# Patient Record
Sex: FEMALE | Race: WHITE | NOT HISPANIC OR LATINO | Employment: UNEMPLOYED | URBAN - METROPOLITAN AREA
[De-identification: names, ages, dates, MRNs, and addresses within clinical notes are randomized per-mention and may not be internally consistent; named-entity substitution may affect disease eponyms.]

---

## 2017-03-14 ENCOUNTER — ALLIED HEALTH/NURSE VISIT (OUTPATIENT)
Dept: FAMILY MEDICINE | Facility: CLINIC | Age: 32
End: 2017-03-14
Payer: COMMERCIAL

## 2017-03-14 DIAGNOSIS — Z11.1 SCREENING EXAMINATION FOR PULMONARY TUBERCULOSIS: Primary | ICD-10-CM

## 2017-03-14 PROCEDURE — 99207 ZZC NO CHARGE NURSE ONLY: CPT

## 2017-03-14 PROCEDURE — 86580 TB INTRADERMAL TEST: CPT

## 2017-03-16 ENCOUNTER — ALLIED HEALTH/NURSE VISIT (OUTPATIENT)
Dept: FAMILY MEDICINE | Facility: CLINIC | Age: 32
End: 2017-03-16
Payer: COMMERCIAL

## 2017-03-16 DIAGNOSIS — Z11.1 VISIT FOR MANTOUX TEST: Primary | ICD-10-CM

## 2017-03-16 PROCEDURE — 99207 ZZC NO CHARGE NURSE ONLY: CPT

## 2017-03-16 NOTE — NURSING NOTE
Pt returned today at 3:15 pm for mantoux read.    Mantoux results NEGATIVE.   No induration.  No swelling.  No redness.     Karmen Walden RN

## 2017-03-16 NOTE — MR AVS SNAPSHOT
After Visit Summary   3/16/2017    Holly Tolliver    MRN: [id number]           Patient Information     Date Of Birth          1985        Visit Information        Provider Department      3/16/2017 3:00 PM AUSTIN ENGLE/IM RN [facility]        Today's Diagnoses     Visit for Mantoux test    -  1       Follow-ups after your visit        Who to contact     If you have questions or need follow up information about today's clinic visit or your schedule please contact [facility] directly at [phone].  Normal or non-critical lab and imaging results will be communicated to you by IncellDxhart, letter or phone within 4 business days after the clinic has received the results. If you do not hear from us within 7 days, please contact the clinic through [employer] or phone. If you have a critical or abnormal lab result, we will notify you by phone as soon as possible.  Submit refill requests through [employer] or call your pharmacy and they will forward the refill request to us. Please allow 3 business days for your refill to be completed.          Additional Information About Your Visit        MyChart Information     [employer] gives you secure access to your electronic health record. If you see a primary care provider, you can also send messages to your care team and make appointments. If you have questions, please call your primary care clinic.  If you do not have a primary care provider, please call [phone] and they will assist you.        Care EveryWhere ID     This is your Care EveryWhere ID. This could be used by other organizations to access your [facility] medical records  [id number]         Blood Pressure from Last 3 Encounters:   12/15/16 129/86   06/10/16 140/83   05/31/16 127/76    Weight from Last 3 Encounters:   12/15/16 272 lb 6.4 oz (123.6 kg)   05/31/16 271 lb 9.6 oz (123.2 kg)   04/12/16 293 lb (132.9 kg)              Today, you had the following     No orders found for  display         Today's Medication Changes          These changes are accurate as of: 3/16/17  6:06 PM.  If you have any questions, ask your nurse or doctor.               These medicines have changed or have updated prescriptions.        Dose/Directions    fluticasone-salmeterol 100-50 MCG/DOSE diskus inhaler   Commonly known as:  ADVAIR   This may have changed:    - how much to take  - how to take this  - when to take this  - additional instructions   Used for:  Mild intermittent asthma without complication        Rx already issued today by  with 1 additional refill.   Quantity:  60 Inhaler   Refills:  1                Primary Care Provider Office Phone # Fax #    WOLFGANG [facility] [phone]       [facility] [address] [place] [address]        Goals        General    I will attend weekly therapy sessions  (pt-stated)     Notes - Note created  3/24/2015 12:26 PM by Kelly Simon    As of today's date 3/24/2015 goal is met at 26 - 50%.   Goal Status:  Active      I will review/research the online support resources given by  within the next 2 weeks  (pt-stated)     Notes - Note created  3/24/2015 12:25 PM by Kelly Simon    As of today's date 3/24/2015 goal is met at 0 - 25%.   Goal Status:  Active        Thank you!     Thank you for choosing [facility]  for your care. Our goal is always to provide you with excellent care. Hearing back from our patients is one way we can continue to improve our services. Please take a few minutes to complete the written survey that you may receive in the mail after your visit with us. Thank you!             Your Updated Medication List - Protect others around you: Learn how to safely use, store and throw away your medicines at www.disposemymeds.org.          This list is accurate as of: 3/16/17  6:06 PM.  Always use your most recent med list.                   Brand Name Dispense Instructions for use    albuterol  108 (90 BASE) MCG/ACT Inhaler    PROAIR HFA/PROVENTIL HFA/VENTOLIN HFA    1 Inhaler    Inhale 2 puffs into the lungs every 4 hours as needed for shortness of breath / dyspnea       citalopram 40 MG tablet    celeXA    90 tablet    Take 1 tablet (40 mg) by mouth daily       fluticasone-salmeterol 100-50 MCG/DOSE diskus inhaler    ADVAIR    60 Inhaler    Rx already issued today by  with 1 additional refill.       ibuprofen 400 MG tablet    ADVIL/MOTRIN    120 tablet    Take 1-2 tablets (400-800 mg) by mouth every 6 hours as needed for other (cramping)       ketoconazole 2 % shampoo    NIZORAL    120 mL    Apply to the affected area and wash off after 5 minutes.  Repeat 3 consecutive days       lamoTRIgine 100 MG tablet    LaMICtal    75 tablet    Take 2.5 tablets (250 mg) by mouth daily       prenatal multivitamin  plus iron 27-0.8 MG Tabs per tablet      Take 1 tablet by mouth daily       ranitidine 150 MG tablet    ZANTAC    60 tablet    Take 1 tablet (150 mg) by mouth 2 times daily

## 2017-03-22 DIAGNOSIS — F32.1 MAJOR DEPRESSIVE DISORDER, SINGLE EPISODE, MODERATE (H): ICD-10-CM

## 2017-03-22 DIAGNOSIS — F31.9 BIPOLAR DISORDER, UNSPECIFIED (H): ICD-10-CM

## 2017-03-22 NOTE — TELEPHONE ENCOUNTER
Reason for Call:  Medication or medication refill:    Do you use a [facility]?  Name of the pharmacy and phone number for the current request:  [facility]    Name of the medication requested: Citalopram 40 mg    Other request: Last filled 2-17-17    Can we leave a detailed message on this number?     Phone number patient can be reached at:     Best Time:     Call taken on 3/22/2017 at 9:12 AM by Ciara Huffman

## 2017-03-22 NOTE — TELEPHONE ENCOUNTER
Lamotrigine  Last Written Prescription Date: 12/15/16  Last Fill Quantity: 75, # refills: 2  Last Office Visit with G, [facility] or [facility] prescribing provider: 12/15/16       BP Readings from Last 3 Encounters:   12/15/16 129/86   06/10/16 140/83   05/31/16 127/76     Date of last Breast Exam: 12/15/16

## 2017-03-27 ENCOUNTER — TELEPHONE (OUTPATIENT)
Dept: FAMILY MEDICINE | Facility: CLINIC | Age: 32
End: 2017-03-27

## 2017-03-27 RX ORDER — LAMOTRIGINE 100 MG/1
250 TABLET ORAL DAILY
Qty: 75 TABLET | Refills: 2 | Status: SHIPPED | OUTPATIENT
Start: 2017-03-27 | End: 2017-03-30

## 2017-03-27 NOTE — TELEPHONE ENCOUNTER
Request for refill:    citalopram (CELEXA) 40 MG tablet 90 tablet 4 3/4/2016  No      Sig: Take 1 tablet (40 mg) by mouth daily     Last office visit with Dr Lino 5/31/16 for 6 week PP.   Last refill 3/4/16.    Unable to refill per refill protocol.    Please review and advise.    Aleyda KAMINSKI RN  Specialty [facility]

## 2017-03-27 NOTE — TELEPHONE ENCOUNTER
Routing refill request to provider for review/approval because:  Drug not on the [facility] refill protocol     Karen Montague RN

## 2017-03-28 ENCOUNTER — ALLIED HEALTH/NURSE VISIT (OUTPATIENT)
Dept: FAMILY MEDICINE | Facility: CLINIC | Age: 32
End: 2017-03-28
Payer: COMMERCIAL

## 2017-03-28 DIAGNOSIS — Z11.1 VISIT FOR MANTOUX TEST: Primary | ICD-10-CM

## 2017-03-28 PROCEDURE — 86580 TB INTRADERMAL TEST: CPT

## 2017-03-28 PROCEDURE — 99207 ZZC NO CHARGE NURSE ONLY: CPT

## 2017-03-28 ASSESSMENT — ANXIETY QUESTIONNAIRES
7. FEELING AFRAID AS IF SOMETHING AWFUL MIGHT HAPPEN: SEVERAL DAYS
6. BECOMING EASILY ANNOYED OR IRRITABLE: NEARLY EVERY DAY
IF YOU CHECKED OFF ANY PROBLEMS ON THIS QUESTIONNAIRE, HOW DIFFICULT HAVE THESE PROBLEMS MADE IT FOR YOU TO DO YOUR WORK, TAKE CARE OF THINGS AT HOME, OR GET ALONG WITH OTHER PEOPLE: EXTREMELY DIFFICULT
3. WORRYING TOO MUCH ABOUT DIFFERENT THINGS: NEARLY EVERY DAY
5. BEING SO RESTLESS THAT IT IS HARD TO SIT STILL: NEARLY EVERY DAY
2. NOT BEING ABLE TO STOP OR CONTROL WORRYING: NEARLY EVERY DAY

## 2017-03-28 ASSESSMENT — PATIENT HEALTH QUESTIONNAIRE - PHQ9: 5. POOR APPETITE OR OVEREATING: NEARLY EVERY DAY

## 2017-03-29 ASSESSMENT — ASTHMA QUESTIONNAIRES: ACT_TOTALSCORE: 20

## 2017-03-29 ASSESSMENT — PATIENT HEALTH QUESTIONNAIRE - PHQ9: SUM OF ALL RESPONSES TO PHQ QUESTIONS 1-9: 12

## 2017-03-30 ENCOUNTER — ALLIED HEALTH/NURSE VISIT (OUTPATIENT)
Dept: FAMILY MEDICINE | Facility: CLINIC | Age: 32
End: 2017-03-30
Payer: COMMERCIAL

## 2017-03-30 ENCOUNTER — OFFICE VISIT (OUTPATIENT)
Dept: FAMILY MEDICINE | Facility: CLINIC | Age: 32
End: 2017-03-30
Payer: COMMERCIAL

## 2017-03-30 VITALS
WEIGHT: 278 LBS | SYSTOLIC BLOOD PRESSURE: 131 MMHG | BODY MASS INDEX: 37.65 KG/M2 | HEART RATE: 92 BPM | TEMPERATURE: 97.7 F | DIASTOLIC BLOOD PRESSURE: 85 MMHG | HEIGHT: 72 IN

## 2017-03-30 DIAGNOSIS — J45.20 MILD INTERMITTENT ASTHMA WITHOUT COMPLICATION: ICD-10-CM

## 2017-03-30 DIAGNOSIS — K21.9 GASTROESOPHAGEAL REFLUX DISEASE WITHOUT ESOPHAGITIS: ICD-10-CM

## 2017-03-30 DIAGNOSIS — Z11.1 SCREENING EXAMINATION FOR PULMONARY TUBERCULOSIS: Primary | ICD-10-CM

## 2017-03-30 DIAGNOSIS — F31.9 BIPOLAR DISORDER, UNSPECIFIED (H): Primary | ICD-10-CM

## 2017-03-30 LAB
PPDINDURATION: 0 MM (ref 0–5)
PPDREDNESS: 0 MM

## 2017-03-30 PROCEDURE — 99213 OFFICE O/P EST LOW 20 MIN: CPT | Performed by: NURSE PRACTITIONER

## 2017-03-30 PROCEDURE — 99207 ZZC NO CHARGE NURSE ONLY: CPT

## 2017-03-30 RX ORDER — LAMOTRIGINE 100 MG/1
200 TABLET ORAL DAILY
Qty: 60 TABLET | Refills: 3 | Status: SHIPPED | OUTPATIENT
Start: 2017-03-30 | End: 2017-08-04

## 2017-03-30 RX ORDER — ALBUTEROL SULFATE 90 UG/1
2 AEROSOL, METERED RESPIRATORY (INHALATION) EVERY 4 HOURS PRN
Qty: 1 INHALER | Refills: 5 | Status: SHIPPED | OUTPATIENT
Start: 2017-03-30 | End: 2018-03-06

## 2017-03-30 RX ORDER — CITALOPRAM HYDROBROMIDE 40 MG/1
40 TABLET ORAL DAILY
Qty: 90 TABLET | Refills: 4 | Status: SHIPPED | OUTPATIENT
Start: 2017-03-30 | End: 2017-09-15 | Stop reason: ALTCHOICE

## 2017-03-30 NOTE — PROGRESS NOTES
SUBJECTIVE:                                                    Holly Tolliver is a 31 year old female who presents to clinic today for the following health issues:      Depression and Anxiety Follow-Up  Patient made appointment for medication refill of lamictal and celexa    Doesn't like lamictal, doesn't think it is working  Causes her to sleep foggy  Wants to wean down.    States that her previous psychiatrist diagnosed her with bipolar and put her on the lamictal  She thinks the Celexa is working well.    Currently doesn't have a psychiatrist or a psychologist.  States that her mood disorders have been difficult to control in the past      Status since last visit: No change- thinks her anxiety is bad    Depression is OK    Other associated symptoms: feels anxious all the time, hard time concentrating    Complicating factors:     Significant life event: No     Current substance abuse: None    PHQ-9 SCORE 4/8/2015 9/28/2015 3/28/2017   Total Score 12 - -   Total Score - 12 12     RUBENS-7 SCORE 10/28/2014 4/8/2015 9/28/2015   Total Score 19 14 -   Total Score - - 18            Amount of exercise or physical activity: 2 days per week    Problems taking medications regularly: No    Medication side effects: feels off balance from lamictal    Diet: regular (no restrictions)        Asthma Follow-Up    Was ACT completed today?    Yes    ACT Total Scores 3/30/2017   ACT TOTAL SCORE (Goal Greater than or Equal to 20) 23   In the past 12 months, how many times did you visit the emergency room for your asthma without being admitted to the hospital? 0   In the past 12 months, how many times were you hospitalized overnight because of your asthma? 0       Recent asthma triggers that patient is dealing with: exercise or sports        Problem list and histories reviewed & adjusted, as indicated.  Additional history: as documented      Reviewed and updated as needed this visit by clinical staff  Tobacco  Allergies  Meds      "  Reviewed and updated as needed this visit by Provider         ROS:  Constitutional, HEENT, cardiovascular, pulmonary, gi and gu systems are negative, except as otherwise noted.    OBJECTIVE:                                                    /85  Pulse 92  Temp 97.7  F (36.5  C) (Tympanic)  Ht 5' 11.75\" (1.822 m)  Wt 278 lb (126.1 kg)  BMI 37.97 kg/m2  Body mass index is 37.97 kg/(m^2).  GENERAL: healthy, alert and no distress  PSYCH: mentation appears normal, affect normal/bright         ASSESSMENT/PLAN:                                                        ICD-10-CM    1. Bipolar disorder, unspecified (H) F31.9 Will decrease her lamictal to 200 mg daily.  Referral to psychiatry for medical management.    lamoTRIgine (LAMICTAL) 100 MG tablet     MENTAL HEALTH REFERRAL   2. Gastroesophageal reflux disease without esophagitis K21.9 ranitidine (ZANTAC) 150 MG tablet   3. Mild intermittent asthma without complication J45.20 albuterol (PROAIR HFA/PROVENTIL HFA/VENTOLIN HFA) 108 (90 BASE) MCG/ACT Inhaler         The risks, benefits and treatment options of prescribed medications or other treatments have been discussed with the patient. The patient verbalized their understanding and should call or follow up if no improvement or if they develop further problems.    WOLFGANG Riley [facility]  "

## 2017-03-30 NOTE — NURSING NOTE
"Chief Complaint   Patient presents with     Depression     medication check     Anxiety       Initial /85  Pulse 92  Temp 97.7  F (36.5  C) (Tympanic)  Ht 5' 11.75\" (1.822 m)  Wt 278 lb (126.1 kg)  BMI 37.97 kg/m2 Estimated body mass index is 37.97 kg/(m^2) as calculated from the following:    Height as of this encounter: 5' 11.75\" (1.822 m).    Weight as of this encounter: 278 lb (126.1 kg).  Medication Reconciliation: complete  "

## 2017-03-30 NOTE — MR AVS SNAPSHOT
After Visit Summary   3/30/2017    Holly Tolliver    MRN: [id number]           Patient Information     Date Of Birth          1985        Visit Information        Provider Department      3/30/2017 2:00 PM Ana Maria Hodge APRN CNP [facility]        Today's Diagnoses     Bipolar disorder, unspecified (H)    -  1    Gastroesophageal reflux disease without esophagitis        Mild intermittent asthma without complication           Follow-ups after your visit        Additional Services     MENTAL HEALTH REFERRAL       Your provider has referred you to: [facility]: [facility] - [facility]  [phone]  Yu Loera     [email or web address]   *Referral from [facility] Primary Care Provider required - Consultation Model - medication management & future refills will be returned to [facility] PCP upon completion of evaluation  *Please call to schedule an appointment.    All scheduling is subject to the client's specific insurance plan & benefits, provider/location availability, and provider clinical specialities.  Please arrive 15 minutes early for your first appointment and bring your completed paperwork.    Please be aware that coverage of these services is subject to the terms and limitations of your health insurance plan.  Call member services at your health plan with any benefit or coverage questions.                  Your next 10 appointments already scheduled     Mar 30, 2017  3:45 PM CDT   Nurse Only with AUSTIN ENGLE/MELANIE RN   [facility] ([facility])    [address] [address] [phone]              Who to contact     If you have questions or need follow up information about today's clinic visit or your schedule please contact [facility] directly at [phone].  Normal or non-critical lab and imaging results will be  "communicated to you by Risktailhart, letter or phone within 4 business days after the clinic has received the results. If you do not hear from us within 7 days, please contact the clinic through [employer] or phone. If you have a critical or abnormal lab result, we will notify you by phone as soon as possible.  Submit refill requests through [employer] or call your pharmacy and they will forward the refill request to us. Please allow 3 business days for your refill to be completed.          Additional Information About Your Visit        [employer] Information     [employer] gives you secure access to your electronic health record. If you see a primary care provider, you can also send messages to your care team and make appointments. If you have questions, please call your primary care clinic.  If you do not have a primary care provider, please call [phone] and they will assist you.        Care EveryWhere ID     This is your Care EveryWhere ID. This could be used by other organizations to access your [facility] medical records  [id number]        Your Vitals Were     Pulse Temperature Height BMI (Body Mass Index)          92 97.7  F (36.5  C) (Tympanic) 5' 11.75\" (1.822 m) 37.97 kg/m2         Blood Pressure from Last 3 Encounters:   03/30/17 131/85   12/15/16 129/86   06/10/16 140/83    Weight from Last 3 Encounters:   03/30/17 278 lb (126.1 kg)   12/15/16 272 lb 6.4 oz (123.6 kg)   05/31/16 271 lb 9.6 oz (123.2 kg)              We Performed the Following     MENTAL HEALTH REFERRAL          Today's Medication Changes          These changes are accurate as of: 3/30/17  2:29 PM.  If you have any questions, ask your nurse or doctor.               These medicines have changed or have updated prescriptions.        Dose/Directions    lamoTRIgine 100 MG tablet   Commonly known as:  LaMICtal   This may have changed:  how much to take   Used for:  Bipolar disorder, unspecified (H)   Changed by:  Ana Maria Hodge APRN CNP     "    Dose:  200 mg   Take 2 tablets (200 mg) by mouth daily   Quantity:  60 tablet   Refills:  3            Where to get your medicines      These medications were sent to [id number] - [place], [place] - [address]  [address] [facility] [address]    Hours:  Closed 10-16-08 business to [facility] Phone:  [phone]     albuterol 108 (90 BASE) MCG/ACT Inhaler    lamoTRIgine 100 MG tablet    ranitidine 150 MG tablet                Primary Care Provider Office Phone # Fax #    Yue Oneill, APRN [facility] [phone]       [facility] [address] [address]        Goals        General    I will attend weekly therapy sessions  (pt-stated)     Notes - Note created  3/24/2015 12:26 PM by Kelly Simon    As of today's date 3/24/2015 goal is met at 26 - 50%.   Goal Status:  Active      I will review/research the online support resources given by CAROLINE within the next 2 weeks  (pt-stated)     Notes - Note created  3/24/2015 12:25 PM by Kelly Simon    As of today's date 3/24/2015 goal is met at 0 - 25%.   Goal Status:  Active        Thank you!     Thank you for choosing [facility]  for your care. Our goal is always to provide you with excellent care. Hearing back from our patients is one way we can continue to improve our services. Please take a few minutes to complete the written survey that you may receive in the mail after your visit with us. Thank you!             Your Updated Medication List - Protect others around you: Learn how to safely use, store and throw away your medicines at www.disposemymeds.org.          This list is accurate as of: 3/30/17  2:29 PM.  Always use your most recent med list.                   Brand Name Dispense Instructions for use    albuterol 108 (90 BASE) MCG/ACT Inhaler    PROAIR HFA/PROVENTIL HFA/VENTOLIN HFA    1 Inhaler    Inhale 2 puffs into the lungs every 4 hours as needed for shortness of breath / dyspnea        citalopram 40 MG tablet    celeXA    90 tablet    Take 1 tablet (40 mg) by mouth daily       ibuprofen 400 MG tablet    ADVIL/MOTRIN    120 tablet    Take 1-2 tablets (400-800 mg) by mouth every 6 hours as needed for other (cramping)       ketoconazole 2 % shampoo    NIZORAL    120 mL    Apply to the affected area and wash off after 5 minutes.  Repeat 3 consecutive days       lamoTRIgine 100 MG tablet    LaMICtal    60 tablet    Take 2 tablets (200 mg) by mouth daily       ranitidine 150 MG tablet    ZANTAC    60 tablet    Take 1 tablet (150 mg) by mouth 2 times daily

## 2017-03-30 NOTE — MR AVS SNAPSHOT
After Visit Summary   3/30/2017    Holly Tolliver    MRN: [id number]           Patient Information     Date Of Birth          1985        Visit Information        Provider Department      3/30/2017 3:45 PM AUSTIN ENGLE/MELANIE KRAFT [facility]        Today's Diagnoses     Screening examination for pulmonary tuberculosis    -  1       Follow-ups after your visit        Who to contact     If you have questions or need follow up information about today's clinic visit or your schedule please contact [facility] directly at [phone].  Normal or non-critical lab and imaging results will be communicated to you by Besstechhart, letter or phone within 4 business days after the clinic has received the results. If you do not hear from us within 7 days, please contact the clinic through [employer] or phone. If you have a critical or abnormal lab result, we will notify you by phone as soon as possible.  Submit refill requests through [employer] or call your pharmacy and they will forward the refill request to us. Please allow 3 business days for your refill to be completed.          Additional Information About Your Visit        MyChart Information     [employer] gives you secure access to your electronic health record. If you see a primary care provider, you can also send messages to your care team and make appointments. If you have questions, please call your primary care clinic.  If you do not have a primary care provider, please call [phone] and they will assist you.        Care EveryWhere ID     This is your Care EveryWhere ID. This could be used by other organizations to access your [facility] medical records  [id number]         Blood Pressure from Last 3 Encounters:   03/30/17 131/85   12/15/16 129/86   06/10/16 140/83    Weight from Last 3 Encounters:   03/30/17 278 lb (126.1 kg)   12/15/16 272 lb 6.4 oz (123.6 kg)   05/31/16 271 lb 9.6 oz (123.2 kg)              Today, you had the  following     No orders found for display         Today's Medication Changes          These changes are accurate as of: 3/30/17  3:56 PM.  If you have any questions, ask your nurse or doctor.               These medicines have changed or have updated prescriptions.        Dose/Directions    lamoTRIgine 100 MG tablet   Commonly known as:  LaMICtal   This may have changed:  how much to take   Used for:  Bipolar disorder, unspecified (H)   Changed by:  Ana Maria Hodge APRN CNP        Dose:  200 mg   Take 2 tablets (200 mg) by mouth daily   Quantity:  60 tablet   Refills:  3            Where to get your medicines      These medications were sent to [id number] - [place], [place] - [address]  [address] [address]    Hours:  Closed 10-16-08 business to [facility] Phone:  [phone]     albuterol 108 (90 BASE) MCG/ACT Inhaler    lamoTRIgine 100 MG tablet    ranitidine 150 MG tablet                Primary Care Provider Office Phone # Fax #    Yue WOLFGANG Poole [phone]       [facility] [address] [address]        Goals        General    I will attend weekly therapy sessions  (pt-stated)     Notes - Note created  3/24/2015 12:26 PM by Kelly Simon    As of today's date 3/24/2015 goal is met at 26 - 50%.   Goal Status:  Active      I will review/research the online support resources given by  within the next 2 weeks  (pt-stated)     Notes - Note created  3/24/2015 12:25 PM by Kelly Simon    As of today's date 3/24/2015 goal is met at 0 - 25%.   Goal Status:  Active        Thank you!     Thank you for choosing [facility]  for your care. Our goal is always to provide you with excellent care. Hearing back from our patients is one way we can continue to improve our services. Please take a few minutes to complete the written survey that you may receive in the mail after your visit with us. Thank you!              Your Updated Medication List - Protect others around you: Learn how to safely use, store and throw away your medicines at www.disposemymeds.org.          This list is accurate as of: 3/30/17  3:56 PM.  Always use your most recent med list.                   Brand Name Dispense Instructions for use    albuterol 108 (90 BASE) MCG/ACT Inhaler    PROAIR HFA/PROVENTIL HFA/VENTOLIN HFA    1 Inhaler    Inhale 2 puffs into the lungs every 4 hours as needed for shortness of breath / dyspnea       citalopram 40 MG tablet    celeXA    90 tablet    Take 1 tablet (40 mg) by mouth daily       ibuprofen 400 MG tablet    ADVIL/MOTRIN    120 tablet    Take 1-2 tablets (400-800 mg) by mouth every 6 hours as needed for other (cramping)       ketoconazole 2 % shampoo    NIZORAL    120 mL    Apply to the affected area and wash off after 5 minutes.  Repeat 3 consecutive days       lamoTRIgine 100 MG tablet    LaMICtal    60 tablet    Take 2 tablets (200 mg) by mouth daily       ranitidine 150 MG tablet    ZANTAC    60 tablet    Take 1 tablet (150 mg) by mouth 2 times daily

## 2017-03-31 ASSESSMENT — ASTHMA QUESTIONNAIRES: ACT_TOTALSCORE: 23

## 2017-04-26 ENCOUNTER — HOSPITAL ENCOUNTER (EMERGENCY)
Facility: CLINIC | Age: 32
Discharge: HOME | End: 2017-04-26
Attending: FAMILY MEDICINE | Admitting: FAMILY MEDICINE
Payer: COMMERCIAL

## 2017-04-26 VITALS
SYSTOLIC BLOOD PRESSURE: 158 MMHG | DIASTOLIC BLOOD PRESSURE: 92 MMHG | HEIGHT: 72 IN | RESPIRATION RATE: 16 BRPM | HEART RATE: 77 BPM | OXYGEN SATURATION: 96 % | BODY MASS INDEX: 37.11 KG/M2 | WEIGHT: 274 LBS | TEMPERATURE: 98.2 F

## 2017-04-26 DIAGNOSIS — M54.89 LEFT PARASPINAL BACK PAIN: ICD-10-CM

## 2017-04-26 PROCEDURE — 99212 OFFICE O/P EST SF 10 MIN: CPT

## 2017-04-26 PROCEDURE — 99213 OFFICE O/P EST LOW 20 MIN: CPT | Performed by: FAMILY MEDICINE

## 2017-04-26 RX ORDER — CYCLOBENZAPRINE HCL 10 MG
10 TABLET ORAL 3 TIMES DAILY PRN
Qty: 20 TABLET | Refills: 0 | Status: SHIPPED | OUTPATIENT
Start: 2017-04-26 | End: 2017-05-02

## 2017-04-26 RX ORDER — MELOXICAM 15 MG/1
15 TABLET ORAL DAILY
Qty: 30 TABLET | Refills: 0 | Status: SHIPPED | OUTPATIENT
Start: 2017-04-26 | End: 2017-09-15

## 2017-04-26 NOTE — DISCHARGE INSTRUCTIONS
(M54.89) Left paraspinal back pain     the Meloxicam and take daily with meals, while on this medication, avoid other nsaids (no aspirin, Naproxen, Advil, Alleve). The only over the counter medication you can take with this Meloxicam is Tylenol 1000mg three times a day as needed- take three times a day for now while in pain then back down as you get better.    Use the muscle relaxer flexeril.    Do stretching exercises.    Continue to apply heat- as needed.

## 2017-04-26 NOTE — ED AVS SNAPSHOT
[facility] Emergency Department    [address] [address]    Phone:  [phone]    Fax:  [phone]                                       Holly Tolliver   MRN: [id number]    Department:  [facility] Emergency Department   Date of Visit:  4/26/2017           Patient Information     Date Of Birth          1985        Your diagnoses for this visit were:     Left paraspinal back pain        You were seen by Ani Jarquin MD.      Follow-up Information     Follow up with Yue Oneill APRN CNP.    Specialty:  Nurse Practitioner    Why:  As needed    Contact information:    [facility]  [address] [address]  [phone]          Discharge Instructions       (M54.89) Left paraspinal back pain     the Meloxicam and take daily with meals, while on this medication, avoid other nsaids (no aspirin, Naproxen, Advil, Alleve). The only over the counter medication you can take with this Meloxicam is Tylenol 1000mg three times a day as needed- take three times a day for now while in pain then back down as you get better.    Use the muscle relaxer flexeril.    Do stretching exercises.    Continue to apply heat- as needed.            24 Hour Appointment Hotline       To make an appointment at any [facility], call [phone] ([phone]). If you don't have a family doctor or clinic, we will help you find one. [facility] are conveniently located to serve the needs of you and your family.             Review of your medicines      START taking        Dose / Directions Last dose taken    cyclobenzaprine 10 MG tablet   Commonly known as:  FLEXERIL   Dose:  10 mg   Quantity:  20 tablet        Take 1 tablet (10 mg) by mouth 3 times daily as needed for muscle spasms   Refills:  0        meloxicam 15 MG tablet   Commonly known as:  MOBIC   Dose:  15 mg   Quantity:  30 tablet        Take 1 tablet (15 mg) by mouth daily for 14 days    Refills:  0          Our records show that you are taking the medicines listed below. If these are incorrect, please call your family doctor or clinic.        Dose / Directions Last dose taken    albuterol 108 (90 BASE) MCG/ACT Inhaler   Commonly known as:  PROAIR HFA/PROVENTIL HFA/VENTOLIN HFA   Dose:  2 puff   Quantity:  1 Inhaler        Inhale 2 puffs into the lungs every 4 hours as needed for shortness of breath / dyspnea   Refills:  5        citalopram 40 MG tablet   Commonly known as:  celeXA   Dose:  40 mg   Quantity:  90 tablet        Take 1 tablet (40 mg) by mouth daily   Refills:  4        ibuprofen 400 MG tablet   Commonly known as:  ADVIL/MOTRIN   Dose:  400-800 mg   Quantity:  120 tablet        Take 1-2 tablets (400-800 mg) by mouth every 6 hours as needed for other (cramping)   Refills:  0        ketoconazole 2 % shampoo   Commonly known as:  NIZORAL   Quantity:  120 mL        Apply to the affected area and wash off after 5 minutes.  Repeat 3 consecutive days   Refills:  1        lamoTRIgine 100 MG tablet   Commonly known as:  LaMICtal   Dose:  200 mg   Quantity:  60 tablet        Take 2 tablets (200 mg) by mouth daily   Refills:  3        ranitidine 150 MG tablet   Commonly known as:  ZANTAC   Dose:  150 mg   Quantity:  60 tablet        Take 1 tablet (150 mg) by mouth 2 times daily   Refills:  1                Prescriptions were sent or printed at these locations (2 Prescriptions)                   [facility] [place] [id number] [address]    Telephone:  [phone]   Fax:  [phone]   Hours:                  E-Prescribed (2 of 2)         cyclobenzaprine (FLEXERIL) 10 MG tablet               meloxicam (MOBIC) 15 MG tablet                Orders Needing Specimen Collection     None      Pending Results     No orders found from 4/24/2017 to 4/27/2017.            Pending Culture Results     No orders found from 4/24/2017 to 4/27/2017.             Test Results From Your Hospital Stay               Thank you for choosing [facility]       Thank you for choosing [facility] for your care. Our goal is always to provide you with excellent care. Hearing back from our patients is one way we can continue to improve our services. Please take a few minutes to complete the written survey that you may receive in the mail after you visit with us. Thank you!        [employer] Information     [employer] gives you secure access to your electronic health record. If you see a primary care provider, you can also send messages to your care team and make appointments. If you have questions, please call your primary care clinic.  If you do not have a primary care provider, please call [phone] and they will assist you.        Care EveryWhere ID     This is your Care EveryWhere ID. This could be used by other organizations to access your [facility] medical records  [id number]        After Visit Summary       This is your record. Keep this with you and show to your community pharmacist(s) and doctor(s) at your next visit.

## 2017-04-26 NOTE — ED AVS SNAPSHOT
[facility] Emergency Department    [address] [address]    Phone:  [phone]    Fax:  [phone]                                       Holly Tolliver   MRN: [id number]    Department:  [facility] Emergency Department   Date of Visit:  4/26/2017           After Visit Summary Signature Page     I have received my discharge instructions, and my questions have been answered. I have discussed any challenges I see with this plan with the nurse or doctor.    ..........................................................................................................................................  Patient/Patient Representative Signature      ..........................................................................................................................................  Patient Representative Print Name and Relationship to Patient    ..................................................               ................................................  Date                                            Time    ..........................................................................................................................................  Reviewed by Signature/Title    ...................................................              ..............................................  Date                                                            Time

## 2017-04-26 NOTE — ED PROVIDER NOTES
History     Chief Complaint   Patient presents with     Back Pain     lumbar      HPI  Holly Tolliver is a 32 year old female who was in her usual state of health till a couple of days ago.  She was bending over at work 2 days ago when she started feeling pain in her back- mostly over the left mid back region (she works as a  and does a lot of bending at work). No known preceeding trauma. Progressively worsening since onset. Today the pain is rated at 8/10 just sitting here, up to 9/10 when she is moving about. She has taken 800mg ibuprofen and this seems to help but just a little. Laying on a heating pad seems to help a little, ice did not help.    Blood pressure noted to be elevated. She does not have hypertension.     I have reviewed the Medications, Allergies, Past Medical and Surgical History, and Social History in the Epic system.    Review of Systems   Pertinent aspects as in the history.     Physical Exam   BP: (!) 158/92  Pulse: 77  Temp: 98.2  F (36.8  C)  Resp: 16  Height: 182.9 cm (6')  Weight: 124.3 kg (274 lb)  SpO2: 96 %  Physical Exam   General - Awake and alert, not in any obvious distress. Afebrile, not pale well hydrated.  Head - Normocephalic, atraumatic.  Musculoskeletal: normal gait and station. Slightly exaggerated lumbar lordosis. Tenderness over left paraspinal region in the thoracic region. Slight limitation in flexion and left lateral flexion but extension and right flexion normal at waist.  Psych - Judgment and mental status are clear, patient has reasonable insight. Mood is stable.        ED Course     ED Course     Procedures        None       Labs Ordered and Resulted from Time of ED Arrival Up to the Time of Departure from the ED - No data to display    Assessments & Plan (with Medical Decision Making)     I have reviewed the nursing notes.    I have reviewed the findings, diagnosis, plan and need for follow up with the patient.    New Prescriptions    CYCLOBENZAPRINE  (FLEXERIL) 10 MG TABLET    Take 1 tablet (10 mg) by mouth 3 times daily as needed for muscle spasms    MELOXICAM (MOBIC) 15 MG TABLET    Take 1 tablet (15 mg) by mouth daily for 14 days       Final diagnoses:   Left paraspinal back pain       Diagnosis, differential diagnosis, treatment and prognosis discussed with patient.  Work note given  Discussed and will follow up with PCP with regards to elevated blood pressure.    See below for summary discussion with patient     the Meloxicam and take daily with meals, while on this medication, avoid other nsaids (no aspirin, Naproxen, Advil, Alleve). The only over the counter medication you can take with this Meloxicam is Tylenol 1000mg three times a day as needed- take three times a day for now while in pain then back down as you get better.    Use the muscle relaxer flexeril.    Do stretching exercises.    Continue to apply heat- as needed.      4/26/2017   [facility] EMERGENCY DEPARTMENT     Ani Jarquin MD  04/26/17 [id number]

## 2017-04-26 NOTE — LETTER
Holly Tolliver  [address]  [place] [address]    April 26, 2017           To Whom It May Concern:      Holly Tolliver was seen in our clinic. She may return to work without restrictions.      Sincerely,      Ani Jarquin

## 2017-08-04 DIAGNOSIS — F31.9 BIPOLAR DISORDER, UNSPECIFIED (H): ICD-10-CM

## 2017-08-04 NOTE — TELEPHONE ENCOUNTER
Lamotrigine      Last Written Prescription Date: 3/30/2017  Last Fill Quantity: 60,  # refills: 3   Last Office Visit with G, P or [facility] prescribing provider: 3/30/2017

## 2017-08-09 RX ORDER — LAMOTRIGINE 100 MG/1
200 TABLET ORAL DAILY
Qty: 60 TABLET | Refills: 0 | Status: SHIPPED | OUTPATIENT
Start: 2017-08-09 | End: 2017-09-05

## 2017-08-09 NOTE — TELEPHONE ENCOUNTER
When I saw her in March she didn't like the lamotrigine.  I lowered the dose and referred her to psychiatry for medication management.  Did she meet with psychiatry?  She needs to follow up.  Ana Maria Hodge, CNP

## 2017-08-09 NOTE — TELEPHONE ENCOUNTER
Refilled for one month.  She should make an appointment with Yu Loera for medication management.  Ana Maria Hodge, CNP

## 2017-08-09 NOTE — TELEPHONE ENCOUNTER
"Patient called back and she made an appt with Dr Morin tomorrow.     Called her, advised of Ana Maria's recommendation that she be seen by Yu, and that Ana Maria will send in meds to allow her time to do this.   \"Ok, I will call them right back, they didn't have an opening until next week. \"  She will have meds per Ana Maria until she is seen by uY.     Elizabeth Tejada RNC      "

## 2017-08-09 NOTE — TELEPHONE ENCOUNTER
"I reached her, \"I took my last pills today, no I did not see the psyciatric nurse, I just stayed on the lower dose. \"    She wanted me to set her up in the clinic today with Ana Maria or Ruby Oneill, and neither have openings until next week, \"I can't wait, I am out of pills today. \"    She is going to try to call to see Yu Loera today or tomorrow, and if not, will call our clinic back to see someone else today or tomorrow.     Ana Maria, do you want to provide a short supply until she can be seen?   Elizabeth Tejada RNC    "

## 2017-09-05 DIAGNOSIS — F31.9 BIPOLAR DISORDER, UNSPECIFIED (H): ICD-10-CM

## 2017-09-06 ENCOUNTER — MEDICAL CORRESPONDENCE (OUTPATIENT)
Dept: HEALTH INFORMATION MANAGEMENT | Facility: CLINIC | Age: 32
End: 2017-09-06

## 2017-09-06 RX ORDER — LAMOTRIGINE 100 MG/1
TABLET ORAL
Qty: 60 TABLET | Refills: 0 | Status: SHIPPED | OUTPATIENT
Start: 2017-09-06 | End: 2017-09-15

## 2017-09-06 NOTE — TELEPHONE ENCOUNTER
Patient has a request for Lamictal  She was a no show according to epic for Yu Loera 8/17/17    Lamictal  Last Written Prescription Date: 8/9/17  Last Fill Quantity: 60,  # refills: 0   Last Office Visit with G, P or [facility] prescribing provider: 3/30/17    Routing refill request to provider for review/approval because:  Patient due for OV    Routing to provider.    Rose VALLES Rn

## 2017-09-06 NOTE — TELEPHONE ENCOUNTER
Please call patient and let her know her medication was refilled for one month.    Celestina García RN

## 2017-09-06 NOTE — TELEPHONE ENCOUNTER
Pt is out of this medication and wondering if she can get this refilled. [facility]

## 2017-09-15 ENCOUNTER — OFFICE VISIT (OUTPATIENT)
Dept: PSYCHIATRY | Facility: CLINIC | Age: 32
End: 2017-09-15
Attending: NURSE PRACTITIONER
Payer: COMMERCIAL

## 2017-09-15 VITALS
HEART RATE: 76 BPM | WEIGHT: 268 LBS | DIASTOLIC BLOOD PRESSURE: 89 MMHG | SYSTOLIC BLOOD PRESSURE: 142 MMHG | BODY MASS INDEX: 36.35 KG/M2 | TEMPERATURE: 97.6 F

## 2017-09-15 DIAGNOSIS — F33.1 MAJOR DEPRESSIVE DISORDER, RECURRENT EPISODE, MODERATE (H): ICD-10-CM

## 2017-09-15 DIAGNOSIS — F90.2 ATTENTION DEFICIT HYPERACTIVITY DISORDER (ADHD), COMBINED TYPE: Primary | ICD-10-CM

## 2017-09-15 DIAGNOSIS — F41.1 GAD (GENERALIZED ANXIETY DISORDER): ICD-10-CM

## 2017-09-15 PROCEDURE — 90792 PSYCH DIAG EVAL W/MED SRVCS: CPT | Performed by: CLINICAL NURSE SPECIALIST

## 2017-09-15 RX ORDER — ATOMOXETINE 40 MG/1
40 CAPSULE ORAL DAILY
Qty: 30 CAPSULE | Refills: 1 | Status: SHIPPED | OUTPATIENT
Start: 2017-09-15 | End: 2017-09-22

## 2017-09-15 ASSESSMENT — ANXIETY QUESTIONNAIRES
3. WORRYING TOO MUCH ABOUT DIFFERENT THINGS: NEARLY EVERY DAY
GAD7 TOTAL SCORE: 19
2. NOT BEING ABLE TO STOP OR CONTROL WORRYING: NEARLY EVERY DAY
6. BECOMING EASILY ANNOYED OR IRRITABLE: NEARLY EVERY DAY
4. TROUBLE RELAXING: NEARLY EVERY DAY
5. BEING SO RESTLESS THAT IT IS HARD TO SIT STILL: NEARLY EVERY DAY
1. FEELING NERVOUS, ANXIOUS, OR ON EDGE: NEARLY EVERY DAY
IF YOU CHECKED OFF ANY PROBLEMS ON THIS QUESTIONNAIRE, HOW DIFFICULT HAVE THESE PROBLEMS MADE IT FOR YOU TO DO YOUR WORK, TAKE CARE OF THINGS AT HOME, OR GET ALONG WITH OTHER PEOPLE: EXTREMELY DIFFICULT
7. FEELING AFRAID AS IF SOMETHING AWFUL MIGHT HAPPEN: SEVERAL DAYS

## 2017-09-15 ASSESSMENT — PATIENT HEALTH QUESTIONNAIRE - PHQ9: SUM OF ALL RESPONSES TO PHQ QUESTIONS 1-9: 12

## 2017-09-15 NOTE — NURSING NOTE
Chief Complaint   Patient presents with     Consult       Initial /89 (BP Location: Left arm, Patient Position: Sitting, Cuff Size: Adult Large)  Pulse 76  Temp 97.6  F (36.4  C) (Tympanic)  Wt 268 lb (121.6 kg)  BMI 36.35 kg/m2 Estimated body mass index is 36.35 kg/(m^2) as calculated from the following:    Height as of 4/26/17: 6' (1.829 m).    Weight as of this encounter: 268 lb (121.6 kg).  Medication Reconciliation: complete

## 2017-09-15 NOTE — PATIENT INSTRUCTIONS
Treatment Plan:  Taper to discontinue citalopram (Celexa) by reducing to 20 mg for four days then discontinue.     Begin atomoxetine (Strattera) 40 mg daily. If that seems too high, phone for atomoxetine (Strattera) 25 mg daily. If tolerating the atomoxetine (Strattera) 40 mg for two weeks, phone for 60 mg daily.     Taper to discontinue lamotrigine (Lamictal) by reducing by 50 mg every week until discontinued.     Begin individual therapy.     Bring in ADHD testing results.     Follow up in one month.     - Recommend patient discuss medications with their pharmacist. Risks and benefits of medications discussed, including side effect profile.   - Safety plan was reviewed; to the ER as needed or call after hours crisis line; [phone]  - Education and counseling was done regarding use of medications, psychotherapy options  - Call [phone] for appointment or to speak to a nurse.   -Office hours: Monday through Thursday 8:00 am to 4:30 pm; Friday 8:00 am to Noon.   - Patient was given a copy of this Treatment Plan today.
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
## 2017-09-15 NOTE — MR AVS SNAPSHOT
After Visit Summary   9/15/2017    Holly Tolliver    MRN: [id number]           Patient Information     Date Of Birth          1985        Visit Information        Provider Department      9/15/2017 7:45 AM Yu Loera APRN [facility]        Today's Diagnoses     Attention deficit hyperactivity disorder (ADHD), combined type    -  1      Care Instructions    Treatment Plan:  Taper to discontinue citalopram (Celexa) by reducing to 20 mg for four days then discontinue.     Begin atomoxetine (Strattera) 40 mg daily. If that seems too high, phone for atomoxetine (Strattera) 25 mg daily. If tolerating the atomoxetine (Strattera) 40 mg for two weeks, phone for 60 mg daily.     Taper to discontinue lamotrigine (Lamictal) by reducing by 50 mg every week until discontinued.     Begin individual therapy.     Bring in ADHD testing results.     Follow up in one month.     - Recommend patient discuss medications with their pharmacist. Risks and benefits of medications discussed, including side effect profile.   - Safety plan was reviewed; to the ER as needed or call after hours crisis line; [phone]  - Education and counseling was done regarding use of medications, psychotherapy options  - Call [phone] for appointment or to speak to a nurse.   -Office hours: Monday through Thursday 8:00 am to 4:30 pm; Friday 8:00 am to Noon.   - Patient was given a copy of this Treatment Plan today.           Follow-ups after your visit        Who to contact     If you have questions or need follow up information about today's clinic visit or your schedule please contact [facility] directly at [phone].  Normal or non-critical lab and imaging results will be communicated to you by MyChart, letter or phone within 4 business days after the clinic has received the results. If you do not hear from us within 7 days, please contact the clinic through MyChart or phone. If  you have a critical or abnormal lab result, we will notify you by phone as soon as possible.  Submit refill requests through [employer] or call your pharmacy and they will forward the refill request to us. Please allow 3 business days for your refill to be completed.          Additional Information About Your Visit        [employer] Information     [employer] gives you secure access to your electronic health record. If you see a primary care provider, you can also send messages to your care team and make appointments. If you have questions, please call your primary care clinic.  If you do not have a primary care provider, please call [phone] and they will assist you.        Care EveryWhere ID     This is your Care EveryWhere ID. This could be used by other organizations to access your [place] medical records  [id number]        Your Vitals Were     Pulse Temperature BMI (Body Mass Index)             76 97.6  F (36.4  C) (Tympanic) 36.35 kg/m2          Blood Pressure from Last 3 Encounters:   09/15/17 142/89   04/26/17 (!) 158/92   03/30/17 131/85    Weight from Last 3 Encounters:   09/15/17 268 lb (121.6 kg)   04/26/17 274 lb (124.3 kg)   03/30/17 278 lb (126.1 kg)              Today, you had the following     No orders found for display         Today's Medication Changes          These changes are accurate as of: 9/15/17  8:36 AM.  If you have any questions, ask your nurse or doctor.               Stop taking these medicines if you haven't already. Please contact your care team if you have questions.     citalopram 40 MG tablet   Commonly known as:  celeXA   Stopped by:  Yu Loera APRN CNS           ketoconazole 2 % shampoo   Commonly known as:  NIZORAL   Stopped by:  Yu Loera APRN CNS           meloxicam 15 MG tablet   Commonly known as:  MOBIC   Stopped by:  Yu Loera APRN CNS                    Primary Care Provider Office Phone # Fax #    Yue WOLFGANG Poole CNP  [phone]       [address] [facility] [address]        Goals        General    I will attend weekly therapy sessions  (pt-stated)     Notes - Note created  3/24/2015 12:26 PM by Kelly Simon    As of today's date 3/24/2015 goal is met at 26 - 50%.   Goal Status:  Active      I will review/research the online support resources given by  within the next 2 weeks  (pt-stated)     Notes - Note created  3/24/2015 12:25 PM by Kelly Simon    As of today's date 3/24/2015 goal is met at 0 - 25%.   Goal Status:  Active        Equal Access to Services     DANELLE LORA : Hadii aad ku hadasho Soomaali, waaxda luqadaha, qaybta kaalmada adeegyada, waxdarren burton . So [facility] [phone].    ATENCIÓN: Si habla español, tiene a youssef disposición servicios gratuitos de asistencia lingüística. Llame al [phone].    We comply with applicable federal civil rights laws and [place] laws. We do not discriminate on the basis of race, color, national origin, age, disability sex, sexual orientation or gender identity.            Thank you!     Thank you for choosing [facility]  for your care. Our goal is always to provide you with excellent care. Hearing back from our patients is one way we can continue to improve our services. Please take a few minutes to complete the written survey that you may receive in the mail after your visit with us. Thank you!             Your Updated Medication List - Protect others around you: Learn how to safely use, store and throw away your medicines at www.disposemymeds.org.          This list is accurate as of: 9/15/17  8:36 AM.  Always use your most recent med list.                   Brand Name Dispense Instructions for use Diagnosis    albuterol 108 (90 BASE) MCG/ACT Inhaler    PROAIR HFA/PROVENTIL HFA/VENTOLIN HFA    1 Inhaler    Inhale 2 puffs into the lungs every 4 hours as needed for shortness of breath / dyspnea    Mild intermittent  asthma without complication       ibuprofen 400 MG tablet    ADVIL/MOTRIN    120 tablet    Take 1-2 tablets (400-800 mg) by mouth every 6 hours as needed for other (cramping)     delivery delivered       ranitidine 150 MG tablet    ZANTAC    60 tablet    Take 1 tablet (150 mg) by mouth 2 times daily    Gastroesophageal reflux disease without esophagitis

## 2017-09-16 ASSESSMENT — ANXIETY QUESTIONNAIRES: GAD7 TOTAL SCORE: 19

## 2017-09-21 ENCOUNTER — NURSE TRIAGE (OUTPATIENT)
Dept: NURSING | Facility: CLINIC | Age: 32
End: 2017-09-21

## 2017-09-21 ENCOUNTER — TELEPHONE (OUTPATIENT)
Dept: PSYCHIATRY | Facility: CLINIC | Age: 32
End: 2017-09-21

## 2017-09-21 DIAGNOSIS — F90.2 ATTENTION DEFICIT HYPERACTIVITY DISORDER (ADHD), COMBINED TYPE: ICD-10-CM

## 2017-09-21 NOTE — TELEPHONE ENCOUNTER
Reason for call:  Other   Patient called regarding (reason for call): prescription  Additional comments: patient reports of having stomach problems from the new medication prescribed, possibly due to being too high of a dose. Please call to discuss.       Phone number to reach patient:  Home number on file [phone] (home)    Best Time:  anytime    Can we leave a detailed message on this number?  YES

## 2017-09-21 NOTE — TELEPHONE ENCOUNTER
"  Reason for Disposition    Caller has NON-URGENT medication question about med that PCP prescribed and triager unable to answer question     Patient was returning clinic call regarding RX foratomoxetine (STRATTERA) 40 MG capsule 30 capsule 1 9/15/2017  --  Sig: Take 1 capsule (40 mg) by mouth daily    This dose is too high, I would like to go on the 25 mg.It's making me very nauseated. Dosage.\" I advised to call clinic in am so they could send over the RX to pharmacy.    Additional Information    Negative: Drug overdose and nurse unable to answer question    Negative: Caller requesting information not related to medicine    Negative: Caller requesting a prescription for Strep throat and has a positive culture result    Negative: Rash while taking a medication or within 3 days of stopping it    Negative: Immunization reaction suspected    Negative: [1] Asthma and [2] having symptoms of asthma (cough, wheezing, etc)    Negative: MORE THAN A DOUBLE DOSE of a prescription or over-the-counter (OTC) drug    Negative: [1] DOUBLE DOSE (an extra dose or lesser amount) of over-the-counter (OTC) drug AND [2] any symptoms (e.g., dizziness, nausea, pain, sleepiness)    Negative: [1] DOUBLE DOSE (an extra dose or lesser amount) of prescription drug AND [2] any symptoms (e.g., dizziness, nausea, pain, sleepiness)    Negative: Took another person's prescription drug    Negative: [1] DOUBLE DOSE (an extra dose or lesser amount) of prescription drug AND [2] NO symptoms (Exception: a double dose of antibiotics)    Negative: Diabetes drug error or overdose (e.g., insulin or extra dose)    Negative: [1] Request for URGENT new prescription or refill of \"essential\" medication (i.e., likelihood of harm to patient if not taken) AND [2] triager unable to fill per unit policy    Negative: [1] Prescription not at pharmacy AND [2] was prescribed today by PCP    Negative: Pharmacy calling with prescription questions and triager unable to answer " question    Negative: Caller has URGENT medication question about med that PCP prescribed and triager unable to answer question    Protocols used: MEDICATION QUESTION CALL-ADULT-

## 2017-09-21 NOTE — TELEPHONE ENCOUNTER
Left VM for Pt. Would like to determine if she is having symptoms from the Strattera or if she is having symptoms from taper on her medications.        From OV with Yu Loera RN, MS, APRN 9/15  Impression:  It appears patient is struggling with ADHD symptoms. Patient was diagnosed in the past, but has not tried medications. Mood stabilizers are not likely to be helpful and patient has not noticed improvement in symptoms with the lamotrigine (Lamictal).      Medication side effects and alternatives reviewed.     Treatment Plan:  Taper to discontinue citalopram (Celexa) by reducing to 20 mg for four days then discontinue.      Begin atomoxetine (Strattera) 40 mg daily. If that seems too high, phone for atomoxetine (Strattera) 25 mg daily. If tolerating the atomoxetine (Strattera) 40 mg for two weeks, phone for 60 mg daily.      Taper to discontinue lamotrigine (Lamictal) by reducing by 50 mg every week until discontinued.      Begin individual therapy.      Bring in ADHD testing results.      Follow up in one month.      - Recommend patient discuss medications with their pharmacist. Risks and benefits of medications discussed, including side effect profile.   - Safety plan was reviewed; to the ER as needed or call after hours crisis line; [phone]  - Education and counseling was done regarding use of medications, psychotherapy options  - Call [phone] for appointment or to speak to a nurse.   -Office hours: Monday through Thursday 8:00 am to 4:30 pm; Friday 8:00 am to Noon.   - Patient was given a copy of this Treatment Plan today.      My Practice Policy was reviewed and signed: YES         Patient will continue to be seen for ongoing consultation and stabilization.        Signed: Yu Loera RN, MS, APRN                 Psychiatry

## 2017-09-22 RX ORDER — ATOMOXETINE 25 MG/1
25 CAPSULE ORAL DAILY
Qty: 30 CAPSULE | Refills: 1 | Status: SHIPPED | OUTPATIENT
Start: 2017-09-22 | End: 2017-11-28

## 2017-09-22 NOTE — TELEPHONE ENCOUNTER
"Reason for call:  Other   Patient called regarding (reason for call): side effects  Additional comments: Missed Janet's call yesterday and states that her \"stomach is killing\" her.  Would like a call today to discuss.      Phone number to reach patient:  Home number on file [phone] (home)    Best Time:  asap    Can we leave a detailed message on this number?  YES  "

## 2017-09-22 NOTE — TELEPHONE ENCOUNTER
Reached out to Holly Tolliver to discuss her symptoms.   Holly stopped the Celexa 3 days ago, and is currently taking 150 mg of Lamictal.     Holly took 40 mg of Strattera for a few days, but reported intense stomach pains that she was unable to tolerate.  She has not taken the Strattera for 2 days now, and the pain is gone, but she is worried about her mental health. Per Yu Loera RN, MS, APRN  Treatment plan she may potentially need lower dose.     Will route to yu for review.

## 2017-11-28 DIAGNOSIS — F90.2 ATTENTION DEFICIT HYPERACTIVITY DISORDER (ADHD), COMBINED TYPE: ICD-10-CM

## 2017-11-29 RX ORDER — ATOMOXETINE 25 MG/1
25 CAPSULE ORAL DAILY
Qty: 30 CAPSULE | Refills: 1 | Status: SHIPPED | OUTPATIENT
Start: 2017-11-29 | End: 2017-12-11 | Stop reason: DRUGHIGH

## 2017-12-11 ENCOUNTER — OFFICE VISIT (OUTPATIENT)
Dept: PSYCHIATRY | Facility: CLINIC | Age: 32
End: 2017-12-11
Payer: COMMERCIAL

## 2017-12-11 VITALS
DIASTOLIC BLOOD PRESSURE: 80 MMHG | BODY MASS INDEX: 35.94 KG/M2 | TEMPERATURE: 97.8 F | WEIGHT: 265 LBS | SYSTOLIC BLOOD PRESSURE: 139 MMHG | HEART RATE: 84 BPM

## 2017-12-11 DIAGNOSIS — F90.2 ADHD (ATTENTION DEFICIT HYPERACTIVITY DISORDER), COMBINED TYPE: Primary | ICD-10-CM

## 2017-12-11 PROCEDURE — 99213 OFFICE O/P EST LOW 20 MIN: CPT | Performed by: CLINICAL NURSE SPECIALIST

## 2017-12-11 RX ORDER — ATOMOXETINE 40 MG/1
40 CAPSULE ORAL DAILY
Qty: 30 CAPSULE | Refills: 1 | COMMUNITY
Start: 2017-12-11 | End: 2018-04-05

## 2017-12-11 ASSESSMENT — ANXIETY QUESTIONNAIRES
4. TROUBLE RELAXING: NEARLY EVERY DAY
IF YOU CHECKED OFF ANY PROBLEMS ON THIS QUESTIONNAIRE, HOW DIFFICULT HAVE THESE PROBLEMS MADE IT FOR YOU TO DO YOUR WORK, TAKE CARE OF THINGS AT HOME, OR GET ALONG WITH OTHER PEOPLE: EXTREMELY DIFFICULT
2. NOT BEING ABLE TO STOP OR CONTROL WORRYING: NEARLY EVERY DAY
1. FEELING NERVOUS, ANXIOUS, OR ON EDGE: NEARLY EVERY DAY
5. BEING SO RESTLESS THAT IT IS HARD TO SIT STILL: NEARLY EVERY DAY
6. BECOMING EASILY ANNOYED OR IRRITABLE: NEARLY EVERY DAY
7. FEELING AFRAID AS IF SOMETHING AWFUL MIGHT HAPPEN: NEARLY EVERY DAY
GAD7 TOTAL SCORE: 21
3. WORRYING TOO MUCH ABOUT DIFFERENT THINGS: NEARLY EVERY DAY

## 2017-12-11 ASSESSMENT — PATIENT HEALTH QUESTIONNAIRE - PHQ9: SUM OF ALL RESPONSES TO PHQ QUESTIONS 1-9: 11

## 2017-12-11 NOTE — PATIENT INSTRUCTIONS
Treatment Plan:  Increase to atomoxetine (Strattera) 40 mg in the morning. After two weeks and tolerating the atomoxetine (Strattera) 40 mg, add on atomoxetine (Strattera) 25 mg for total dose of 65 mg in the morning.     Phone in 2 weeks if sleep is still an issue, phone for trazodone (Desyrel).     Follow up in one month.     - Recommend patient discuss medications with their pharmacist. Risks and benefits for medications were discussed including, but not limited to, side effects.   - Safety plan was reviewed; to the ER as needed or call after hours crisis line; [phone]  - Education and counseling was done regarding use of medications, psychotherapy options  - Call [phone] for appointment or to speak to a nurse.    -Office hours: Monday through Thursday 8:00 am to 4:30 pm; Friday 8:00 am to Noon.

## 2017-12-11 NOTE — PROGRESS NOTES
Psychiatric Progress Note    Name: Holly Tolliver  Date: 12/11/2017  Length of Visit: Spent 21 minutes face to face with this patient, where at least 50 % of this time was spent in counseling on/or about ADHD symptom management.     MRN: [id number]      Current Outpatient Prescriptions   Medication Sig     atomoxetine (STRATTERA) 25 MG capsule Take 1 capsule (25 mg) by mouth daily     albuterol (PROAIR HFA/PROVENTIL HFA/VENTOLIN HFA) 108 (90 BASE) MCG/ACT Inhaler Inhale 2 puffs into the lungs every 4 hours as needed for shortness of breath / dyspnea     ibuprofen (ADVIL,MOTRIN) 400 MG tablet Take 1-2 tablets (400-800 mg) by mouth every 6 hours as needed for other (cramping)     ranitidine (ZANTAC) 150 MG tablet Take 1 tablet (150 mg) by mouth 2 times daily     No current facility-administered medications for this visit.      Facility-Administered Medications Ordered in Other Visits   Medication     lidocaine 1 % injection     lidocaine-EPInephrine 1.5 %-[id number] injection     fentanyl (SUBLIMAZE) injection       Therapist:  None    PHQ-9:  PHQ-9 score:    PHQ-9 SCORE 9/15/2017   Total Score -   Total Score 12       RUBENS-7:  RUBENS-7 SCORE 4/8/2015 9/28/2015 9/15/2017   Total Score 14 - -   Total Score - 18 19           Interim History:  Patient returns for follow up from initial appointment 9-. Citalopram (Celexa) and lamotrigine (Lamictal) were tapered to discontinue and atomoxetine (Strattera) was started.     Patient is accompanied by her toddler son, Brown, who is very active during the appointment.     Atomoxetine (Strattera) was started 40 mg, but patient did not tolerate and it was decreased to 25 mg daily. Patient reports she is able to concentrate a bit more and get more things done around the house. Patient is not sleeping well, falling and maintaining sleep. Patient is taking the atomoxetine (Strattera) at bedtime as she thought she would remember better. This is likely causing the sleep  "issues. Patient reports her mood is lower now since stopping the citalopram (Celexa).       Past Medical History:   Diagnosis Date     ALCOH DEP NEC/NOS-UNSPEC 12/31/2007     Asthma      Chickenpox      Postpartum depression 2011     Pregnancy induced hypertension      Urinary tract infections        10 point ROS is negative except for those listed above.     Vital Signs:   /80 (BP Location: Right arm, Patient Position: Sitting, Cuff Size: Adult Large)  Pulse 84  Temp 97.8  F (36.6  C) (Tympanic)  Wt 265 lb (120.2 kg)  BMI 35.94 kg/m2      Mental Status Assessment:  Appearance:  Well groomed      Behavior/relationship to examiner/demeanor:  Cooperative, engaged and pleasant  Motor activity:  Normal  Gait:  Normal   Speech:  Normal in volume, articulation, coherence   Mood (subjective report):  \"I'm not sleeping\"   Affect (objective appearance):  Mood congruent  Thought Process (Associations):  Logical, linear and goal directed  Thought content:  No evidence of suicidal or homicidal ideation,          no overt psychosis and                    patient does not appear to be responding to internal stimuli  Oriented to person, place, date/time   Attention Span and concentration: Intact   Memory:  Short-term memory intact and Long-term memory; Intact  Language:  Fluent   Fund of Knowledge/Intelligence:  Average  Use of language: Intact   Abstraction:  Normal  Insight:  Adequate  Judgment:  Adequate for safety    DSM DIAGNOSIS:  Attention-Deficit/Hyperactivity Disorder  314.01 (F90.2) Combined presentation  296.32 (F33.1) Major Depressive Disorder, Recurrent Episode, Moderate _ and With anxious distress  300.02 (F41.1) Generalized Anxiety Disorder    Assessment:  Patient is tolerating the atomoxetine (Strattera) 25 mg daily and has noticed slight improvement in ADHD symptoms. Patient is not sleeping well which is likely due to taking atomoxetine (Strattera) at bedtime. Patient agrees to taking atomoxetine " (Strattera) in the morning - will increase dose.     Medication side effects and alternatives were reviewed.     Treatment Plan:  Increase to atomoxetine (Strattera) 40 mg in the morning. After two weeks and tolerating the atomoxetine (Strattera) 40 mg, add on atomoxetine (Strattera) 25 mg for total dose of 65 mg in the morning.     Phone in 2 weeks if sleep is still an issue, phone for trazodone (Desyrel).     Follow up in one month.     - Recommend patient discuss medications with their pharmacist. Risks and benefits for medications were discussed including, but not limited to, side effects.   - Safety plan was reviewed; to the ER as needed or call after hours crisis line; [phone]  - Education and counseling was done regarding use of medications, psychotherapy options  - Call [phone] for appointment or to speak to a nurse.    -Office hours: Monday through Thursday 8:00 am to 4:30 pm; Friday 8:00 am to Noon.   - Patient received a copy of this Treatment Plan today.    Patient will continue to be seen for ongoing consultation and stabilization.      Signed:  Yu Loera, RN, MS, CNS-BC

## 2017-12-11 NOTE — NURSING NOTE
Chief Complaint   Patient presents with     Recheck Medication       Initial /80 (BP Location: Right arm, Patient Position: Sitting, Cuff Size: Adult Large)  Pulse 84  Temp 97.8  F (36.6  C) (Tympanic)  Wt 265 lb (120.2 kg)  BMI 35.94 kg/m2 Estimated body mass index is 35.94 kg/(m^2) as calculated from the following:    Height as of 4/26/17: 6' (1.829 m).    Weight as of this encounter: 265 lb (120.2 kg).  Medication Reconciliation: complete

## 2017-12-11 NOTE — MR AVS SNAPSHOT
After Visit Summary   12/11/2017    Holly Tolliver    MRN: [id number]           Patient Information     Date Of Birth          1985        Visit Information        Provider Department      12/11/2017 11:15 AM Yu Loera APRN [facility]        Today's Diagnoses     ADHD (attention deficit hyperactivity disorder), combined type    -  1      Care Instructions    Treatment Plan:  Increase to atomoxetine (Strattera) 40 mg in the morning. After two weeks and tolerating the atomoxetine (Strattera) 40 mg, add on atomoxetine (Strattera) 25 mg for total dose of 65 mg in the morning.     Phone in 2 weeks if sleep is still an issue, phone for trazodone (Desyrel).     Follow up in one month.     - Recommend patient discuss medications with their pharmacist. Risks and benefits for medications were discussed including, but not limited to, side effects.   - Safety plan was reviewed; to the ER as needed or call after hours crisis line; [phone]  - Education and counseling was done regarding use of medications, psychotherapy options  - Call [phone] for appointment or to speak to a nurse.    -Office hours: Monday through Thursday 8:00 am to 4:30 pm; Friday 8:00 am to Noon.             Follow-ups after your visit        Who to contact     If you have questions or need follow up information about today's clinic visit or your schedule please contact [facility] directly at [phone].  Normal or non-critical lab and imaging results will be communicated to you by MyChart, letter or phone within 4 business days after the clinic has received the results. If you do not hear from us within 7 days, please contact the clinic through [employer] or phone. If you have a critical or abnormal lab result, we will notify you by phone as soon as possible.  Submit refill requests through [employer] or call your pharmacy and they will forward the refill request to us. Please allow 3  business days for your refill to be completed.          Additional Information About Your Visit        MyChart Information     [employer] gives you secure access to your electronic health record. If you see a primary care provider, you can also send messages to your care team and make appointments. If you have questions, please call your primary care clinic.  If you do not have a primary care provider, please call [phone] and they will assist you.        Care EveryWhere ID     This is your Care EveryWhere ID. This could be used by other organizations to access your [place] medical records  [id number]        Your Vitals Were     Pulse Temperature BMI (Body Mass Index)             84 97.8  F (36.6  C) (Tympanic) 35.94 kg/m2          Blood Pressure from Last 3 Encounters:   12/11/17 139/80   09/15/17 142/89   04/26/17 (!) 158/92    Weight from Last 3 Encounters:   12/11/17 265 lb (120.2 kg)   09/15/17 268 lb (121.6 kg)   04/26/17 274 lb (124.3 kg)              Today, you had the following     No orders found for display         Today's Medication Changes          These changes are accurate as of: 12/11/17 11:33 AM.  If you have any questions, ask your nurse or doctor.               These medicines have changed or have updated prescriptions.        Dose/Directions    STRATTERA 40 MG capsule   This may have changed:  Another medication with the same name was removed. Continue taking this medication, and follow the directions you see here.   Used for:  ADHD (attention deficit hyperactivity disorder), combined type   Generic drug:  atomoxetine   Changed by:  Yu Loera APRN CNS        Dose:  40 mg   Take 1 capsule (40 mg) by mouth daily   Quantity:  30 capsule   Refills:  1                Primary Care Provider Office Phone # Fax #    WOLFGANG Freire [facility] [phone] [address] [facility] [address]        Goals        General    I will attend weekly therapy sessions   (pt-stated)     Notes - Note created  3/24/2015 12:26 PM by Kelly Simon    As of today's date 3/24/2015 goal is met at 26 - 50%.   Goal Status:  Active      I will review/research the online support resources given by CAROLINE within the next 2 weeks  (pt-stated)     Notes - Note created  3/24/2015 12:25 PM by Kelly Simon    As of today's date 3/24/2015 goal is met at 0 - 25%.   Goal Status:  Active        Equal Access to Services     DANELLE LORA : Hadii aad ku hadasho Soomaali, waaxda luqadaha, qaybta kaalmada adeegyada, waxay idiin hayaan jenni burton . So [facility] [phone].    ATENCIÓN: Si habla español, tiene a youssef disposición servicios gratuitos de asistencia lingüística. Llame al [phone].    We comply with applicable federal civil rights laws and [place] laws. We do not discriminate on the basis of race, color, national origin, age, disability, sex, sexual orientation, or gender identity.            Thank you!     Thank you for choosing [facility]  for your care. Our goal is always to provide you with excellent care. Hearing back from our patients is one way we can continue to improve our services. Please take a few minutes to complete the written survey that you may receive in the mail after your visit with us. Thank you!             Your Updated Medication List - Protect others around you: Learn how to safely use, store and throw away your medicines at www.disposemymeds.org.          This list is accurate as of: 12/11/17 11:33 AM.  Always use your most recent med list.                   Brand Name Dispense Instructions for use Diagnosis    albuterol 108 (90 BASE) MCG/ACT Inhaler    PROAIR HFA/PROVENTIL HFA/VENTOLIN HFA    1 Inhaler    Inhale 2 puffs into the lungs every 4 hours as needed for shortness of breath / dyspnea    Mild intermittent asthma without complication       ibuprofen 400 MG tablet    ADVIL/MOTRIN    120 tablet    Take 1-2 tablets (400-800 mg) by mouth every 6  hours as needed for other (cramping)     delivery delivered       ranitidine 150 MG tablet    ZANTAC    60 tablet    Take 1 tablet (150 mg) by mouth 2 times daily    Gastroesophageal reflux disease without esophagitis       STRATTERA 40 MG capsule   Generic drug:  atomoxetine     30 capsule    Take 1 capsule (40 mg) by mouth daily    ADHD (attention deficit hyperactivity disorder), combined type

## 2017-12-12 ASSESSMENT — ANXIETY QUESTIONNAIRES: GAD7 TOTAL SCORE: 21

## 2018-02-19 ENCOUNTER — HOSPITAL ENCOUNTER (EMERGENCY)
Facility: CLINIC | Age: 33
Discharge: HOME | End: 2018-02-19
Attending: FAMILY MEDICINE | Admitting: FAMILY MEDICINE
Payer: COMMERCIAL

## 2018-02-19 VITALS
RESPIRATION RATE: 16 BRPM | BODY MASS INDEX: 35.88 KG/M2 | WEIGHT: 264.55 LBS | HEART RATE: 116 BPM | OXYGEN SATURATION: 96 % | TEMPERATURE: 98.9 F | DIASTOLIC BLOOD PRESSURE: 82 MMHG | SYSTOLIC BLOOD PRESSURE: 129 MMHG

## 2018-02-19 DIAGNOSIS — J10.1 INFLUENZA A: ICD-10-CM

## 2018-02-19 LAB
DEPRECATED S PYO AG THROAT QL EIA: NORMAL
FLUAV+FLUBV AG SPEC QL: NEGATIVE
FLUAV+FLUBV AG SPEC QL: POSITIVE
SPECIMEN SOURCE: ABNORMAL
SPECIMEN SOURCE: NORMAL

## 2018-02-19 PROCEDURE — 87804 INFLUENZA ASSAY W/OPTIC: CPT | Performed by: FAMILY MEDICINE

## 2018-02-19 PROCEDURE — 99283 EMERGENCY DEPT VISIT LOW MDM: CPT | Mod: Z6 | Performed by: FAMILY MEDICINE

## 2018-02-19 PROCEDURE — 99283 EMERGENCY DEPT VISIT LOW MDM: CPT | Performed by: FAMILY MEDICINE

## 2018-02-19 PROCEDURE — 87081 CULTURE SCREEN ONLY: CPT | Performed by: FAMILY MEDICINE

## 2018-02-19 PROCEDURE — 25000132 ZZH RX MED GY IP 250 OP 250 PS 637: Performed by: FAMILY MEDICINE

## 2018-02-19 PROCEDURE — 87880 STREP A ASSAY W/OPTIC: CPT | Performed by: FAMILY MEDICINE

## 2018-02-19 PROCEDURE — 99281 EMR DPT VST MAYX REQ PHY/QHP: CPT

## 2018-02-19 RX ORDER — ACETAMINOPHEN 325 MG/1
650 TABLET ORAL ONCE
Status: COMPLETED | OUTPATIENT
Start: 2018-02-19 | End: 2018-02-19

## 2018-02-19 RX ORDER — OSELTAMIVIR PHOSPHATE 75 MG/1
75 CAPSULE ORAL 2 TIMES DAILY
Status: COMPLETED | OUTPATIENT
Start: 2018-02-19 | End: 2018-02-19

## 2018-02-19 RX ORDER — OSELTAMIVIR PHOSPHATE 75 MG/1
75 CAPSULE ORAL 2 TIMES DAILY
Qty: 10 CAPSULE | Refills: 0 | Status: SHIPPED | OUTPATIENT
Start: 2018-02-19 | End: 2018-02-24

## 2018-02-19 RX ADMIN — ACETAMINOPHEN 650 MG: 325 TABLET, FILM COATED ORAL at 21:43

## 2018-02-19 RX ADMIN — OSELTAMIVIR PHOSPHATE 75 MG: 75 CAPSULE ORAL at 23:51

## 2018-02-19 NOTE — ED AVS SNAPSHOT
[facility] Emergency Department    [address] [address]    Phone:  [phone]    Fax:  [phone]                                       Holly Tolliver   MRN: [id number]    Department:  [facility] Emergency Department   Date of Visit:  2/19/2018           After Visit Summary Signature Page     I have received my discharge instructions, and my questions have been answered. I have discussed any challenges I see with this plan with the nurse or doctor.    ..........................................................................................................................................  Patient/Patient Representative Signature      ..........................................................................................................................................  Patient Representative Print Name and Relationship to Patient    ..................................................               ................................................  Date                                            Time    ..........................................................................................................................................  Reviewed by Signature/Title    ...................................................              ..............................................  Date                                                            Time

## 2018-02-19 NOTE — ED AVS SNAPSHOT
[facility] Emergency Department    [address] [address]    Phone:  [phone]    Fax:  [phone]                                       Holly Tolliver   MRN: [id number]    Department:  [facility] Emergency Department   Date of Visit:  2/19/2018           Patient Information     Date Of Birth          1985        Your diagnoses for this visit were:     Influenza A        You were seen by Joseph Hill MD.      Follow-up Information     Schedule an appointment as soon as possible for a visit with Yue Oneill APRN CNP.    Specialty:  Nurse Practitioner    Why:  As needed, If symptoms worsen    Contact information:    [address] [facility] [address] [facility] [id number] [phone]          Discharge Instructions         Influenza (Adult)    Influenza is also called the flu. It is a viral illness that affects the air passages of your lungs. It is different from the common cold. The flu can easily be passed from one to person to another. It may be spread through the air by coughing and sneezing. Or it can be spread by touching the sick person and then touching your own eyes, nose, or mouth.  The flu starts 1 to 3 days after you are exposed to the flu virus. It may last for 1 to 2 weeks but many people feel tired or fatigued for many weeks afterward. You usually don t need to take antibiotics unless you have a complication. This might be an ear or sinus infection or pneumonia.  Symptoms of the flu may be mild or severe. They can include extreme tiredness (wanting to stay in bed all day), chills, fevers, muscle aches, soreness with eye movement, headache, and a dry, hacking cough.  Home care  Follow these guidelines when caring for yourself at home:    Avoid being around cigarette smoke, whether yours or other people s.    Acetaminophen or ibuprofen will help ease your fever, muscle aches, and headache. Don t give aspirin to anyone younger than 18 who has the flu. Aspirin can  harm the liver.    Nausea and loss of appetite are common with the flu. Eat light meals. Drink 6 to 8 glasses of liquids every day. Good choices are water, sport drinks, soft drinks without caffeine, juices, tea, and soup. Extra fluids will also help loosen secretions in your nose and lungs.    Over-the-counter cold medicines will not make the flu go away faster. But the medicines may help with coughing, sore throat, and congestion in your nose and sinuses. Don t use a decongestant if you have high blood pressure.    Stay home until your fever has been gone for at least 24 hours without using medicine to reduce fever.  Follow-up care  Follow up with your healthcare provider, or as advised, if you are not getting better over the next week.  If you are age 65 or older, talk with your provider about getting a pneumococcal vaccine every 5 years. You should also get this vaccine if you have chronic asthma or COPD. All adults should get a flu vaccine every fall. Ask your provider about this.  When to seek medical advice  Call your healthcare provider right away if any of these occur:    Cough with lots of colored mucus (sputum) or blood in your mucus    Chest pain, shortness of breath, wheezing, or trouble breathing    Severe headache, or face, neck, or ear pain    New rash with fever    Fever of 100.4 F (38 C) or higher, or as directed by your healthcare provider    Confusion, behavior change, or seizure    Severe weakness or dizziness    You get a new fever or cough after getting better for a few days  Date Last Reviewed: 1/1/2017 2000-2017 [employer]. [address] [place] [address], [place] [address]. All rights reserved. This information is not intended as a substitute for professional medical care. Always follow your healthcare professional's instructions.      Push fluids, rest.  Tylenol/ibuprofen as needed for comfort.  Tamiflu as discussed.  Return to the emergency department if worse or changes.    24  Hour Appointment Hotline       To make an appointment at any [facility], call [phone] ([phone]). If you don't have a family doctor or clinic, we will help you find one. [facility] clinics are conveniently located to serve the needs of you and your family.             Review of your medicines      START taking        Dose / Directions Last dose taken    oseltamivir 75 MG capsule   Commonly known as:  TAMIFLU   Dose:  75 mg   Quantity:  10 capsule        Take 1 capsule (75 mg) by mouth 2 times daily for 5 days   Refills:  0          Our records show that you are taking the medicines listed below. If these are incorrect, please call your family doctor or clinic.        Dose / Directions Last dose taken    albuterol 108 (90 BASE) MCG/ACT Inhaler   Commonly known as:  PROAIR HFA/PROVENTIL HFA/VENTOLIN HFA   Dose:  2 puff   Quantity:  1 Inhaler        Inhale 2 puffs into the lungs every 4 hours as needed for shortness of breath / dyspnea   Refills:  5        ibuprofen 400 MG tablet   Commonly known as:  ADVIL/MOTRIN   Dose:  400-800 mg   Quantity:  120 tablet        Take 1-2 tablets (400-800 mg) by mouth every 6 hours as needed for other (cramping)   Refills:  0        ranitidine 150 MG tablet   Commonly known as:  ZANTAC   Dose:  150 mg   Quantity:  60 tablet        Take 1 tablet (150 mg) by mouth 2 times daily   Refills:  1        STRATTERA 40 MG capsule   Dose:  40 mg   Quantity:  30 capsule   Generic drug:  atomoxetine        Take 1 capsule (40 mg) by mouth daily   Refills:  1                Prescriptions were sent or printed at these locations (1 Prescription)                   Other Prescriptions                Printed at Department/Unit printer (1 of 1)         oseltamivir (TAMIFLU) 75 MG capsule                Procedures and tests performed during your visit     Beta strep group A culture    Influenza A/B antigen    Rapid strep screen      Orders Needing Specimen Collection     None      Pending  Results     Date and Time Order Name Status Description    2/19/2018 2142 Beta strep group A culture In process             Pending Culture Results     Date and Time Order Name Status Description    2/19/2018 2142 Beta strep group A culture In process             Pending Results Instructions     If you had any lab results that were not finalized at the time of your Discharge, you can call the ED Lab Result RN at [phone]. You will be contacted by this team for any positive Lab results or changes in treatment. The nurses are available 7 days a week from 10A to 6:30P.  You can leave a message 24 hours per day and they will return your call.        Test Results From Your Hospital Stay        2/19/2018 10:24 PM      Component Results     Component Value Ref Range & Units Status    Influenza A/B Agn Specimen Nasal  Final    Influenza A Positive (A) NEG^Negative Final    Influenza B Negative NEG^Negative Final    Test results must be correlated with clinical data. If necessary, results   should be confirmed by a molecular assay or viral culture.           2/19/2018 10:16 PM      Component Results     Component    Specimen Description    Throat    Rapid Strep A Screen    NEGATIVE: No Group A streptococcal antigen detected by immunoassay, await culture report.         2/19/2018 10:22 PM                Thank you for choosing [facility]       Thank you for choosing [facility] for your care. Our goal is always to provide you with excellent care. Hearing back from our patients is one way we can continue to improve our services. Please take a few minutes to complete the written survey that you may receive in the mail after you visit with us. Thank you!        [employer] Information     [employer] gives you secure access to your electronic health record. If you see a primary care provider, you can also send messages to your care team and make appointments. If you have questions, please call your primary care clinic.  If you do not have  a primary care provider, please call [phone] and they will assist you.        Care EveryWhere ID     This is your Care EveryWhere ID. This could be used by other organizations to access your [place] medical records  [id number]        Equal Access to Services     DANELLE LORA : Francis Aguiar, sheila gilbert, raegan loza. So [facility] [phone].    ATENCIÓN: Si habla español, tiene a youssef disposición servicios gratuitos de asistencia lingüística. Llame al [phone].    We comply with applicable federal civil rights laws and [place] laws. We do not discriminate on the basis of race, color, national origin, age, disability, sex, sexual orientation, or gender identity.            After Visit Summary       This is your record. Keep this with you and show to your community pharmacist(s) and doctor(s) at your next visit.

## 2018-02-20 NOTE — ED PROVIDER NOTES
History     Chief Complaint   Patient presents with     Cough     fever, aches exposed to influenza     HPI  Holly Tolliver is a 32 year old female, past medical history significant for generalized anxiety disorder, GERD, depression, back a use disorder, asthma, presents to the emergency department with concerns of cough, fever, body aches and exposure to influenza.  Less than 24 hours of symptoms.  Eating and drinking well.  No nausea or vomiting.      Problem List:    Patient Active Problem List    Diagnosis Date Noted     Routine postpartum follow-up 2016     Priority: Medium     Mirena IUD 2016     Priority: Medium     Lot: [id number]  Exp:        Single liveborn, born in hospital, delivered 2016     Priority: Medium     Placental abruption in third trimester 2016     Priority: Medium     Placental abruption 2016     Priority: Medium      delivery delivered 2016     Priority: Medium     Prenatal care, subsequent pregnancy 2016     Priority: Medium     Prenatal care, subsequent pregnancy in third trimester 2016     Priority: Medium     Health Care Home 2015     Priority: Medium     Care Coordinator: Kelly DANIEL, MSW  See letters for Health Care home care plan    SW covering in [facility] absence    FREDY Luz, MSW   [phone]  3/24/2015 12:24 PM       Generalized anxiety disorder 2013     Priority: Medium     Diagnosis updated by automated process. Provider to review and confirm.       GERD (gastroesophageal reflux disease) 2013     Priority: Medium     Major depressive disorder, single episode, moderate (H) 2013     Priority: Medium     CARDIOVASCULAR SCREENING; LDL GOAL LESS THAN 160 10/23/2012     Priority: Medium     CTS (carpal tunnel syndrome) 10/14/2011     Priority: Medium     Tobacco use disorder 2008     Priority: Medium     Has decreased from 1 ppd to 1 cigarette per day since  pregnancy.       Mild intermittent asthma 2008     Priority: Medium        Past Medical History:    Past Medical History:   Diagnosis Date     ALCOH DEP NEC/NOS-UNSPEC 2007     Asthma      Chickenpox      Postpartum depression      Pregnancy induced hypertension      Urinary tract infections        Past Surgical History:    Past Surgical History:   Procedure Laterality Date      SECTION Bilateral 2016    Procedure:  SECTION;  Surgeon: Beau Cardoso MD;  Location: [facility] OR     PE TUBES  age 3       Family History:    Family History   Problem Relation Age of Onset     Alcohol/Drug Mother      alcohol- recovered     Hypertension Mother      Depression Mother      also anxiety     Other - See Comments Mother      ankylosing spondylosis     Alcohol/Drug Father      alcohol- recovered     Alcohol/Drug Sister      A&D     CANCER Maternal Grandmother      lung     Depression Maternal Grandmother      also anxiety     MENTAL ILLNESS Paternal Grandmother      Alcohol/Drug Brother      alcohol       Social History:  Marital Status:  Single [1]  Social History   Substance Use Topics     Smoking status: Former Smoker     Packs/day: 0.00     Years: 12.00     Types: Cigarettes     Smokeless tobacco: Never Used     Alcohol use No      Comment: sober since 2015        Medications:      oseltamivir (TAMIFLU) 75 MG capsule   atomoxetine (STRATTERA) 40 MG capsule   ranitidine (ZANTAC) 150 MG tablet   albuterol (PROAIR HFA/PROVENTIL HFA/VENTOLIN HFA) 108 (90 BASE) MCG/ACT Inhaler   ibuprofen (ADVIL,MOTRIN) 400 MG tablet         Review of Systems   All other systems reviewed and are negative.      Physical Exam   BP: (!) 148/107  Pulse: 116  Heart Rate: 94  Temp: 101  F (38.3  C)  Resp: 16  Weight: 120 kg (264 lb 8.8 oz)  SpO2: 96 %      Physical Exam   Constitutional: She is oriented to person, place, and time. She appears well-developed and well-nourished.   Appears ill but nontoxic.    HENT:   Head: Normocephalic and atraumatic.   Right Ear: External ear normal.   Left Ear: External ear normal.   Nose: Nose normal.   Mouth/Throat: Oropharynx is clear and moist.   Eyes: Conjunctivae and EOM are normal. Pupils are equal, round, and reactive to light.   Neck: Normal range of motion. Neck supple.   Cardiovascular: Normal rate, regular rhythm, normal heart sounds and intact distal pulses.    Pulmonary/Chest: Effort normal and breath sounds normal.   Abdominal: Soft. Bowel sounds are normal.   Musculoskeletal: Normal range of motion.   Neurological: She is alert and oriented to person, place, and time.   Skin: Skin is warm and dry.   Psychiatric: She has a normal mood and affect. Her behavior is normal.   Nursing note and vitals reviewed.      ED Course     ED Course     Procedures              Critical Care time:  none               Labs Ordered and Resulted from Time of ED Arrival Up to the Time of Departure from the ED   INFLUENZA A/B ANTIGEN - Abnormal; Notable for the following:        Result Value    Influenza A Positive (*)     All other components within normal limits   RAPID STREP SCREEN       Assessments & Plan (with Medical Decision Making)   32-year-old female past medical history reviewed above, presents the emergency department with influenza-like illness symptoms as described above fourth of less than 24 hours duration.  Physical exam is unremarkable.  Rapid influenza swab is positive.  Options for treatment were discussed with her and she would like Tamiflu which was prescribed.  Otherwise supportive symptomatic care with rest fluids Tylenol/ibuprofen and return if not improving or worse.      Disclaimer: This note consists of symbols derived from keyboarding, dictation and/or voice recognition software. As a result, there may be errors in the script that have gone undetected. Please consider this when interpreting information found in this chart.      I have reviewed the nursing  notes.    I have reviewed the findings, diagnosis, plan and need for follow up with the patient.       Discharge Medication List as of 2/19/2018 11:49 PM      START taking these medications    Details   oseltamivir (TAMIFLU) 75 MG capsule Take 1 capsule (75 mg) by mouth 2 times daily for 5 days, Disp-10 capsule, R-0, Local Print             Final diagnoses:   Influenza A       2/19/2018   [facility] EMERGENCY DEPARTMENT     Joseph Hill MD  02/23/18 [id number]

## 2018-02-20 NOTE — DISCHARGE INSTRUCTIONS
Influenza (Adult)    Influenza is also called the flu. It is a viral illness that affects the air passages of your lungs. It is different from the common cold. The flu can easily be passed from one to person to another. It may be spread through the air by coughing and sneezing. Or it can be spread by touching the sick person and then touching your own eyes, nose, or mouth.  The flu starts 1 to 3 days after you are exposed to the flu virus. It may last for 1 to 2 weeks but many people feel tired or fatigued for many weeks afterward. You usually don t need to take antibiotics unless you have a complication. This might be an ear or sinus infection or pneumonia.  Symptoms of the flu may be mild or severe. They can include extreme tiredness (wanting to stay in bed all day), chills, fevers, muscle aches, soreness with eye movement, headache, and a dry, hacking cough.  Home care  Follow these guidelines when caring for yourself at home:    Avoid being around cigarette smoke, whether yours or other people s.    Acetaminophen or ibuprofen will help ease your fever, muscle aches, and headache. Don t give aspirin to anyone younger than 18 who has the flu. Aspirin can harm the liver.    Nausea and loss of appetite are common with the flu. Eat light meals. Drink 6 to 8 glasses of liquids every day. Good choices are water, sport drinks, soft drinks without caffeine, juices, tea, and soup. Extra fluids will also help loosen secretions in your nose and lungs.    Over-the-counter cold medicines will not make the flu go away faster. But the medicines may help with coughing, sore throat, and congestion in your nose and sinuses. Don t use a decongestant if you have high blood pressure.    Stay home until your fever has been gone for at least 24 hours without using medicine to reduce fever.  Follow-up care  Follow up with your healthcare provider, or as advised, if you are not getting better over the next week.  If you are age 65 or  older, talk with your provider about getting a pneumococcal vaccine every 5 years. You should also get this vaccine if you have chronic asthma or COPD. All adults should get a flu vaccine every fall. Ask your provider about this.  When to seek medical advice  Call your healthcare provider right away if any of these occur:    Cough with lots of colored mucus (sputum) or blood in your mucus    Chest pain, shortness of breath, wheezing, or trouble breathing    Severe headache, or face, neck, or ear pain    New rash with fever    Fever of 100.4 F (38 C) or higher, or as directed by your healthcare provider    Confusion, behavior change, or seizure    Severe weakness or dizziness    You get a new fever or cough after getting better for a few days  Date Last Reviewed: 1/1/2017 2000-2017 [employer]. [address] [place] [address], [place] [address]. All rights reserved. This information is not intended as a substitute for professional medical care. Always follow your healthcare professional's instructions.      Push fluids, rest.  Tylenol/ibuprofen as needed for comfort.  Tamiflu as discussed.  Return to the emergency department if worse or changes.

## 2018-02-22 LAB
BACTERIA SPEC CULT: NORMAL
SPECIMEN SOURCE: NORMAL

## 2018-03-06 ENCOUNTER — OFFICE VISIT (OUTPATIENT)
Dept: FAMILY MEDICINE | Facility: CLINIC | Age: 33
End: 2018-03-06
Payer: COMMERCIAL

## 2018-03-06 VITALS
OXYGEN SATURATION: 96 % | TEMPERATURE: 97.9 F | WEIGHT: 265 LBS | BODY MASS INDEX: 35.94 KG/M2 | DIASTOLIC BLOOD PRESSURE: 75 MMHG | SYSTOLIC BLOOD PRESSURE: 133 MMHG | HEART RATE: 79 BPM

## 2018-03-06 DIAGNOSIS — J45.20 MILD INTERMITTENT ASTHMA WITHOUT COMPLICATION: ICD-10-CM

## 2018-03-06 DIAGNOSIS — B36.0 TINEA VERSICOLOR: Primary | ICD-10-CM

## 2018-03-06 DIAGNOSIS — L80 VITILIGO: ICD-10-CM

## 2018-03-06 PROCEDURE — 99214 OFFICE O/P EST MOD 30 MIN: CPT | Performed by: NURSE PRACTITIONER

## 2018-03-06 RX ORDER — FLUCONAZOLE 150 MG/1
TABLET ORAL
Qty: 4 TABLET | Refills: 0 | Status: SHIPPED | OUTPATIENT
Start: 2018-03-06 | End: 2018-04-05

## 2018-03-06 RX ORDER — ALBUTEROL SULFATE 90 UG/1
2 AEROSOL, METERED RESPIRATORY (INHALATION) EVERY 4 HOURS PRN
Qty: 1 INHALER | Refills: 0 | Status: SHIPPED | OUTPATIENT
Start: 2018-03-06 | End: 2018-05-14

## 2018-03-06 NOTE — PROGRESS NOTES
SUBJECTIVE:   Holly Tolliver is a 32 year old female who presents to clinic today for the following health issues:      Rash  Onset: 2 months    Description:   Location: arms, chest and neck  Character: round, blotchy, raised, red  Itching (Pruritis): YES    Progression of Symptoms:  worsening    Accompanying Signs & Symptoms:  Fever: no   Body aches or joint pain: no   Sore throat symptoms: no   Recent cold symptoms: YES- had influenza    History:   Previous similar rash: YES    Precipitating factors:   Exposure to similar rash: no   New exposures: None   Recent travel: no     Alleviating factors:  none    Therapies Tried and outcome: Benadryl, did not help    PROBLEMS TO ADD ON...  Intermittent asthma: well controlled- only needing to use albuterol inhaler with exertion/exercise.         Problem list and histories reviewed & adjusted, as indicated.  Additional history: as documented    Patient Active Problem List   Diagnosis     Tobacco use disorder     Mild intermittent asthma     CTS (carpal tunnel syndrome)     CARDIOVASCULAR SCREENING; LDL GOAL LESS THAN 160     Major depressive disorder, single episode, moderate (H)     Generalized anxiety disorder     GERD (gastroesophageal reflux disease)     Health Care Home     Prenatal care, subsequent pregnancy in third trimester     Prenatal care, subsequent pregnancy     Single liveborn, born in hospital, delivered     Placental abruption in third trimester     Placental abruption      delivery delivered     Routine postpartum follow-up     Mirena IUD     Past Surgical History:   Procedure Laterality Date      SECTION Bilateral 2016    Procedure:  SECTION;  Surgeon: Beau Cardoso MD;  Location: [facility] OR     PE TUBES  age 3       Social History   Substance Use Topics     Smoking status: Former Smoker     Packs/day: 0.00     Years: 12.00     Types: Cigarettes     Smokeless tobacco: Never Used     Alcohol use No      Comment: sober  since 1/9/2015     Family History   Problem Relation Age of Onset     Alcohol/Drug Mother      alcohol- recovered     Hypertension Mother      Depression Mother      also anxiety     Other - See Comments Mother      ankylosing spondylosis     Alcohol/Drug Father      alcohol- recovered     Alcohol/Drug Sister      A&D     CANCER Maternal Grandmother      lung     Depression Maternal Grandmother      also anxiety     MENTAL ILLNESS Paternal Grandmother      Alcohol/Drug Brother      alcohol           Reviewed and updated as needed this visit by clinical staff       Reviewed and updated as needed this visit by Provider         ROS:  Constitutional, HEENT, cardiovascular, pulmonary, GI, , musculoskeletal, neuro, skin, endocrine and psych systems are negative, except as otherwise noted.    OBJECTIVE:     /75 (BP Location: Left arm, Patient Position: Chair, Cuff Size: Adult Large)  Pulse 79  Temp 97.9  F (36.6  C) (Tympanic)  Wt 265 lb (120.2 kg)  SpO2 96%  BMI 35.94 kg/m2  Body mass index is 35.94 kg/(m^2).  GENERAL: healthy, alert and no distress  RESP: lungs clear to auscultation - no rales, rhonchi or wheezes  MS: no gross musculoskeletal defects noted, no edema  SKIN: scattered areas of Vitiligo and tinea versicolor on arms/chest/back    Diagnostic Test Results:  none     ASSESSMENT/PLAN:       1. Tinea versicolor  -   - DERMATOLOGY REFERRAL  - fluconazole (DIFLUCAN) 150 MG tablet; Take 2 tablets once a week for 2 weeks.  Dispense: 4 tablet; Refill: 0    2. Vitiligo    - DERMATOLOGY REFERRAL    3. Mild intermittent asthma without complication  Well controlled   - albuterol (PROAIR HFA/PROVENTIL HFA/VENTOLIN HFA) 108 (90 BASE) MCG/ACT Inhaler; Inhale 2 puffs into the lungs every 4 hours as needed for shortness of breath / dyspnea  Dispense: 1 Inhaler; Refill: 0      WOLFGANG Freire [facility]

## 2018-03-06 NOTE — PATIENT INSTRUCTIONS
1. Take Diflucan as prescribed  2. Schedule appointment with dermatology-  ([facility] in [place])           Thank you for choosing [facility].  You may be receiving a survey in the mail from Nolan Mann regarding your visit today.  Please take a few minutes to complete and return the survey to let us know how we are doing.      If you have questions or concerns, please contact us via [employer] or you can contact your care team at [phone].    Our Clinic hours are:  Monday 6:40 am  to 7:00 pm  Tuesday -Friday 6:40 am to 5:00 pm    The [place] outpatient lab hours are:  Monday - Friday 6:10 am to 4:45 pm  Saturdays 7:00 am to 11:00 am  Appointments are required, call [phone]    If you have clinical questions after hours or would like to schedule an appointment,  call the clinic at [phone].

## 2018-03-06 NOTE — NURSING NOTE
Initial /75 (BP Location: Left arm, Patient Position: Chair, Cuff Size: Adult Large)  Pulse 79  Temp 97.9  F (36.6  C) (Tympanic)  Wt 265 lb (120.2 kg)  SpO2 96%  BMI 35.94 kg/m2 Estimated body mass index is 35.94 kg/(m^2) as calculated from the following:    Height as of 4/26/17: 6' (1.829 m).    Weight as of this encounter: 265 lb (120.2 kg). .    Bhavna Ponce

## 2018-03-06 NOTE — MR AVS SNAPSHOT
After Visit Summary   3/6/2018    Holly Tolliver    MRN: [id number]           Patient Information     Date Of Birth          1985        Visit Information        Provider Department      3/6/2018 10:00 AM Yue Oneill APRN [facility]        Today's Diagnoses     Tinea versicolor    -  1    Vitiligo        Mild intermittent asthma without complication          Care Instructions    1. Take Diflucan as prescribed  2. Schedule appointment with dermatology-  ([facility] in [place])           Thank you for choosing [facility].  You may be receiving a survey in the mail from Nolan Mann regarding your visit today.  Please take a few minutes to complete and return the survey to let us know how we are doing.      If you have questions or concerns, please contact us via [employer] or you can contact your care team at [phone].    Our Clinic hours are:  Monday 6:40 am  to 7:00 pm  Tuesday -Friday 6:40 am to 5:00 pm    The [place] outpatient lab hours are:  Monday - Friday 6:10 am to 4:45 pm  Saturdays 7:00 am to 11:00 am  Appointments are required, call [phone]    If you have clinical questions after hours or would like to schedule an appointment,  call the clinic at [phone].          Follow-ups after your visit        Additional Services     DERMATOLOGY REFERRAL       Your provider has referred you to: [place]: [facility] [phone]   [email or web address]    Please be aware that coverage of these services is subject to the terms and limitations of your health insurance plan.  Call member services at your health plan with any benefit or coverage questions.      Please bring the following with you to your appointment:    (1) Any X-Rays, CTs or MRIs which have been performed.  Contact the facility where they were done to arrange for  prior to your scheduled appointment.    (2) List of current  medications  (3) This referral request   (4) Any documents/labs given to you for this referral                  Who to contact     If you have questions or need follow up information about today's clinic visit or your schedule please contact [facility] directly at [phone].  Normal or non-critical lab and imaging results will be communicated to you by MyChart, letter or phone within 4 business days after the clinic has received the results. If you do not hear from us within 7 days, please contact the clinic through [employer] or phone. If you have a critical or abnormal lab result, we will notify you by phone as soon as possible.  Submit refill requests through MeetMe or call your pharmacy and they will forward the refill request to us. Please allow 3 business days for your refill to be completed.          Additional Information About Your Visit        [employer] Information     MeetMe gives you secure access to your electronic health record. If you see a primary care provider, you can also send messages to your care team and make appointments. If you have questions, please call your primary care clinic.  If you do not have a primary care provider, please call [phone] and they will assist you.        Care EveryWhere ID     This is your Care EveryWhere ID. This could be used by other organizations to access your [facility] medical records  [id number]        Your Vitals Were     Pulse Temperature Pulse Oximetry BMI (Body Mass Index)          79 97.9  F (36.6  C) (Tympanic) 96% 35.94 kg/m2         Blood Pressure from Last 3 Encounters:   03/06/18 133/75   02/19/18 129/82   12/11/17 139/80    Weight from Last 3 Encounters:   03/06/18 265 lb (120.2 kg)   02/19/18 264 lb 8.8 oz (120 kg)   12/11/17 265 lb (120.2 kg)              We Performed the Following     DERMATOLOGY REFERRAL          Today's Medication Changes          These changes are accurate as of 3/6/18 10:21 AM.  If you have any questions, ask  your nurse or doctor.               Start taking these medicines.        Dose/Directions    fluconazole 150 MG tablet   Commonly known as:  DIFLUCAN   Used for:  Tinea versicolor        Take 2 tablets once a week for 2 weeks.   Quantity:  4 tablet   Refills:  0            Where to get your medicines      These medications were sent to [id number] - [place], [place] - [address]  [address] [facility] [address]    Hours:  Closed 10-16-08 business to [facility] Phone:  [phone]     albuterol 108 (90 BASE) MCG/ACT Inhaler    fluconazole 150 MG tablet                Primary Care Provider Office Phone # Fax #    Yue Oneill, APRN [facility] [phone] [address] [facility] [address]        Goals        General    I will attend weekly therapy sessions  (pt-stated)     Notes - Note created  3/24/2015 12:26 PM by Kelly Simon    As of today's date 3/24/2015 goal is met at 26 - 50%.   Goal Status:  Active      I will review/research the online support resources given by  within the next 2 weeks  (pt-stated)     Notes - Note created  3/24/2015 12:25 PM by Kelly Simon    As of today's date 3/24/2015 goal is met at 0 - 25%.   Goal Status:  Active        Equal Access to Services     DANELLE LORA AH: Hadii anton montero hadasho Soomaali, waaxda luqadaha, qaybta kaalmada adeegyada, raegan finch. So [facility] [phone].    ATENCIÓN: Si habla español, tiene a youssef disposición servicios gratuitos de asistencia lingüística. Llame al [phone].    We comply with applicable federal civil rights laws and [place] laws. We do not discriminate on the basis of race, color, national origin, age, disability, sex, sexual orientation, or gender identity.            Thank you!     Thank you for choosing [facility]  for your care. Our goal is always to provide you with excellent care. Hearing back from our patients is one way we can continue to  improve our services. Please take a few minutes to complete the written survey that you may receive in the mail after your visit with us. Thank you!             Your Updated Medication List - Protect others around you: Learn how to safely use, store and throw away your medicines at www.disposemymeds.org.          This list is accurate as of 3/6/18 10:21 AM.  Always use your most recent med list.                   Brand Name Dispense Instructions for use Diagnosis    albuterol 108 (90 BASE) MCG/ACT Inhaler    PROAIR HFA/PROVENTIL HFA/VENTOLIN HFA    1 Inhaler    Inhale 2 puffs into the lungs every 4 hours as needed for shortness of breath / dyspnea    Mild intermittent asthma without complication       fluconazole 150 MG tablet    DIFLUCAN    4 tablet    Take 2 tablets once a week for 2 weeks.    Tinea versicolor       ibuprofen 400 MG tablet    ADVIL/MOTRIN    120 tablet    Take 1-2 tablets (400-800 mg) by mouth every 6 hours as needed for other (cramping)     delivery delivered       ranitidine 150 MG tablet    ZANTAC    60 tablet    Take 1 tablet (150 mg) by mouth 2 times daily    Gastroesophageal reflux disease without esophagitis       STRATTERA 40 MG capsule   Generic drug:  atomoxetine     30 capsule    Take 1 capsule (40 mg) by mouth daily    ADHD (attention deficit hyperactivity disorder), combined type

## 2018-03-07 ASSESSMENT — ASTHMA QUESTIONNAIRES: ACT_TOTALSCORE: 20

## 2018-03-19 ENCOUNTER — HOSPITAL ENCOUNTER (EMERGENCY)
Facility: CLINIC | Age: 33
Discharge: HOME | End: 2018-03-19
Attending: STUDENT IN AN ORGANIZED HEALTH CARE EDUCATION/TRAINING PROGRAM | Admitting: STUDENT IN AN ORGANIZED HEALTH CARE EDUCATION/TRAINING PROGRAM
Payer: COMMERCIAL

## 2018-03-19 VITALS
HEIGHT: 72 IN | DIASTOLIC BLOOD PRESSURE: 94 MMHG | OXYGEN SATURATION: 96 % | RESPIRATION RATE: 18 BRPM | SYSTOLIC BLOOD PRESSURE: 160 MMHG | TEMPERATURE: 99.2 F | BODY MASS INDEX: 33.86 KG/M2 | WEIGHT: 250 LBS

## 2018-03-19 DIAGNOSIS — R07.89 ATYPICAL CHEST PAIN: ICD-10-CM

## 2018-03-19 DIAGNOSIS — R20.2 PARESTHESIA: ICD-10-CM

## 2018-03-19 DIAGNOSIS — F19.90 ILLICIT DRUG USE: ICD-10-CM

## 2018-03-19 LAB
ALBUMIN SERPL-MCNC: 4.3 G/DL (ref 3.4–5)
ALP SERPL-CCNC: 71 U/L (ref 40–150)
ALT SERPL W P-5'-P-CCNC: 37 U/L (ref 0–50)
ANION GAP SERPL CALCULATED.3IONS-SCNC: 6 MMOL/L (ref 3–14)
AST SERPL W P-5'-P-CCNC: 46 U/L (ref 0–45)
BASOPHILS # BLD AUTO: 0 10E9/L (ref 0–0.2)
BASOPHILS NFR BLD AUTO: 0.3 %
BILIRUB SERPL-MCNC: 0.8 MG/DL (ref 0.2–1.3)
BUN SERPL-MCNC: 12 MG/DL (ref 7–30)
CALCIUM SERPL-MCNC: 9.2 MG/DL (ref 8.5–10.1)
CHLORIDE SERPL-SCNC: 101 MMOL/L (ref 94–109)
CO2 SERPL-SCNC: 28 MMOL/L (ref 20–32)
CREAT SERPL-MCNC: 0.82 MG/DL (ref 0.52–1.04)
DIFFERENTIAL METHOD BLD: NORMAL
EOSINOPHIL # BLD AUTO: 0 10E9/L (ref 0–0.7)
EOSINOPHIL NFR BLD AUTO: 0.5 %
ERYTHROCYTE [DISTWIDTH] IN BLOOD BY AUTOMATED COUNT: 13.6 % (ref 10–15)
GFR SERPL CREATININE-BSD FRML MDRD: 81 ML/MIN/1.7M2
GLUCOSE SERPL-MCNC: 113 MG/DL (ref 70–99)
HCG SERPL QL: NEGATIVE
HCT VFR BLD AUTO: 41.3 % (ref 35–47)
HGB BLD-MCNC: 14.4 G/DL (ref 11.7–15.7)
IMM GRANULOCYTES # BLD: 0 10E9/L (ref 0–0.4)
IMM GRANULOCYTES NFR BLD: 0.3 %
LYMPHOCYTES # BLD AUTO: 2.8 10E9/L (ref 0.8–5.3)
LYMPHOCYTES NFR BLD AUTO: 32.2 %
MCH RBC QN AUTO: 28.3 PG (ref 26.5–33)
MCHC RBC AUTO-ENTMCNC: 34.9 G/DL (ref 31.5–36.5)
MCV RBC AUTO: 81 FL (ref 78–100)
MONOCYTES # BLD AUTO: 0.9 10E9/L (ref 0–1.3)
MONOCYTES NFR BLD AUTO: 10 %
NEUTROPHILS # BLD AUTO: 5 10E9/L (ref 1.6–8.3)
NEUTROPHILS NFR BLD AUTO: 56.7 %
PLATELET # BLD AUTO: 268 10E9/L (ref 150–450)
POTASSIUM SERPL-SCNC: 4.2 MMOL/L (ref 3.4–5.3)
PROT SERPL-MCNC: 8.7 G/DL (ref 6.8–8.8)
RBC # BLD AUTO: 5.08 10E12/L (ref 3.8–5.2)
SODIUM SERPL-SCNC: 135 MMOL/L (ref 133–144)
TROPONIN I SERPL-MCNC: <0.015 UG/L (ref 0–0.04)
TROPONIN I SERPL-MCNC: <0.015 UG/L (ref 0–0.04)
WBC # BLD AUTO: 8.8 10E9/L (ref 4–11)

## 2018-03-19 PROCEDURE — 99284 EMERGENCY DEPT VISIT MOD MDM: CPT | Mod: 25 | Performed by: STUDENT IN AN ORGANIZED HEALTH CARE EDUCATION/TRAINING PROGRAM

## 2018-03-19 PROCEDURE — 84703 CHORIONIC GONADOTROPIN ASSAY: CPT | Performed by: STUDENT IN AN ORGANIZED HEALTH CARE EDUCATION/TRAINING PROGRAM

## 2018-03-19 PROCEDURE — 25000128 H RX IP 250 OP 636: Performed by: STUDENT IN AN ORGANIZED HEALTH CARE EDUCATION/TRAINING PROGRAM

## 2018-03-19 PROCEDURE — 25000132 ZZH RX MED GY IP 250 OP 250 PS 637: Performed by: STUDENT IN AN ORGANIZED HEALTH CARE EDUCATION/TRAINING PROGRAM

## 2018-03-19 PROCEDURE — 85025 COMPLETE CBC W/AUTO DIFF WBC: CPT | Performed by: STUDENT IN AN ORGANIZED HEALTH CARE EDUCATION/TRAINING PROGRAM

## 2018-03-19 PROCEDURE — 80053 COMPREHEN METABOLIC PANEL: CPT | Performed by: STUDENT IN AN ORGANIZED HEALTH CARE EDUCATION/TRAINING PROGRAM

## 2018-03-19 PROCEDURE — 84484 ASSAY OF TROPONIN QUANT: CPT | Mod: 91 | Performed by: STUDENT IN AN ORGANIZED HEALTH CARE EDUCATION/TRAINING PROGRAM

## 2018-03-19 PROCEDURE — 96361 HYDRATE IV INFUSION ADD-ON: CPT | Performed by: STUDENT IN AN ORGANIZED HEALTH CARE EDUCATION/TRAINING PROGRAM

## 2018-03-19 PROCEDURE — 93010 ELECTROCARDIOGRAM REPORT: CPT | Mod: Z6 | Performed by: STUDENT IN AN ORGANIZED HEALTH CARE EDUCATION/TRAINING PROGRAM

## 2018-03-19 PROCEDURE — 93005 ELECTROCARDIOGRAM TRACING: CPT | Performed by: STUDENT IN AN ORGANIZED HEALTH CARE EDUCATION/TRAINING PROGRAM

## 2018-03-19 PROCEDURE — 96360 HYDRATION IV INFUSION INIT: CPT | Performed by: STUDENT IN AN ORGANIZED HEALTH CARE EDUCATION/TRAINING PROGRAM

## 2018-03-19 RX ORDER — ASPIRIN 81 MG/1
324 TABLET, CHEWABLE ORAL ONCE
Status: COMPLETED | OUTPATIENT
Start: 2018-03-19 | End: 2018-03-19

## 2018-03-19 RX ADMIN — SODIUM CHLORIDE, POTASSIUM CHLORIDE, SODIUM LACTATE AND CALCIUM CHLORIDE 1000 ML: 600; 310; 30; 20 INJECTION, SOLUTION INTRAVENOUS at 17:43

## 2018-03-19 RX ADMIN — ASPIRIN 81 MG 324 MG: 81 TABLET ORAL at 17:47

## 2018-03-19 NOTE — ED AVS SNAPSHOT
[facility] Emergency Department    [address] [address]    Phone:  [phone]    Fax:  [phone]                                       Holly Tolliver   MRN: [id number]    Department:  [facility] Emergency Department   Date of Visit:  3/19/2018           After Visit Summary Signature Page     I have received my discharge instructions, and my questions have been answered. I have discussed any challenges I see with this plan with the nurse or doctor.    ..........................................................................................................................................  Patient/Patient Representative Signature      ..........................................................................................................................................  Patient Representative Print Name and Relationship to Patient    ..................................................               ................................................  Date                                            Time    ..........................................................................................................................................  Reviewed by Signature/Title    ...................................................              ..............................................  Date                                                            Time

## 2018-03-19 NOTE — ED NOTES
Pt brought to ED by EMS with multiple concerns after using meth about 3 pm. Pt reports she has been clean 18 months until today; a friend brought over some meth and he injected the meth into her upper right forearm. Pt reports some numbness and swelling in her upper extremities. Pt reports she had chest pain/palpitation earlier.

## 2018-03-19 NOTE — ED AVS SNAPSHOT
[facility] Emergency Department    [address] [facility] [address] [facility] [address]    Phone:  [phone]    Fax:  [phone]                                       Holly Tolliver   MRN: [id number]    Department:  [facility] Emergency Department   Date of Visit:  3/19/2018           Patient Information     Date Of Birth          1985        Your diagnoses for this visit were:     Atypical chest pain     Paresthesia     Illicit drug use        You were seen by Lui Ham DO.      Follow-up Information     Follow up with Yue Oneill APRN CNP. Schedule an appointment as soon as possible for a visit in 2 days.    Specialty:  Nurse Practitioner    Why:  Followup for reevaluation and managment plan.    Contact information:    [address] [facility] [address] [facility] [address]  [phone]        Discharge References/Attachments     CHEST PAIN, UNCERTAIN CAUSE (ENGLISH)    METHAMPHETAMINE ABUSE AND ADDICTION, UNDERSTANDING (ENGLISH)      24 Hour Appointment Hotline       To make an appointment at any [facility], call [phone] ([phone]). If you don't have a family doctor or clinic, we will help you find one. [facility] are conveniently located to serve the needs of you and your family.             Review of your medicines      Our records show that you are taking the medicines listed below. If these are incorrect, please call your family doctor or clinic.        Dose / Directions Last dose taken    albuterol 108 (90 BASE) MCG/ACT Inhaler   Commonly known as:  PROAIR HFA/PROVENTIL HFA/VENTOLIN HFA   Dose:  2 puff   Quantity:  1 Inhaler        Inhale 2 puffs into the lungs every 4 hours as needed for shortness of breath / dyspnea   Refills:  0        fluconazole 150 MG tablet   Commonly known as:  DIFLUCAN   Quantity:  4 tablet        Take 2 tablets once a week for 2 weeks.   Refills:  0        ibuprofen 400 MG tablet   Commonly known as:  ADVIL/MOTRIN   Dose:  400-800 mg   Quantity:   120 tablet        Take 1-2 tablets (400-800 mg) by mouth every 6 hours as needed for other (cramping)   Refills:  0        ranitidine 150 MG tablet   Commonly known as:  ZANTAC   Dose:  150 mg   Quantity:  60 tablet        Take 1 tablet (150 mg) by mouth 2 times daily   Refills:  1        STRATTERA 40 MG capsule   Dose:  40 mg   Quantity:  30 capsule   Generic drug:  atomoxetine        Take 1 capsule (40 mg) by mouth daily   Refills:  1                Procedures and tests performed during your visit     CBC with platelets differential    Cardiac Continuous Monitoring    Comprehensive metabolic panel    EKG 12-lead, tracing only    HCG qualitative pregnancy (blood)    Peripheral IV: Standard    Pulse oximetry nursing    Troponin I    Troponin I (second draw)    Vital signs      Orders Needing Specimen Collection     None      Pending Results     No orders found from 3/17/2018 to 3/20/2018.            Pending Culture Results     No orders found from 3/17/2018 to 3/20/2018.            Pending Results Instructions     If you had any lab results that were not finalized at the time of your Discharge, you can call the ED Lab Result RN at [phone]. You will be contacted by this team for any positive Lab results or changes in treatment. The nurses are available 7 days a week from 10A to 6:30P.  You can leave a message 24 hours per day and they will return your call.        Test Results From Your Hospital Stay        3/19/2018  5:51 PM      Component Results     Component Value Ref Range & Units Status    WBC 8.8 4.0 - 11.0 10e9/L Final    RBC Count 5.08 3.8 - 5.2 10e12/L Final    Hemoglobin 14.4 11.7 - 15.7 g/dL Final    Hematocrit 41.3 35.0 - 47.0 % Final    MCV 81 78 - 100 fl Final    MCH 28.3 26.5 - 33.0 pg Final    MCHC 34.9 31.5 - 36.5 g/dL Final    RDW 13.6 10.0 - 15.0 % Final    Platelet Count 268 150 - 450 10e9/L Final    Diff Method Automated Method  Final    % Neutrophils 56.7 % Final    % Lymphocytes 32.2 %  Final    % Monocytes 10.0 % Final    % Eosinophils 0.5 % Final    % Basophils 0.3 % Final    % Immature Granulocytes 0.3 % Final    Absolute Neutrophil 5.0 1.6 - 8.3 10e9/L Final    Absolute Lymphocytes 2.8 0.8 - 5.3 10e9/L Final    Absolute Monocytes 0.9 0.0 - 1.3 10e9/L Final    Absolute Eosinophils 0.0 0.0 - 0.7 10e9/L Final    Absolute Basophils 0.0 0.0 - 0.2 10e9/L Final    Abs Immature Granulocytes 0.0 0 - 0.4 10e9/L Final         3/19/2018  6:10 PM      Component Results     Component Value Ref Range & Units Status    Sodium 135 133 - 144 mmol/L Final    Potassium 4.2 3.4 - 5.3 mmol/L Final    Chloride 101 94 - 109 mmol/L Final    Carbon Dioxide 28 20 - 32 mmol/L Final    Anion Gap 6 3 - 14 mmol/L Final    Glucose 113 (H) 70 - 99 mg/dL Final    Urea Nitrogen 12 7 - 30 mg/dL Final    Creatinine 0.82 0.52 - 1.04 mg/dL Final    GFR Estimate 81 >60 mL/min/1.7m2 Final    Non  GFR Calc    GFR Estimate If Black >90 >60 mL/min/1.7m2 Final    African American GFR Calc    Calcium 9.2 8.5 - 10.1 mg/dL Final    Bilirubin Total 0.8 0.2 - 1.3 mg/dL Final    Albumin 4.3 3.4 - 5.0 g/dL Final    Protein Total 8.7 6.8 - 8.8 g/dL Final    Alkaline Phosphatase 71 40 - 150 U/L Final    ALT 37 0 - 50 U/L Final    AST 46 (H) 0 - 45 U/L Final         3/19/2018  6:10 PM      Component Results     Component Value Ref Range & Units Status    Troponin I ES <0.015 0.000 - 0.045 ug/L Final    The 99th percentile for upper reference range is 0.045 ug/L.  Troponin values   in the range of 0.045 - 0.120 ug/L may be associated with risks of adverse   clinical events.           3/19/2018  6:02 PM      Component Results     Component Value Ref Range & Units Status    HCG Qualitative Serum Negative NEG^Negative Final    This test is for screening purposes.  Results should be interpreted along with   the clinical picture.  Confirmation testing is available if warranted by   ordering BOD283, HCG Quantitative Pregnancy.            3/19/2018  8:48 PM      Component Results     Component Value Ref Range & Units Status    Troponin I ES <0.015 0.000 - 0.045 ug/L Final    The 99th percentile for upper reference range is 0.045 ug/L.  Troponin values   in the range of 0.045 - 0.120 ug/L may be associated with risks of adverse   clinical events.                  Thank you for choosing [facility]       Thank you for choosing [facility] for your care. Our goal is always to provide you with excellent care. Hearing back from our patients is one way we can continue to improve our services. Please take a few minutes to complete the written survey that you may receive in the mail after you visit with us. Thank you!        [facility] Information     [employer] gives you secure access to your electronic health record. If you see a primary care provider, you can also send messages to your care team and make appointments. If you have questions, please call your primary care clinic.  If you do not have a primary care provider, please call [phone] and they will assist you.        Care EveryWhere ID     This is your Care EveryWhere ID. This could be used by other organizations to access your [facility] medical records  [id number]        Equal Access to Services     DANELLE LORA : Hadjonathan Aguiar, sheila gilbert, raegan loza. So [facility] [phone].    ATENCIÓN: Si habla español, tiene a youssef disposición servicios gratuitos de asistencia lingüística. Llame al [phone].    We comply with applicable federal civil rights laws and [place] laws. We do not discriminate on the basis of race, color, national origin, age, disability, sex, sexual orientation, or gender identity.            After Visit Summary       This is your record. Keep this with you and show to your community pharmacist(s) and doctor(s) at your next visit.
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
## 2018-03-30 ENCOUNTER — TELEPHONE (OUTPATIENT)
Dept: FAMILY MEDICINE | Facility: CLINIC | Age: 33
End: 2018-03-30

## 2018-04-02 NOTE — TELEPHONE ENCOUNTER
Left detailed message on patient's cell phone to return call to clinic to complete Phq9/Donovan.  Also reminded patient she is due for a Pap screening.  Will postphone this encounter for ten days.

## 2018-04-05 ENCOUNTER — OFFICE VISIT (OUTPATIENT)
Dept: FAMILY MEDICINE | Facility: CLINIC | Age: 33
End: 2018-04-05
Payer: COMMERCIAL

## 2018-04-05 VITALS
SYSTOLIC BLOOD PRESSURE: 131 MMHG | BODY MASS INDEX: 35.56 KG/M2 | DIASTOLIC BLOOD PRESSURE: 89 MMHG | WEIGHT: 260.4 LBS | TEMPERATURE: 98.2 F | HEART RATE: 97 BPM | OXYGEN SATURATION: 96 %

## 2018-04-05 DIAGNOSIS — F90.9 ATTENTION DEFICIT HYPERACTIVITY DISORDER (ADHD), UNSPECIFIED ADHD TYPE: ICD-10-CM

## 2018-04-05 DIAGNOSIS — F15.91 HISTORY OF METHAMPHETAMINE USE: ICD-10-CM

## 2018-04-05 DIAGNOSIS — F32.1 MAJOR DEPRESSIVE DISORDER, SINGLE EPISODE, MODERATE (H): ICD-10-CM

## 2018-04-05 DIAGNOSIS — Z01.419 PAP SMEAR, LOW-RISK: Primary | ICD-10-CM

## 2018-04-05 DIAGNOSIS — R42 DIZZINESS: ICD-10-CM

## 2018-04-05 PROCEDURE — G0145 SCR C/V CYTO,THINLAYER,RESCR: HCPCS | Performed by: NURSE PRACTITIONER

## 2018-04-05 PROCEDURE — 99214 OFFICE O/P EST MOD 30 MIN: CPT | Mod: 25 | Performed by: NURSE PRACTITIONER

## 2018-04-05 PROCEDURE — G0476 HPV COMBO ASSAY CA SCREEN: HCPCS | Performed by: NURSE PRACTITIONER

## 2018-04-05 PROCEDURE — 99395 PREV VISIT EST AGE 18-39: CPT | Performed by: NURSE PRACTITIONER

## 2018-04-05 NOTE — MR AVS SNAPSHOT
After Visit Summary   4/5/2018    Holly Tolliver    MRN: [id number]           Patient Information     Date Of Birth          1985        Visit Information        Provider Department      4/5/2018 10:00 AM Yue Oneill APRN CNP [facility]        Care Instructions      Preventive Health Recommendations  Female Ages 26 - 39  Yearly exam:   See your health care provider every year in order to    Review health changes.     Discuss preventive care.      Review your medicines if you your doctor has prescribed any.    Until age 30: Get a Pap test every three years (more often if you have had an abnormal result).    After age 30: Talk to your doctor about whether you should have a Pap test every 3 years or have a Pap test with HPV screening every 5 years.   You do not need a Pap test if your uterus was removed (hysterectomy) and you have not had cancer.  You should be tested each year for STDs (sexually transmitted diseases), if you're at risk.   Talk to your provider about how often to have your cholesterol checked.  If you are at risk for diabetes, you should have a diabetes test (fasting glucose).  Shots: Get a flu shot each year. Get a tetanus shot every 10 years.   Nutrition:     Eat at least 5 servings of fruits and vegetables each day.    Eat whole-grain bread, whole-wheat pasta and brown rice instead of white grains and rice.    Talk to your provider about Calcium and Vitamin D.     Lifestyle    Exercise at least 150 minutes a week (30 minutes a day, 5 days of the week). This will help you control your weight and prevent disease.    Limit alcohol to one drink per day.    No smoking.     Wear sunscreen to prevent skin cancer.    See your dentist every six months for an exam and cleaning.            Follow-ups after your visit        Who to contact     If you have questions or need follow up information about today's clinic visit or your schedule please contact [facility]  [place] directly at [phone].  Normal or non-critical lab and imaging results will be communicated to you by [employer], letter or phone within 4 business days after the clinic has received the results. If you do not hear from us within 7 days, please contact the clinic through [employer] or phone. If you have a critical or abnormal lab result, we will notify you by phone as soon as possible.  Submit refill requests through [employer] or call your pharmacy and they will forward the refill request to us. Please allow 3 business days for your refill to be completed.          Additional Information About Your Visit        [employer] Information     [employer] gives you secure access to your electronic health record. If you see a primary care provider, you can also send messages to your care team and make appointments. If you have questions, please call your primary care clinic.  If you do not have a primary care provider, please call [phone] and they will assist you.        Care EveryWhere ID     This is your Care EveryWhere ID. This could be used by other organizations to access your [facility] medical records  [id number]        Your Vitals Were     Pulse Temperature Pulse Oximetry BMI (Body Mass Index)          96 98.2  F (36.8  C) (Tympanic) 96% 35.56 kg/m2         Blood Pressure from Last 3 Encounters:   04/05/18 146/87   03/19/18 (!) 160/94   03/06/18 133/75    Weight from Last 3 Encounters:   04/05/18 260 lb 6.4 oz (118.1 kg)   03/19/18 250 lb (113.4 kg)   03/06/18 265 lb (120.2 kg)              Today, you had the following     No orders found for display         Today's Medication Changes          These changes are accurate as of 4/5/18 10:24 AM.  If you have any questions, ask your nurse or doctor.               Stop taking these medicines if you haven't already. Please contact your care team if you have questions.     ranitidine 150 MG tablet   Commonly known as:  ZANTAC                    Primary Care Provider  Office Phone # Fax #    WOLFGANG Freire [phone] [address] [facility] [address]        Goals        General    I will attend weekly therapy sessions  (pt-stated)     Notes - Note created  3/24/2015 12:26 PM by Kelly Simon    As of today's date 3/24/2015 goal is met at 26 - 50%.   Goal Status:  Active      I will review/research the online support resources given by  within the next 2 weeks  (pt-stated)     Notes - Note created  3/24/2015 12:25 PM by Kelly Simon    As of today's date 3/24/2015 goal is met at 0 - 25%.   Goal Status:  Active        Equal Access to Services     DANELLE LORA : Hadii anton montero hadasho Sokathyali, waaxda luqadaha, qaybta kaalmada adeegyada, raegan burton . So [facility] [phone].    ATENCIÓN: Si habla español, tiene a youssef disposición servicios gratuitos de asistencia lingüística. Llame al [phone].    We comply with applicable federal civil rights laws and [place] laws. We do not discriminate on the basis of race, color, national origin, age, disability, sex, sexual orientation, or gender identity.            Thank you!     Thank you for choosing [facility]  for your care. Our goal is always to provide you with excellent care. Hearing back from our patients is one way we can continue to improve our services. Please take a few minutes to complete the written survey that you may receive in the mail after your visit with us. Thank you!             Your Updated Medication List - Protect others around you: Learn how to safely use, store and throw away your medicines at www.disposemymeds.org.          This list is accurate as of 4/5/18 10:24 AM.  Always use your most recent med list.                   Brand Name Dispense Instructions for use Diagnosis    albuterol 108 (90 BASE) MCG/ACT Inhaler    PROAIR HFA/PROVENTIL HFA/VENTOLIN HFA    1 Inhaler    Inhale 2 puffs into the lungs every 4 hours as needed for  shortness of breath / dyspnea    Mild intermittent asthma without complication       ibuprofen 400 MG tablet    ADVIL/MOTRIN    120 tablet    Take 1-2 tablets (400-800 mg) by mouth every 6 hours as needed for other (cramping)     delivery delivered

## 2018-04-05 NOTE — NURSING NOTE
"Initial /83 (BP Location: Left arm, Patient Position: Supine)  Pulse 96  Temp 98.2  F (36.8  C) (Tympanic)  Wt 260 lb 6.4 oz (118.1 kg)  SpO2 96%  BMI 35.56 kg/m2 Estimated body mass index is 35.56 kg/(m^2) as calculated from the following:    Height as of 3/19/18: 5' 11.75\" (1.822 m).    Weight as of this encounter: 260 lb 6.4 oz (118.1 kg). .    Bhavna Ponce  Initial /89  Pulse 97  Temp 98.2  F (36.8  C) (Tympanic)  Wt 260 lb 6.4 oz (118.1 kg)  SpO2 96%  BMI 35.56 kg/m2 Estimated body mass index is 35.56 kg/(m^2) as calculated from the following:    Height as of 3/19/18: 5' 11.75\" (1.822 m).    Weight as of this encounter: 260 lb 6.4 oz (118.1 kg). .    Bhavna Ponce    "

## 2018-04-05 NOTE — NURSING NOTE
"Initial /87 (BP Location: Left arm, Patient Position: Chair, Cuff Size: Adult Large)  Pulse 96  Temp 98.2  F (36.8  C) (Tympanic)  Wt 260 lb 6.4 oz (118.1 kg)  SpO2 96%  BMI 35.56 kg/m2 Estimated body mass index is 35.56 kg/(m^2) as calculated from the following:    Height as of 3/19/18: 5' 11.75\" (1.822 m).    Weight as of this encounter: 260 lb 6.4 oz (118.1 kg). .    Bhavna Ponce    "

## 2018-04-05 NOTE — PROGRESS NOTES
SUBJECTIVE:   CC: Holly Tolliver is an 32 year old woman who presents for preventive health visit.     Healthy Habits:    Do you get at least three servings of calcium containing foods daily (dairy, green leafy vegetables, etc.)? yes    Amount of exercise or daily activities, outside of work: two day(s) per week    Problems taking medications regularly No    Medication side effects: No    Have you had an eye exam in the past two years? yes    Do you see a dentist twice per year? yes    Do you have sleep apnea, excessive snoring or daytime drowsiness?no      Depression and Anxiety Follow-Up    Status since last visit: Worsened off medications- stopped taking Strattera did not like how it made her feel     Other associated symptoms:ADD    Complicating factors:     Significant life event: No     Current substance abuse: None    ED/UC Followup:    Facility:  [facility]  Date of visit: 03/19/2018  Reason for visit: Drug abuse relapse- 17 days ago- patient use Meth- has been in remission for 17 months   Current Status: stable            Dizziness- ongoing for a few months- occurs the most when she is walking- patient vague in describing symptoms. Does not get dizzy when turning over in bed or quick head changes.     PHQ-9 3/28/2017 9/15/2017 12/11/2017   Total Score 12 12 11   Q9: Suicide Ideation Not at all Not at all Not at all     RUBENS-7 SCORE 9/28/2015 9/15/2017 12/11/2017   Total Score - - -   Total Score 18 19 21     In the past two weeks have you had thoughts of suicide or self-harm?  No.    Do you have concerns about your personal safety or the safety of others?   No  PHQ-9  English  PHQ-9   Any Language  RUBENS-7  Suicide Assessment Five-step Evaluation and Treatment (SAFE-T)    Today's PHQ-2 Score:   PHQ-2 ( 1999 Pfizer) 3/28/2017 9/28/2015   Q1: Little interest or pleasure in doing things 0 0   Q2: Feeling down, depressed or hopeless 1 0   PHQ-2 Score 1 0       Abuse: Current or Past(Physical, Sexual or  Emotional)- No  Do you feel safe in your environment - Yes    Social History   Substance Use Topics     Smoking status: Former Smoker     Packs/day: 0.00     Years: 12.00     Types: Cigarettes     Smokeless tobacco: Never Used     Alcohol use No      Comment: sober since 2015     If you drink alcohol do you typically have >3 drinks per day or >7 drinks per week? No                     Reviewed orders with patient.  Reviewed health maintenance and updated orders accordingly - Yes  Patient Active Problem List   Diagnosis     Tobacco use disorder     Mild intermittent asthma     CTS (carpal tunnel syndrome)     CARDIOVASCULAR SCREENING; LDL GOAL LESS THAN 160     Major depressive disorder, single episode, moderate (H)     Generalized anxiety disorder     GERD (gastroesophageal reflux disease)     Health Care Home     Prenatal care, subsequent pregnancy in third trimester     Prenatal care, subsequent pregnancy     Single liveborn, born in hospital, delivered     Placental abruption in third trimester     Placental abruption      delivery delivered     Routine postpartum follow-up     Mirena IUD     Past Surgical History:   Procedure Laterality Date      SECTION Bilateral 2016    Procedure:  SECTION;  Surgeon: Beau Cardoso MD;  Location: [facility] OR     PE TUBES  age 3       Social History   Substance Use Topics     Smoking status: Former Smoker     Packs/day: 0.00     Years: 12.00     Types: Cigarettes     Smokeless tobacco: Never Used     Alcohol use No      Comment: sober since 2015     Family History   Problem Relation Age of Onset     Alcohol/Drug Mother      alcohol- recovered     Hypertension Mother      Depression Mother      also anxiety     Other - See Comments Mother      ankylosing spondylosis     Alcohol/Drug Father      alcohol- recovered     Alcohol/Drug Sister      A&D     CANCER Maternal Grandmother      lung     Depression Maternal Grandmother      also anxiety      MENTAL ILLNESS Paternal Grandmother      Alcohol/Drug Brother      alcohol           Mammogram not appropriate for this patient based on age.    Pertinent mammograms are reviewed under the imaging tab.  History of abnormal Pap smear:   Last 3 Pap Results:   PAP (no units)   Date Value   04/08/2015 NIL   01/18/2012 NIL   06/14/2011 NIL       Reviewed and updated as needed this visit by clinical staff         Reviewed and updated as needed this visit by Provider            ROS:  C: NEGATIVE for fever, chills, change in weight  I: NEGATIVE for worrisome rashes, moles or lesions  E: NEGATIVE for vision changes or irritation  ENT: NEGATIVE for ear, mouth and throat problems  R: NEGATIVE for significant cough or SOB  B: NEGATIVE for masses, tenderness or discharge  CV: NEGATIVE for chest pain, palpitations or peripheral edema  GI: NEGATIVE for nausea, abdominal pain, heartburn, or change in bowel habits  : NEGATIVE for unusual urinary or vaginal symptoms. Periods are regular.  M: NEGATIVE for significant arthralgias or myalgia  NEURO: POSITIVE for dizziness/lightheadedness  PSYCHIATRIC: POSITIVE foranxiety, drug usage and depression/ ADHD    OBJECTIVE:   There were no vitals taken for this visit.  EXAM:  GENERAL: healthy, alert and no distress  EYES: Eyes grossly normal to inspection, PERRL and conjunctivae and sclerae normal  HENT: ear canals and TM's normal, nose and mouth without ulcers or lesions  NECK: no adenopathy, no asymmetry, masses, or scars and thyroid normal to palpation  RESP: lungs clear to auscultation - no rales, rhonchi or wheezes  BREAST: normal without masses, tenderness or nipple discharge and no palpable axillary masses or adenopathy  CV: regular rate and rhythm, normal S1 S2, no S3 or S4, no murmur, click or rub, no peripheral edema and peripheral pulses strong  ABDOMEN: soft, nontender, no hepatosplenomegaly, no masses and bowel sounds normal  MS: no gross musculoskeletal defects noted, no  "edema  SKIN: no suspicious lesions or rashes  NEURO: Normal strength and tone, mentation intact and speech normal  PSYCH: mentation appears normal, affect normal/bright    ASSESSMENT/PLAN:   1. Pap smear, low-risk    - Pap imaged thin layer screen with HPV - recommended age 30 - 65 years (select HPV order below)  - HPV High Risk Types DNA Cervical    2. Dizziness  Patient vague in describing symptoms - unlikely BPPV based on symptoms and physical exam  - orthostatic blood pressure were normal   - recommend patient to keep a diary of when these episodes occur  - ? Dehydration- encouraged patient to increase fluids   - consider neurology referral if symptoms continue    3. History of methamphetamine use  Patient with history of methamphetamine use- in remission for 17 months - relapsed 17 days ago -  Patient currently going to treatment program    4. Major depressive disorder, single episode, moderate (H)  Patient stopped medications several months ago due to side effects  - recommend that she follow up with Psych/ uY Loera for reassessment of medication/therapy     5. Attention deficit hyperactivity disorder (ADHD), unspecified ADHD type  Patient stopped medications several months ago due to side effects  - recommend that she follow up with Psych/ Yu Loera to be restarted on medications      COUNSELING:   Reviewed preventive health counseling, as reflected in patient instructions       Regular exercise       Healthy diet/nutrition         reports that she has quit smoking. Her smoking use included Cigarettes. She smoked 0.00 packs per day for 12.00 years. She has never used smokeless tobacco.    Estimated body mass index is 34.14 kg/(m^2) as calculated from the following:    Height as of 3/19/18: 5' 11.75\" (1.822 m).    Weight as of 3/19/18: 250 lb (113.4 kg).   Weight management plan: counseled on diet and exercise    Counseling Resources:  ATP IV Guidelines  Pooled Cohorts Equation Calculator  Breast " Cancer Risk Calculator  FRAX Risk Assessment  ICSI Preventive Guidelines  Dietary Guidelines for Americans, 2010  USDA's MyPlate  ASA Prophylaxis  Lung CA Screening    > 25 min spent in direct face to face time with this patient above physical time, greater than 50% in counseling and coordination of care discussing depression, dizziness and drug use. .      WOLFGANG Freire [facility]

## 2018-04-09 LAB
COPATH REPORT: NORMAL
PAP: NORMAL

## 2018-04-11 LAB
FINAL DIAGNOSIS: NORMAL
HPV HR 12 DNA CVX QL NAA+PROBE: NEGATIVE
HPV16 DNA SPEC QL NAA+PROBE: NEGATIVE
HPV18 DNA SPEC QL NAA+PROBE: NEGATIVE
SPECIMEN DESCRIPTION: NORMAL
SPECIMEN SOURCE CVX/VAG CYTO: NORMAL

## 2018-04-12 ENCOUNTER — TELEPHONE (OUTPATIENT)
Dept: FAMILY MEDICINE | Facility: CLINIC | Age: 33
End: 2018-04-12

## 2018-04-12 ASSESSMENT — ANXIETY QUESTIONNAIRES
6. BECOMING EASILY ANNOYED OR IRRITABLE: NEARLY EVERY DAY
GAD7 TOTAL SCORE: 18
IF YOU CHECKED OFF ANY PROBLEMS ON THIS QUESTIONNAIRE, HOW DIFFICULT HAVE THESE PROBLEMS MADE IT FOR YOU TO DO YOUR WORK, TAKE CARE OF THINGS AT HOME, OR GET ALONG WITH OTHER PEOPLE: EXTREMELY DIFFICULT
1. FEELING NERVOUS, ANXIOUS, OR ON EDGE: NEARLY EVERY DAY
7. FEELING AFRAID AS IF SOMETHING AWFUL MIGHT HAPPEN: NOT AT ALL
5. BEING SO RESTLESS THAT IT IS HARD TO SIT STILL: NEARLY EVERY DAY
3. WORRYING TOO MUCH ABOUT DIFFERENT THINGS: NEARLY EVERY DAY
2. NOT BEING ABLE TO STOP OR CONTROL WORRYING: NEARLY EVERY DAY

## 2018-04-12 ASSESSMENT — PATIENT HEALTH QUESTIONNAIRE - PHQ9: 5. POOR APPETITE OR OVEREATING: NEARLY EVERY DAY

## 2018-04-12 NOTE — TELEPHONE ENCOUNTER
PHQ9 Due by:4/15/18    Please contact patient to complete follow up PHQ9 before their DUE DATE.  Due between 2/15/18-4/15/18    This is important feedback for your care team to monitor how you are doing while taking (stopped taking meds, see 4/5/18 office visit).     You completed this same questionnaire   PHQ-9 SCORE 12/11/2017   Total Score -   Total Score 11       MA STAFF: If upon calling patient and PHQ9 score is higher that 10 route to the provider. You may also seek an RN for review.        Panel Management Review      Patient has the following on her problem list:     Depression / Dysthymia review    Measure:  Needs PHQ-9 score of 4 or less during index window.  Administer PHQ-9 and if score is 5 or more, send encounter to provider for next steps.    5   7 month window range: 2/15/18-4/15/18    PHQ-9 SCORE 9/15/2017 12/11/2017 4/12/2018   Total Score - - -   Total Score 12 11 20       If PHQ-9 recheck is 5 or more, route to provider for next steps.    Patient is due for:  PHQ9      Composite cancer screening  Chart review shows that this patient is due/due soon for the following None  Summary:    Patient is due/failing the following:   PHQ9    Action needed:   Patient needs to do PHQ9.    Type of outreach:    Phone spoke with patient, phq9 score of 20, ximena score of 18.  She is not currently on any medication, has tried several in the past, them seem to start out working and then become less effective.  She states celexa seemed to work the best.  She was encouraged to schedule a psychiatrist appt at her visit on 4/5/18, she is planning on making that appt today.  She would like to discuss possibly starting a medication.    Questions for provider review:    Would you suggest the patient come in for a clinic appt to discuss treatment options or would a phone visit be appropriate?                                                                                                                                    NORMA  VANESSA Pete       Chart routed to Provider .

## 2018-04-12 NOTE — TELEPHONE ENCOUNTER
Spoke with the pt and she agrees with provider recommendations below and was transfer to the schedulers.  No other action needed.  Janet Gonzalez CMA (PEDRO)   (aka: Jennifer Gonzalez)

## 2018-04-12 NOTE — TELEPHONE ENCOUNTER
Please refer to last OV note- patient needs to schedule appointment with Ricardo- uY Loera to start/continue medications. I did review /made that recommendation when patient was in clinic and patient verbalized understanding.

## 2018-04-13 ASSESSMENT — PATIENT HEALTH QUESTIONNAIRE - PHQ9: SUM OF ALL RESPONSES TO PHQ QUESTIONS 1-9: 20

## 2018-04-13 ASSESSMENT — ANXIETY QUESTIONNAIRES: GAD7 TOTAL SCORE: 18

## 2018-04-23 ENCOUNTER — OFFICE VISIT (OUTPATIENT)
Dept: FAMILY MEDICINE | Facility: CLINIC | Age: 33
End: 2018-04-23
Payer: COMMERCIAL

## 2018-04-23 VITALS
WEIGHT: 262.7 LBS | BODY MASS INDEX: 35.58 KG/M2 | SYSTOLIC BLOOD PRESSURE: 128 MMHG | TEMPERATURE: 97.9 F | HEIGHT: 72 IN | DIASTOLIC BLOOD PRESSURE: 89 MMHG | HEART RATE: 81 BPM

## 2018-04-23 DIAGNOSIS — B36.0 TINEA VERSICOLOR: Primary | ICD-10-CM

## 2018-04-23 PROCEDURE — 99213 OFFICE O/P EST LOW 20 MIN: CPT | Performed by: NURSE PRACTITIONER

## 2018-04-23 RX ORDER — CICLOPIROX OLAMINE 7.7 MG/G
CREAM TOPICAL 2 TIMES DAILY
Qty: 90 G | Refills: 0 | Status: SHIPPED | OUTPATIENT
Start: 2018-04-23 | End: 2020-08-27

## 2018-04-23 RX ORDER — FLUCONAZOLE 150 MG/1
TABLET ORAL
Qty: 4 TABLET | Refills: 0 | Status: SHIPPED | OUTPATIENT
Start: 2018-04-23 | End: 2018-05-14

## 2018-04-23 NOTE — PROGRESS NOTES
SUBJECTIVE:   Holly Tolliver is a 33 year old female who presents to clinic today for the following health issues: complains of slightly itchy rash on bilateral arms. Concern about possible psoriasis.     Possible Psoriasis       Duration: Last week    Description (location/character/radiation): Left hand, both arms, itches a little     Intensity:  moderate    Accompanying signs and symptoms: When it gets warm out she notices that it flares up and gets red     History (similar episodes/previous evaluation): Had same thing 6 months ago and it went away with diflucan     Precipitating or alleviating factors: None    Therapies tried and outcome: None     Problem list and histories reviewed & adjusted, as indicated.  Additional history: as documented    Labs reviewed in EPIC    Reviewed and updated as needed this visit by clinical staff  Tobacco  Allergies  Med Hx  Surg Hx  Fam Hx  Soc Hx      Reviewed and updated as needed this visit by Provider         ROS:  Constitutional, HEENT, cardiovascular, pulmonary, gi and gu systems are negative, except as otherwise noted.    OBJECTIVE:     /89  Pulse 81  Temp 97.9  F (36.6  C) (Tympanic)  Ht 6' (1.829 m)  Wt 262 lb 11.2 oz (119.2 kg)  BMI 35.63 kg/m2  Body mass index is 35.63 kg/(m^2).  GENERAL: healthy, alert and no distress  SKIN: multiple light brown color oval patches on bilateral arms  PSYCH: mentation appears normal, affect normal/bright    Diagnostic Test Results:  none     ASSESSMENT/PLAN:     1. Tinea versicolor  - ciclopirox (LOPROX) 0.77 % cream; Apply topically 2 times daily for 14 days  Dispense: 90 g; Refill: 0  - DERMATOLOGY REFERRAL  - fluconazole (DIFLUCAN) 150 MG tablet; Take 2 tablets weekly for 2 weeks  Dispense: 4 tablet; Refill: 0    See Patient Instructions    WOLFGANG Ndiaye [facility]

## 2018-04-23 NOTE — PATIENT INSTRUCTIONS
Diflucan 2 tablets today and than 2 tablets next Monday  Apply Cream twice daily for 2 weeks    Follow up with dermatologist

## 2018-04-23 NOTE — MR AVS SNAPSHOT
After Visit Summary   4/23/2018    Holly Tolliver    MRN: [id number]           Patient Information     Date Of Birth          1985        Visit Information        Provider Department      4/23/2018 8:00 AM Wanda Godoy APRN [facility]        Today's Diagnoses     Tinea versicolor    -  1      Care Instructions    Diflucan 2 tablets today and than 2 tablets next Monday  Apply Cream twice daily for 2 weeks    Follow up with dermatologist               Follow-ups after your visit        Additional Services     DERMATOLOGY REFERRAL       Your provider has referred you to: FM[facility]: [facility] [phone]   [email or web address]    Please be aware that coverage of these services is subject to the terms and limitations of your health insurance plan.  Call member services at your health plan with any benefit or coverage questions.      Please bring the following with you to your appointment:    (1) Any X-Rays, CTs or MRIs which have been performed.  Contact the facility where they were done to arrange for  prior to your scheduled appointment.    (2) List of current medications  (3) This referral request   (4) Any documents/labs given to you for this referral                  Your next 10 appointments already scheduled     May 14, 2018  8:45 AM CDT   Return Visit with WOLFGANG Arrington [facility] ([facility])    [address] [address] [phone]              Who to contact     If you have questions or need follow up information about today's clinic visit or your schedule please contact [facility] directly at [phone].  Normal or non-critical lab and imaging results will be communicated to you by MyChart, letter or phone within 4 business days after the clinic has received the results. If you do not hear from us within 7 days,  please contact the clinic through "[employer] or phone. If you have a critical or abnormal lab result, we will notify you by phone as soon as possible.  Submit refill requests through "[employer] or call your pharmacy and they will forward the refill request to us. Please allow 3 business days for your refill to be completed.          Additional Information About Your Visit        [employer] Trip Information     "[employer] gives you secure access to your electronic health record. If you see a primary care provider, you can also send messages to your care team and make appointments. If you have questions, please call your primary care clinic.  If you do not have a primary care provider, please call [phone] and they will assist you.        Care EveryWhere ID     This is your Care EveryWhere ID. This could be used by other organizations to access your [place] medical records  [id number]        Your Vitals Were     Pulse Temperature Height BMI (Body Mass Index)          81 97.9  F (36.6  C) (Tympanic) 6' (1.829 m) 35.63 kg/m2         Blood Pressure from Last 3 Encounters:   04/23/18 128/89   04/05/18 131/89   03/19/18 (!) 160/94    Weight from Last 3 Encounters:   04/23/18 262 lb 11.2 oz (119.2 kg)   04/05/18 260 lb 6.4 oz (118.1 kg)   03/19/18 250 lb (113.4 kg)              We Performed the Following     DERMATOLOGY REFERRAL          Today's Medication Changes          These changes are accurate as of 4/23/18  8:21 AM.  If you have any questions, ask your nurse or doctor.               Start taking these medicines.        Dose/Directions    ciclopirox 0.77 % cream   Commonly known as:  LOPROX   Used for:  Tinea versicolor   Started by:  Wanda Godoy APRN CNP        Apply topically 2 times daily for 14 days   Quantity:  90 g   Refills:  0       fluconazole 150 MG tablet   Commonly known as:  DIFLUCAN   Used for:  Tinea versicolor   Started by:  Wanda Godoy APRN CNP        Take 2 tablets weekly for 2 weeks    Quantity:  4 tablet   Refills:  0            Where to get your medicines      These medications were sent to [id number] - [place], [place] - [address]  [address], [facility] [address]    Hours:  Closed 10-16-08 business to [facility] Phone:  [phone]     ciclopirox 0.77 % cream    fluconazole 150 MG tablet                Primary Care Provider Office Phone # Fax #    WOLFGANG Freire [facility] [phone] [address] [address]        Goals        General    I will attend weekly therapy sessions  (pt-stated)     Notes - Note created  3/24/2015 12:26 PM by Kelly Simon    As of today's date 3/24/2015 goal is met at 26 - 50%.   Goal Status:  Active      I will review/research the online support resources given by  within the next 2 weeks  (pt-stated)     Notes - Note created  3/24/2015 12:25 PM by Kelly Simon    As of today's date 3/24/2015 goal is met at 0 - 25%.   Goal Status:  Active        Equal Access to Services     [facility]: Hadii anton montero hadashpetra Somushtaq, waaxda luqadaha, qaybta kaalpoornima barnes, raegan burton . So [facility] [phone].    ATENCIÓN: Si habla español, tiene a youssef disposición servicios gratjuanos de asistencia lingüística. Llame al [phone].    We comply with applicable federal civil rights laws and [place] laws. We do not discriminate on the basis of race, color, national origin, age, disability, sex, sexual orientation, or gender identity.            Thank you!     Thank you for choosing [facility]  for your care. Our goal is always to provide you with excellent care. Hearing back from our patients is one way we can continue to improve our services. Please take a few minutes to complete the written survey that you may receive in the mail after your visit with us. Thank you!             Your Updated Medication List - Protect others around you: Learn how to safely use,  store and throw away your medicines at www.disposemymeds.org.          This list is accurate as of 18  8:21 AM.  Always use your most recent med list.                   Brand Name Dispense Instructions for use Diagnosis    albuterol 108 (90 Base) MCG/ACT Inhaler    PROAIR HFA/PROVENTIL HFA/VENTOLIN HFA    1 Inhaler    Inhale 2 puffs into the lungs every 4 hours as needed for shortness of breath / dyspnea    Mild intermittent asthma without complication       ciclopirox 0.77 % cream    LOPROX    90 g    Apply topically 2 times daily for 14 days    Tinea versicolor       fluconazole 150 MG tablet    DIFLUCAN    4 tablet    Take 2 tablets weekly for 2 weeks    Tinea versicolor       ibuprofen 400 MG tablet    ADVIL/MOTRIN    120 tablet    Take 1-2 tablets (400-800 mg) by mouth every 6 hours as needed for other (cramping)     delivery delivered

## 2018-05-14 ENCOUNTER — OFFICE VISIT (OUTPATIENT)
Dept: PSYCHIATRY | Facility: CLINIC | Age: 33
End: 2018-05-14
Payer: COMMERCIAL

## 2018-05-14 VITALS
DIASTOLIC BLOOD PRESSURE: 83 MMHG | SYSTOLIC BLOOD PRESSURE: 125 MMHG | WEIGHT: 262 LBS | HEART RATE: 75 BPM | TEMPERATURE: 98.1 F | BODY MASS INDEX: 35.53 KG/M2

## 2018-05-14 DIAGNOSIS — F41.1 GAD (GENERALIZED ANXIETY DISORDER): ICD-10-CM

## 2018-05-14 DIAGNOSIS — F33.1 MAJOR DEPRESSIVE DISORDER, RECURRENT EPISODE, MODERATE (H): Primary | ICD-10-CM

## 2018-05-14 DIAGNOSIS — F90.2 ATTENTION DEFICIT HYPERACTIVITY DISORDER (ADHD), COMBINED TYPE: ICD-10-CM

## 2018-05-14 PROCEDURE — 99214 OFFICE O/P EST MOD 30 MIN: CPT | Performed by: CLINICAL NURSE SPECIALIST

## 2018-05-14 RX ORDER — TRAZODONE HYDROCHLORIDE 50 MG/1
50-100 TABLET, FILM COATED ORAL
Qty: 30 TABLET | Refills: 1 | Status: SHIPPED | OUTPATIENT
Start: 2018-05-14 | End: 2018-09-10

## 2018-05-14 ASSESSMENT — ANXIETY QUESTIONNAIRES
4. TROUBLE RELAXING: NEARLY EVERY DAY
GAD7 TOTAL SCORE: 19
1. FEELING NERVOUS, ANXIOUS, OR ON EDGE: NEARLY EVERY DAY
IF YOU CHECKED OFF ANY PROBLEMS ON THIS QUESTIONNAIRE, HOW DIFFICULT HAVE THESE PROBLEMS MADE IT FOR YOU TO DO YOUR WORK, TAKE CARE OF THINGS AT HOME, OR GET ALONG WITH OTHER PEOPLE: EXTREMELY DIFFICULT
3. WORRYING TOO MUCH ABOUT DIFFERENT THINGS: NEARLY EVERY DAY
7. FEELING AFRAID AS IF SOMETHING AWFUL MIGHT HAPPEN: SEVERAL DAYS
5. BEING SO RESTLESS THAT IT IS HARD TO SIT STILL: NEARLY EVERY DAY
2. NOT BEING ABLE TO STOP OR CONTROL WORRYING: NEARLY EVERY DAY
6. BECOMING EASILY ANNOYED OR IRRITABLE: NEARLY EVERY DAY

## 2018-05-14 NOTE — PATIENT INSTRUCTIONS
Treatment Plan:  Begin trazodone (Desyrel)  mg at bedtime.     Schedule ADHD testing.     Follow up after testing.     - Recommend patient discuss medications with their pharmacist. Risks and benefits for medications were discussed including, but not limited to, side effects.   - Safety plan was reviewed; to the ER as needed or call after hours crisis line; [phone]  - Education and counseling was done regarding use of medications, psychotherapy options  - Call [phone] for appointment or to speak to a nurse.    -Office hours: Monday through Thursday 8:00 am to 4:30 pm.

## 2018-05-14 NOTE — MR AVS SNAPSHOT
After Visit Summary   5/14/2018    Holly Tolliver    MRN: [id number]           Patient Information     Date Of Birth          1985        Visit Information        Provider Department      5/14/2018 8:45 AM Yu Loera APRN [facility]        Today's Diagnoses     Major depressive disorder, recurrent episode, moderate (H)    -  1    RUBENS (generalized anxiety disorder)        Attention deficit hyperactivity disorder (ADHD), combined type          Care Instructions    Treatment Plan:  Begin trazodone (Desyrel)  mg at bedtime.     Schedule ADHD testing.     Follow up after testing.     - Recommend patient discuss medications with their pharmacist. Risks and benefits for medications were discussed including, but not limited to, side effects.   - Safety plan was reviewed; to the ER as needed or call after hours crisis line; [phone]  - Education and counseling was done regarding use of medications, psychotherapy options  - Call [phone] for appointment or to speak to a nurse.    -Office hours: Monday through Thursday 8:00 am to 4:30 pm.             Follow-ups after your visit        Additional Services     MENTAL HEALTH REFERRAL  - Adult; Assessments and Testing; ADHD; G: [facility] [phone]; We will contact you to schedule the appointment or please call with any questions       All scheduling is subject to the client's specific insurance plan & benefits, provider/location availability, and provider clinical specialities.  Please arrive 15 minutes early for your first appointment and bring your completed paperwork.    Please be aware that coverage of these services is subject to the terms and limitations of your health insurance plan.  Call member services at your health plan with any benefit or coverage questions.                            Who to contact     If you have questions or need follow up information about today's clinic  visit or your schedule please contact [facility] directly at [phone].  Normal or non-critical lab and imaging results will be communicated to you by MyChart, letter or phone within 4 business days after the clinic has received the results. If you do not hear from us within 7 days, please contact the clinic through [employer] or phone. If you have a critical or abnormal lab result, we will notify you by phone as soon as possible.  Submit refill requests through [employer] or call your pharmacy and they will forward the refill request to us. Please allow 3 business days for your refill to be completed.          Additional Information About Your Visit        [employer] Information     [employer] gives you secure access to your electronic health record. If you see a primary care provider, you can also send messages to your care team and make appointments. If you have questions, please call your primary care clinic.  If you do not have a primary care provider, please call [phone] and they will assist you.        Care EveryWhere ID     This is your Care EveryWhere ID. This could be used by other organizations to access your [place] medical records  [id number]        Your Vitals Were     Pulse Temperature BMI (Body Mass Index)             75 98.1  F (36.7  C) (Tympanic) 35.53 kg/m2          Blood Pressure from Last 3 Encounters:   05/14/18 125/83   04/23/18 128/89   04/05/18 131/89    Weight from Last 3 Encounters:   05/14/18 262 lb (118.8 kg)   04/23/18 262 lb 11.2 oz (119.2 kg)   04/05/18 260 lb 6.4 oz (118.1 kg)              We Performed the Following     MENTAL HEALTH REFERRAL  - Adult; Assessments and Testing; ADHD; FMG: [facility] [phone]; We will contact you to schedule the appointment or please call with any questions          Today's Medication Changes          These changes are accurate as of 5/14/18  9:14 AM.  If you have any questions, ask your nurse or doctor.                Start taking these medicines.        Dose/Directions    traZODone 50 MG tablet   Commonly known as:  DESYREL   Used for:  Major depressive disorder, recurrent episode, moderate (H), RUBENS (generalized anxiety disorder)   Started by:  Yu Loera APRN CNS        Dose:   mg   Take 1-2 tablets ( mg) by mouth nightly as needed for sleep   Quantity:  30 tablet   Refills:  1         Stop taking these medicines if you haven't already. Please contact your care team if you have questions.     albuterol 108 (90 Base) MCG/ACT Inhaler   Commonly known as:  PROAIR HFA/PROVENTIL HFA/VENTOLIN HFA   Stopped by:  Yu Loera APRN CNS           fluconazole 150 MG tablet   Commonly known as:  DIFLUCAN   Stopped by:  Yu Loera APRN CNS           ibuprofen 400 MG tablet   Commonly known as:  ADVIL/MOTRIN   Stopped by:  Yu Loera APRN CNS                Where to get your medicines      These medications were sent to [id number] - [facility] [address] [facility] [address] [facility]  [address] [facility] [address]    Hours:  Closed 10-16-08 business to [facility] Phone:  [phone]     traZODone 50 MG tablet                Primary Care Provider Office Phone # Fax #    Yue WOLFGANG Poole [facility] [phone] [address] [facility] [address]        Goals        General    I will attend weekly therapy sessions  (pt-stated)     Notes - Note created  3/24/2015 12:26 PM by Kelly Simon    As of today's date 3/24/2015 goal is met at 26 - 50%.   Goal Status:  Active      I will review/research the online support resources given by  within the next 2 weeks  (pt-stated)     Notes - Note created  3/24/2015 12:25 PM by Kelly Simon    As of today's date 3/24/2015 goal is met at 0 - 25%.   Goal Status:  Active        Equal Access to Services     DANELLE LORA AH: Francis Aguiar, sheial gilbert, raegan loza  tristan pickenstin la'aan ah. So [facility] [phone].    ATENCIÓN: Si habla ivonne, tiene a youssef disposición servicios gratuitos de asistencia lingüística. Dylon al [phone].    We comply with applicable federal civil rights laws and [place] laws. We do not discriminate on the basis of race, color, national origin, age, disability, sex, sexual orientation, or gender identity.            Thank you!     Thank you for choosing [facility]  for your care. Our goal is always to provide you with excellent care. Hearing back from our patients is one way we can continue to improve our services. Please take a few minutes to complete the written survey that you may receive in the mail after your visit with us. Thank you!             Your Updated Medication List - Protect others around you: Learn how to safely use, store and throw away your medicines at www.disposemymeds.org.          This list is accurate as of 5/14/18  9:14 AM.  Always use your most recent med list.                   Brand Name Dispense Instructions for use Diagnosis    traZODone 50 MG tablet    DESYREL    30 tablet    Take 1-2 tablets ( mg) by mouth nightly as needed for sleep    Major depressive disorder, recurrent episode, moderate (H), RUBENS (generalized anxiety disorder)

## 2018-05-14 NOTE — PROGRESS NOTES
Psychiatric Progress Note    Name: Holly Tolliver  Date: 5/14/2018  Length of Visit: Spent 30 minutes face to face with this patient, where at least 50 % of this time was spent in counseling on/or about ADHD management.     MRN: [id number]      No current outpatient prescriptions on file.     No current facility-administered medications for this visit.      Facility-Administered Medications Ordered in Other Visits   Medication     fentanyl (SUBLIMAZE) injection     lidocaine 1 % injection     lidocaine-EPInephrine 1.5 %-[id number] injection       Therapist:  None    PHQ-9:  PHQ-9 score:    PHQ-9 SCORE 4/12/2018   Total Score -   Total Score 20       RUBENS-7:  RUBENS-7 SCORE 9/15/2017 12/11/2017 4/12/2018   Total Score - - -   Total Score 19 21 18           Interim History:  Patient returns for follow up from appointment 12-. Atomoxetine (Strattera) was increased to 65 mg in the morning. Patient was to return for follow up in one month.     Patient is accompanied by her 2 year old son, Brown who is very active.     Patient reports discontinuing the atomoxetine (Strattera) several months ago due to nausea. Patient reports previous diagnosis of ADHD; however, has not been able to find the report. We discussed this would be necessary to utilize other stimulants. Patient would like to be retested. Patient reports difficulty sleeping.       Past Medical History:   Diagnosis Date     ALCOH DEP NEC/NOS-UNSPEC 12/31/2007     Asthma      Chickenpox      Postpartum depression 2011     Pregnancy induced hypertension      Urinary tract infections        10 point ROS is negative except for those listed above    Vital Signs:   /83 (BP Location: Right arm, Patient Position: Sitting, Cuff Size: Adult Large)  Pulse 75  Temp 98.1  F (36.7  C) (Tympanic)  Wt 262 lb (118.8 kg)  BMI 35.53 kg/m2      Mental Status Assessment:  Appearance:  Well groomed      Behavior/relationship to examiner/demeanor:  Cooperative,  "engaged and pleasant  Motor activity:  Normal  Gait:  Normal   Speech:  Normal in volume, articulation, coherence   Mood (subjective report):  \"tired\"  Affect (objective appearance):  Mood congruent  Thought Process (Associations):  Logical, linear and goal directed  Thought content:  No evidence of suicidal or homicidal ideation,          no overt psychosis and                    patient does not appear to be responding to internal stimuli  Oriented to person, place, date/time   Attention Span and concentration: Intact   Memory:  Short-term memory intact and Long-term memory; Intact  Language:  Fluent   Fund of Knowledge/Intelligence:  Average  Use of language: Intact   Abstraction:  Normal  Insight:  Adequate  Judgment:  Adequate for safety    DSM DIAGNOSIS:  Attention-Deficit/Hyperactivity Disorder  314.01 (F90.2) Combined presentation  296.32 (F33.1) Major Depressive Disorder, Recurrent Episode, Moderate _ and With anxious distress  300.02 (F41.1) Generalized Anxiety Disorder    Assessment:  Patient did not tolerate atomoxetine (Strattera). Patient agrees to testing to confirm ADHD diagnosis prior to starting stimulants.     Medication side effects and alternatives were reviewed.     Treatment Plan:  Begin trazodone (Desyrel)  mg at bedtime.     Schedule ADHD testing.     Follow up after testing.     - Recommend patient discuss medications with their pharmacist. Risks and benefits for medications were discussed including, but not limited to, side effects.   - Safety plan was reviewed; to the ER as needed or call after hours crisis line; [phone]  - Education and counseling was done regarding use of medications, psychotherapy options  - Call [phone] for appointment or to speak to a nurse.    -Office hours: Monday through Thursday 8:00 am to 4:30 pm.   - Patient received a copy of this Treatment Plan today.    Patient will continue to be seen for ongoing consultation and stabilization.      Signed:  " Yu Loera, RN, MS, CNS-BC

## 2018-05-15 ASSESSMENT — PATIENT HEALTH QUESTIONNAIRE - PHQ9: SUM OF ALL RESPONSES TO PHQ QUESTIONS 1-9: 13

## 2018-05-15 ASSESSMENT — ANXIETY QUESTIONNAIRES: GAD7 TOTAL SCORE: 19

## 2018-09-10 ENCOUNTER — OFFICE VISIT (OUTPATIENT)
Dept: FAMILY MEDICINE | Facility: CLINIC | Age: 33
End: 2018-09-10
Payer: COMMERCIAL

## 2018-09-10 VITALS
DIASTOLIC BLOOD PRESSURE: 70 MMHG | HEART RATE: 86 BPM | BODY MASS INDEX: 35.49 KG/M2 | OXYGEN SATURATION: 98 % | SYSTOLIC BLOOD PRESSURE: 128 MMHG | WEIGHT: 262 LBS | HEIGHT: 72 IN | TEMPERATURE: 97.1 F

## 2018-09-10 DIAGNOSIS — B36.0 TINEA VERSICOLOR: Primary | ICD-10-CM

## 2018-09-10 PROCEDURE — 99213 OFFICE O/P EST LOW 20 MIN: CPT | Performed by: NURSE PRACTITIONER

## 2018-09-10 RX ORDER — FLUCONAZOLE 150 MG/1
300 TABLET ORAL WEEKLY
Qty: 4 TABLET | Refills: 0 | Status: SHIPPED | OUTPATIENT
Start: 2018-09-10 | End: 2018-10-05

## 2018-09-10 RX ORDER — CICLOPIROX 7.7 MG/G
GEL TOPICAL
Qty: 100 G | Refills: 1 | Status: SHIPPED | OUTPATIENT
Start: 2018-09-10 | End: 2018-10-05

## 2018-09-10 NOTE — PROGRESS NOTES
SUBJECTIVE:   Holly Tolliver is a 33 year old female who presents to clinic today for the following health issues:    Rash  Onset: 2 months     Description:   Location: left arm, chest   Character: patchy, round, oval, white, scaling and slightly itchy.   Itching (Pruritis): YES    Progression of Symptoms:  Worsening    Accompanying Signs & Symptoms:  Fever: no   Body aches or joint pain: no   Sore throat symptoms: no   Recent cold symptoms: no     History:   Previous similar rash: YES, doesn't think it went away completely the first time. Had similar rash during Spring and was treated with Lopirox and Diflucan with significant improvement, but states she thinks she had couple oval patches left after treatment     Precipitating factors:   Exposure to similar rash: YES  New exposures: None   Recent travel: no     Alleviating factors:  None     Therapies Tried and outcome: vitamin e oil     Problem list and histories reviewed & adjusted, as indicated.  Additional history: as documented    Labs reviewed in EPIC    Reviewed and updated as needed this visit by clinical staff  Allergies       Reviewed and updated as needed this visit by Provider         ROS:  Constitutional, HEENT, cardiovascular, pulmonary, gi and gu systems are negative, except as otherwise noted.    OBJECTIVE:     /70  Pulse 86  Temp 97.1  F (36.2  C) (Tympanic)  Ht 6' (1.829 m)  Wt 262 lb (118.8 kg)  SpO2 98%  BMI 35.53 kg/m2  Body mass index is 35.53 kg/(m^2).  GENERAL: healthy, alert and no distress  SKIN: multiple small, oval and round patches on erythematous skin on the upper back, bilaterally and chest area   PSYCH: mentation appears normal, affect normal/bright    Diagnostic Test Results:  none     ASSESSMENT/PLAN:     1. Tinea versicolor  - fluconazole (DIFLUCAN) 150 MG tablet; Take 2 tablets (300 mg) by mouth once a week  Dispense: 4 tablet; Refill: 0  - ciclopirox 0.77 % GEL; Apply to affected areas of your skin twice daily  for up to 4 weeks  Dispense: 100 g; Refill: 1    See Patient Instructions    WOLFGANG Ndiaye [facility]

## 2018-09-10 NOTE — MR AVS SNAPSHOT
After Visit Summary   9/10/2018    Holly Tolliver    MRN: [id number]           Patient Information     Date Of Birth          1985        Visit Information        Provider Department      9/10/2018 11:00 AM Wanda Godoy APRN CNP [facility]        Today's Diagnoses     Tinea versicolor    -  1      Care Instructions    Ciclopirox gel twice daily for up to 4 weeks   Diflucan 300 mg (2 tablets) weekly for 4 weeks               Follow-ups after your visit        Who to contact     If you have questions or need follow up information about today's clinic visit or your schedule please contact [facility] directly at [phone].  Normal or non-critical lab and imaging results will be communicated to you by MyChart, letter or phone within 4 business days after the clinic has received the results. If you do not hear from us within 7 days, please contact the clinic through [employer] or phone. If you have a critical or abnormal lab result, we will notify you by phone as soon as possible.  Submit refill requests through Qoture or call your pharmacy and they will forward the refill request to us. Please allow 3 business days for your refill to be completed.          Additional Information About Your Visit        MyChart Information     Qoture gives you secure access to your electronic health record. If you see a primary care provider, you can also send messages to your care team and make appointments. If you have questions, please call your primary care clinic.  If you do not have a primary care provider, please call [phone] and they will assist you.        Care EveryWhere ID     This is your Care EveryWhere ID. This could be used by other organizations to access your [place] medical records  [id number]        Your Vitals Were     Pulse Temperature Height Pulse Oximetry BMI (Body Mass Index)       86 97.1  F (36.2  C) (Tympanic) 6' (1.829 m) 98% 35.53 kg/m2         Blood Pressure from Last 3 Encounters:   09/10/18 128/70   05/14/18 125/83   04/23/18 128/89    Weight from Last 3 Encounters:   09/10/18 262 lb (118.8 kg)   05/14/18 262 lb (118.8 kg)   04/23/18 262 lb 11.2 oz (119.2 kg)              Today, you had the following     No orders found for display         Today's Medication Changes          These changes are accurate as of 9/10/18 11:20 AM.  If you have any questions, ask your nurse or doctor.               Start taking these medicines.        Dose/Directions    ciclopirox 0.77 % Gel   Used for:  Tinea versicolor        Apply to affected areas of your skin twice daily for up to 4 weeks   Quantity:  100 g   Refills:  1       fluconazole 150 MG tablet   Commonly known as:  DIFLUCAN   Used for:  Tinea versicolor        Dose:  300 mg   Take 2 tablets (300 mg) by mouth once a week   Quantity:  4 tablet   Refills:  0            Where to get your medicines      These medications were sent to [facility] [address]  [address] [place] [address]     Phone:  [phone]     ciclopirox 0.77 % Gel    fluconazole 150 MG tablet                Primary Care Provider Office Phone # Fax #    Yue Oneill APRDEE DEE [facility] [phone] [address] [facility] [address]        Goals        General    I will attend weekly therapy sessions  (pt-stated)     Notes - Note created  3/24/2015 12:26 PM by Kelly Simon    As of today's date 3/24/2015 goal is met at 26 - 50%.   Goal Status:  Active      I will review/research the online support resources given by  within the next 2 weeks  (pt-stated)     Notes - Note created  3/24/2015 12:25 PM by Kelly Simon    As of today's date 3/24/2015 goal is met at 0 - 25%.   Goal Status:  Active        Equal Access to Services     [facility] GENO AH: Francis Aguiar, waaxda luqadaha, qaybta kaalmada jobyegyaildefonso, raegan burton . [facility]  [phone].    ATENCIÓN: Si jameel coronado, tiene a youssef disposición servicios gratuitos de asistencia lingüística. Dylon aguillon [phone].    We comply with applicable federal civil rights laws and [place] laws. We do not discriminate on the basis of race, color, national origin, age, disability, sex, sexual orientation, or gender identity.            Thank you!     Thank you for choosing [facility]  for your care. Our goal is always to provide you with excellent care. Hearing back from our patients is one way we can continue to improve our services. Please take a few minutes to complete the written survey that you may receive in the mail after your visit with us. Thank you!             Your Updated Medication List - Protect others around you: Learn how to safely use, store and throw away your medicines at www.disposemymeds.org.          This list is accurate as of 9/10/18 11:20 AM.  Always use your most recent med list.                   Brand Name Dispense Instructions for use Diagnosis    ciclopirox 0.77 % Gel     100 g    Apply to affected areas of your skin twice daily for up to 4 weeks    Tinea versicolor       fluconazole 150 MG tablet    DIFLUCAN    4 tablet    Take 2 tablets (300 mg) by mouth once a week    Tinea versicolor       traZODone 50 MG tablet    DESYREL    30 tablet    Take 1-2 tablets ( mg) by mouth nightly as needed for sleep    Major depressive disorder, recurrent episode, moderate (H), RUBENS (generalized anxiety disorder)

## 2018-09-10 NOTE — PATIENT INSTRUCTIONS
Ciclopirox gel twice daily for up to 4 weeks   Diflucan 300 mg (2 tablets) weekly for 4 weeks

## 2018-10-05 ENCOUNTER — OFFICE VISIT (OUTPATIENT)
Dept: FAMILY MEDICINE | Facility: CLINIC | Age: 33
End: 2018-10-05
Payer: COMMERCIAL

## 2018-10-05 VITALS
RESPIRATION RATE: 16 BRPM | OXYGEN SATURATION: 97 % | DIASTOLIC BLOOD PRESSURE: 78 MMHG | WEIGHT: 264.3 LBS | TEMPERATURE: 97.4 F | SYSTOLIC BLOOD PRESSURE: 118 MMHG | HEART RATE: 74 BPM | BODY MASS INDEX: 35.85 KG/M2

## 2018-10-05 DIAGNOSIS — J45.20 MILD INTERMITTENT ASTHMA WITHOUT COMPLICATION: ICD-10-CM

## 2018-10-05 DIAGNOSIS — Z11.3 SCREEN FOR STD (SEXUALLY TRANSMITTED DISEASE): Primary | ICD-10-CM

## 2018-10-05 PROCEDURE — 36415 COLL VENOUS BLD VENIPUNCTURE: CPT | Performed by: FAMILY MEDICINE

## 2018-10-05 PROCEDURE — 99213 OFFICE O/P EST LOW 20 MIN: CPT | Performed by: FAMILY MEDICINE

## 2018-10-05 PROCEDURE — 87591 N.GONORRHOEAE DNA AMP PROB: CPT | Performed by: FAMILY MEDICINE

## 2018-10-05 PROCEDURE — 86780 TREPONEMA PALLIDUM: CPT | Performed by: FAMILY MEDICINE

## 2018-10-05 PROCEDURE — 87491 CHLMYD TRACH DNA AMP PROBE: CPT | Performed by: FAMILY MEDICINE

## 2018-10-05 PROCEDURE — 86706 HEP B SURFACE ANTIBODY: CPT | Performed by: FAMILY MEDICINE

## 2018-10-05 PROCEDURE — 87389 HIV-1 AG W/HIV-1&-2 AB AG IA: CPT | Performed by: FAMILY MEDICINE

## 2018-10-05 RX ORDER — ALBUTEROL SULFATE 90 UG/1
2 AEROSOL, METERED RESPIRATORY (INHALATION) EVERY 4 HOURS PRN
Qty: 1 INHALER | Refills: 5 | Status: SHIPPED | OUTPATIENT
Start: 2018-10-05 | End: 2020-06-11

## 2018-10-05 ASSESSMENT — ANXIETY QUESTIONNAIRES
IF YOU CHECKED OFF ANY PROBLEMS ON THIS QUESTIONNAIRE, HOW DIFFICULT HAVE THESE PROBLEMS MADE IT FOR YOU TO DO YOUR WORK, TAKE CARE OF THINGS AT HOME, OR GET ALONG WITH OTHER PEOPLE: EXTREMELY DIFFICULT
2. NOT BEING ABLE TO STOP OR CONTROL WORRYING: NEARLY EVERY DAY
6. BECOMING EASILY ANNOYED OR IRRITABLE: NEARLY EVERY DAY
3. WORRYING TOO MUCH ABOUT DIFFERENT THINGS: NEARLY EVERY DAY
1. FEELING NERVOUS, ANXIOUS, OR ON EDGE: NEARLY EVERY DAY
7. FEELING AFRAID AS IF SOMETHING AWFUL MIGHT HAPPEN: MORE THAN HALF THE DAYS
5. BEING SO RESTLESS THAT IT IS HARD TO SIT STILL: NEARLY EVERY DAY
GAD7 TOTAL SCORE: 20

## 2018-10-05 ASSESSMENT — PATIENT HEALTH QUESTIONNAIRE - PHQ9: 5. POOR APPETITE OR OVEREATING: NEARLY EVERY DAY

## 2018-10-05 NOTE — MR AVS SNAPSHOT
After Visit Summary   10/5/2018    Holly Tolliver    MRN: [id number]           Patient Information     Date Of Birth          1985        Visit Information        Provider Department      10/5/2018 10:20 AM Nikki Morin MD [facility]        Today's Diagnoses     Screen for STD (sexually transmitted disease)    -  1    Mild intermittent asthma without complication           Follow-ups after your visit        Future tests that were ordered for you today     Open Future Orders        Priority Expected Expires Ordered    **Hepatitis C Screen Reflex to RNA FUTURE anytime Routine 10/5/2018 10/5/2019 10/5/2018            Who to contact     If you have questions or need follow up information about today's clinic visit or your schedule please contact [facility] directly at [phone].  Normal or non-critical lab and imaging results will be communicated to you by MyChart, letter or phone within 4 business days after the clinic has received the results. If you do not hear from us within 7 days, please contact the clinic through MyChart or phone. If you have a critical or abnormal lab result, we will notify you by phone as soon as possible.  Submit refill requests through [employer] or call your pharmacy and they will forward the refill request to us. Please allow 3 business days for your refill to be completed.          Additional Information About Your Visit        MyChart Information     [employer] gives you secure access to your electronic health record. If you see a primary care provider, you can also send messages to your care team and make appointments. If you have questions, please call your primary care clinic.  If you do not have a primary care provider, please call [phone] and they will assist you.        Care EveryWhere ID     This is your Care EveryWhere ID. This could be used by other organizations to access your [facility]  records  [id number]        Your Vitals Were     Pulse Temperature Respirations Pulse Oximetry BMI (Body Mass Index)       74 97.4  F (36.3  C) (Tympanic) 16 97% 35.85 kg/m2        Blood Pressure from Last 3 Encounters:   10/05/18 118/78   09/10/18 128/70   05/14/18 125/83    Weight from Last 3 Encounters:   10/05/18 264 lb 4.8 oz (119.9 kg)   09/10/18 262 lb (118.8 kg)   05/14/18 262 lb (118.8 kg)              We Performed the Following     Chlamydia trachomatis PCR     Hepatitis B Surface Antibody     HIV Antigen Antibody Combo     Neisseria gonorrhoeae PCR     Treponema Abs w Reflex to RPR and Titer          Today's Medication Changes          These changes are accurate as of 10/5/18 11:43 AM.  If you have any questions, ask your nurse or doctor.               Start taking these medicines.        Dose/Directions    albuterol 108 (90 Base) MCG/ACT inhaler   Commonly known as:  PROAIR HFA/PROVENTIL HFA/VENTOLIN HFA   Used for:  Mild intermittent asthma without complication   Started by:  Nikki Morin MD        Dose:  2 puff   Inhale 2 puffs into the lungs every 4 hours as needed for shortness of breath / dyspnea   Quantity:  1 Inhaler   Refills:  5            Where to get your medicines      These medications were sent to [id number] [address] [facility]  [address] [facility] [address]    Hours:  Closed 10-16-08 business to [facility] Phone:  [phone]     albuterol 108 (90 Base) MCG/ACT inhaler                Primary Care Provider Office Phone # Fax #    YueWOLFGANG [facility] [phone] [address] [facility] [address]        Goals        General    I will attend weekly therapy sessions  (pt-stated)     Notes - Note created  3/24/2015 12:26 PM by Kelly Simon    As of today's date 3/24/2015 goal is met at 26 - 50%.   Goal Status:  Active      I will review/research the online support resources given by CAROLINE within the next 2  weeks  (pt-stated)     Notes - Note created  3/24/2015 12:25 PM by Kelly Simon    As of today's date 3/24/2015 goal is met at 0 - 25%.   Goal Status:  Active        Equal Access to Services     DANELLE GENO : Hadjonathan anton montero steffio Sokathyali, waaxda luqadaha, qaybta kaalmada adeegyada, raegan ohara lamirima finch. So [facility] [phone].    ATENCIÓN: Si habla español, tiene a youssef disposición servicios gratuitos de asistencia lingüística. Llame al [phone].    We comply with applicable federal civil rights laws and [place] laws. We do not discriminate on the basis of race, color, national origin, age, disability, sex, sexual orientation, or gender identity.            Thank you!     Thank you for choosing [facility]  for your care. Our goal is always to provide you with excellent care. Hearing back from our patients is one way we can continue to improve our services. Please take a few minutes to complete the written survey that you may receive in the mail after your visit with us. Thank you!             Your Updated Medication List - Protect others around you: Learn how to safely use, store and throw away your medicines at www.disposemymeds.org.          This list is accurate as of 10/5/18 11:43 AM.  Always use your most recent med list.                   Brand Name Dispense Instructions for use Diagnosis    albuterol 108 (90 Base) MCG/ACT inhaler    PROAIR HFA/PROVENTIL HFA/VENTOLIN HFA    1 Inhaler    Inhale 2 puffs into the lungs every 4 hours as needed for shortness of breath / dyspnea    Mild intermittent asthma without complication

## 2018-10-05 NOTE — PROGRESS NOTES
"  SUBJECTIVE:   Holly Tolliver is a 33 year old female who presents to clinic today for the following health issues:    Std test: Patient in clinic today just wanting all STD testing done. Patient state she has no exposures that she knows of. Patient states that she hasn't been tested in a while so she wants to be checked.  Patient will like to be tested for \"all STDs \" Patient reports that she has npot been tested in a while and will like to get tested. Also mentioned that she has had an IUD in for about three years and lately she has been experiencing some irregular bleeding . She reports having her periods and then she will occasionally spot in between her periods. This has been ongoing for a few months. Encouraged that she sees the OB /Gyn.    Problem list and histories reviewed & adjusted, as indicated.  Additional history: as documented    Patient Active Problem List   Diagnosis     Tobacco use disorder     Mild intermittent asthma     CTS (carpal tunnel syndrome)     CARDIOVASCULAR SCREENING; LDL GOAL LESS THAN 160     Major depressive disorder, single episode, moderate (H)     Generalized anxiety disorder     GERD (gastroesophageal reflux disease)     Health Care Home     Prenatal care, subsequent pregnancy in third trimester     Prenatal care, subsequent pregnancy     Single liveborn, born in hospital, delivered     Placental abruption in third trimester     Placental abruption      delivery delivered     Routine postpartum follow-up     Mirena IUD     Past Surgical History:   Procedure Laterality Date      SECTION Bilateral 2016    Procedure:  SECTION;  Surgeon: Beau Cardoso MD;  Location: [facility] OR     PE TUBES  age 3       Social History   Substance Use Topics     Smoking status: Former Smoker     Packs/day: 0.00     Years: 12.00     Types: Cigarettes     Smokeless tobacco: Never Used     Alcohol use No      Comment: sober since 2015     Family History   Problem " Relation Age of Onset     Alcohol/Drug Mother      alcohol- recovered     Hypertension Mother      Depression Mother      also anxiety     Other - See Comments Mother      ankylosing spondylosis     Alcohol/Drug Father      alcohol- recovered     Alcohol/Drug Sister      A&D     Cancer Maternal Grandmother      lung     Depression Maternal Grandmother      also anxiety     Mental Illness Paternal Grandmother      Alcohol/Drug Brother      alcohol         Current Outpatient Prescriptions   Medication Sig Dispense Refill     albuterol (PROAIR HFA/PROVENTIL HFA/VENTOLIN HFA) 108 (90 Base) MCG/ACT inhaler Inhale 2 puffs into the lungs every 4 hours as needed for shortness of breath / dyspnea 1 Inhaler 5     No Known Allergies  BP Readings from Last 3 Encounters:   10/05/18 118/78   09/10/18 128/70   05/14/18 125/83    Wt Readings from Last 3 Encounters:   10/05/18 264 lb 4.8 oz (119.9 kg)   09/10/18 262 lb (118.8 kg)   05/14/18 262 lb (118.8 kg)                  Labs reviewed in EPIC    Reviewed and updated as needed this visit by clinical staff       Reviewed and updated as needed this visit by Provider         ROS:  Constitutional, HEENT, cardiovascular, pulmonary, gi and gu systems are negative, except as otherwise noted.    OBJECTIVE:     /78  Pulse 74  Temp 97.4  F (36.3  C) (Tympanic)  Resp 16  Wt 264 lb 4.8 oz (119.9 kg)  LMP   SpO2 97%  BMI 35.85 kg/m2  Body mass index is 35.85 kg/(m^2).  GENERAL: healthy, alert and no distress  EYES: Eyes grossly normal to inspection, PERRL and conjunctivae and sclerae normal  HENT: ear canals and TM's normal, nose and mouth without ulcers or lesions  NECK: no adenopathy, no asymmetry, masses, or scars and thyroid normal to palpation  RESP: lungs clear to auscultation - no rales, rhonchi or wheezes  CV: regular rate and rhythm, normal S1 S2, no S3 or S4, no murmur, click or rub, no peripheral edema and peripheral pulses strong  MS: no gross musculoskeletal defects  noted, no edema    Diagnostic Test Results:  none     ASSESSMENT/PLAN:   1. Screen for STD (sexually transmitted disease)  Patient will be notified of results   - Chlamydia trachomatis PCR  - Neisseria gonorrhoeae PCR  - HIV Antigen Antibody Combo  - Hepatitis B Surface Antibody  - **Hepatitis C Screen Reflex to RNA FUTURE anytime; Future  - Treponema Abs w Reflex to RPR and Titer    2. Mild intermittent asthma without complication  - albuterol (PROAIR HFA/PROVENTIL HFA/VENTOLIN HFA) 108 (90 Base) MCG/ACT inhaler; Inhale 2 puffs into the lungs every 4 hours as needed for shortness of breath / dyspnea  Dispense: 1 Inhaler; Refill: 5    FUTURE APPOINTMENTS:       - Follow-up visit as needed    Nikki Morin MD  [facility]

## 2018-10-06 LAB — T PALLIDUM AB SER QL: NONREACTIVE

## 2018-10-06 ASSESSMENT — ANXIETY QUESTIONNAIRES: GAD7 TOTAL SCORE: 20

## 2018-10-06 ASSESSMENT — ASTHMA QUESTIONNAIRES: ACT_TOTALSCORE: 23

## 2018-10-06 ASSESSMENT — PATIENT HEALTH QUESTIONNAIRE - PHQ9: SUM OF ALL RESPONSES TO PHQ QUESTIONS 1-9: 13

## 2018-10-07 LAB
C TRACH DNA SPEC QL NAA+PROBE: NEGATIVE
N GONORRHOEA DNA SPEC QL NAA+PROBE: NEGATIVE
SPECIMEN SOURCE: NORMAL
SPECIMEN SOURCE: NORMAL

## 2018-10-08 LAB
HBV SURFACE AB SERPL IA-ACNC: 0.41 M[IU]/ML
HIV 1+2 AB+HIV1 P24 AG SERPL QL IA: NONREACTIVE

## 2018-10-08 NOTE — PROGRESS NOTES
Please inform patient that test result was within normal parameters.   Thank you.     Nikki Morin M.D.

## 2018-11-28 ENCOUNTER — ALLIED HEALTH/NURSE VISIT (OUTPATIENT)
Dept: FAMILY MEDICINE | Facility: CLINIC | Age: 33
End: 2018-11-28
Payer: COMMERCIAL

## 2018-11-28 DIAGNOSIS — Z23 NEED FOR PROPHYLACTIC VACCINATION AND INOCULATION AGAINST INFLUENZA: Primary | ICD-10-CM

## 2018-11-28 PROCEDURE — 90686 IIV4 VACC NO PRSV 0.5 ML IM: CPT

## 2018-11-28 PROCEDURE — 90471 IMMUNIZATION ADMIN: CPT

## 2018-11-28 PROCEDURE — 99207 ZZC NO CHARGE NURSE ONLY: CPT

## 2018-11-28 NOTE — PROGRESS NOTES
Injectable Influenza Immunization Documentation    1.  Is the person to be vaccinated sick today?   No    2. Does the person to be vaccinated have an allergy to a component   of the vaccine?   No  Egg Allergy Algorithm Link    3. Has the person to be vaccinated ever had a serious reaction   to influenza vaccine in the past?   No    4. Has the person to be vaccinated ever had Guillain-Barré syndrome?   No    Form completed by Brianda Still CMA ([facility]) 3:08 PM 11/28/2018

## 2018-11-28 NOTE — MR AVS SNAPSHOT
After Visit Summary   11/28/2018    Holly Tolliver    MRN: [id number]           Patient Information     Date Of Birth          1985        Visit Information        Provider Department      11/28/2018 3:15 PM [place] [facility] CMA/LPN [facility]        Today's Diagnoses     Need for prophylactic vaccination and inoculation against influenza    -  1       Follow-ups after your visit        Your next 10 appointments already scheduled     Nov 28, 2018  3:15 PM CST   Nurse Only with [place] [facility] CMA/LPN   [facility] ([facility]    [address] [facility] [address]   [phone]              Who to contact     If you have questions or need follow up information about today's clinic visit or your schedule please contact [facility] directly at [phone].  Normal or non-critical lab and imaging results will be communicated to you by [employer], letter or phone within 4 business days after the clinic has received the results. If you do not hear from us within 7 days, please contact the clinic through [employer] or phone. If you have a critical or abnormal lab result, we will notify you by phone as soon as possible.  Submit refill requests through [employer] or call your pharmacy and they will forward the refill request to us. Please allow 3 business days for your refill to be completed.          Additional Information About Your Visit        MyChart Information     [employer] gives you secure access to your electronic health record. If you see a primary care provider, you can also send messages to your care team and make appointments. If you have questions, please call your primary care clinic.  If you do not have a primary care provider, please call [phone] and they will assist you.        Care EveryWhere ID     This is your Care EveryWhere ID. This could be used by other organizations to access your [place] medical records  [id number]          Blood Pressure from Last 3 Encounters:   10/05/18 118/78   09/10/18 128/70   05/14/18 125/83    Weight from Last 3 Encounters:   10/05/18 264 lb 4.8 oz (119.9 kg)   09/10/18 262 lb (118.8 kg)   05/14/18 262 lb (118.8 kg)              We Performed the Following     FLU VACCINE, SPLIT VIRUS, IM (QUADRIVALENT) [39334]- >3 YRS     Vaccine Administration, Initial [49855]        Primary Care Provider Office Phone # Fax #    Yue Oneill, APRN [facility] [phone] [address] [facility] [address]        Goals        General    I will attend weekly therapy sessions  (pt-stated)     Notes - Note created  3/24/2015 12:26 PM by Kelly Simon    As of today's date 3/24/2015 goal is met at 26 - 50%.   Goal Status:  Active      I will review/research the online support resources given by  within the next 2 weeks  (pt-stated)     Notes - Note created  3/24/2015 12:25 PM by Kelly Simon    As of today's date 3/24/2015 goal is met at 0 - 25%.   Goal Status:  Active        Equal Access to Services     DANELLE LORA : Hadii anton montero hadirlandao Sokathyali, waaxda luqadaha, qaybta kaalmada adeegyada, raegan finch. So [facility] [phone].    ATENCIÓN: Si habla español, tiene a youssef disposición servicios gratuitos de asistencia lingüística. Llame al [phone].    We comply with applicable federal civil rights laws and [place] laws. We do not discriminate on the basis of race, color, national origin, age, disability, sex, sexual orientation, or gender identity.            Thank you!     Thank you for choosing [facility]  for your care. Our goal is always to provide you with excellent care. Hearing back from our patients is one way we can continue to improve our services. Please take a few minutes to complete the written survey that you may receive in the mail after your visit with us. Thank you!             Your Updated Medication List - Protect others around you: Learn  how to safely use, store and throw away your medicines at www.disposemymeds.org.          This list is accurate as of 11/28/18  3:10 PM.  Always use your most recent med list.                   Brand Name Dispense Instructions for use Diagnosis    albuterol 108 (90 Base) MCG/ACT inhaler    PROAIR HFA/PROVENTIL HFA/VENTOLIN HFA    1 Inhaler    Inhale 2 puffs into the lungs every 4 hours as needed for shortness of breath / dyspnea    Mild intermittent asthma without complication

## 2019-03-25 ENCOUNTER — OFFICE VISIT (OUTPATIENT)
Dept: FAMILY MEDICINE | Facility: CLINIC | Age: 34
End: 2019-03-25
Payer: COMMERCIAL

## 2019-03-25 VITALS
SYSTOLIC BLOOD PRESSURE: 120 MMHG | TEMPERATURE: 97.2 F | HEIGHT: 72 IN | HEART RATE: 85 BPM | OXYGEN SATURATION: 99 % | RESPIRATION RATE: 16 BRPM | BODY MASS INDEX: 35.62 KG/M2 | DIASTOLIC BLOOD PRESSURE: 84 MMHG | WEIGHT: 263 LBS

## 2019-03-25 DIAGNOSIS — F32.1 MAJOR DEPRESSIVE DISORDER, SINGLE EPISODE, MODERATE (H): Primary | ICD-10-CM

## 2019-03-25 DIAGNOSIS — E66.01 MORBID OBESITY (H): ICD-10-CM

## 2019-03-25 DIAGNOSIS — B36.0 TINEA VERSICOLOR: ICD-10-CM

## 2019-03-25 DIAGNOSIS — D17.30 LIPOMA OF SKIN AND SUBCUTANEOUS TISSUE: ICD-10-CM

## 2019-03-25 PROCEDURE — 99214 OFFICE O/P EST MOD 30 MIN: CPT | Performed by: NURSE PRACTITIONER

## 2019-03-25 RX ORDER — FLUCONAZOLE 150 MG/1
300 TABLET ORAL WEEKLY
Qty: 8 TABLET | Refills: 0 | Status: SHIPPED | OUTPATIENT
Start: 2019-03-25 | End: 2019-07-16

## 2019-03-25 RX ORDER — CICLOPIROX OLAMINE 7.7 MG/G
CREAM TOPICAL 2 TIMES DAILY
Qty: 90 G | Refills: 1 | Status: SHIPPED | OUTPATIENT
Start: 2019-03-25 | End: 2019-05-16

## 2019-03-25 RX ORDER — CITALOPRAM HYDROBROMIDE 20 MG/1
20 TABLET ORAL DAILY
Qty: 30 TABLET | Refills: 5 | Status: SHIPPED | OUTPATIENT
Start: 2019-03-25 | End: 2019-07-16

## 2019-03-25 RX ORDER — HYDROXYZINE PAMOATE 25 MG/1
25-50 CAPSULE ORAL 3 TIMES DAILY PRN
Qty: 60 CAPSULE | Refills: 3 | Status: SHIPPED | OUTPATIENT
Start: 2019-03-25 | End: 2019-07-16

## 2019-03-25 ASSESSMENT — ANXIETY QUESTIONNAIRES
2. NOT BEING ABLE TO STOP OR CONTROL WORRYING: NEARLY EVERY DAY
6. BECOMING EASILY ANNOYED OR IRRITABLE: MORE THAN HALF THE DAYS
5. BEING SO RESTLESS THAT IT IS HARD TO SIT STILL: NEARLY EVERY DAY
IF YOU CHECKED OFF ANY PROBLEMS ON THIS QUESTIONNAIRE, HOW DIFFICULT HAVE THESE PROBLEMS MADE IT FOR YOU TO DO YOUR WORK, TAKE CARE OF THINGS AT HOME, OR GET ALONG WITH OTHER PEOPLE: EXTREMELY DIFFICULT
1. FEELING NERVOUS, ANXIOUS, OR ON EDGE: NEARLY EVERY DAY
GAD7 TOTAL SCORE: 17
3. WORRYING TOO MUCH ABOUT DIFFERENT THINGS: NEARLY EVERY DAY
7. FEELING AFRAID AS IF SOMETHING AWFUL MIGHT HAPPEN: NOT AT ALL

## 2019-03-25 ASSESSMENT — PATIENT HEALTH QUESTIONNAIRE - PHQ9
5. POOR APPETITE OR OVEREATING: NEARLY EVERY DAY
SUM OF ALL RESPONSES TO PHQ QUESTIONS 1-9: 7

## 2019-03-25 ASSESSMENT — MIFFLIN-ST. JEOR: SCORE: 2010.99

## 2019-03-25 NOTE — PATIENT INSTRUCTIONS
Celexa 20 mg daily  Vistaril 25-50 mg 3 times daily as needed for anxiety    Lopirox cream twice daily for at least 2 weeks  Follow up with dermatology  Diflucan 300 mg weekly for 4 weeks

## 2019-03-25 NOTE — PROGRESS NOTES
"  SUBJECTIVE:   Holly Tolliver is a 33 year old female who presents to clinic today for the following health issues:    Chief Complaint   Patient presents with     Anxiety     Derm Problem     Tinea versicolor, would like to get a cream for this and Diflucan tablets.      Mass       Depression and Anxiety Follow-Up    Status since last visit: Depression is stable, anxiety has worsened. Feeling more anxious. Wants to talk about getting on an anxiety medication, used to be on anxiety meds in the past     Other associated symptoms:None    Complicating factors:     Significant life event: No     Current substance abuse: None    PHQ 4/12/2018 5/14/2018 10/5/2018   PHQ-9 Total Score 20 13 13   Q9: Suicide Ideation Not at all Not at all Not at all     RUBENS-7 SCORE 4/12/2018 5/14/2018 10/5/2018   Total Score - - -   Total Score 18 19 20     In the past two weeks have you had thoughts of suicide or self-harm?  No.    Do you have concerns about your personal safety or the safety of others?   No  PHQ-9  English  PHQ-9   Any Language  RUBENS-7  Suicide Assessment Five-step Evaluation and Treatment (SAFE-T)    Amount of exercise or physical activity: 2-3 days/week for an average of 45-60 minutes    Problems taking medications regularly: No    Medication side effects: none    Diet: regular (no restrictions)      Lump on left lower back       Duration: 5 months     Description (location/character/radiation): Has a lump on the left side of her lower back.     Intensity:  Mild, 4/10 depending on how hard she presses on it     Accompanying signs and symptoms: When she tries to \"rub\" it out, she feels light headed     History (similar episodes/previous evaluation): None    Precipitating or alleviating factors: None    Therapies tried and outcome: None     Problem list and histories reviewed & adjusted, as indicated.  Additional history: as documented    Labs reviewed in EPIC    Reviewed and updated as needed this visit by clinical " "staff       Reviewed and updated as needed this visit by Provider         ROS:  Constitutional, HEENT, cardiovascular, pulmonary, gi and gu systems are negative, except as otherwise noted.    OBJECTIVE:     /84 (BP Location: Left arm, Patient Position: Sitting, Cuff Size: Adult Large)   Pulse 85   Temp 97.2  F (36.2  C) (Tympanic)   Resp 16   Ht 1.822 m (5' 11.75\")   Wt 119.3 kg (263 lb)   SpO2 99%   BMI 35.92 kg/m    Body mass index is 35.92 kg/m .  GENERAL: healthy, alert and no distress  MS: no gross musculoskeletal defects noted, no edema  SKIN: left lower back small slightly tender lump about 1 inch in diameter, likely represent lipoma.   multiple small, oval and round patches on erythematous skin on the bilateral arm, lower back and lower abdomen, similar rash on her chest that she had last year-resolved   PSYCH: mentation appears normal, affect normal/bright    Diagnostic Test Results:  none     ASSESSMENT/PLAN:     1. Major depressive disorder, single episode, moderate (H)    - hydrOXYzine (VISTARIL) 25 MG capsule; Take 1-2 capsules (25-50 mg) by mouth 3 times daily as needed for anxiety  Dispense: 60 capsule; Refill: 3  - citalopram (CELEXA) 20 MG tablet; Take 1 tablet (20 mg) by mouth daily  Dispense: 30 tablet; Refill: 5    2. Tinea versicolor  -she tried treatment with oral Diflucan and Loprox last year with some improvement. Prescribed Diflucan and Loprox cream, if no improvement in 2-3 weeks follow up with dermatology   - fluconazole (DIFLUCAN) 150 MG tablet; Take 2 tablets (300 mg) by mouth once a week for 4 doses  Dispense: 8 tablet; Refill: 0  - ciclopirox (LOPROX) 0.77 % cream; Apply topically 2 times daily  Dispense: 90 g; Refill: 1  - DERMATOLOGY REFERRAL    3. Morbid obesity (H)  -discussed healthy diet and exercise, she will try to work on weight loss during summer time    4. Lipoma of skin and subcutaneous tissue  -small slightly tender lump on the left lower back, advised to " monitor for now, for discomfort apply warm packs and topical lidocaine cream      See Patient Instructions    WOLFGANG Ndiaye [facility] [place]

## 2019-03-26 ENCOUNTER — TRANSFERRED RECORDS (OUTPATIENT)
Dept: HEALTH INFORMATION MANAGEMENT | Facility: CLINIC | Age: 34
End: 2019-03-26

## 2019-03-26 ASSESSMENT — ASTHMA QUESTIONNAIRES: ACT_TOTALSCORE: 14

## 2019-03-26 ASSESSMENT — ANXIETY QUESTIONNAIRES: GAD7 TOTAL SCORE: 17

## 2019-04-20 NOTE — NURSING NOTE
Chief Complaint   Patient presents with     Derm Problem     Psoriasis on her left hand and arms that started last week        Initial /89  Pulse 81  Temp 97.9  F (36.6  C) (Tympanic)  Ht 6' (1.829 m)  Wt 262 lb 11.2 oz (119.2 kg)  BMI 35.63 kg/m2 Estimated body mass index is 35.63 kg/(m^2) as calculated from the following:    Height as of this encounter: 6' (1.829 m).    Weight as of this encounter: 262 lb 11.2 oz (119.2 kg).  Medication Reconciliation: complete  
Incision was left open, packing was not placed.

## 2019-05-15 ENCOUNTER — TRANSFERRED RECORDS (OUTPATIENT)
Dept: HEALTH INFORMATION MANAGEMENT | Facility: CLINIC | Age: 34
End: 2019-05-15

## 2019-05-16 ENCOUNTER — OFFICE VISIT (OUTPATIENT)
Dept: FAMILY MEDICINE | Facility: CLINIC | Age: 34
End: 2019-05-16
Payer: COMMERCIAL

## 2019-05-16 VITALS
TEMPERATURE: 97.7 F | SYSTOLIC BLOOD PRESSURE: 122 MMHG | WEIGHT: 254.2 LBS | OXYGEN SATURATION: 98 % | BODY MASS INDEX: 34.43 KG/M2 | DIASTOLIC BLOOD PRESSURE: 76 MMHG | RESPIRATION RATE: 16 BRPM | HEART RATE: 88 BPM | HEIGHT: 72 IN

## 2019-05-16 DIAGNOSIS — F41.9 ANXIETY: Primary | ICD-10-CM

## 2019-05-16 DIAGNOSIS — B36.0 TINEA VERSICOLOR: ICD-10-CM

## 2019-05-16 DIAGNOSIS — F32.1 MAJOR DEPRESSIVE DISORDER, SINGLE EPISODE, MODERATE (H): ICD-10-CM

## 2019-05-16 PROCEDURE — 99214 OFFICE O/P EST MOD 30 MIN: CPT | Performed by: NURSE PRACTITIONER

## 2019-05-16 RX ORDER — PAROXETINE 20 MG/1
20 TABLET, FILM COATED ORAL EVERY MORNING
Qty: 30 TABLET | Refills: 2 | Status: SHIPPED | OUTPATIENT
Start: 2019-05-16 | End: 2019-07-16

## 2019-05-16 RX ORDER — FLUCONAZOLE 150 MG/1
TABLET ORAL
Qty: 4 TABLET | Refills: 0 | Status: SHIPPED | OUTPATIENT
Start: 2019-05-16 | End: 2019-07-16

## 2019-05-16 RX ORDER — CICLOPIROX OLAMINE 7.7 MG/G
CREAM TOPICAL 2 TIMES DAILY
Qty: 90 G | Refills: 1 | Status: SHIPPED | OUTPATIENT
Start: 2019-05-16 | End: 2019-11-04

## 2019-05-16 RX ORDER — CITALOPRAM HYDROBROMIDE 20 MG/1
20 TABLET ORAL DAILY
Qty: 30 TABLET | Refills: 5 | Status: CANCELLED | OUTPATIENT
Start: 2019-05-16

## 2019-05-16 ASSESSMENT — MIFFLIN-ST. JEOR: SCORE: 1966.07

## 2019-05-16 NOTE — PROGRESS NOTES
SUBJECTIVE:   Holly Tolliver is a 34 year old female who presents to clinic today for the following   health issues:    Depression and Anxiety Follow-Up    Status since last visit: Improved but would like to change medication- she feel like paxil helped her symptoms a lot more when she was taking    That vs Citalopram.     Other associated symptoms:None    Complicating factors:     Significant life event: No     Current substance abuse: None      PHQ-9 SCORE 5/14/2018 10/5/2018 3/25/2019   PHQ-9 Total Score - - -   PHQ-9 Total Score 13 13 7     RUBENS-7 SCORE 5/14/2018 10/5/2018 3/25/2019   Total Score - - -   Total Score 19 20 17       In the past two weeks have you had thoughts of suicide or self-harm?  No.    Do you have concerns about your personal safety or the safety of others?   No  PHQ-9  English  PHQ-9   Any Language  RUBENS-7  Suicide Assessment Five-step Evaluation and Treatment (SAFE-T)    Amount of exercise or physical activity: 4-5 days/week for an average of 30-45 minutes    Problems taking medications regularly: No    Medication side effects: loss of appitite    Diet: regular (no restrictions)      Patient also needs skin creams refilled- history of tinea versicolor - intermittent at times.     Additional history: as documented    Reviewed  and updated as needed this visit by clinical staff  Tobacco  Allergies  Meds  Med Hx  Surg Hx  Fam Hx  Soc Hx        Reviewed and updated as needed this visit by Provider         Patient Active Problem List   Diagnosis     Tobacco use disorder     Mild intermittent asthma     CTS (carpal tunnel syndrome)     CARDIOVASCULAR SCREENING; LDL GOAL LESS THAN 160     Major depressive disorder, single episode, moderate (H)     Generalized anxiety disorder     GERD (gastroesophageal reflux disease)     Health Care Home     Prenatal care, subsequent pregnancy in third trimester     Prenatal care, subsequent pregnancy     Single liveborn, born in hospital, delivered  "    Placental abruption in third trimester     Placental abruption      delivery delivered     Routine postpartum follow-up     Mirena IUD     Obesity (BMI 35.0-39.9) with comorbidity (H)     Past Surgical History:   Procedure Laterality Date      SECTION Bilateral 2016    Procedure:  SECTION;  Surgeon: Beau Cardoso MD;  Location: [facility] OR     PE TUBES  age 3       Social History     Tobacco Use     Smoking status: Former Smoker     Packs/day: 0.00     Years: 12.00     Pack years: 0.00     Types: Cigarettes     Smokeless tobacco: Never Used   Substance Use Topics     Alcohol use: No     Alcohol/week: 0.0 oz     Comment: sober since 2015     Family History   Problem Relation Age of Onset     Alcohol/Drug Mother         alcohol- recovered     Hypertension Mother      Depression Mother         also anxiety     Other - See Comments Mother         ankylosing spondylosis     Alcohol/Drug Father         alcohol- recovered     Alcohol/Drug Sister         A&D     Cancer Maternal Grandmother         lung     Depression Maternal Grandmother         also anxiety     Mental Illness Paternal Grandmother      Alcohol/Drug Brother         alcohol           ROS:  Constitutional, HEENT, cardiovascular, pulmonary, GI, , musculoskeletal, neuro, skin, endocrine and psych systems are negative, except as otherwise noted.    OBJECTIVE:     /76   Pulse 88   Temp 97.7  F (36.5  C) (Tympanic)   Resp 16   Ht 1.822 m (5' 11.75\")   Wt 115.3 kg (254 lb 3.2 oz)   SpO2 98%   BMI 34.72 kg/m    Body mass index is 34.72 kg/m .  GENERAL: healthy, alert and no distress  RESP: lungs clear to auscultation - no rales, rhonchi or wheezes  CV: regular rate and rhythm, normal S1 S2, no S3 or S4, no murmur, click or rub, no peripheral edema and peripheral pulses strong  MS: no gross musculoskeletal defects noted, no edema  SKIN: scattered versicolor on left arm and chest area  PSYCH: mentation appears " normal, affect normal/bright    Diagnostic Test Results:  none     ASSESSMENT/PLAN:       1. Major depressive disorder, single episode, moderate (H)  - patient wants to stop Citalopram and switch back to Paxil as she felt like it helped her symptoms better  - MENTAL HEALTH REFERRAL  - Adult; Psychiatry and Medication Management; Psychiatry; [facility] [place] [facility] [phone].  Medication management & future refills will be returned to [facility] PCP upon completion of evaluation; We amilcar...  - PARoxetine (PAXIL) 20 MG tablet; Take 1 tablet (20 mg) by mouth every morning  Dispense: 30 tablet; Refill: 2    2. Anxiety  - stop Citalopram   - start PARoxetine (PAXIL) 20 MG tablet; Take 1 tablet (20 mg) by mouth every morning  Dispense: 30 tablet; Refill: 2    3. Tinea versicolor    - ciclopirox (LOPROX) 0.77 % cream; Apply topically 2 times daily  Dispense: 90 g; Refill: 1  - fluconazole (DIFLUCAN) 150 MG tablet; Take 2 tablet once a week for 2 weeks  Dispense: 4 tablet; Refill: 0  - DERMATOLOGY REFERRAL        WOLFGANG [facility] [place]

## 2019-05-16 NOTE — PATIENT INSTRUCTIONS
Thank you for choosing [facility].  You may be receiving an email and/or telephone survey request from [employer] [facility] Customer [employer] regarding your visit today.  Please take a few minutes to respond to the survey to let us know how we are doing.      If you have questions or concerns, please contact us via [employer] or you can contact your care team at [phone].    Our Clinic hours are:  Monday 6:40 am  to 7:00 pm  Tuesday -Friday 6:40 am to 5:00 pm    The [place] outpatient lab hours are:  Monday - Friday 6:10 am to 4:45 pm  Saturdays 7:00 am to 11:00 am  Appointments are required, call [phone]    If you have clinical questions after hours or would like to schedule an appointment,  call the clinic at [phone].

## 2019-06-10 ENCOUNTER — TELEPHONE (OUTPATIENT)
Dept: FAMILY MEDICINE | Facility: CLINIC | Age: 34
End: 2019-06-10

## 2019-06-10 NOTE — LETTER
Kailyn 10, 2019      Holly Tolliver  [address]  [place] [address]        Dear Holly,     Your [facility] Team works hard to make sure that you and your family receive exceptional care. Enclosed you will find a copy of the Asthma Control Test (ACT) that our clinic uses to monitor and manage your asthma. This test is an assessment tool that we use to determine how well your asthma is controlled.  Please complete the enclosed form and return in the provided envelope.  Thank you for trusting us with your health care.    Sincerely,        Your [facility] Team/felicia

## 2019-06-10 NOTE — TELEPHONE ENCOUNTER
Panel Management Review      Patient has the following on her problem list:     Asthma review     ACT Total Scores 3/25/2019   ACT TOTAL SCORE -   ASTHMA ER VISITS -   ASTHMA HOSPITALIZATIONS -   ACT TOTAL SCORE (Goal Greater than or Equal to 20) 14   In the past 12 months, how many times did you visit the emergency room for your asthma without being admitted to the hospital? 0   In the past 12 months, how many times were you hospitalized overnight because of your asthma? 0      1. Is Asthma diagnosis on the Problem List? Yes    2. Is Asthma listed on Health Maintenance? Yes    3. Patient is due for:  ACT      Composite cancer screening  Chart review shows that this patient is due/due soon for the following None  Summary:    Patient is due/failing the following:   ACT    Action needed:   Patient needs to do ACT.    Type of outreach:    Letter mailed with ACT for the patient to complete and return.      Questions for provider review:    None                                                                                                                                    NORMA Pete MA

## 2019-07-16 ENCOUNTER — OFFICE VISIT (OUTPATIENT)
Dept: PSYCHIATRY | Facility: CLINIC | Age: 34
End: 2019-07-16
Attending: NURSE PRACTITIONER
Payer: COMMERCIAL

## 2019-07-16 VITALS
BODY MASS INDEX: 33.6 KG/M2 | RESPIRATION RATE: 12 BRPM | SYSTOLIC BLOOD PRESSURE: 120 MMHG | DIASTOLIC BLOOD PRESSURE: 70 MMHG | OXYGEN SATURATION: 96 % | HEART RATE: 80 BPM | WEIGHT: 246 LBS

## 2019-07-16 DIAGNOSIS — F41.1 GENERALIZED ANXIETY DISORDER: Primary | ICD-10-CM

## 2019-07-16 PROCEDURE — 90792 PSYCH DIAG EVAL W/MED SRVCS: CPT | Performed by: NURSE PRACTITIONER

## 2019-07-16 RX ORDER — PAROXETINE 30 MG/1
30 TABLET, FILM COATED ORAL EVERY MORNING
Qty: 30 TABLET | Refills: 2 | Status: SHIPPED | OUTPATIENT
Start: 2019-07-16 | End: 2019-10-03 | Stop reason: DRUGHIGH

## 2019-07-16 RX ORDER — PROPRANOLOL HYDROCHLORIDE 10 MG/1
10 TABLET ORAL 2 TIMES DAILY
Qty: 60 TABLET | Refills: 2 | Status: SHIPPED | OUTPATIENT
Start: 2019-07-16 | End: 2019-10-03

## 2019-07-16 NOTE — PROGRESS NOTES
"                                                         Outpatient Psychiatric Evaluation - Standard Adult    Name:  Holly Tolliver  : 1985    Source of Referral:  Primary Care Provider: WOLFGANG Freire CNP   Last visit: 2019  Current Psychotherapist: Terrie Multani in [facility]   Last visit: last week. Weekly      [facility], Bossman Whitehead MD, beginning 10/2014, and Yu Loera CNP, from 2017 to 2018.      Identifying Data:  Patient is a 34 year old, single  White American female  who presents for initial visit with me.  Patient is currently employed full time. Patient attended the session alone. Consent to communicate signed for Yuly patient's Mother. Consent for treatment signed and included in electronic medical record. Discussed limits of confidentiality today. My Practice Policy was reviewed and signed.     Patient prefers to be called: \"Ana\"    Chief Complaint:    Patient reports: \"ADHD. Anxiety, getting startled easily.\"      HPI:    Per Yu Loera CNP, from her 2017 evaluation:    Patient reports first panic attack at age 16 and has struggled with depression and anxiety since that time. Paroxetine (Paxil) was helpful, but caused weight gain. Sertraline (Zoloft) made anxiety worse. Patient completed 28 day inpatient treatment for alcohol in . Patient met with Bossman Whitehead MD in  was diagnosed with mood disorder and anxiety, started lamotrigine (Lamictal) and citalopram (Celexa). Today, patient reports the lamotrigine (Lamictal) \"makes me off balance\" - dizziness. Patient reports irritability and \"starts fighting with people\" - verbally. Patient reports the lamotrigine (Lamictal) has not made a change in mood or behavior. Patient reports citalopram (Celexa) is no longer helpful.    While seeing NIMA Loera CNP, patient was tapered off Lamictal and Celexa. Patient states NIMA Loera CNP felt she had ADHD, " "not bipolar disorder. She was referred for ADHD diagnostic evaluation. Strattera was trialed, but was intolerably nauseating without benefit; also lost 10 lbs.    Restarted Celexa in 03/2019. Came into PCP's office again in 05/2019 wanting to switch Celexa to Paxil. States she didn't find Celexa to be working. She has now been taking Paxil 20 mg daily for the past 2 months. Patient states she's feeling sweaty and hot on Paxil. She had last used Paxil 10 years ago before pregnancies; gained weight with Paxil in past, but was otherwise helpful with mood/anxiety. States she's not having weight gain with Paxil now, in fact continuing to lose weight. States current use of Paxil somewhat helpful to anxiety; less panicky; able to tolerate interactions better.    She's still feeling \"jumpy\" and easily startled. Rather distractible. States she's been stressed with an active CPS case on her; has been  from her children, but she was rather tearful and voiced not wanting to share details about this. 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 Working nights, but her sleep-wake schedule is rather erratic. She's been using Hydroxyzine to sleep, but this works unreliably, and causes her to oversleep.    She's been seeing a therapist, Terrie Multani at [facility], since 04/2019; seeing weekly; has been helpful.    States she did get an ADHD evaluation, but dates and with which practice/clincian is vague. States she was diagnosed via computerized testing/questionnaire. States she'll effort to get records of this. States she was diagnosed with ADHD-hyperactive type.    Patient reports a history of methamphetamine abuse. She was sober for 18 months, then relapsed, and has been sober again past 16 months. States she is very much active in AA; has sponsor. States she has lots of family support; " they are all in recovery themselves. She presently lives alone.    She states she's in overall good health. Has exercise induced asthma; though infrequently exacerbated; uses inhaler twice a year on average.          Psychiatric Review of Symptoms:     PHQ-9 scores:   PHQ-9 SCORE 2018 10/5/2018 3/25/2019   PHQ-9 Total Score - - -   PHQ-9 Total Score 13 13 7        RUBENS-7 scores:    RUBENS-7 SCORE 2018 10/5/2018 3/25/2019   Total Score - - -   Total Score 19 20 17         Psychiatric History:   No hospitalizations  No suicide attempts      Substance Use History:  History of meth abuse; sober past 16 month  No chemical dependency treatment  Engaged with AA; has sponsor  Tobacco: no      Past Medical History:  Past Medical History:   Diagnosis Date     ALCOH DEP NEC/NOS-UNSPEC 2007     Asthma      Chickenpox      Postpartum depression      Pregnancy induced hypertension      Urinary tract infections       Surgery:   Past Surgical History:   Procedure Laterality Date      SECTION Bilateral 2016    Procedure:  SECTION;  Surgeon: Beau Cardoso MD;  Location: [facility] OR     PE TUBES  age 3     Allergies:   No Known Allergies  Primary Care Provider: WOLFGANG Freire CNP  Seizures or Head Injury: No      Current Medications:    Current Outpatient Medications:      albuterol (PROAIR HFA/PROVENTIL HFA/VENTOLIN HFA) 108 (90 Base) MCG/ACT inhaler, Inhale 2 puffs into the lungs every 4 hours as needed for shortness of breath / dyspnea, Disp: 1 Inhaler, Rfl: 5     ciclopirox (LOPROX) 0.77 % cream, Apply topically 2 times daily, Disp: 90 g, Rfl: 1     hydrOXYzine (VISTARIL) 25 MG capsule, Take 1-2 capsules (25-50 mg) by mouth 3 times daily as needed for anxiety, Disp: 60 capsule, Rfl: 3     PARoxetine (PAXIL) 20 MG tablet, Take 1 tablet (20 mg) by mouth every morning, Disp: 30 tablet, Rfl: 2    The [place] Prescription Monitoring Program has been reviewed and there are no concerns  about diversionary activity for controlled substances at this time.     Vital Signs:  Vitals: /70 (BP Location: Left arm, Patient Position: Chair, Cuff Size: Adult Large)   Pulse 80   Resp 12   Wt 111.6 kg (246 lb)   LMP  (LMP Unknown)   SpO2 96%   Breastfeeding? No   BMI 33.60 kg/m      Labs:  Most recent labs reviewed and no new labs.     Review of Systems:  10 systems (general, cardiovascular, respiratory, eyes, ENT, endocrine, GI, , M/S, neurological) were reviewed. Most pertinent finding(s) is/are: exercise induced asthma. The remaining systems are all unremarkable.    Family History:   Patient reported family history includes:   Family History   Problem Relation Age of Onset     Alcohol/Drug Mother         alcohol- recovered     Hypertension Mother      Depression Mother         also anxiety     Other - See Comments Mother         ankylosing spondylosis     Alcohol/Drug Father         alcohol- recovered     Alcohol/Drug Sister         A&D     Cancer Maternal Grandmother         lung     Depression Maternal Grandmother         also anxiety     Mental Illness Paternal Grandmother      Alcohol/Drug Brother         alcohol       Social History:   Grew up in [place]  2 brothers, 1 sister  2 children  Single  1 year of college  Former PCA; now working in [employer]      Mental Status Examination:     Appearance:  awake, alert and adequately groomed  Attitude:  cooperative   Eye Contact:  good  Gait and Station: Normal  Psychomotor Behavior:  intact station, gait and muscle tone  Oriented to:  time, person, and place  Attention Span and Concentration:  Fair  Speech:  clear, coherent  Mood:  anxious  Affect:  appropriate and in normal range  Associations:  no loose associations  Thought Process:  logical, linear and goal oriented  Thought Content:  Appropriate to Interview  Recent and Remote Memory:  intact Not formally assessed. No amnesia.  Fund of Knowledge: appropriate  Insight:   good  Judgment:  intact  Impulse Control:  intact    Suicide Risk Assessment:  Today Holly Tolliver denies suicidal ideation or self-harm impulses. Therefore, based on all available evidence including the factors cited above, Holly Tolliver does not appear to be at imminent risk for self-harm, does not meet criteria for a 72-hr hold, and therefore remains appropriate for ongoing outpatient level of care.  A thorough assessment of risk factors related to suicide and self-harm have been reviewed and are noted above. The patient convincingly denies acute suicidality on several occasions. Local community safety resources reviewed and printed for patient to use if needed. There was no deceit detected, and the patient presented in a manner that was believable.     DSM5  Diagnosis:  296.32 (F33.1) Major Depressive Disorder, Recurrent Episode, Moderate _  300.02 (F41.1) Generalized Anxiety Disorder   R/O ADHD-hyperactive type    Medical Comorbidities Include:   Patient Active Problem List    Diagnosis Date Noted     Obesity (BMI 35.0-39.9) with comorbidity (H) 2019     Priority: Medium     Routine postpartum follow-up 2016     Priority: Medium     Mirena IUD 2016     Priority: Medium     Lot: [id number]  Exp:        Single liveborn, born in hospital, delivered 2016     Priority: Medium     Placental abruption in third trimester 2016     Priority: Medium     Placental abruption 2016     Priority: Medium      delivery delivered 2016     Priority: Medium     Prenatal care, subsequent pregnancy 2016     Priority: Medium     Prenatal care, subsequent pregnancy in third trimester 2016     Priority: Medium     Health Care Home 2015     Priority: Medium     Care Coordinator: Kelly DANIEL, CHAYITO  See letters for Health Care home care plan    SW covering in [facility] absence    FREDY Luz, CHAYITO   [phone]  3/24/2015 12:24 PM        Generalized anxiety disorder 09/13/2013     Priority: Medium     Diagnosis updated by automated process. Provider to review and confirm.       GERD (gastroesophageal reflux disease) 09/13/2013     Priority: Medium     Major depressive disorder, single episode, moderate (H) 01/23/2013     Priority: Medium     CARDIOVASCULAR SCREENING; LDL GOAL LESS THAN 160 10/23/2012     Priority: Medium     CTS (carpal tunnel syndrome) 10/14/2011     Priority: Medium     Tobacco use disorder 02/19/2008     Priority: Medium     Has decreased from 1 ppd to 1 cigarette per day since pregnancy.       Mild intermittent asthma 02/19/2008     Priority: Medium         A 12-item WHODAS 2.0 assessment was completed by the patient today and recorded in EPIC.    WHODAS 2.0 Total Score 9/15/2017   Total Score 25       The Patient Activation Measure (MOUNA) score was completed and recorded in [facility]. This assesses patient knowledge, skill, and confidence for self-management.   MOUNA Score (Last Two) 4/8/2015   MOUNA Raw Score 44   Activation Score 70.8   MOUNA Level 4                  Impression:  Holly Tolliver is a 34 year old female with a history of anxiety, depression, methamphetamine abuse (now in remission), and reportedly diagnosed ADHD. I do tend to see the ADHD diagnosis being valid. We agree to effort to get her anxiety under better control first, and then we can look at the ADHD piece, presuming she's able to share her diagnostic records.    She's having ongoing hyperhidrosis and hotness with Paxil, persistent for the past two months. This may be in part Paxil induced and anxiety. All the same, she's having moderate benefit with current dosage of Paxil. I propose two options:    1) Switch from Paxil to an untrialed antidepressant, Prozac.    2) Increase her Paxil to 30 mg daily, and consider augmenting with Propranolol to treat the somatic anxiety, and perhaps mitigate the side effects of Paxil. We discussed the risks of bronchospasm with  Propranolol given her exercise induced asthma, though this is mildly and infrequently symptomatic, so it may be an acceptable risk if she closely monitors and carries her inhaler.    The patient opts for option #2, and will closely monitor.    She'll continue in weekly psychotherapy, and engagement with AA.    Medication side effects and alternatives reviewed. Health promotion activities recommended and reviewed today. All questions addressed. Education and counseling completed regarding risks and benefits of medications and psychotherapy options. Collaborative Care Psychiatry Service model reviewed today. Recommend therapy for additional support.       Treatment Plan:    Increase Paxil to 30 mg daily    May take Propranolol up to 10 mg BID. Advised of need of close monitor and to discontinue if having respiratory distress.    Continue weekly psychotherapy    Continue AA    Get ADHD diagnostic records    Continue all other medical directions per primary care provider.     Continue all other medications as reviewed per electronic medical record today.     Safety plan reviewed. To the Emergency Department as needed or call [phone]. [place] Crisis Text Line: Text [place] to 151285  or  Suicide LifeLine Chat: suicidepreventionQuotefishline.org/chat/    Schedule an appointment with me in 8 weeks or sooner as needed.  Call [facility] at [phone] to schedule.    Follow up with primary care provider as planned or for acute medical concerns.    Call the psychiatric nurse line with medication questions or concerns at [phone].    [employer] may be used to communicate with your provider, but this is not intended to be used for emergencies.      Crisis Resources:    National Suicide Prevention Lifeline: 847.663.5281 (TTY: 512.265.6770). Call anytime for help.  (www.suicidepreventionlifeline.org)  National [place] on Mental Illness (www.billie.org): 382.618.7348 or 839-769-1420.   Mental Health Association  (www.mentalhealth.org): 467.424.8409 or 078-762-7323.  [place] Crisis Text Line: Text [place] to 127191  Suicide LifeLine Chat: suicidepreventionWattbotline.org/chat    Administrative Billing:   Time spent with patient was 60 minutes and greater than 50% of time or 40 minutes was spent in counseling and coordination of care regarding above diagnoses and treatment plan.    Patient Status:  Patient will continue to be seen for ongoing consultation and stabilization.    Signed:   Dominic Phelps CNP  Psychiatry

## 2019-07-17 ASSESSMENT — ANXIETY QUESTIONNAIRES
IF YOU CHECKED OFF ANY PROBLEMS ON THIS QUESTIONNAIRE, HOW DIFFICULT HAVE THESE PROBLEMS MADE IT FOR YOU TO DO YOUR WORK, TAKE CARE OF THINGS AT HOME, OR GET ALONG WITH OTHER PEOPLE: EXTREMELY DIFFICULT
7. FEELING AFRAID AS IF SOMETHING AWFUL MIGHT HAPPEN: SEVERAL DAYS
5. BEING SO RESTLESS THAT IT IS HARD TO SIT STILL: NEARLY EVERY DAY
GAD7 TOTAL SCORE: 19
1. FEELING NERVOUS, ANXIOUS, OR ON EDGE: NEARLY EVERY DAY
2. NOT BEING ABLE TO STOP OR CONTROL WORRYING: NEARLY EVERY DAY
3. WORRYING TOO MUCH ABOUT DIFFERENT THINGS: NEARLY EVERY DAY
6. BECOMING EASILY ANNOYED OR IRRITABLE: NEARLY EVERY DAY

## 2019-07-17 ASSESSMENT — PATIENT HEALTH QUESTIONNAIRE - PHQ9
5. POOR APPETITE OR OVEREATING: NEARLY EVERY DAY
SUM OF ALL RESPONSES TO PHQ QUESTIONS 1-9: 11

## 2019-07-18 ASSESSMENT — ANXIETY QUESTIONNAIRES: GAD7 TOTAL SCORE: 19

## 2019-08-15 ENCOUNTER — TELEPHONE (OUTPATIENT)
Dept: PSYCHIATRY | Facility: CLINIC | Age: 34
End: 2019-08-15

## 2019-08-15 NOTE — TELEPHONE ENCOUNTER
Received 3 page forms from [facility] and [facility] regarding pt's ADHD assessment. Copied and sent to abstracting. Copy in [facility] forms folders.      Zayda Fuentes, CMA

## 2019-08-29 NOTE — TELEPHONE ENCOUNTER
Reviewed Christine and Desean's diagnostic report from 05/2019. Diagnosed with ADHD-combined via interview and RINA testing. Will abstract report into chart.    Patient initially seen 07/2019; was to set up f/u for 2 months. No appt on books as of today.

## 2019-09-12 ENCOUNTER — TELEPHONE (OUTPATIENT)
Dept: FAMILY MEDICINE | Facility: CLINIC | Age: 34
End: 2019-09-12

## 2019-09-12 NOTE — LETTER
September 12, 2019      Holly Tolliver  [address]  [place] [address]        Dear Holly,     Your [facility] Team works hard to make sure that you and your family receive exceptional care. Enclosed you will find a copy of the Asthma Control Test (ACT) that our clinic uses to monitor and manage your asthma. This test is an assessment tool that we use to determine how well your asthma is controlled.  Please complete the enclosed form and return in the provided envelope.  Thank you for trusting us with your health care.    Sincerely,        Your [facility] Team/felicia

## 2019-10-03 ENCOUNTER — OFFICE VISIT (OUTPATIENT)
Dept: PSYCHIATRY | Facility: CLINIC | Age: 34
End: 2019-10-03
Payer: COMMERCIAL

## 2019-10-03 VITALS
WEIGHT: 239.5 LBS | SYSTOLIC BLOOD PRESSURE: 133 MMHG | DIASTOLIC BLOOD PRESSURE: 80 MMHG | BODY MASS INDEX: 32.44 KG/M2 | OXYGEN SATURATION: 98 % | HEIGHT: 72 IN | TEMPERATURE: 97.4 F | HEART RATE: 79 BPM | RESPIRATION RATE: 18 BRPM

## 2019-10-03 DIAGNOSIS — F41.1 GENERALIZED ANXIETY DISORDER: ICD-10-CM

## 2019-10-03 DIAGNOSIS — F32.1 MODERATE MAJOR DEPRESSION (H): Primary | ICD-10-CM

## 2019-10-03 DIAGNOSIS — F15.21 AMPHETAMINE SUBSTANCE USE DISORDER, MODERATE, IN SUSTAINED REMISSION (H): ICD-10-CM

## 2019-10-03 PROCEDURE — 99214 OFFICE O/P EST MOD 30 MIN: CPT | Performed by: NURSE PRACTITIONER

## 2019-10-03 RX ORDER — PAROXETINE 40 MG/1
40 TABLET, FILM COATED ORAL EVERY MORNING
Qty: 30 TABLET | Refills: 0 | Status: SHIPPED | OUTPATIENT
Start: 2019-10-03 | End: 2019-12-03

## 2019-10-03 RX ORDER — IMIPRAMINE HYDROCHLORIDE 10 MG/1
10 TABLET, FILM COATED ORAL 2 TIMES DAILY
Qty: 60 TABLET | Refills: 0 | Status: SHIPPED | OUTPATIENT
Start: 2019-10-03 | End: 2019-10-25 | Stop reason: SINTOL

## 2019-10-03 ASSESSMENT — ANXIETY QUESTIONNAIRES
5. BEING SO RESTLESS THAT IT IS HARD TO SIT STILL: NEARLY EVERY DAY
3. WORRYING TOO MUCH ABOUT DIFFERENT THINGS: NEARLY EVERY DAY
GAD7 TOTAL SCORE: 18
IF YOU CHECKED OFF ANY PROBLEMS ON THIS QUESTIONNAIRE, HOW DIFFICULT HAVE THESE PROBLEMS MADE IT FOR YOU TO DO YOUR WORK, TAKE CARE OF THINGS AT HOME, OR GET ALONG WITH OTHER PEOPLE: EXTREMELY DIFFICULT
1. FEELING NERVOUS, ANXIOUS, OR ON EDGE: NEARLY EVERY DAY
6. BECOMING EASILY ANNOYED OR IRRITABLE: MORE THAN HALF THE DAYS
7. FEELING AFRAID AS IF SOMETHING AWFUL MIGHT HAPPEN: SEVERAL DAYS
2. NOT BEING ABLE TO STOP OR CONTROL WORRYING: NEARLY EVERY DAY

## 2019-10-03 ASSESSMENT — MIFFLIN-ST. JEOR: SCORE: 1899.39

## 2019-10-03 ASSESSMENT — PATIENT HEALTH QUESTIONNAIRE - PHQ9
SUM OF ALL RESPONSES TO PHQ QUESTIONS 1-9: 10
5. POOR APPETITE OR OVEREATING: NEARLY EVERY DAY

## 2019-10-03 NOTE — PROGRESS NOTES
"                                                         Outpatient Psychiatric Evaluation - Standard Adult    Name:  Holly Tolliver  : 1985    Source of Referral:  Primary Care Provider: WOLFGANG Freire CNP   Last visit: ***  Current Psychotherapist: ***   Last visit: ***    Identifying Data:  Patient is a 34 year old, {[facility] RELATIONSHIP STATUS:271391}  White American female  who presents for initial visit with me.  Patient is currently {[facility] EMPLOYMENT:831744}. Patient attended the session {[facility] ATTENDANCE:748172}. Consent to communicate signed for *** . Consent for treatment signed and included in electronic medical record. Discussed limits of confidentiality today. My Practice Policy was reviewed and signed.     Patient prefers to be called: Ana     Chief Complaint:    Chief Complaint   Patient presents with     Recheck Medication     Transferred from Dr. Phelps.  Here to discuss ADHD medication as well as side effects from Inderal.         HPI:      ***  Psychiatric Review of Symptoms:  Depression: {[facility] DEPRESSION SXS:140574}   PHQ-9 scores:   PHQ-9 SCORE 3/25/2019 2019 10/3/2019   PHQ-9 Total Score - - -   PHQ-9 Total Score 7 11 10     Saira:  {[facility] SAIRA SXS:138218::\"No symptoms\"}   MDQ Score: {[facility] MDQ:790723}  Anxiety: [facility]513027}    RUBENS-7 scores:    RUBENS-7 SCORE 3/25/2019 2019 10/3/2019   Total Score - - -   Total Score 17 19 18     Panic:  {PANIC:503902}   Agoraphobia:  {YES / NO:367487::\"Yes\"}   PTSD:  {[facility] PTSD SXS:532538::\"No symptoms\"}   OCD:  {[facility] OCD SXS:599673::\"No symptoms\"}   Psychosis: {PSYCHOSIS:051419::\"No symptoms\"}   ADD / ADHD: {[facility] ADD SXS:679719::\"No symptoms\"}  Gambling or shoplifting: {YES / NO:469014::\"No\"}   Eating Disorder:  {[facility] ED SXS:165727}  Sleep:   {[facility] SLEEP:621831}     Psychiatric History:   Hospitalizations:***  History of Commitment? {YES / NO:167704::\"No\"}   Past Treatment: {[facility] MENTAL HEALTH SERVICES:203229}  Suicide " "Attempts:  ***  Self-injurious Behavior: {vtsib:027146}  Electroconvulsive Therapy (ECT) or Transcranial Magnetic Stimulation (TMS): {YES / NO:753183::\"No\"}   Genetic Testing: {YES / NO:268827::\"No\"}     Substance Use History:  Current use of drugs or alcohol: {[facility] SUBSTANCES:024105::\"Denies\"}   Patient {[facility] DRINKING PROBLEMS:241800}.   Based on the clinical interview, there  {Indications:203349}.  Tobacco use: {[facility] YES/NO:580915}  Caffeine: {[facility] YES/NO CAFFEINE:352411}  Patient {[facility] RECEIVED CHEMICAL DEPENDENCY TREATMENT:165784}  Recovery Programming Involvement: {vtrecovery:643586}    Past Medical History:  Past Medical History:   Diagnosis Date     ALCOH DEP NEC/NOS-UNSPEC 2007     Asthma      Chickenpox      Postpartum depression      Pregnancy induced hypertension      Urinary tract infections       Surgery:   Past Surgical History:   Procedure Laterality Date      SECTION Bilateral 2016    Procedure:  SECTION;  Surgeon: Beau Cardoso MD;  Location: [place] OR     PE TUBES  age 3     Allergies:   No Known Allergies  Primary Care Provider: WOLFGANG Freire CNP  Seizures or Head Injury: {[facility] YES/NO HEAD INJURY:907394}  Diet: {[facility] DIET:035619}  Food Allergies: {YES / NO:332310::\"No\"}   Exercise: {[facility] EXERCISE:486169::\"No regular exercise program\"}  Supplements: Reviewed per Electronic Medical Record Today    PARoxetine (PAXIL) 30 MG tablet, Take 1 tablet (30 mg) by mouth every morning  albuterol (PROAIR HFA/PROVENTIL HFA/VENTOLIN HFA) 108 (90 Base) MCG/ACT inhaler, Inhale 2 puffs into the lungs every 4 hours as needed for shortness of breath / dyspnea (Patient not taking: Reported on 10/3/2019)  ciclopirox (LOPROX) 0.77 % cream, Apply topically 2 times daily (Patient not taking: Reported on 10/3/2019)  propranolol (INDERAL) 10 MG tablet, Take 1 tablet (10 mg) by mouth 2 times daily (Patient not taking: Reported on 10/3/2019)    No current facility-administered " "medications on file prior to visit.        The [place] Prescription Monitoring Program has been reviewed and there are no concerns about diversionary activity for controlled substances at this time.  No data for controlled substances over the last one year. ***    Vital Signs:  Vitals: /80   Pulse 79   Temp 97.4  F (36.3  C) (Tympanic)   Resp 18   Ht 1.822 m (5' 11.75\")   Wt 108.6 kg (239 lb 8 oz)   SpO2 98%   BMI 32.71 kg/m      Labs:  Most recent laboratory results reviewed and the pertinent results include: ***  Most recent labs reviewed and no new labs.   {[employer] Multiple Select:852347}    Review of Systems:  10 systems (general, cardiovascular, respiratory, eyes, ENT, endocrine, GI, , M/S, neurological) were reviewed. Most pertinent finding(s) is/are: *** . The remaining systems are all unremarkable.  Family History:   Patient reported family history includes:   Family History   Problem Relation Age of Onset     Alcohol/Drug Mother         alcohol- recovered     Hypertension Mother      Depression Mother         also anxiety     Other - See Comments Mother         ankylosing spondylosis     Alcohol/Drug Father         alcohol- recovered     Alcohol/Drug Sister         A&D     Cancer Maternal Grandmother         lung     Depression Maternal Grandmother         also anxiety     Mental Illness Paternal Grandmother      Alcohol/Drug Brother         alcohol     Mental Illness History: {[facility] SI HX:921854}  Substance Abuse History: {[facility] SI HX:377292}  Suicide History: {[facility] SI HX:878443}  Medications: {[facility]:844991}     Social History:   Birth place: ***  Childhood: ***  Siblings:  ***  Highest education level was {[facility] EDUCATIONAL LEVEL:744192}.   Employment History:  ***  Childhood illnesses: {vtillness:524792}  Current Living situation:  ***  with *** . Feels safe at home.  Children: ***   Firearms/Weapons Access: {[facility] YES/NO FIREARM:584103}   Service: {[facility] YES/NO " :321128}    Mental Status Examination:     Appearance:  {[facility] APPEARANCE:036568}  Attitude:  { :609165}   Eye Contact:  {[facility] EYE:182482}  Gait and Station: {[facility] GAIT:539278}  Psychomotor Behavior:  { :854598}  Oriented to:  { :342883}  Attention Span and Concentration:  { :187944}  Speech:  {[facility] SPEECH:613788}  Mood:  { :237931}  Affect:  { :249998}  Associations:  { :051122}  Thought Process:  { :641265}  Thought Content:  {[facility] TC:379922}  Recent and Remote Memory:  { :994949} Not formally assessed. No amnesia.  Fund of Knowledge: { :292433}  Insight:  { :903282}  Judgment:  { :598320}  Impulse Control:  { :315510}    Suicide Risk Assessment:  Today Holly Tolliver reports ***. In addition, there are notable risk factors for self-harm, including {[facility] SUICIDE RISKS:120806}. However, risk is mitigated by {[facility] SUICIDE PROTECT:105922}. Therefore, based on all available evidence including the factors cited above, Holly Tolliver does not appear to be at imminent risk for self-harm, does not meet criteria for a 72-hr hold, and therefore remains appropriate for ongoing outpatient level of care.  A thorough assessment of risk factors related to suicide and self-harm have been reviewed and are noted above. The patient convincingly denies acute suicidality on several occasions. Local community safety resources reviewed and printed for patient to use if needed. There was no deceit detected, and the patient presented in a manner that was believable.     DSM5  Diagnosis:  {DSM5  Diagnosis:405094}    Medical Comorbidities Include:   Patient Active Problem List    Diagnosis Date Noted     Obesity (BMI 35.0-39.9) with comorbidity (H) 03/25/2019     Priority: Medium     Routine postpartum follow-up 05/31/2016     Priority: Medium     Mirena IUD 05/31/2016     Priority: Medium     Lot: [id number]  Exp: 09/18       Single liveborn, born in hospital, delivered 04/19/2016     Priority: Medium     Placental  abruption in third trimester 2016     Priority: Medium     Placental abruption 2016     Priority: Medium      delivery delivered 2016     Priority: Medium     Prenatal care, subsequent pregnancy 2016     Priority: Medium     Prenatal care, subsequent pregnancy in third trimester 2016     Priority: Medium     Health Care Home 2015     Priority: Medium     Care Coordinator: Kelly DANIEL, MSW  See letters for Health Care home care plan    SW covering in [place], [place] SW absence    FREDY Luz, MSW   [phone]  3/24/2015 12:24 PM       Generalized anxiety disorder 2013     Priority: Medium     Diagnosis updated by automated process. Provider to review and confirm.       GERD (gastroesophageal reflux disease) 2013     Priority: Medium     Major depressive disorder, single episode, moderate (H) 2013     Priority: Medium     CARDIOVASCULAR SCREENING; LDL GOAL LESS THAN 160 10/23/2012     Priority: Medium     CTS (carpal tunnel syndrome) 10/14/2011     Priority: Medium     Tobacco use disorder 2008     Priority: Medium     Has decreased from 1 ppd to 1 cigarette per day since pregnancy.       Mild intermittent asthma 2008     Priority: Medium       A 12-item WHODAS 2.0 assessment was completed by the patient today and recorded in EPIC.    WHODAS 2.0 Total Score 9/15/2017 10/3/2019   Total Score 25 20       The Patient Activation Measure (MOUNA) score was completed and recorded in [facility]. This assesses patient knowledge, skill, and confidence for self-management.   MOUNA Score (Last Two) 2015   MOUNA Raw Score 44   Activation Score 70.8   MOUNA Level 4                Impression:  Holly Tolliver ***. Medication side effects and alternatives reviewed. Health promotion activities recommended and reviewed today. All questions addressed. Education and counseling completed regarding risks and benefits of medications and psychotherapy  options. Collaborative Care Psychiatry Service model reviewed today. Recommend therapy for additional support.     Treatment Plan:     ***        Continue all other medical directions per primary care provider.     Continue all other medications as reviewed per electronic medical record today.     Safety plan reviewed. To the Emergency Department as needed or call after hours crisis line at [phone] or [phone]. [place] Crisis Text Line: Text [place] to 856115  or  Suicide LifeLine Chat: Therapydia.org/chat/    To schedule individual or family therapy, call [facility] at [phone]. ***    Continue individual therapy as planned with ***.    Schedule an appointment with me in *** weeks or sooner as needed.  Call [facility] at [phone] to schedule.    Follow up with primary care provider as planned or for acute medical concerns.    Call the psychiatric nurse line with medication questions or concerns at [phone].    [employer] may be used to communicate with your provider, but this is not intended to be used for emergencies.    Crisis Resources:    National Suicide Prevention Lifeline: 458.213.2650 (TTY: 231.454.5771). Call anytime for help.  (www.suicidepreventionlifeline.org)  National [place] [employer] Mental Illness (www.billie.org): 272.809.4095 or 326-508-8391.   Mental Health Association (www.mentalhealth.org): 544.278.9513 or 032-037-7339.  [place] Crisis Text Line: Text [place] to 087694  Suicide LifeLine Chat: Therapydia.org/chat    Administrative Billing:   Time spent with patient was 60 minutes and greater than 50% of time or 40 minutes was spent in counseling and coordination of care regarding above diagnoses and treatment plan.    [facility]:  [facility]    Signed:   YVAN Rankin-BC   Psychiatry

## 2019-10-03 NOTE — PROGRESS NOTES
"    Outpatient Psychiatric Progress Note    Name: Holly Tolliver   : 1985                    Primary Care Provider: WOLFGANG Freire CNP   Therapist: Dr. Multani     PHQ-9 scores:  PHQ-9 SCORE 3/25/2019 2019 10/3/2019   PHQ-9 Total Score - - -   PHQ-9 Total Score 7 11 10       RUBENS-7 scores:  RUBENS-7 SCORE 3/25/2019 2019 10/3/2019   Total Score - - -   Total Score 17 19 18       Patient Identification:    Patient is a 34 year old year old, single  White American female  who presents for return visit with me.  Patient is currently employed full time. Patient attended the session alone. Patient prefers to be called: \"Ana\".    Interim History:    Ana was last seen by Dominic for outpatient psychiatry Consultation on 2019.     During that appointment, we her mental health history was reviewed and she was assessed for ADHD's and anxiety symptoms.  She has had multiple medication trials were reviewed with various side effects.  She had tried Strattera but felt nauseated without benefit along with weight loss so she stopped taking it.  At the time, Dominic was awaiting test results to confirm the diagnosis of ADHD.  She had some symptoms of diaphoresis and sensation of feeling hot when taking the Paxil but did agree to an increase to 30 mg daily.  She also started propranolol to help with the anxiety which was surmised to possibly be part of the reason why she was having physical symptoms.    Current medications include: albuterol (PROAIR HFA/PROVENTIL HFA/VENTOLIN HFA) 108 (90 Base) MCG/ACT inhaler, Inhale 2 puffs into the lungs every 4 hours as needed for shortness of breath / dyspnea (Patient not taking: Reported on 10/3/2019)  ciclopirox (LOPROX) 0.77 % cream, Apply topically 2 times daily (Patient not taking: Reported on 10/3/2019)    No current facility-administered medications on file prior to visit.        The [place] Prescription Monitoring Program has been reviewed and there " are no concerns about diversionary activity for controlled substances at this time.      I was able to review most recent Primary Care Provider, specialty provider, and therapy visit notes that I have access to.     Today, patient reports that she saw Dominic for 1 visit before he left the practice.  She currently is seeing her therapist on a regular basis.  Today she was able to provide details about her child protection services case that has been open for approximately 9 months.  Apparently she had taken her now 3-year-old son to a  facility and suspicious bruises were in place.  She tells me that he is normally a very active child and was getting bruises all the time.  Her 7-year-old son was also taken from her and both of the children are  and now living in foster care.  Several times during this interview she was tearful as she misses having her children around.  She worries that they are being mistreated in the foster homes as she found out one of the homes was investigated for maltreatment charges.  On October 7 she is due in court again and is hopeful that her 7-year-old son will be coming home with her.  The charges that had been filed were dismissed by the .  She tells me that her 3-year-old son has had 6 different placements during the past 9 months and is showing symptoms of behavioral problems.  Her 7-year-old son has had 4 different foster home placements.    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  She plans to find a new job when she gets her children back.  Some nights she only gets 3-4 hours of sleep.  She currently lives alone.  There were allegations that there were fleas in her home due to 2 kittens that she had taken in.  She has since given them away.  7 months ago she ended a 7-year of her 3-year-old son.  He lost parental rights.  Her 7-year-old son's father also has lost parental rights.  Ana mentioned no alcohol use history.  She tells me she has been  "sober from methamphetamines for 18 months and has a sponsor.  She adamantly endorses her family is a strong source of support for her.  She readily signed a consent form today to receive results from a recent psychological evaluation done on her.  She tells me she has been going to parenting classes.  She has an [employer] worker also.    With regards to her ADHD diagnosis, which was confirmed in her records, she tells me it is difficult for her to focus on completing tasks.  She gets easily distracted and often loses things.    She also gets anxiety that is worse in social settings.  When she took the propranolol she became diaphoretic and felt \"hot\".  She would get lightheaded and then sees black spots in her line of vision.  She has since stopped taking that.    Anxious - worse in social settings - work,   propranololl - hot and sweaty, lightheaded     Celexa, prozac - panic attacks,      has a past medical history of ALCOH DEP NEC/NOS-UNSPEC (12/31/2007), Asthma, Chickenpox, Postpartum depression (2011), Pregnancy induced hypertension, and Urinary tract infections. She also has no past medical history of Abnormal Pap smear, Anemia, Asymptomatic human immunodeficiency virus (HIV) infection status (H), Blood dyscrasia, Breast disorder, Chronic kidney disease, Complication of anesthesia, Diabetes mellitus (H), Fertility problem, Heart disease, Herpes simplex without mention of complication, Liver disease, Rh incompatibility, Seizures (H), Sickle cell anemia (H), Systemic lupus erythematosus (H), Thyroid disease, or Varicosities.    Social history updates:    Ana continues with her job at the [employer].  She socializes very little with people other than her family.    Substance use updates:    Reports no alcohol or methamphetamine use  Tobacco use: No    Vital Signs:   /80   Pulse 79   Temp 97.4  F (36.3  C) (Tympanic)   Resp 18   Ht 1.822 m (5' 11.75\")   Wt 108.6 kg (239 lb 8 oz)   SpO2 98%   BMI " 32.71 kg/m      Labs:  Most recent laboratory results reviewed and no new labs.     Review of Systems:  10 systems (general, cardiovascular, respiratory, eyes, ENT, endocrine, GI, , M/S, neurological) were reviewed. Most pertinent finding(s) is/are: No chest pain, no rashes, generalized fatigue. The remaining systems are all unremarkable.    Mental Status Examination:  Appearance:  fatigued, Just getting off night shift work and casually dressed  Attitude:  evasive and guarded   Eye Contact:  fair  Gait and Station: Normal  Psychomotor Behavior:  physical retardation  Oriented to:  time, person, and place  Attention Span and Concentration:  Fair  Speech:   Soft, slowed, and Speaks: Fluent English  Mood:  anxious, sad  and depressed  Affect:  intensity is flat, guarded and restricted range  Associations:  no loose associations  Thought Process:  [place]  Thought Content:  no evidence of suicidal ideation or homicidal ideation, no auditory hallucinations present and no visual hallucinations present  Recent and Remote Memory:  fair Not formally assessed. No amnesia.  Fund of Knowledge: low-normal  Insight:  fair  Judgment:  fair  Impulse Control:  intact    Suicide Risk Assessment:  Today Holly Tolliver reports no suicide or homicide thoughts. In addition, there are notable risk factors for self-harm, including single status, anxiety, substance abuse and withdrawing. However, risk is mitigated by commitment to family, spiritual/Religion beliefs, sobriety, history of seeking help when needed, future oriented, denies suicidal intent or plan and denies homicidal ideation, intent, or plan. Therefore, based on all available evidence including the factors cited above, Holly Tolliver does not appear to be at imminent risk for self-harm, does not meet criteria for a 72-hr hold, and therefore remains appropriate for ongoing outpatient level of care.  A thorough assessment of risk factors related to suicide and  self-harm have been reviewed and are noted above. The patient convincingly denies suicidality on several occasions. Local community safety resources printed and reviewed for patient to use if needed. There was no deceit detected, and the patient presented in a manner that was believable.     DSM5 Diagnosis:  Major depressive disorder, moderate, with anxiety  Generalized anxiety disorder  Social anxiety disorder  Stimulant use disorder, severe, in sustained remission    Medical comorbidities include:   Patient Active Problem List    Diagnosis Date Noted     Obesity (BMI 35.0-39.9) with comorbidity (H) 2019     Priority: Medium     Routine postpartum follow-up 2016     Priority: Medium     Mirena IUD 2016     Priority: Medium     Lot: [id number]  Exp:        Single liveborn, born in hospital, delivered 2016     Priority: Medium     Placental abruption in third trimester 2016     Priority: Medium     Placental abruption 2016     Priority: Medium      delivery delivered 2016     Priority: Medium     Prenatal care, subsequent pregnancy 2016     Priority: Medium     Prenatal care, subsequent pregnancy in third trimester 2016     Priority: Medium     Health Care Home 2015     Priority: Medium     Care Coordinator: Kelly DANIEL, MSW  See letters for Health Care home care plan    SW covering in [facility] absence    FREDY Luz, MSW   [phone]  3/24/2015 12:24 PM       Generalized anxiety disorder 2013     Priority: Medium     Diagnosis updated by automated process. Provider to review and confirm.       GERD (gastroesophageal reflux disease) 2013     Priority: Medium     Major depressive disorder, single episode, moderate (H) 2013     Priority: Medium     CARDIOVASCULAR SCREENING; LDL GOAL LESS THAN 160 10/23/2012     Priority: Medium     CTS (carpal tunnel syndrome) 10/14/2011     Priority: Medium     Tobacco  use disorder 02/19/2008     Priority: Medium     Has decreased from 1 ppd to 1 cigarette per day since pregnancy.       Mild intermittent asthma 02/19/2008     Priority: Medium       Assessment:    Holly Tolliver comes in today to review medication options to manage her symptoms.  The past 9 months CPS has been involved and her children have been living in foster care homes.  This is been very stressful for her.  However she has been able to follow all the guidelines required of her.  Apparently during a previous court proceeding the  throughout the case.  Another court date is pending for October 7 and she hopes to get her children back into her custody.  Ana describes anxiety most days that is heightened when she is in social settings.  She also had heightened anxiety when she saw me according our conversation in her electronic health record.  I assured her that these records are kept confidential unless she releases them or if they are subpoenaed.  She continues to have symptoms of ADHD has been confirmed by a psychological evaluation.  She tells me she was prescribed lorazepam in the past for her anxiety but I told her, given her substance use history I was not comfortable prescribing that at this time.  Instead she will try imipramine up to twice daily as needed for anxiety.  She tells me she particularly needs this when she is going to work or when she is going to AA meetings.  A safety plan was reviewed.  Also for her anxiety her Paxil has been increased to 40 mg daily.  She stopped taking the propranolol due to side effects so that will be discontinued.  Medication side effects and alternatives were reviewed. Health promotion activities recommended and reviewed today. All questions addressed. Education and counseling completed regarding risks and benefits of medications and psychotherapy options.    Treatment Plan:  1. Paxil increased to 40 mg daily  2. Propranolol discontinued  3. Add Imipramine  10 mg (1 tablet) up to twice daily as needed for anxiety  4. Continue all therapies      Continue all other medications as reviewed per electronic medical record today.     Safety plan reviewed. To the Emergency Department as needed or call after hours crisis line at [phone] or [phone]. [place] Crisis Text [place]. Text [place] to 546041 or Suicide LifeLine Chat: suicideOneFineMeal.org/chat/    To schedule individual or family therapy, call [facility] at [phone].     Continue individual therapy as planned with Dr. Multani.    Schedule an appointment with me in 4 weeks  or sooner as needed. Call [facility] at [phone] to schedule.    Follow up with primary care provider as planned or for acute medical concerns.    Call the psychiatric nurse line with medication questions or concerns at [phone].    "[employer]hart may be used to communicate with your provider, but this is not intended to be used for emergencies.    Crisis Resources:    National Suicide Prevention Lifeline: 173.499.5985 (TTY: 396.614.1750). Call anytime for help.  (www.suicidepreventionlifeline.org)  National [place] on Mental Illness (www.billie.org): 929.543.6527 or 593-298-1968.   Mental Health Association (www.mentalhealth.org): 113.457.8268 or 494-436-6398.  [place] Crisis Text [place]: Text [place] to 918314  Suicide LifeLine Chat: suicideOneFineMeal.org/chat    Administrative Billing:   Time spent with patient was 50 minutes and greater than 50% of time or 30 minutes was spent in counseling and coordination of care regarding above diagnoses and treatment plan.    Patient Status:  Patient will continue to be seen for ongoing consultation and stabilization.    Signed:   YVAN Rankin-BC   Psychiatry

## 2019-10-03 NOTE — PATIENT INSTRUCTIONS
1. Paxil increased to 40 mg daily  2. Propranolol discontinued  3. Add Imipramine 10 mg (1 tablet) up to twice daily as needed for anxiety  4. Continue all therapies      Continue all other medications as reviewed per electronic medical record today.     Safety plan reviewed. To the Emergency Department as needed or call after hours crisis line at [phone] or [phone]. [place] Crisis Text [place]. Text [place] to 370759 or Suicide LifeLine Chat: [employer].org/chat/    To schedule individual or family therapy, call [facility] at [phone].     Continue individual therapy as planned with Dr. Multani.    Schedule an appointment with me in 4 weeks  or sooner as needed. Call [facility] at [phone] to schedule.    Follow up with primary care provider as planned or for acute medical concerns.    Call the psychiatric nurse line with medication questions or concerns at [phone].    [email or web address] may be used to communicate with your provider, but this is not intended to be used for emergencies.    Crisis Resources:    National Suicide Prevention Lifeline: 447.780.4991 (TTY: 799.989.9824). Call anytime for help.  (www.suicidepreventionlifeline.org)  National [place] on Mental Illness (www.billie.org): 998.881.7791 or 206-095-0618.   Mental Health Association (www.mentalhealth.org): 317.835.5867 or 128-790-2465.  [place] Crisis Text [place]: Text [place] to 340319  Suicide LifeLine Chat: suicideMobile Travel Technologies.org/chat

## 2019-10-04 ASSESSMENT — ANXIETY QUESTIONNAIRES: GAD7 TOTAL SCORE: 18

## 2019-10-25 ENCOUNTER — OFFICE VISIT (OUTPATIENT)
Dept: FAMILY MEDICINE | Facility: CLINIC | Age: 34
End: 2019-10-25
Payer: COMMERCIAL

## 2019-10-25 VITALS
WEIGHT: 245 LBS | DIASTOLIC BLOOD PRESSURE: 70 MMHG | OXYGEN SATURATION: 99 % | BODY MASS INDEX: 33.46 KG/M2 | TEMPERATURE: 96.3 F | SYSTOLIC BLOOD PRESSURE: 130 MMHG | HEART RATE: 72 BPM | RESPIRATION RATE: 14 BRPM

## 2019-10-25 DIAGNOSIS — J02.0 STREPTOCOCCAL PHARYNGITIS: ICD-10-CM

## 2019-10-25 DIAGNOSIS — G47.00 PERSISTENT INSOMNIA: ICD-10-CM

## 2019-10-25 DIAGNOSIS — R07.0 THROAT PAIN: Primary | ICD-10-CM

## 2019-10-25 LAB
DEPRECATED S PYO AG THROAT QL EIA: ABNORMAL
SPECIMEN SOURCE: ABNORMAL

## 2019-10-25 PROCEDURE — 87880 STREP A ASSAY W/OPTIC: CPT | Performed by: NURSE PRACTITIONER

## 2019-10-25 PROCEDURE — 99214 OFFICE O/P EST MOD 30 MIN: CPT | Performed by: NURSE PRACTITIONER

## 2019-10-25 RX ORDER — PENICILLIN V POTASSIUM 500 MG/1
500 TABLET, FILM COATED ORAL 3 TIMES DAILY
Qty: 30 TABLET | Refills: 0 | Status: SHIPPED | OUTPATIENT
Start: 2019-10-25 | End: 2019-11-11

## 2019-10-25 RX ORDER — TRAZODONE HYDROCHLORIDE 50 MG/1
50 TABLET, FILM COATED ORAL AT BEDTIME
Qty: 31 TABLET | Refills: 2 | Status: SHIPPED | OUTPATIENT
Start: 2019-10-25 | End: 2020-07-14

## 2019-10-25 ASSESSMENT — ASTHMA QUESTIONNAIRES
ACT_TOTALSCORE: 20
QUESTION_5 LAST FOUR WEEKS HOW WOULD YOU RATE YOUR ASTHMA CONTROL: WELL CONTROLLED
ACUTE_EXACERBATION_TODAY: NO
QUESTION_4 LAST FOUR WEEKS HOW OFTEN HAVE YOU USED YOUR RESCUE INHALER OR NEBULIZER MEDICATION (SUCH AS ALBUTEROL): ONCE A WEEK OR LESS
QUESTION_1 LAST FOUR WEEKS HOW MUCH OF THE TIME DID YOUR ASTHMA KEEP YOU FROM GETTING AS MUCH DONE AT WORK, SCHOOL OR AT HOME: A LITTLE OF THE TIME
QUESTION_2 LAST FOUR WEEKS HOW OFTEN HAVE YOU HAD SHORTNESS OF BREATH: ONCE OR TWICE A WEEK
QUESTION_3 LAST FOUR WEEKS HOW OFTEN DID YOUR ASTHMA SYMPTOMS (WHEEZING, COUGHING, SHORTNESS OF BREATH, CHEST TIGHTNESS OR PAIN) WAKE YOU UP AT NIGHT OR EARLIER THAN USUAL IN THE MORNING: ONCE OR TWICE

## 2019-10-25 ASSESSMENT — PAIN SCALES - GENERAL: PAINLEVEL: MODERATE PAIN (5)

## 2019-10-25 NOTE — PROGRESS NOTES
Subjective     Holly Tolliver is a 34 year old female who presents to clinic today for the following health issues:    HPI   SORE THROAT      Duration: 5 days     Description (location/character/radiation): sore throat and headaches    Intensity:  moderate    Accompanying signs and symptoms: one    History (similar episodes/previous evaluation): son has strep     Precipitating or alleviating factors: None    Therapies tried and outcome: None     Results for orders placed or performed in visit on 10/25/19   Strep, Rapid Screen   Result Value Ref Range    Specimen Description Throat     Rapid Strep A Screen (A)      POSITIVE: Group A Streptococcal antigen detected by immunoassay.     Needs refills on her trazodone today for persistent insomnia  -------------------------------------    Patient Active Problem List   Diagnosis     Tobacco use disorder     Mild intermittent asthma     CTS (carpal tunnel syndrome)     CARDIOVASCULAR SCREENING; LDL GOAL LESS THAN 160     Major depressive disorder, single episode, moderate (H)     Generalized anxiety disorder     GERD (gastroesophageal reflux disease)     Health Care Home     Prenatal care, subsequent pregnancy in third trimester     Prenatal care, subsequent pregnancy     Single liveborn, born in hospital, delivered     Placental abruption in third trimester     Placental abruption      delivery delivered     Routine postpartum follow-up     Mirena IUD     Obesity (BMI 35.0-39.9) with comorbidity (H)     Past Surgical History:   Procedure Laterality Date      SECTION Bilateral 2016    Procedure:  SECTION;  Surgeon: Beau Cardoso MD;  Location: [facility] OR     PE TUBES  age 3       Social History     Tobacco Use     Smoking status: Former Smoker     Packs/day: 0.00     Years: 12.00     Pack years: 0.00     Types: Cigarettes     Smokeless tobacco: Never Used     Tobacco comment: Never   Substance Use Topics     Alcohol use: No      Alcohol/week: 0.0 standard drinks     Comment: sober since 1/9/2015. 16 months     Family History   Problem Relation Age of Onset     Alcohol/Drug Mother         alcohol- recovered     Hypertension Mother      Depression Mother         also anxiety     Other - See Comments Mother         ankylosing spondylosis     Alcohol/Drug Father         alcohol- recovered     Alcohol/Drug Sister         A&D     Cancer Maternal Grandmother         lung     Depression Maternal Grandmother         also anxiety     Mental Illness Paternal Grandmother      Alcohol/Drug Brother         alcohol           -------------------------------------  Reviewed and updated as needed this visit by Provider         Review of Systems   ROS COMP: Constitutional, HEENT, cardiovascular, pulmonary, GI, , musculoskeletal, neuro, skin, endocrine and psych systems are negative, except as otherwise noted.      Objective    /70   Pulse 72   Temp 96.3  F (35.7  C) (Tympanic)   Resp 14   Wt 111.1 kg (245 lb)   SpO2 99%   BMI 33.46 kg/m    Body mass index is 33.46 kg/m .  Physical Exam   GENERAL: healthy, alert and no distress  EYES: Eyes grossly normal to inspection, PERRL and conjunctivae and sclerae normal  HENT: ear canals and TM's normal, nose and mouth without ulcers or lesions   Posterior pharynx is red and injected with white patchy exudate tonsillar tissue bilaterally   NECK: no adenopathy, no asymmetry, masses, or scars and thyroid normal to palpation  RESP: lungs clear to auscultation - no rales, rhonchi or wheezes  CV: regular rate and rhythm, normal S1 S2, no S3 or S4, no murmur, click or rub, no peripheral edema and peripheral pulses strong  ABDOMEN: soft, nontender, no hepatosplenomegaly, no masses and bowel sounds normal  MS: no gross musculoskeletal defects noted, no edema  SKIN: no suspicious lesions or rashes  NEURO: Normal strength and tone, mentation intact and speech normal  PSYCH: mentation appears normal, affect  "normal/bright    Diagnostic Test Results:  Labs reviewed in Epic  Results for orders placed or performed in visit on 10/25/19   Strep, Rapid Screen   Result Value Ref Range    Specimen Description Throat     Rapid Strep A Screen (A)      POSITIVE: Group A Streptococcal antigen detected by immunoassay.           Assessment & Plan     Holly was seen today for pharyngitis.    Diagnoses and all orders for this visit:    Throat pain  -     Strep, Rapid Screen    Streptococcal pharyngitis  -     penicillin V (VEETID) 500 MG tablet; Take 1 tablet (500 mg) by mouth 3 times daily for 10 days    Persistent insomnia  -     traZODone (DESYREL) 50 MG tablet; Take 1 tablet (50 mg) by mouth At Bedtime    Symptomatic care strategies reviewed.  Increase fluids.  Rest.  Pen-Vee K 500 mg 3 times daily for 10 days  Trazodone 50 mg at at bedtime refilled today secondary to persistent insomnia     BMI:   Estimated body mass index is 33.46 kg/m  as calculated from the following:    Height as of 10/3/19: 1.822 m (5' 11.75\").    Weight as of this encounter: 111.1 kg (245 lb).   Weight management plan: Discussed healthy diet and exercise guidelines        Call or return to the clinic with any worsening of symptoms or no resolution. Patient/Parent verbalized understanding and is in agreement. Medication side effects reviewed.   Current Outpatient Medications   Medication Sig Dispense Refill     penicillin V (VEETID) 500 MG tablet Take 1 tablet (500 mg) by mouth 3 times daily for 10 days 30 tablet 0     traZODone (DESYREL) 50 MG tablet Take 1 tablet (50 mg) by mouth At Bedtime 31 tablet 2     albuterol (PROAIR HFA/PROVENTIL HFA/VENTOLIN HFA) 108 (90 Base) MCG/ACT inhaler Inhale 2 puffs into the lungs every 4 hours as needed for shortness of breath / dyspnea (Patient not taking: Reported on 10/3/2019) 1 Inhaler 5     ciclopirox (LOPROX) 0.77 % cream Apply topically 2 times daily (Patient not taking: Reported on 10/3/2019) 90 g 1     " PARoxetine (PAXIL) 40 MG tablet Take 1 tablet (40 mg) by mouth every morning 30 tablet 0     Chart documentation with Dragon Voice recognition Software. Although reviewed after completion, some words and grammatical errors may remain.    See Patient Instructions    Return in about 2 weeks (around 11/8/2019) for Ongoing symptoms as needed with PCP.    WOLFGANG [facility]

## 2019-10-25 NOTE — LETTER
My Asthma Action Plan    Name: Holly Tolliver   YOB: 1985  Date: 10/25/2019   My doctor: WOLFGANG Maravilla CNP   My clinic: [facility]        My Rescue Medicine:   Albuterol inhaler (Proair/Ventolin/Proventil HFA)  2-4 puffs EVERY 4 HOURS as needed. Use a spacer if recommended by your provider.   My Asthma Severity:   Intermittent / Exercise Induced  Know your asthma triggers: see below  exercise or sports          GREEN ZONE   Good Control    I feel good    No cough or wheeze    Can work, sleep and play without asthma symptoms       Take your asthma control medicine every day.     1. If exercise triggers your asthma, take your rescue medication    15 minutes before exercise or sports, and    During exercise if you have asthma symptoms  2. Spacer to use with inhaler: If you have a spacer, make sure to use it with your inhaler             YELLOW ZONE Getting Worse  I have ANY of these:    I do not feel good    Cough or wheeze    Chest feels tight    Wake up at night   1. Keep taking your Green Zone medications  2. Start taking your rescue medicine:    every 20 minutes for up to 1 hour. Then every 4 hours for 24-48 hours.  3. If you stay in the Yellow Zone for more than 12-24 hours, contact your doctor.  4. If you do not return to the Green Zone in 12-24 hours or you get worse, start taking your oral steroid medicine if prescribed by your provider.           RED ZONE Medical Alert - Get Help  I have ANY of these:    I feel awful    Medicine is not helping    Breathing getting harder    Trouble walking or talking    Nose opens wide to breathe       1. Take your rescue medicine NOW  2. If your provider has prescribed an oral steroid medicine, start taking it NOW  3. Call your doctor NOW  4. If you are still in the Red Zone after 20 minutes and you have not reached your doctor:    Take your rescue medicine again and    Call 911 or go to the emergency room right away    See  your regular doctor within 2 weeks of an Emergency Room or Urgent Care visit for follow-up treatment.          Annual Reminders:  Meet with Asthma Educator,  Flu Shot in the Fall, consider Pneumonia Vaccination for patients with asthma (aged 19 and older).    Pharmacy:    [id number] - [place], [place] - [address]  [place] [facility], [place] - [address] [place] [address]                        Asthma Triggers  How To Control Things That Make Your Asthma Worse    Triggers are things that make your asthma worse.  Look at the list below to help you find your triggers and   what you can do about them. You can help prevent asthma flare-ups by staying away from your triggers.      Trigger                                                          What you can do   Cigarette Smoke  Tobacco smoke can make asthma worse. Do not allow smoking in your home, car or around you.  Be sure no one smokes at a child s day care or school.  If you smoke, ask your health care provider for ways to help you quit.  Ask family members to quit too.  Ask your health care provider for a referral to Quit Plan to help you quit smoking, or call 1-199-260-PLAN.     Colds, Flu, Bronchitis  These are common triggers of asthma. Wash your hands often.  Don t touch your eyes, nose or mouth.  Get a flu shot every year.     Dust Mites  These are tiny bugs that live in cloth or carpet. They are too small to see. Wash sheets and blankets in hot water every week.   Encase pillows and mattress in dust mite proof covers.  Avoid having carpet if you can. If you have carpet, vacuum weekly.   Use a dust mask and HEPA vacuum.   Pollen and Outdoor Mold  Some people are allergic to trees, grass, or weed pollen, or molds. Try to keep your windows closed.  Limit time out doors when pollen count is high.   Ask you health care provider about taking medicine during allergy season.     Animal Dander  Some people are  allergic to skin flakes, urine or saliva from pets with fur or feathers. Keep pets with fur or feathers out of your home.    If you can t keep the pet outdoors, then keep the pet out of your bedroom.  Keep the bedroom door closed.  Keep pets off cloth furniture and away from stuffed toys.     Mice, Rats, and Cockroaches  Some people are allergic to the waste from these pests.   Cover food and garbage.  Clean up spills and food crumbs.  Store grease in the refrigerator.   Keep food out of the bedroom.   Indoor Mold  This can be a trigger if your home has high moisture. Fix leaking faucets, pipes, or other sources of water.   Clean moldy surfaces.  Dehumidify basement if it is damp and smelly.   Smoke, Strong Odors, and Sprays  These can reduce air quality. Stay away from strong odors and sprays, such as perfume, powder, hair spray, paints, smoke incense, paint, cleaning products, candles and new carpet.   Exercise or Sports  Some people with asthma have this trigger. Be active!  Ask your doctor about taking medicine before sports or exercise to prevent symptoms.    Warm up for 5-10 minutes before and after sports or exercise.     Other Triggers of Asthma  Cold air:  Cover your nose and mouth with a scarf.  Sometimes laughing or crying can be a trigger.  Some medicines and food can trigger asthma.

## 2019-10-25 NOTE — PATIENT INSTRUCTIONS
Patient Education     Pharyngitis: Strep (Confirmed)    You have had a positive test for strep throat. Strep throat is a contagious illness. It is spread by coughing, kissing or by touching others after touching your mouth or nose. Symptoms include throat pain that is worse with swallowing, aching all over, headache, and fever. It is treated with antibiotic medicine. This should help you start to feel better in 1 to 2 days.  Home care    Rest at home. Drink plenty of fluids to you won't get dehydrated.    No work or school for the first 2 days of taking the antibiotics. After this time, you will not be contagious. You can then return to school or work if you are feeling better.     Take antibiotic medicine for the full 10 days, even if you feel better. This is very important to ensure the infection is treated. It is also important to prevent medicine-resistant germs from developing. If you were given an antibiotic shot, you don't need any more antibiotics.    You may use acetaminophen or ibuprofen to control pain or fever, unless another medicine was prescribed for this. Talk with your healthcare provider before taking these medicines if you have chronic liver or kidney disease. Also talk with your healthcare provider if you have had a stomach ulcer or GI bleeding.    Throat lozenges or sprays help reduce pain. Gargling with warm saltwater will also reduce throat pain. Dissolve 1/2 teaspoon of salt in 1 glass of warm water. This may be useful just before meals.     Soft foods are OK. Don't eat salty or spicy foods.  Follow-up care  Follow up with your healthcare provider or our staff if you don't get better over the next week.  When to seek medical advice  Call your healthcare provider right away if any of these occur:    Fever of 100.4 F (38 C) or higher, or as directed by your healthcare provider    New or worsening ear pain, sinus pain, or headache    Painful lumps in the back of neck    Stiff neck    Lymph  nodes getting larger or becoming soft in the middle    You can't swallow liquids or you can't open your mouth wide because of throat pain    Signs of dehydration. These include very dark urine or no urine, sunken eyes, and dizziness.    Trouble breathing or noisy breathing    Muffled voice    Rash  Prevention  Here are steps you can take to help prevent an infection:    Keep good hand washing habits.    Don t have close contact with people who have sore throats, colds, or other upper respiratory infections.    Don t smoke, and stay away from secondhand smoke.  Date Last Reviewed: 11/1/2017 2000-2018 [employer]. [address] [place] [address]. All rights reserved. This information is not intended as a substitute for professional medical care. Always follow your healthcare professional's instructions.           Patient Education     What Is Insomnia?    Do you have trouble falling asleep? Do you wake up often during the night? Or maybe you wake up too early in the morning. You may be suffering from insomnia. Talk with your healthcare provider if it lasts longer than a few weeks and you feel tired most of the time.  What causes insomnia?  Some common causes of insomnia are:    Health problems. These may be things such as pain, depression, medicine side effects, or trouble breathing.    Circadian rhythm disorder. This is a shift in the body s normal 24-hour activity cycle.    Lifestyle factors. These can include a changing sleep schedule, lack of exercise, or too much caffeine or alcohol.    Sleep settings. This includes things such as a poor mattress, noise, or a room that s too hot or too cold.    Stress. You may be stressed about problems at work, money worries, or family events.  Talk to your healthcare provider  Describe your sleeping problems to your healthcare provider. Try to keep a daily sleep diary for a couple of weeks. Write down the time you go to bed, the time you wake up, and  anything that seems to affect your sleep. Your healthcare provider can work with you to create a treatment plan. You may need to learn good sleeping habits and make some lifestyle changes. If you have any health problems, these may need to be treated first.  Date Last Reviewed: 8/1/2017 2000-2018 [employer]. [address] [place] [address], [place] [address]. All rights reserved. This information is not intended as a substitute for professional medical care. Always follow your healthcare professional's instructions.

## 2019-10-26 ASSESSMENT — ASTHMA QUESTIONNAIRES: ACT_TOTALSCORE: 20

## 2019-11-03 ENCOUNTER — HEALTH MAINTENANCE LETTER (OUTPATIENT)
Age: 34
End: 2019-11-03

## 2019-11-04 ENCOUNTER — OFFICE VISIT (OUTPATIENT)
Dept: PSYCHIATRY | Facility: CLINIC | Age: 34
End: 2019-11-04
Payer: COMMERCIAL

## 2019-11-04 VITALS
WEIGHT: 245 LBS | HEART RATE: 83 BPM | HEIGHT: 72 IN | DIASTOLIC BLOOD PRESSURE: 82 MMHG | BODY MASS INDEX: 33.18 KG/M2 | SYSTOLIC BLOOD PRESSURE: 131 MMHG | RESPIRATION RATE: 16 BRPM

## 2019-11-04 DIAGNOSIS — F32.1 MODERATE MAJOR DEPRESSION (H): ICD-10-CM

## 2019-11-04 DIAGNOSIS — F41.1 GENERALIZED ANXIETY DISORDER: Primary | ICD-10-CM

## 2019-11-04 DIAGNOSIS — F15.21 AMPHETAMINE SUBSTANCE USE DISORDER, MODERATE, IN SUSTAINED REMISSION (H): ICD-10-CM

## 2019-11-04 DIAGNOSIS — J45.20 MILD INTERMITTENT ASTHMA WITHOUT COMPLICATION: ICD-10-CM

## 2019-11-04 PROCEDURE — 99213 OFFICE O/P EST LOW 20 MIN: CPT | Performed by: NURSE PRACTITIONER

## 2019-11-04 RX ORDER — ALBUTEROL SULFATE 90 UG/1
2 AEROSOL, METERED RESPIRATORY (INHALATION) EVERY 4 HOURS PRN
Qty: 1 INHALER | Refills: 5 | Status: CANCELLED | OUTPATIENT
Start: 2019-11-04

## 2019-11-04 ASSESSMENT — ANXIETY QUESTIONNAIRES
2. NOT BEING ABLE TO STOP OR CONTROL WORRYING: NEARLY EVERY DAY
7. FEELING AFRAID AS IF SOMETHING AWFUL MIGHT HAPPEN: SEVERAL DAYS
1. FEELING NERVOUS, ANXIOUS, OR ON EDGE: NEARLY EVERY DAY
5. BEING SO RESTLESS THAT IT IS HARD TO SIT STILL: NEARLY EVERY DAY
GAD7 TOTAL SCORE: 19
IF YOU CHECKED OFF ANY PROBLEMS ON THIS QUESTIONNAIRE, HOW DIFFICULT HAVE THESE PROBLEMS MADE IT FOR YOU TO DO YOUR WORK, TAKE CARE OF THINGS AT HOME, OR GET ALONG WITH OTHER PEOPLE: EXTREMELY DIFFICULT
3. WORRYING TOO MUCH ABOUT DIFFERENT THINGS: NEARLY EVERY DAY
6. BECOMING EASILY ANNOYED OR IRRITABLE: NEARLY EVERY DAY

## 2019-11-04 ASSESSMENT — PATIENT HEALTH QUESTIONNAIRE - PHQ9
SUM OF ALL RESPONSES TO PHQ QUESTIONS 1-9: 8
5. POOR APPETITE OR OVEREATING: NEARLY EVERY DAY

## 2019-11-04 ASSESSMENT — MIFFLIN-ST. JEOR: SCORE: 1924.34

## 2019-11-04 NOTE — PROGRESS NOTES
"    Outpatient Psychiatric Progress Note    Name: Holly Tolliver   : 1985                    Primary Care Provider: WOLFGANG Freire CNP   Therapist: Unknown    PHQ-9 scores:  PHQ-9 SCORE 2019 10/3/2019 2019   PHQ-9 Total Score - - -   PHQ-9 Total Score 11 10 8       RUBENS-7 scores:  RUBENS-7 SCORE 2019 10/3/2019 2019   Total Score - - -   Total Score 19 18 19       Patient Identification:    Patient is a 34 year old year old, single  White American female  who presents for return visit with me.  Patient is currently employed full time. Patient attended the session with Son , who they agreed to have interview with. Patient prefers to be called: \"Ana\".    Interim History:    I last saw Holly Tolliver for outpatient psychiatry Consultation on October 3, 2019.     During that appointment, we introduced ourselves to each other as she had been following a previously collaborative care practice provider.  At the time she informed me that child protection services has been involved with her family for the past 9 months.  She is coming into for follow recommendations made by the team.  Court date was pending for  and she hopes to get her children back at that time..     Current medications include: albuterol (PROAIR HFA/PROVENTIL HFA/VENTOLIN HFA) 108 (90 Base) MCG/ACT inhaler, Inhale 2 puffs into the lungs every 4 hours as needed for shortness of breath / dyspnea  PARoxetine (PAXIL) 40 MG tablet, Take 1 tablet (40 mg) by mouth every morning  [] penicillin V (VEETID) 500 MG tablet, Take 1 tablet (500 mg) by mouth 3 times daily for 10 days  traZODone (DESYREL) 50 MG tablet, Take 1 tablet (50 mg) by mouth At Bedtime    No current facility-administered medications on file prior to visit.        The [place] Prescription Monitoring Program has been reviewed and there are no concerns about diversionary activity for controlled substances at this time.      I was able to " review most recent Primary Care Provider, specialty provider, and therapy visit notes that I have access to.     Today, patient reports she now has her children at home with her.  She Has Ct. in January.  Today she tells me when she took imipramine it made her too tired so she stopped taking it.  Also at that time she did not sleep well and was up every hour.  Recently she has missed work due to having strep throat.  She is taking the trazodone and it helps her to sleep at night.  At work she gets anxious as she is running multiple presses.  She feels jumpy.  When she took the Strattera, she said she did not like how she feels.  Today she is insisting on starting a medication like Adderall.  She informs me she starts many projects and does not finish them.  She is busy going to therapy, meetings, DBT, and has a sponsor.  Of note she has a history of methamphetamine use.  When she tried Wellbutrin that gave her panic attacks.  As our interview went on her son was becoming increasingly restless and she left abruptly telling me she had appointments to take her children to.  I informed her that I would review this case with the psychiatry team for feedback as I am not comfortable prescribing stimulant medications on a short-term basis, as the collaborative care model does not warrant long-term care for ADHD.     has a past medical history of ALCOH DEP NEC/NOS-UNSPEC (12/31/2007), Asthma, Chickenpox, Postpartum depression (2011), Pregnancy induced hypertension, and Urinary tract infections. She also has no past medical history of Abnormal Pap smear, Anemia, Asymptomatic human immunodeficiency virus (HIV) infection status (H), Blood dyscrasia, Breast disorder, Chronic kidney disease, Complication of anesthesia, Diabetes mellitus (H), Fertility problem, Heart disease, Herpes simplex without mention of complication, Liver disease, Rh incompatibility, Seizures (H), Sickle cell anemia (H), Systemic lupus erythematosus (H),  "Thyroid disease, or Varicosities.    Social history updates:    She has been attending meetings, individual and group therapies, and managing her children at home now that she has custody of them again.    Substance use updates:    No  Alcohol use for 19 months   Tobacco use: No    Vital Signs:   /82 (BP Location: Left arm, Patient Position: Sitting, Cuff Size: Adult Regular)   Pulse 83   Resp 16   Ht 1.822 m (5' 11.75\")   Wt 111.1 kg (245 lb)   BMI 33.46 kg/m      Labs:    Most recent laboratory results reviewed and no new labs.     Review of Systems:  10 systems (general, cardiovascular, respiratory, eyes, ENT, endocrine, GI, , M/S, neurological) were reviewed. Most pertinent finding(s) is/are: No chest pain, no rash, no headaches. The remaining systems are all unremarkable.    Mental Status Examination:  Appearance:  casually dressed  Attitude:  evasive and guarded   Eye Contact:  fair  Gait and Station: Normal  Psychomotor Behavior:  fidgeting  Oriented to:  time, person, and place  Attention Span and Concentration:  Easily distracted  Speech:   pressured speech and Speaks: English  Mood:  anxious, sad  and depressed  Affect:  restricted range  Associations:  no loose associations  Thought Process:  disorganized and circumstantial  Thought Content:  no evidence of suicidal ideation or homicidal ideation, no auditory hallucinations present and no visual hallucinations present  Recent and Remote Memory:  fair Not formally assessed. No amnesia.  Fund of Knowledge: low-normal  Insight:  limited  Judgment:  limited  Impulse Control:  limited    Suicide Risk Assessment:  Today Holly Tolliver reports that she is not feeling suicidal nor does she want to harm anybody.. In addition, there are notable risk factors for self-harm, including single status, anxiety, substance abuse and mood change. However, risk is mitigated by commitment to family, history of seeking help when needed, future oriented, no " access to firearms or weapons, denies suicidal intent or plan and denies homicidal ideation, intent, or plan. Therefore, based on all available evidence including the factors cited above, Holly Tolliver does not appear to be at imminent risk for self-harm, does not meet criteria for a 72-hr hold, and therefore remains appropriate for ongoing outpatient level of care.  A thorough assessment of risk factors related to suicide and self-harm have been reviewed and are noted above. The patient convincingly denies suicidality on several occasions. Local community safety resources printed and reviewed for patient to use if needed. There was no deceit detected, and the patient presented in a manner that was believable.     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, Amphetamine type substance    Medical comorbidities include:   Patient Active Problem List    Diagnosis Date Noted     Obesity (BMI 35.0-39.9) with comorbidity (H) 2019     Priority: Medium     Routine postpartum follow-up 2016     Priority: Medium     Mirena IUD 2016     Priority: Medium     Lot: [id number]  Exp:        Single liveborn, born in hospital, delivered 2016     Priority: Medium     Placental abruption in third trimester 2016     Priority: Medium     Placental abruption 2016     Priority: Medium      delivery delivered 2016     Priority: Medium     Prenatal care, subsequent pregnancy 2016     Priority: Medium     Prenatal care, subsequent pregnancy in third trimester 2016     Priority: Medium     Health Care Home 2015     Priority: Medium     Care Coordinator: Kelly Simon LSW, MSW  See letters for Health Care home care  plan    SW covering in [place] SW absence    Kelly Simon, LSW, MSW   [phone]  3/24/2015 12:24 PM       Generalized anxiety disorder 09/13/2013     Priority: Medium     Diagnosis updated by automated process. Provider to review and confirm.       GERD (gastroesophageal reflux disease) 09/13/2013     Priority: Medium     Major depressive disorder, single episode, moderate (H) 01/23/2013     Priority: Medium     CARDIOVASCULAR SCREENING; LDL GOAL LESS THAN 160 10/23/2012     Priority: Medium     CTS (carpal tunnel syndrome) 10/14/2011     Priority: Medium     Tobacco use disorder 02/19/2008     Priority: Medium     Has decreased from 1 ppd to 1 cigarette per day since pregnancy.       Mild intermittent asthma 02/19/2008     Priority: Medium       Assessment:    Holly Tolliver comes in today to review medication options to manage her symptoms of ADHD and anxiety.  Multiple medication trials have been unsuccessful in controlling her symptoms.  She keeps insisting that Adderall is the medication that will help her improve her abilities to function at work as well as at home.  She was unable to stay to agree to any other options.  I did tell her I will contact the psychiatry team tomorrow to explore other options and she can return to discuss those at a later date.  In the meantime no medications have been prescribed.    Medication side effects and alternatives were reviewed. Health promotion activities recommended and reviewed today. All questions addressed. Education and counseling completed regarding risks and benefits of medications and psychotherapy options.    Treatment Plan:      Paroxetine 40 mg p.o. daily     Trazodone 50 mg p.o. daily         continue all other medications as reviewed per electronic medical record today.     Safety plan reviewed. To the Emergency Department as needed or call after hours crisis line at [phone] or [phone]. [place] Crisis Text Line. Text [place] to 276711 or  Suicide LifeLine Chat: [email or web address] [email or web address]/chat/    To schedule individual or family therapy, call [facility] at [phone].     Continue individual therapy as planned.    Schedule an appointment with me in 1 week or sooner as needed. Call [facility] at [phone] to schedule.    Follow up with primary care provider as planned or for acute medical concerns.    Call the psychiatric nurse line with medication questions or concerns at [phone].    HowGoodt may be used to communicate with your provider, but this is not intended to be used for emergencies.    Crisis Resources:    National Suicide Prevention Lifeline: 787.168.3636 (TTY: 886.189.7942). Call anytime for help.  (www.suicidepreventionlifeline.org)  National [place] on Mental Illness (www.billie.org): 773.246.8841 or 100-489-6423.   Mental Health Association (www.mentalhealth.org): 373.827.7861 or 095-892-0890.  [place] Crisis Text Line: Text [place] to 333302  Suicide LifeLine Chat: [email or web address]/chat    Administrative Billing:   Time spent with patient was 30 minutes and greater than 50% of time or 20 minutes was spent in counseling and coordination of care regarding above diagnoses and treatment plan.    Patient Status:  Patient will continue to be seen for ongoing consultation and stabilization.    Signed:   YVAN Rankin-BC   Psychiatry

## 2019-11-05 ASSESSMENT — ANXIETY QUESTIONNAIRES: GAD7 TOTAL SCORE: 19

## 2019-11-05 NOTE — PATIENT INSTRUCTIONS
Continue all other medications as reviewed per electronic medical record today.     Safety plan reviewed. To the Emergency Department as needed or call after hours crisis line at [phone] or [phone]. [place] Crisis Text Line. Text [place] to 775315 or Suicide LifeLine Chat: [employer].[email or web address]/chat/    To schedule individual or family therapy, call [facility] at [phone].     Continue individual therapy as planned.    Schedule an appointment with me in 1 week or sooner as needed. Call [facility] at [phone] to schedule.    Follow up with primary care provider as planned or for acute medical concerns.    Call the psychiatric nurse line with medication questions or concerns at [phone].    [employer]bug may be used to communicate with your provider, but this is not intended to be used for emergencies.    Crisis Resources:    National Suicide Prevention Lifeline: 940.101.8673 (TTY: 343.670.6749). Call anytime for help.  (www.suicidepreventionlifeline.org)  National [place] on Mental Illness (www.billie.org): 268.743.1865 or 994-363-3170.   Mental Health Association (www.mentalhealth.org): 910.182.4822 or 434-646-4756.  [place] Crisis Text Line: Text [place] to 777959  Suicide LifeLine Chat: [email or web address] Games [email or web address]/chat

## 2019-11-11 ENCOUNTER — OFFICE VISIT (OUTPATIENT)
Dept: PSYCHIATRY | Facility: CLINIC | Age: 34
End: 2019-11-11
Payer: COMMERCIAL

## 2019-11-11 VITALS
HEIGHT: 72 IN | BODY MASS INDEX: 33.18 KG/M2 | DIASTOLIC BLOOD PRESSURE: 88 MMHG | WEIGHT: 245 LBS | RESPIRATION RATE: 16 BRPM | SYSTOLIC BLOOD PRESSURE: 132 MMHG

## 2019-11-11 DIAGNOSIS — F33.1 MAJOR DEPRESSIVE DISORDER, RECURRENT EPISODE, MODERATE (H): ICD-10-CM

## 2019-11-11 DIAGNOSIS — F90.2 ATTENTION DEFICIT HYPERACTIVITY DISORDER (ADHD), COMBINED TYPE: ICD-10-CM

## 2019-11-11 DIAGNOSIS — F41.1 GENERALIZED ANXIETY DISORDER: Primary | ICD-10-CM

## 2019-11-11 DIAGNOSIS — F15.21 AMPHETAMINE SUBSTANCE USE DISORDER, MODERATE, IN SUSTAINED REMISSION (H): ICD-10-CM

## 2019-11-11 PROCEDURE — 99214 OFFICE O/P EST MOD 30 MIN: CPT | Performed by: NURSE PRACTITIONER

## 2019-11-11 ASSESSMENT — MIFFLIN-ST. JEOR: SCORE: 1924.34

## 2019-11-11 NOTE — PROGRESS NOTES
"    Outpatient Psychiatric Progress Note    Name: Holly Tolliver   : 1985                    Primary Care Provider: WOLFGANG Freire CNP   Therapist: [facility] treatment program     PHQ-9 scores:  PHQ-9 SCORE 2019 10/3/2019 2019   PHQ-9 Total Score - - -   PHQ-9 Total Score 11 10 8       RUBENS-7 scores:  RUBENS-7 SCORE 2019 10/3/2019 2019   Total Score - - -   Total Score 19 18 19       Patient Identification:    Patient is a 34 year old year old, single  White American female  who presents for return visit with me.  Patient is currently employed full time. Patient attended the session alone. Patient prefers to be called: \"Ana\".    Interim History:    I last saw Holly Tolliver for outpatient psychiatry Return Visit on 2019.     During that appointment, we sedated about her desire to be prescribed Adderall to help manage her focus and concentration symptoms.  At the time she was exhibiting high anxiety and all other previous attempts to medicate her to stabilize her mood had been unsuccessful.  She remains fixed on wanting to be on a stimulant medication.  Her son was with her at this visit which proved to be a distraction and we ended our session with the understanding I would bring this situation to the psychiatry team for direction..     Current medications include: albuterol (PROAIR HFA/PROVENTIL HFA/VENTOLIN HFA) 108 (90 Base) MCG/ACT inhaler, Inhale 2 puffs into the lungs every 4 hours as needed for shortness of breath / dyspnea  PARoxetine (PAXIL) 40 MG tablet, Take 1 tablet (40 mg) by mouth every morning  traZODone (DESYREL) 50 MG tablet, Take 1 tablet (50 mg) by mouth At Bedtime    No current facility-administered medications on file prior to visit.        The [place] Prescription Monitoring Program has been reviewed and there are no concerns about diversionary activity for controlled substances at this time.      I was able to review most recent Primary Care " "Provider, specialty provider, and therapy visit notes that I have access to.     Today, patient reports that she has had an appetite increase since taking the Paxil.  She talked to her sponsor and tells me today that her sponsor told her that being on Adderall while being enrolled in a [facility] program would be fine as long as h er use was closely monitored.  I informed her of my decision to have her care referred to a long term psychiatrist due to multiple medication trials without success in keeping her organized and on task at home and at work.  However, she agreed that she has been making positive strides in managing her emotions and shared with me how productive she has been at her job.  She continues to have sleep issues and feels groggy in the morning after taking the Trazodone.       has a past medical history of ALCOH DEP NEC/NOS-UNSPEC (12/31/2007), Asthma, Chickenpox, Postpartum depression (2011), Pregnancy induced hypertension, and Urinary tract infections. She also has no past medical history of Abnormal Pap smear, Anemia, Asymptomatic human immunodeficiency virus (HIV) infection status (H), Blood dyscrasia, Breast disorder, Chronic kidney disease, Complication of anesthesia, Diabetes mellitus (H), Fertility problem, Heart disease, Herpes simplex without mention of complication, Liver disease, Rh incompatibility, Seizures (H), Sickle cell anemia (H), Systemic lupus erythematosus (H), Thyroid disease, or Varicosities.    Social history updates:     Spending time with sober friends.    Substance use updates:    No alcohol use  Tobacco use: No    Vital Signs:   /88 (BP Location: Right arm, Patient Position: Sitting, Cuff Size: Adult Regular)   Resp 16   Ht 1.822 m (5' 11.75\")   Wt 111.1 kg (245 lb)   BMI 33.46 kg/m      Labs:    Most recent laboratory results reviewed and no new labs.     Review of Systems:  10 systems (general, cardiovascular, respiratory, eyes, ENT, endocrine, GI, , M/S, " neurological) were reviewed. Most pertinent finding(s) is/are: No chest pain, no headache, no rashes. The remaining systems are all unremarkable.    Mental Status Examination:  Appearance:  casually dressed  Attitude:  evasive   Eye Contact:  fair  Gait and Station: Normal  Psychomotor Behavior:  intact station, gait and muscle tone  Oriented to:  time, person, and place  Attention Span and Concentration:  Fair  Speech:   pressured speech and Speaks: English  Mood:  anxious and Mild irritability  Affect:  intensity is heightened  Associations:  no loose associations  Thought Process:  tangental and circumstantial  Thought Content:  no evidence of suicidal ideation or homicidal ideation, no auditory hallucinations present and no visual hallucinations present  Recent and Remote Memory:  fair Not formally assessed. No amnesia.  Fund of Knowledge: low-normal  Insight:  limited  Judgment:  limited  Impulse Control:  limited    Suicide Risk Assessment:  Today Holly Tolliver reports that she is not having any thoughts to harm herself or other people. In addition, there are notable risk factors for self-harm, including single status, anxiety and mood change. However, risk is mitigated by commitment to family, sobriety, history of seeking help when needed, future oriented, no access to firearms or weapons, denies suicidal intent or plan and denies homicidal ideation, intent, or plan. Therefore, based on all available evidence including the factors cited above, Holly Tolliver does not appear to be at imminent risk for self-harm, does not meet criteria for a 72-hr hold, and therefore remains appropriate for ongoing outpatient level of care.  A thorough assessment of risk factors related to suicide and self-harm have been reviewed and are noted above. The patient convincingly denies suicidality on several occasions. Local community safety resources printed and reviewed for patient to use if needed. There was no deceit  detected, and the patient presented in a manner that was believable.     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, Amphetamine type substance    Medical comorbidities include:   Patient Active Problem List    Diagnosis Date Noted     Obesity (BMI 35.0-39.9) with comorbidity (H) 2019     Priority: Medium     Routine postpartum follow-up 2016     Priority: Medium     Mirena IUD 2016     Priority: Medium     Lot: [id number]  Exp:        Single liveborn, born in hospital, delivered 2016     Priority: Medium     Placental abruption in third trimester 2016     Priority: Medium     Placental abruption 2016     Priority: Medium      delivery delivered 2016     Priority: Medium     Prenatal care, subsequent pregnancy 2016     Priority: Medium     Prenatal care, subsequent pregnancy in third trimester 2016     Priority: Medium     Health Care Home 2015     Priority: Medium     Care Coordinator: Kelly DANIEL, MSW  See letters for Health Care home care plan    SW covering in [facility] absence    FREDY Luz, MSW   [phone]  3/24/2015 12:24 PM       Generalized anxiety disorder 2013     Priority: Medium     Diagnosis updated by automated process. Provider to review and confirm.       GERD (gastroesophageal reflux disease) 2013     Priority: Medium     Major depressive disorder, single episode, moderate (H) 2013     Priority: Medium     CARDIOVASCULAR SCREENING; LDL GOAL LESS THAN 160 10/23/2012     Priority: Medium     CTS (carpal tunnel syndrome) 10/14/2011     Priority: Medium     Tobacco use disorder 2008     Priority: Medium     Has decreased from 1  ppd to 1 cigarette per day since pregnancy.       Mild intermittent asthma 02/19/2008     Priority: Medium       Assessment:    Holly Tolliver comes in today with the intent that she wants to have Adderall prescribed to her.  I am sending her to long-term psychiatric care for med management given her multiple medication trials, coupled with amphetamine use that resulted in her being court ordered to go through treatment in the recent return of her children to her home.  She is telling me that she is productive at work but remains scattered and disorganized.  I reviewed non-stimulant ADHD medications with her and she is unwilling to try any of those.  She will continue with her Paxil and trazodone as prescribed.  She takes the trazodone infrequently.  While she is concerned about weight gain with the Paxil she appears better able to cope with being a single parent and following substance use programming with frequent contact with her sponsor.  Medication side effects and alternatives were reviewed. Health promotion activities recommended and reviewed today. All questions addressed. Education and counseling completed regarding risks and benefits of medications and psychotherapy options.    Treatment Plan:      1. Referred for long term psychiatry services for ADHD  Medication management  2. Continue Paxil 40 mg daily  3. Continue Trazodone as needed  4.  Continue community service programs      Continue all other medications as reviewed per electronic medical record today.     Safety plan reviewed. To the Emergency Department as needed or call after hours crisis line at [phone] or [phone]. [place] Crisis Text [place]. Text [place] to 070053 or Suicide LifeLine Chat: suicidepreventionlifeline.org/chat/    To schedule individual or family therapy, call [facility] at [phone].    Schedule an appointment with me  as needed. Call [facility] at [phone] to  schedule.    Follow up with primary care provider as planned or for acute medical concerns.    Call the psychiatric nurse line with medication questions or concerns at [phone].    MyChart may be used to communicate with your provider, but this is not intended to be used for emergencies.    Crisis Resources:    National Suicide Prevention Lifeline: 628.956.1100 (TTY: 678.690.6282). Call anytime for help.  (www.suicidepreventionlifeline.org)  National [place] on Mental Illness (www.billie.org): 552.405.6760 or 233-122-9976.   Mental Health Association (www.mentalhealth.org): 372.951.3285 or 016-307-1821.  [place] Crisis Text [place]: Text [place] to 973806  Suicide LifeLine Chat: suicidepreSpinnakrline.org/chat    Administrative Billing:   Time spent with patient was 30 minutes and greater than 50% of time or 20 minutes was spent in counseling and coordination of care regarding above diagnoses and treatment plan.    Patient Status:  The patient is being referred to long term community psychiatry care and provider will provide bridging until patient is established with new community provider.     Signed:   YVAN Rankin-BC   Psychiatry

## 2019-11-11 NOTE — PATIENT INSTRUCTIONS
1. Referred for long term psychiatry services for ADHD  Medication management  2. Continue Paxil 40 mg daily  3. Continue Trazodone as needed  4.  Continue community service programs      Continue all other medications as reviewed per electronic medical record today.     Safety plan reviewed. To the Emergency Department as needed or call after hours crisis line at [phone] or [phone]. [place] Crisis Text [place]. Text [place] to 461439 or Suicide LifeLine Chat: One Touch EMR.org/chat/    To schedule individual or family therapy, call [facility] at [phone].    Schedule an appointment with me  as needed. Call [facility] at [phone] to schedule.    Follow up with primary care provider as planned or for acute medical concerns.    Call the psychiatric nurse line with medication questions or concerns at [phone].    Cyprotexhart may be used to communicate with your provider, but this is not intended to be used for emergencies.    Crisis Resources:    National Suicide Prevention Lifeline: 847.742.6886 (TTY: 701.886.3879). Call anytime for help.  (www.suicidepreventionlifeline.org)  National [place] on Mental Illness (www.billie.org): 165.628.8767 or 027-884-1925.   Mental Health Association (www.mentalhealth.org): 884.593.9769 or 807-867-4540.  [place] Crisis [place]: Text [place] to 127147  Suicide LifeLine Chat: One Touch EMR.org/chat

## 2019-11-29 DIAGNOSIS — F41.1 GENERALIZED ANXIETY DISORDER: ICD-10-CM

## 2019-12-02 NOTE — TELEPHONE ENCOUNTER
"Requested Prescriptions   Pending Prescriptions Disp Refills     PARoxetine (PAXIL) 40 MG tablet 30 tablet 0     Sig: Take 1 tablet (40 mg) by mouth every morning   Last Written Prescription Date:  11/11/19 ended  Last Fill Quantity: 30 tab,  # refills: 0   Last office visit: 10/25/2019 with prescribing provider:  RAFIQ Enamorado   Future Office Visit:        SSRIs Protocol Passed - 11/29/2019  6:18 PM        Passed - Recent (12 mo) or future (30 days) visit within the authorizing provider's specialty     Patient has had an office visit with the authorizing provider or a provider within the authorizing providers department within the previous 12 mos or has a future within next 30 days. See \"Patient Info\" tab in inbasket, or \"Choose Columns\" in Meds & Orders section of the refill encounter.              Passed - Medication is active on med list        Passed - Patient is age 18 or older        Passed - No active pregnancy on record        Passed - No positive pregnancy test in last 12 months          "

## 2019-12-03 RX ORDER — PAROXETINE 40 MG/1
40 TABLET, FILM COATED ORAL EVERY MORNING
Qty: 30 TABLET | Refills: 0 | Status: SHIPPED | OUTPATIENT
Start: 2019-12-03 | End: 2020-01-10

## 2020-01-09 DIAGNOSIS — F41.1 GENERALIZED ANXIETY DISORDER: ICD-10-CM

## 2020-01-10 RX ORDER — PAROXETINE 40 MG/1
40 TABLET, FILM COATED ORAL EVERY MORNING
Qty: 30 TABLET | Refills: 0 | Status: SHIPPED | OUTPATIENT
Start: 2020-01-10 | End: 2020-02-19

## 2020-02-18 ENCOUNTER — TELEPHONE (OUTPATIENT)
Dept: FAMILY MEDICINE | Facility: CLINIC | Age: 35
End: 2020-02-18

## 2020-02-19 DIAGNOSIS — F41.1 GENERALIZED ANXIETY DISORDER: ICD-10-CM

## 2020-02-19 RX ORDER — PAROXETINE 40 MG/1
40 TABLET, FILM COATED ORAL EVERY MORNING
Qty: 30 TABLET | Refills: 0 | Status: SHIPPED | OUTPATIENT
Start: 2020-02-19 | End: 2020-04-03

## 2020-02-19 NOTE — TELEPHONE ENCOUNTER
Reason for Call: Paxil 40mg- patient has 2 doses left    Do you use a [facility]?  Name of the pharmacy and phone number for the current request:    [facility] [place] [id number] [place] [address] [phone] (Phone)  [phone] (Fax)         Name of the medication requested: Paxil 40mg    Can we leave a detailed message on this number? yes    Phone number patient can be reached at: [phone]    Best Time: any    Call taken on 2/19/2020 at 10:13 AM by Brooklynn Blanco

## 2020-02-19 NOTE — TELEPHONE ENCOUNTER
Paroxetine was prescribed by Ariana Reynoso.  Left message for patient to call [phone] to speak to her nurse regarding this refill.    Rose VALLES Rn

## 2020-02-19 NOTE — TELEPHONE ENCOUNTER
Patient was referred to long-term psychiatry on 11/11/2019, her last office visit with ERICA Rankin.    I attempted to call patient to discuss her follow-up plans. No answer and no voicemail available.    Will route to scheduling team to check on status of referral from 11/11/20 to long-term psych.     Short bridge provided to avoid discontinuation symptoms with message to call clinic.        Summary of Assessment and Treatment plan by ERICA Rankin  On 11/11/2019.  Assessment:     Holly Tolliver comes in today with the intent that she wants to have Adderall prescribed to her.  I am sending her to long-term psychiatric care for med management given her multiple medication trials, coupled with amphetamine use that resulted in her being court ordered to go through treatment in the recent return of her children to her home.  She is telling me that she is productive at work but remains scattered and disorganized.  I reviewed non-stimulant ADHD medications with her and she is unwilling to try any of those.  She will continue with her Paxil and trazodone as prescribed.  She takes the trazodone infrequently.  While she is concerned about weight gain with the Paxil she appears better able to cope with being a single parent and following substance use programming with frequent contact with her sponsor.  Medication side effects and alternatives were reviewed. Health promotion activities recommended and reviewed today. All questions addressed. Education and counseling completed regarding risks and benefits of medications and psychotherapy options.     Treatment Plan:   1. Referred for long term psychiatry services for ADHD  Medication management  2. Continue Paxil 40 mg daily  3. Continue Trazodone as needed  4.  Continue community service programs       Continue all other medications as reviewed per electronic medical record today.     Safety plan reviewed. To the Emergency Department as  needed or call after hours crisis line at [phone] or [phone]. [place] Crisis Text [place]. Text [place] to 173726 or Suicide LifeLine Chat: suicideFundly.org/chat/    To schedule individual or family therapy, call [facility] at [phone].    Schedule an appointment with me  as needed. Call [facility] at [phone] to schedule.    Follow up with primary care provider as planned or for acute medical concerns.    Call the psychiatric nurse line with medication questions or concerns at [phone].    Carwowhart may be used to communicate with your provider, but this is not intended to be used for emergencies.     Crisis Resources:    National Suicide Prevention Lifeline: 744.915.5877 (TTY: 989.893.1697). Call anytime for help.  (www.suicidepreventionlifeline.org)  National [place] on Mental Illness (www.billie.org): 482.686.4763 or 212-980-3039.   Mental Health Association (www.mentalhealth.org): 736.264.5396 or 715-964-4278.  [place] Crisis Text [place]: Text [place] to 137556  Suicide LifeLine Chat: suicideFundly.org/chat     Administrative Billing:   Time spent with patient was 30 minutes and greater than 50% of time or 20 minutes was spent in counseling and coordination of care regarding above diagnoses and treatment plan.     Patient Status:  The patient is being referred to long term community psychiatry care and provider will provide bridging until patient is established with new community provider.      Signed:   YVAN Rankin-BC   Psychiatry

## 2020-03-16 ENCOUNTER — TELEPHONE (OUTPATIENT)
Dept: FAMILY MEDICINE | Facility: CLINIC | Age: 35
End: 2020-03-16

## 2020-03-16 ASSESSMENT — ANXIETY QUESTIONNAIRES
2. NOT BEING ABLE TO STOP OR CONTROL WORRYING: SEVERAL DAYS
3. WORRYING TOO MUCH ABOUT DIFFERENT THINGS: SEVERAL DAYS
6. BECOMING EASILY ANNOYED OR IRRITABLE: MORE THAN HALF THE DAYS
5. BEING SO RESTLESS THAT IT IS HARD TO SIT STILL: SEVERAL DAYS
7. FEELING AFRAID AS IF SOMETHING AWFUL MIGHT HAPPEN: NOT AT ALL
GAD7 TOTAL SCORE: 6
1. FEELING NERVOUS, ANXIOUS, OR ON EDGE: NOT AT ALL
IF YOU CHECKED OFF ANY PROBLEMS ON THIS QUESTIONNAIRE, HOW DIFFICULT HAVE THESE PROBLEMS MADE IT FOR YOU TO DO YOUR WORK, TAKE CARE OF THINGS AT HOME, OR GET ALONG WITH OTHER PEOPLE: SOMEWHAT DIFFICULT

## 2020-03-16 ASSESSMENT — PATIENT HEALTH QUESTIONNAIRE - PHQ9
5. POOR APPETITE OR OVEREATING: SEVERAL DAYS
SUM OF ALL RESPONSES TO PHQ QUESTIONS 1-9: 5

## 2020-03-16 NOTE — TELEPHONE ENCOUNTER
Patient Quality Outreach      Summary:    Patient is due/failing the following:   PHQ-9 Needed    Type of outreach:    Phone, spoke to patient/parent. phq/ximena completed.  She continues to take her medication, paroxetine, with no concerns.  No other concerns currently.  She has not established with long term psychiatry per JJ Birch's recommendations.  She is planning on making an appointment in the future, she is starting a new job and no school for kids right now.        PHQ 10/3/2019 11/4/2019 3/16/2020   PHQ-9 Total Score 10 8 5   Q9: Thoughts of better off dead/self-harm past 2 weeks Not at all Not at all Not at all     XIMENA-7 SCORE 10/3/2019 11/4/2019 3/16/2020   Total Score - - -   Total Score 18 19 6       PHQ-9 ([employer]) 3/16/2020   No Interest In Doing Things    Feeling Depressed    Trouble Sleeping    Tired / No Energy    No appetite or Over-Eating    Feeling Bad about Self    Trouble Concentrating    Moving Slow or Restless    Suicidal Thoughts    Total Score    1.  Little interest or pleasure in doing things 1   2.  Feeling down, depressed, or hopeless 1   3.  Trouble falling or staying asleep, or sleeping too much 1   4.  Feeling tired or having little energy 1   5.  Poor appetite or overeating 0   6.  Feeling bad about yourself 0   7.  Trouble concentrating 1   8.  Moving slowly or restless 0   9.  Suicidal or self-harm thoughts 0   PHQ-9 Total Score 5   Difficulty at work, home, or with people Somewhat difficult   XIMENA-7   [employer], 2002; Used with Permission) 3/16/2020   Over the last 2 weeks, how often have you been bothered by feeling nervous, anxious or on edge?    Over the last 2 weeks, how often have you been bothered by not being able to stop or control worrying?    Over the last 2 weeks, how often have you been bothered by worrying too much about different things?    Over the last 2 weeks, how often have you been bothered by trouble relaxing?    Over the last 2 weeks, how often have you  been bothered by being so restless that it is hard to sit still?    Over the last 2 weeks, how often have you been bothered by becoming easily annoyed or irritable?    Over the last 2 weeks, how often have you been bothered by feeling afraid as if something awful might happen?    RUBENS-7 Total Score =     1. Feeling nervous, anxious, or on edge 0   2. Not being able to stop or control worrying 1   3. Worrying too much about different things 1   4. Trouble relaxing 1   5. Being so restless that it is hard to sit still 1   6. Becoming easily annoyed or irritable 2   7. Feeling afraid, as if something awful might happen 0   RUBENS-7 Total Score 6   If you checked any problems, how difficult have they made it for you to do your work, take care of things at home, or get along with other people? Somewhat difficult       Questions for provider review:    Any further recommendations?                                                                                        Patient has the following on her problem list/HM:     Depression / Dysthymia review    6 Month Remission: 4-8 month window range: 11/18/20-3/17/20  12 Month Remission: 10-14 month window range:        PHQ-9 SCORE 10/3/2019 11/4/2019 3/16/2020   PHQ-9 Total Score - - -   PHQ-9 Total Score 10 8 5       If PHQ-9 recheck is 5 or more, route to provider for next steps.                                                NORMA Pete MA         Chart routed to Provider.

## 2020-03-16 NOTE — LETTER
March 24, 2020      Holly Tolliver  [address] [place] [address]  [place] [address]        Dear Holly,     We have attempted and been unable to reach you by phone.  Your provider reviewed the depression questionnaire we reviewed over the phone.  Here are her recommendations:    Patient is encouraged to make a telephone visit if she needs one to discuss any concerns.    Please call the clinic at [phone] to schedule a telephone visit if you would like to.    Thank you for trusting us with your health care.    Sincerely,        Your [facility] Team/felicia

## 2020-03-17 ASSESSMENT — ANXIETY QUESTIONNAIRES: GAD7 TOTAL SCORE: 6

## 2020-03-28 DIAGNOSIS — F41.1 GENERALIZED ANXIETY DISORDER: ICD-10-CM

## 2020-03-28 RX ORDER — PAROXETINE 40 MG/1
40 TABLET, FILM COATED ORAL EVERY MORNING
Qty: 30 TABLET | Refills: 0 | Status: CANCELLED | OUTPATIENT
Start: 2020-03-28

## 2020-03-30 NOTE — TELEPHONE ENCOUNTER
"Requested Prescriptions   Pending Prescriptions Disp Refills     PARoxetine (PAXIL) 40 MG tablet 30 tablet 0     Sig: Take 1 tablet (40 mg) by mouth every morning       SSRIs Protocol Passed - 3/28/2020 10:06 AM  RUBENS-7 SCORE 10/3/2019 11/4/2019 3/16/2020   Total Score - - -   Total Score 18 19 6     PHQ 10/3/2019 11/4/2019 3/16/2020   PHQ-9 Total Score 10 8 5   Q9: Thoughts of better off dead/self-harm past 2 weeks Not at all Not at all Not at all           Passed - Recent (12 mo) or future (30 days) visit within the authorizing provider's specialty     Patient has had an office visit with the authorizing provider or a provider within the authorizing providers department within the previous 12 mos or has a future within next 30 days. See \"Patient Info\" tab in inbasket, or \"Choose Columns\" in Meds & Orders section of the refill encounter.              Passed - Medication is active on med list        Passed - Patient is age 18 or older        Passed - No active pregnancy on record        Passed - No positive pregnancy test in last 12 months           Last Written Prescription Date:  2/19/202  Last Fill Quantity: 30,  # refills: 0   Last office visit: 11/11/2019 with prescribing provider:  Edgardo   Future Office Visit:      "

## 2020-03-31 ENCOUNTER — NURSE TRIAGE (OUTPATIENT)
Dept: NURSING | Facility: CLINIC | Age: 35
End: 2020-03-31

## 2020-03-31 NOTE — TELEPHONE ENCOUNTER
Holly is calling to see if clinic will do a drug screen for a urine analysis.      Additional Information    Negative: Nursing judgment, per information in Reference    Negative: Information only call about a Well Adult (no illness or injury)    Negative: Nursing judgment or information in reference    Negative: Nursing judgment or information in reference    Negative: Nursing judgment or information in reference    Negative: Nursing judgment or information in reference    Negative: Nursing judgment or information in reference    Negative: Nursing judgment or information in reference    Negative: Nursing judgment or information in reference    Negative: Nursing judgment or information in reference    Negative: Nursing judgment or information in reference    Negative: Nursing judgment or information in reference    Negative: Nursing judgment or information in reference    Negative: Nursing judgment or information in reference    Negative: Nursing judgment or information in reference    Nursing judgment or information in reference    Protocols used: NO GUIDELINE [id number]

## 2020-04-01 DIAGNOSIS — F41.1 GENERALIZED ANXIETY DISORDER: ICD-10-CM

## 2020-04-03 RX ORDER — PAROXETINE 40 MG/1
40 TABLET, FILM COATED ORAL EVERY MORNING
Qty: 30 TABLET | Refills: 3 | Status: SHIPPED | OUTPATIENT
Start: 2020-04-03 | End: 2020-05-04

## 2020-04-12 ENCOUNTER — HOSPITAL ENCOUNTER (EMERGENCY)
Facility: CLINIC | Age: 35
Discharge: HOME | End: 2020-04-12
Attending: NURSE PRACTITIONER | Admitting: NURSE PRACTITIONER
Payer: COMMERCIAL

## 2020-04-12 VITALS
TEMPERATURE: 98.3 F | BODY MASS INDEX: 32.2 KG/M2 | WEIGHT: 230 LBS | DIASTOLIC BLOOD PRESSURE: 75 MMHG | OXYGEN SATURATION: 96 % | HEIGHT: 71 IN | RESPIRATION RATE: 16 BRPM | SYSTOLIC BLOOD PRESSURE: 121 MMHG | HEART RATE: 111 BPM

## 2020-04-12 DIAGNOSIS — R05.9 COUGH: ICD-10-CM

## 2020-04-12 DIAGNOSIS — J02.0 STREP THROAT: ICD-10-CM

## 2020-04-12 LAB
DEPRECATED S PYO AG THROAT QL EIA: POSITIVE
SPECIMEN SOURCE: ABNORMAL

## 2020-04-12 PROCEDURE — 99284 EMERGENCY DEPT VISIT MOD MDM: CPT | Mod: Z6 | Performed by: NURSE PRACTITIONER

## 2020-04-12 PROCEDURE — 87880 STREP A ASSAY W/OPTIC: CPT | Performed by: NURSE PRACTITIONER

## 2020-04-12 PROCEDURE — 99283 EMERGENCY DEPT VISIT LOW MDM: CPT | Performed by: NURSE PRACTITIONER

## 2020-04-12 RX ORDER — AMOXICILLIN 500 MG/1
500 CAPSULE ORAL 3 TIMES DAILY
Qty: 30 CAPSULE | Refills: 0 | Status: SHIPPED | OUTPATIENT
Start: 2020-04-12 | End: 2020-05-28

## 2020-04-12 ASSESSMENT — ENCOUNTER SYMPTOMS
SINUS PAIN: 0
TROUBLE SWALLOWING: 0
LIGHT-HEADEDNESS: 0
SINUS PRESSURE: 0
DIZZINESS: 0
COUGH: 1
STRIDOR: 0
JOINT SWELLING: 0
ABDOMINAL PAIN: 0
SORE THROAT: 1
FEVER: 0
SHORTNESS OF BREATH: 1
CHEST TIGHTNESS: 0
ARTHRALGIAS: 0
NUMBNESS: 0
WEAKNESS: 0
HEADACHES: 0
MYALGIAS: 0
VOMITING: 0
CHILLS: 0
FATIGUE: 0
WHEEZING: 0
RHINORRHEA: 0
NAUSEA: 0

## 2020-04-12 ASSESSMENT — MIFFLIN-ST. JEOR: SCORE: 1839.4

## 2020-04-12 NOTE — ED AVS SNAPSHOT
[facility] Emergency Department  [address] [address]  Phone:  [phone]  Fax:  [phone]                                    Holly Tolliver   MRN: [id number]    Department:  [facility] Emergency Department   Date of Visit:  4/12/2020           After Visit Summary Signature Page    I have received my discharge instructions, and my questions have been answered. I have discussed any challenges I see with this plan with the nurse or doctor.    ..........................................................................................................................................  Patient/Patient Representative Signature      ..........................................................................................................................................  Patient Representative Print Name and Relationship to Patient    ..................................................               ................................................  Date                                   Time    ..........................................................................................................................................  Reviewed by Signature/Title    ...................................................              ..............................................  Date                                               Time          22EPIC Rev 08/18

## 2020-04-13 NOTE — ED PROVIDER NOTES
History     Chief Complaint   Patient presents with     Pharyngitis     Cough         Holly Tolliver is a 35 year old female who presents to the emergency department for evaluation of sore throat for two weeks. Patient has accompanying cough and shortness of breath. Denies headache, fever, congestion, difficulty swallowing, chest pain, and abdominal pain. Has been utilizing Tylenol and Benadryl with relief of symptoms. No known sick contacts including COVID 19.    Allergies:  No Known Allergies    Problem List:    Patient Active Problem List    Diagnosis Date Noted     Obesity (BMI 35.0-39.9) with comorbidity (H) 2019     Priority: Medium     Routine postpartum follow-up 2016     Priority: Medium     Mirena IUD 2016     Priority: Medium     Lot: [id number]  Exp:        Single liveborn, born in hospital, delivered 2016     Priority: Medium     Placental abruption in third trimester 2016     Priority: Medium     Placental abruption 2016     Priority: Medium      delivery delivered 2016     Priority: Medium     Prenatal care, subsequent pregnancy 2016     Priority: Medium     Prenatal care, subsequent pregnancy in third trimester 2016     Priority: Medium     Health Care Home 2015     Priority: Medium     Care Coordinator: Kelly DANIEL, MSW  See letters for Health Care home care plan    SW covering in [facility] absence    FREDY Luz, MSW   [phone]  3/24/2015 12:24 PM       Generalized anxiety disorder 2013     Priority: Medium     Diagnosis updated by automated process. Provider to review and confirm.       GERD (gastroesophageal reflux disease) 2013     Priority: Medium     Major depressive disorder, single episode, moderate (H) 2013     Priority: Medium     CARDIOVASCULAR SCREENING; LDL GOAL LESS THAN 160 10/23/2012     Priority: Medium     CTS (carpal tunnel syndrome) 10/14/2011     Priority:  Medium     Tobacco use disorder 2008     Priority: Medium     Has decreased from 1 ppd to 1 cigarette per day since pregnancy.       Mild intermittent asthma 2008     Priority: Medium      Past Medical History:    Past Medical History:   Diagnosis Date     ALCOH DEP NEC/NOS-UNSPEC 2007     Asthma      Chickenpox      Postpartum depression      Pregnancy induced hypertension      Urinary tract infections      Past Surgical History:    Past Surgical History:   Procedure Laterality Date      SECTION Bilateral 2016    Procedure:  SECTION;  Surgeon: Beau Cardoso MD;  Location: [facility] OR     PE TUBES  age 3     Family History:    Family History   Problem Relation Age of Onset     Alcohol/Drug Mother         alcohol- recovered     Hypertension Mother      Depression Mother         also anxiety     Other - See Comments Mother         ankylosing spondylosis     Alcohol/Drug Father         alcohol- recovered     Alcohol/Drug Sister         A&D     Cancer Maternal Grandmother         lung     Depression Maternal Grandmother         also anxiety     Mental Illness Paternal Grandmother      Alcohol/Drug Brother         alcohol     Social History:  Marital Status:  Single [1]  Social History     Tobacco Use     Smoking status: Former Smoker     Packs/day: 0.00     Years: 12.00     Pack years: 0.00     Types: Cigarettes     Smokeless tobacco: Never Used     Tobacco comment: Never   Substance Use Topics     Alcohol use: No     Alcohol/week: 0.0 standard drinks     Comment: sober since 2015. 16 months     Drug use: No     Types: Methamphetamines     Comment: last use 2015. None      Medications:    amoxicillin (AMOXIL) 500 MG capsule  albuterol (PROAIR HFA/PROVENTIL HFA/VENTOLIN HFA) 108 (90 Base) MCG/ACT inhaler  PARoxetine (PAXIL) 40 MG tablet  traZODone (DESYREL) 50 MG tablet      Review of Systems   Constitutional: Negative for chills, fatigue and fever.   HENT: Positive  "for sore throat. Negative for congestion, ear discharge, ear pain, rhinorrhea, sinus pressure, sinus pain and trouble swallowing.    Respiratory: Positive for cough and shortness of breath. Negative for chest tightness, wheezing and stridor.    Cardiovascular: Negative for chest pain.   Gastrointestinal: Negative for abdominal pain, nausea and vomiting.   Musculoskeletal: Negative for arthralgias, joint swelling and myalgias.   Skin: Negative for rash.   Neurological: Negative for dizziness, weakness, light-headedness, numbness and headaches.     Physical Exam   BP: 121/75  Pulse: 111  Temp: 98.3  F (36.8  C)  Resp: 16  Height: 180.3 cm (5' 11\")  Weight: 104.3 kg (230 lb)  SpO2: 96 %    Holly Tolliver is a 35 year old female who is being evaluated via a billable telephone visit.      The patient has been notified of following:     \"This telephone visit will be conducted via a call between you and your physician/provider. We have found that certain health care needs can be provided without the need for a physical exam.  This service lets us provide the care you need with a short phone conversation.  If a prescription is necessary we can send it directly to your pharmacy.  If lab work is needed we can place an order for that and you can then stop by our lab to have the test done at a later time.    Telephone visits are billed at different rates depending on your insurance coverage. During this emergency period, for some insurers they may be billed the same as an in-person visit.  Please reach out to your insurance provider with any questions.    If during the course of the call the physician/provider feels a telephone visit is not appropriate, you will not be charged for this service.\"    Patient has given verbal consent for Telephone visit?  Yes    Review of Systems   Constitutional: Negative for chills, fatigue and fever.   HENT: Positive for sore throat. Negative for congestion, ear discharge, ear pain, " "rhinorrhea, sinus pressure, sinus pain and trouble swallowing.    Respiratory: Positive for cough and shortness of breath. Negative for chest tightness, wheezing and stridor.    Cardiovascular: Negative for chest pain.   Gastrointestinal: Negative for abdominal pain, nausea and vomiting.   Musculoskeletal: Negative for arthralgias, joint swelling and myalgias.   Skin: Negative for rash.   Neurological: Negative for dizziness, weakness, light-headedness, numbness and headaches.        Objective   Reported vitals:  /75   Pulse 111   Temp 98.3  F (36.8  C) (Oral)   Resp 16   Ht 1.803 m (5' 11\")   Wt 104.3 kg (230 lb)   SpO2 96%   BMI 32.08 kg/m     healthy, alert and no distress  PSYCH: Alert and oriented times 3; coherent speech, normal   rate and volume, able to articulate logical thoughts, able   to abstract reason, no tangential thoughts, no hallucinations   or delusions  Her affect is normal and pleasant  RESP: No cough, no audible wheezing, able to talk in full sentences  Remainder of exam unable to be completed due to telephone visits    Diagnostic Test Results:  Labs reviewed in Epic  Strep screen - Positive        No follow-ups on file.    Phone call duration:  10 minutes    WOLFGANG Carrion CNP    ED Course        Procedures    Results for orders placed or performed during the hospital encounter of 04/12/20 (from the past 24 hour(s))   Streptococcus A Rapid Scr w Reflx to PCR    Specimen: Throat   Result Value Ref Range    Strep Specimen Description Throat     Streptococcus Group A Rapid Screen Positive (A) NEG^Negative     Medications - No data to display    Assessments & Plan (with Medical Decision Making)   Patient is a 35 year old female who presents to the emergency department for evaluation of sore throat, cough and shortness of breath. Patient is physically in the emergency department but visit completed via iPad, no physical exam completed with the patient. She appears to be in no " acute distress, speaking in full sentences with no shortness of breath. She is laughing during the visit, no cough noted. Rapid strep is positive, will treat with amoxicillin. Cannot entirely rule out COVID 19 at this time due to testing guidelines. Provided information regarding home isolation. May use over the counter medications as needed and appropriate. Increase rest and hydration. Return precautions reviewed, all questions answered. Patient agreeable to plan of care and discharged in stable condition.    I have reviewed the nursing notes.    I have reviewed the findings, diagnosis, plan and need for follow up with the patient.    Discharge Medication List as of 4/12/2020  8:36 PM      START taking these medications    Details   amoxicillin (AMOXIL) 500 MG capsule Take 1 capsule (500 mg) by mouth 3 times daily for 10 days, Disp-30 capsule,R-0, E-Prescribe           Final diagnoses:   Strep throat   Cough       4/12/2020   [facility] EMERGENCY DEPARTMENT     Shruthi Donovan, WOLFGANG CNP  04/12/20 [id number]

## 2020-04-13 NOTE — DISCHARGE INSTRUCTIONS
Please use the information at the end of this document to sign up for [facility] where you can get your results and a message about those results sent to you through the [facility] application. If you do not have mychart we will call you with your results but it may take longer.    Regardless of if you have been tested or not:  Patient who have symptoms (cough, fever, or shortness of breath), need to isolate for 7 days from when symptoms started AND 72 hours after fever resolves (without fever reducing medications) AND improvement of respiratory symptoms (whichever is longer).    Isolate yourself at home (in own room/own bathroom if possible)  Do Not allow any visitors  Do Not go to work or school  Do Not go to [employer],  centers, shopping, or other public places.  Do Not shake hands.  Avoid close and intimate contact with others (hugging, kissing).  Follow CDC recommendations for household cleaning of frequently touched services.     After the initial 7 days, continue to isolate yourself from household members as much as possible. To continue decrease the risk of community spread and exposure, you and any members of your household should limit activities in public for 14 days after starting home isolation.     You can reference the following CDC link for helpful home isolation/care tips:  https://www.cdc.gov/coronavirus/2019-ncov/downloads/10Things.pdf    Protect Others:  Cover Your Mouth and Nose with a mask, disposable tissue or wash cloth to avoid spreading germs to others.  Wash your hands and face frequently with soap and water    Call Back If: Breathing difficulty develops or you become worse.    For more information about COVID19 and options for caring for yourself at home, please visit the CDC website at https://www.cdc.gov/coronavirus/2019-ncov/about/steps-when-sick.html  For more options for care at [facility], please visit our website at  [email or web address]

## 2020-04-23 ENCOUNTER — NURSE TRIAGE (OUTPATIENT)
Dept: NURSING | Facility: CLINIC | Age: 35
End: 2020-04-23

## 2020-04-23 DIAGNOSIS — J02.0 STREPTOCOCCAL SORE THROAT: Primary | ICD-10-CM

## 2020-04-23 RX ORDER — CEFDINIR 300 MG/1
300 CAPSULE ORAL 2 TIMES DAILY
Qty: 20 CAPSULE | Refills: 0 | Status: SHIPPED | OUTPATIENT
Start: 2020-04-23 | End: 2020-05-28

## 2020-04-24 ENCOUNTER — NURSE TRIAGE (OUTPATIENT)
Dept: NURSING | Facility: CLINIC | Age: 35
End: 2020-04-24

## 2020-04-24 NOTE — TELEPHONE ENCOUNTER
"Pt reports \"I had strep throat a week ago and symptoms have not gone away\". Oral temp this morning 100.3 now \"hard time swallowing\". Took amoxicillin for ten days, finished yesterday. \"White long streak on back of throat\". Able to eat and drink and no difficulty breathing. Pt rates pain \"8\" currently. Pt reports she tried to go to [facility] in [place] but was turned away d/t fever this morning.    Writer contacted SAVANAH Hughes at [facility] and reviewed pt information and LOV notes with Celestina. Per Celestina ok to send in Rx for Cefdinir 300mg capsule BID x 10 days #20 no refills. Pt strongly advised by Writer to f/u with PCP in three days and if fever or worsening symptoms occur go directly to the ER. Rx called to [facility] pharmacist Estelle.     Pt verbalizes understanding and agrees to plan. Pt will call back with preferred pharmacy information.         Reason for Disposition    [1] Taking antibiotic > 72 hours (3 days) for strep throat AND [2] sore throat not improved    Additional Information    [1] Diagnosed strep throat AND [2] taking antibiotic AND [3] symptoms continue    Protocols used: STREP THROAT INFECTION ON ANTIBIOTIC FOLLOW-UP CALL-A-, STREP THROAT TEST FOLLOW-UP CALL-A-      "

## 2020-04-24 NOTE — TELEPHONE ENCOUNTER
Pt called earlier.  She says her antibiotic is not at pharmacy and it closes at 2100.  See the other nurse triage call from today for that information.    Pharmacy: [facility] in [place].    Contacted Rupali Encarnacion RN who was adrdressing the original call. She will now sent the prescription to [facility].  Pt was informed.    amairani duncan RN on 4/23/2020 at 8:15 PM    Reason for Disposition    Health Information question, no triage required and triager able to answer question    Additional Information    Negative: RN needs further essential information from caller in order to complete triage    Negative: Requesting regular office appointment    Negative: [1] Caller requesting NON-URGENT health information AND [2] PCP's office is the best resource    Protocols used: INFORMATION ONLY CALL-A-

## 2020-04-25 NOTE — TELEPHONE ENCOUNTER
9:53 PM   FNA called patient back and has not been able to log on to [facility] due to connection issues. Message routed to Celestina Hughes CNP and to [facility]. Advised to call back in AM  If she has not heard back from . She verbalized understanding.    FNA explained that this team does not have the capability to send a note stating she was treated.    Jacquelin Bruce RN/[place] Nurse Advisor

## 2020-04-25 NOTE — TELEPHONE ENCOUNTER
Ana requests for a provider note stating that she was prescribed Cefdinir yesterday 4/23/2020, by Celestina Hughes CNP ([facility]). Patient did not do a phone visit. See 4/23/2020 FNA encounter.     virtual visits are closed at this time, advised her to try [facility] tonight.    COVID 19 Nurse Triage Plan/Patient Instructions    Please be aware that novel coronavirus (COVID-19) may be circulating in the community. If you develop symptoms such as fever, cough, or SOB or if you have concerns about the presence of another infection including coronavirus (COVID-19), please contact your health care provider or visit [email or web address].     Disposition/Instructions    Patient to stay at home and follow home care protocol based instructions.     Thank you for limiting contact with others, wearing a simple mask to cover your cough, practice good hand hygiene habits and accessing our virtual services where possible to limit the spread of this virus.    For more information about COVID19 and options for caring for yourself at home, please visit the CDC website at https://www.cdc.gov/coronavirus/2019-ncov/about/steps-when-sick.html  For more options for care at [facility], please visit our website at [email or web address]    For more information, please use the [place] Department of Health COVID-19 Website: https://www.health.[facility].mn.us/diseases/coronavirus/index.html  [place] Department of Health ([facility]) COVID-19 Hotlines (Interpreters available):      Health questions: Phone Number: [phone] or [phone] and Hours: 7 a.m. to 7 p.m.    Schools and  questions: Phone Number: [phone] or [phone] and Hours 7 a.m. to 7 p.m.    Jacquelin Bruce RN/[place] Nurse Advisor        Reason for Disposition    General information question, no triage required and triager able to answer question    Protocols used: INFORMATION ONLY CALL-A-

## 2020-04-27 ENCOUNTER — TELEPHONE (OUTPATIENT)
Dept: FAMILY MEDICINE | Facility: CLINIC | Age: 35
End: 2020-04-27

## 2020-04-27 ENCOUNTER — TELEPHONE (OUTPATIENT)
Dept: OBGYN | Facility: CLINIC | Age: 35
End: 2020-04-27

## 2020-04-27 NOTE — TELEPHONE ENCOUNTER
Reason for Call:  Other     Detailed comments: Pt had IUD placed over 3 years ago. Was seen in the ER 04/26 for acute cystitis - was told they believe the IUD has migrated into the muscle of the uterus and advised to be seen by OB/Gyn - Please contact pt     Phone Number Patient can be reached at: Home number on file [phone] (home)    Best Time: Any    Can we leave a detailed message on this number? YES    Call taken on 4/27/2020 at 8:06 AM by Denise Behrendt

## 2020-04-27 NOTE — TELEPHONE ENCOUNTER
"S-(situation): Letter    B-(background): 04/23/20 FNA notes:  Pt reports \"I had strep throat a week ago and symptoms have not gone away\". Oral temp this morning 100.3 now \"hard time swallowing\". Took amoxicillin for ten days, finished yesterday. \"White long streak on back of throat\". Able to eat and drink and no difficulty breathing. Pt rates pain \"8\" currently. Pt reports she tried to go to [facility] in [place] but was turned away d/t fever this morning.     Writer contacted SAVANAH Hughes at [facility] and reviewed pt information and LOV notes with Celestina. Per Celestina ok to send in Rx for Cefdinir 300mg capsule BID x 10 days #20 no refills. Pt strongly advised by Writer to f/u with PCP in three days and if fever or worsening symptoms occur go directly to the ER. Rx called to [facility] pharmacist [facility].      Pt verbalizes understanding and agrees to plan. Pt will call back with preferred pharmacy information.     A-(assessment): Patient was trying to be seen at [facility] on 04/23/20 but had fever and was denied. Was treated for strep by Celestina Hughes. Patient requesting letter that she was denied a visit but was treated for strep.     R-(recommendations): Routing to treating provider.      NIYAH GalarzaN, RN      "

## 2020-04-27 NOTE — TELEPHONE ENCOUNTER
Yes that would be fine     Lelia Grijalva MD     Message text      Pt notified of appointment on 5/4/2020 at 3 pm  Pt reports understanding.  Pt does not have further questions or concerns.    Radha Riojas   [facility]  RN

## 2020-04-27 NOTE — TELEPHONE ENCOUNTER
I would say that we should probably do an in person visit to see if strings are visible. If so the IUD could be removed and replaced in clinic. If not, she can discuss the procedure to remove and timing (this would likely be delayed due to Coronavirus)     Lelia Grijalva MD     Message text      What is the time frame this is needed in?    Clinic appointment is not available this week as of right now.    First available 5/4/2020 with Dr. Cardoso.    Would this be OK?    Radha Riojas   Ob[facility]Gyn Clinic  RN

## 2020-04-27 NOTE — TELEPHONE ENCOUNTER
"Call to pt to offer appointment for ER follow up.    Pt was seen in the ER at [facility] 4/26/2020 for RLQ pain. CT pelvis completed as well as pelvic US.    Pt offered appointment tomorrow for telephone consult. Pt reports \" the ER doctor me he thinks I need surgery because my IUD grew into my uterus.\"    Please review pt chart and advise on pt next steps and appropriate appointment for follow up.    Thank you.    Radha Riojas   Ob[facility]Gyn Clinic  RN      "

## 2020-04-27 NOTE — TELEPHONE ENCOUNTER
Reason for Call:  Other Work Note     Detailed comments: Patient is asking if MADELYN Oneill could write a work note for her stating that she tried to be seen on [facility] on 04/23 but was unable to get into clinic due to having a fever that morning.  Patient was prescribed cefdinir (OMNICEF) 300 MG capsule for Streptococcal sore throat [J02.0]  - Primary by Celestina Hughes NP from [facility].  No note about this in chart.  Patient has since called [facility] for a work note and they are refusing to do a note without a telephone visit.  Patient is very frustrated that she tried to be seen and was just given an rx and now can't get a note for this.    Phone Number Patient can be reached at: Home number on file [phone] (home)    Best Time: Any    Can we leave a detailed message on this number? YES    Call taken on 4/27/2020 at 3:27 PM by Shruthi Ortiz

## 2020-05-04 ENCOUNTER — OFFICE VISIT (OUTPATIENT)
Dept: OBGYN | Facility: CLINIC | Age: 35
End: 2020-05-04
Payer: COMMERCIAL

## 2020-05-04 VITALS
TEMPERATURE: 98.2 F | WEIGHT: 230 LBS | DIASTOLIC BLOOD PRESSURE: 80 MMHG | HEART RATE: 91 BPM | BODY MASS INDEX: 32.2 KG/M2 | RESPIRATION RATE: 18 BRPM | HEIGHT: 71 IN | SYSTOLIC BLOOD PRESSURE: 123 MMHG

## 2020-05-04 DIAGNOSIS — F41.1 GENERALIZED ANXIETY DISORDER: ICD-10-CM

## 2020-05-04 DIAGNOSIS — Z30.016 ENCOUNTER FOR INITIAL PRESCRIPTION OF TRANSDERMAL PATCH HORMONAL CONTRACEPTIVE DEVICE: ICD-10-CM

## 2020-05-04 DIAGNOSIS — Z30.432 ENCOUNTER FOR IUD REMOVAL: Primary | ICD-10-CM

## 2020-05-04 PROCEDURE — 99213 OFFICE O/P EST LOW 20 MIN: CPT | Mod: 25 | Performed by: OBSTETRICS & GYNECOLOGY

## 2020-05-04 PROCEDURE — 58301 REMOVE INTRAUTERINE DEVICE: CPT | Performed by: OBSTETRICS & GYNECOLOGY

## 2020-05-04 RX ORDER — PAROXETINE 40 MG/1
40 TABLET, FILM COATED ORAL EVERY MORNING
Qty: 30 TABLET | Refills: 0 | Status: SHIPPED | OUTPATIENT
Start: 2020-05-04 | End: 2022-01-24

## 2020-05-04 RX ORDER — NORELGESTROMIN AND ETHINYL ESTRADIOL 35; 150 UG/MG; UG/MG
PATCH TRANSDERMAL
Qty: 12 PATCH | Refills: 3 | Status: SHIPPED | OUTPATIENT
Start: 2020-05-04 | End: 2022-01-24

## 2020-05-04 RX ORDER — IBUPROFEN 400 MG/1
800 TABLET, FILM COATED ORAL ONCE
Status: COMPLETED | OUTPATIENT
Start: 2020-05-04 | End: 2020-05-04

## 2020-05-04 RX ADMIN — IBUPROFEN 800 MG: 400 TABLET, FILM COATED ORAL at 03:00

## 2020-05-04 ASSESSMENT — MIFFLIN-ST. JEOR: SCORE: 1839.4

## 2020-05-04 NOTE — LETTER
May 4, 2020      Holly Tolliver  [address]  [facility] [address]        To Whom It May Concern,        Holly Tolliver had IUD removed in clinic 5/4/2020.          Sincerely,        Beau Cardoso MD

## 2020-05-04 NOTE — PROGRESS NOTES
CC:  Follow up  from herself  for IUD problem  HPI:  Holly Tolliver is a 35 year old female is a   .  No LMP recorded. (Menstrual status: IUD).  Menses are rare, he has had a Mirena IUD for the last 3 years.  She complains of recent onset of right upper quadrant discomfort for which she underwent an evaluation.  An ultrasound of the pelvis showed what appeared to be a deviation of the IUD to the endometrial right consistent with migration of the IUD.  She has had problems over the recent past with postcoital bleeding as well as pain and cramping.  She has spotting now that she did not have before and would like the IUD removed.  She has difficulty remembering to take pills and would like a different method that would be less frequent and daily  Patients records are available and reviewed at today's visit.    Past GYN history:  No STD history       Last PAP smear:  Normal  Last TSH:   TSH   Date Value Ref Range Status   2015 0.85 0.40 - 4.00 mU/L Final    , normal?  Yes    Past Medical History:   Diagnosis Date     ALCOH DEP NEC/NOS-UNSPEC 2007     Asthma      Chickenpox      Postpartum depression      Pregnancy induced hypertension      Urinary tract infections        Past Surgical History:   Procedure Laterality Date      SECTION Bilateral 2016    Procedure:  SECTION;  Surgeon: Beau Cardoso MD;  Location: [facility]     PE TUBES  age 3       Family History   Problem Relation Age of Onset     Alcohol/Drug Mother         alcohol- recovered     Hypertension Mother      Depression Mother         also anxiety     Other - See Comments Mother         ankylosing spondylosis     Alcohol/Drug Father         alcohol- recovered     Alcohol/Drug Sister         A&D     Cancer Maternal Grandmother         lung     Depression Maternal Grandmother         also anxiety     Mental Illness Paternal Grandmother      Alcohol/Drug Brother         alcohol       Allergies: Patient has no  "known allergies.    Current Outpatient Medications   Medication Sig Dispense Refill     albuterol (PROAIR HFA/PROVENTIL HFA/VENTOLIN HFA) 108 (90 Base) MCG/ACT inhaler Inhale 2 puffs into the lungs every 4 hours as needed for shortness of breath / dyspnea 1 Inhaler 5     cefdinir (OMNICEF) 300 MG capsule Take 1 capsule (300 mg) by mouth 2 times daily 20 capsule 0     norelgestromin-ethinyl estradiol (ORTHO EVRA) 150-35 MCG/24HR patch Remove old patch and apply new patch onto the skin once a week for 3 weeks (21 days). Do not wear patch week 4 (days 22-28), then repeat. 12 patch 3     PARoxetine (PAXIL) 40 MG tablet Take 1 tablet (40 mg) by mouth every morning 30 tablet 0     traZODone (DESYREL) 50 MG tablet Take 1 tablet (50 mg) by mouth At Bedtime 31 tablet 2       ROS:  C: NEGATIVE for fever, chills, change in weight  I: NEGATIVE for worrisome rashes, moles or lesions  E: NEGATIVE for vision changes or irritation  E/M: NEGATIVE for ear, mouth and throat problems  R: NEGATIVE for significant cough or SOB  CV: NEGATIVE for chest pain, palpitations or peripheral edema  GI: NEGATIVE for nausea, abdominal pain, heartburn, or change in bowel habits  : NEGATIVE for frequency, dysuria, hematuria, vaginal discharge  M: NEGATIVE for significant arthralgias or myalgia  N: NEGATIVE for weakness, dizziness or paresthesias  E: NEGATIVE for temperature intolerance, skin/hair changes  P: NEGATIVE for changes in mood or affect    EXAM:  Blood pressure 123/80, pulse 91, temperature 98.2  F (36.8  C), temperature source Tympanic, resp. rate 18, height 1.803 m (5' 11\"), weight 104.3 kg (230 lb), not currently breastfeeding.   BMI= Body mass index is 32.08 kg/m .  General - pleasant female in no acute distress.  Pelvic - EG: normal adult female,   BUS: within normal limits,   Vagina: well rugated, no discharge,   Cervix: no lesions or CMT, IUD strings are not readily visible  Uterus: firm, normal sized and nontender,  Adnexae: no " masses or tenderness.  Rectovaginal - deferred.  Musculoskeletal - no gross deformities.  Neurological - normal strength, sensation, and mental status.    Procedure:  She requested and received 800 mg of ibuprofen prior to the procedure-taken orally  After  consent was obtained from the patient, IUD strings were grasped on the Endo cervical canal using a endocervical speculum.  Once these were visible, the  IUD strings were grasped with ring forcep and IUD easily removed intact with minimal patient discomfort noted.  Minimal bleeding noted.    ASSESSMENT/PLAN:  (Z30.016) Encounter for initial prescription of transdermal patch hormonal contraceptive device  (primary encounter diagnosis)  Comment: Need for contraception  Plan: norelgestromin-ethinyl estradiol (ORTHO EVRA)         150-35 MCG/24HR patch        Continuous patch use    (F41.1) Generalized anxiety disorder  Comment:   Plan: PARoxetine (PAXIL) 40 MG tablet        Refill    (Z30.432) Encounter for IUD removal  Comment: Dyspareunia, postcoital bleeding, possible migration of the IUD  Plan: ibuprofen (ADVIL/MOTRIN) tablet 800 mg        IUD removal        Letter will be sent to the referring provider.    Beau Cardoso MD

## 2020-05-28 ENCOUNTER — OFFICE VISIT (OUTPATIENT)
Dept: FAMILY MEDICINE | Facility: CLINIC | Age: 35
End: 2020-05-28
Payer: MEDICAID

## 2020-05-28 VITALS
WEIGHT: 247.5 LBS | TEMPERATURE: 98.1 F | DIASTOLIC BLOOD PRESSURE: 85 MMHG | SYSTOLIC BLOOD PRESSURE: 129 MMHG | RESPIRATION RATE: 16 BRPM | HEART RATE: 75 BPM | BODY MASS INDEX: 33.52 KG/M2 | HEIGHT: 72 IN | OXYGEN SATURATION: 96 %

## 2020-05-28 DIAGNOSIS — Z20.2 POTENTIAL EXPOSURE TO STD: ICD-10-CM

## 2020-05-28 DIAGNOSIS — J45.990 EXERCISE-INDUCED ASTHMA: ICD-10-CM

## 2020-05-28 DIAGNOSIS — Z00.00 ROUTINE HISTORY AND PHYSICAL EXAMINATION OF ADULT: Primary | ICD-10-CM

## 2020-05-28 DIAGNOSIS — F41.9 ANXIETY: ICD-10-CM

## 2020-05-28 DIAGNOSIS — B36.0 TINEA VERSICOLOR: ICD-10-CM

## 2020-05-28 DIAGNOSIS — Z87.898 HX OF INTRAVENOUS DRUG USE IN REMISSION: ICD-10-CM

## 2020-05-28 LAB
ALBUMIN SERPL-MCNC: 3.9 G/DL (ref 3.4–5)
ALP SERPL-CCNC: 43 U/L (ref 40–150)
ALT SERPL W P-5'-P-CCNC: 25 U/L (ref 0–50)
ANION GAP SERPL CALCULATED.3IONS-SCNC: 5 MMOL/L (ref 3–14)
AST SERPL W P-5'-P-CCNC: 24 U/L (ref 0–45)
BASOPHILS # BLD AUTO: 0 10E9/L (ref 0–0.2)
BASOPHILS NFR BLD AUTO: 0.4 %
BILIRUB SERPL-MCNC: 0.2 MG/DL (ref 0.2–1.3)
BUN SERPL-MCNC: 15 MG/DL (ref 7–30)
CALCIUM SERPL-MCNC: 8.7 MG/DL (ref 8.5–10.1)
CHLORIDE SERPL-SCNC: 106 MMOL/L (ref 94–109)
CHOLEST SERPL-MCNC: 126 MG/DL
CO2 SERPL-SCNC: 26 MMOL/L (ref 20–32)
CREAT SERPL-MCNC: 0.82 MG/DL (ref 0.52–1.04)
DIFFERENTIAL METHOD BLD: NORMAL
EOSINOPHIL # BLD AUTO: 0.1 10E9/L (ref 0–0.7)
EOSINOPHIL NFR BLD AUTO: 2.1 %
ERYTHROCYTE [DISTWIDTH] IN BLOOD BY AUTOMATED COUNT: 12.9 % (ref 10–15)
GFR SERPL CREATININE-BSD FRML MDRD: >90 ML/MIN/{1.73_M2}
GLUCOSE SERPL-MCNC: 85 MG/DL (ref 70–99)
HCT VFR BLD AUTO: 42.4 % (ref 35–47)
HDLC SERPL-MCNC: 46 MG/DL
HGB BLD-MCNC: 14.1 G/DL (ref 11.7–15.7)
IMM GRANULOCYTES # BLD: 0 10E9/L (ref 0–0.4)
IMM GRANULOCYTES NFR BLD: 0.4 %
LDLC SERPL CALC-MCNC: 58 MG/DL
LYMPHOCYTES # BLD AUTO: 2 10E9/L (ref 0.8–5.3)
LYMPHOCYTES NFR BLD AUTO: 36 %
MCH RBC QN AUTO: 28.9 PG (ref 26.5–33)
MCHC RBC AUTO-ENTMCNC: 33.3 G/DL (ref 31.5–36.5)
MCV RBC AUTO: 87 FL (ref 78–100)
MONOCYTES # BLD AUTO: 0.4 10E9/L (ref 0–1.3)
MONOCYTES NFR BLD AUTO: 7.7 %
NEUTROPHILS # BLD AUTO: 3 10E9/L (ref 1.6–8.3)
NEUTROPHILS NFR BLD AUTO: 53.4 %
NONHDLC SERPL-MCNC: 80 MG/DL
NRBC # BLD AUTO: 0 10*3/UL
NRBC BLD AUTO-RTO: 0 /100
PLATELET # BLD AUTO: 251 10E9/L (ref 150–450)
POTASSIUM SERPL-SCNC: 4.2 MMOL/L (ref 3.4–5.3)
PROT SERPL-MCNC: 7.8 G/DL (ref 6.8–8.8)
RBC # BLD AUTO: 4.88 10E12/L (ref 3.8–5.2)
SODIUM SERPL-SCNC: 137 MMOL/L (ref 133–144)
TRIGL SERPL-MCNC: 112 MG/DL
WBC # BLD AUTO: 5.6 10E9/L (ref 4–11)

## 2020-05-28 RX ORDER — IBUPROFEN 200 MG
600-800 TABLET ORAL DAILY PRN
COMMUNITY
End: 2023-04-07

## 2020-05-28 RX ORDER — KETOCONAZOLE 20 MG/ML
SHAMPOO TOPICAL
Qty: 120 ML | Refills: 1 | Status: SHIPPED | OUTPATIENT
Start: 2020-05-28 | End: 2021-09-09

## 2020-05-28 RX ORDER — AMOXICILLIN 250 MG
2-4 CAPSULE ORAL 2 TIMES DAILY
COMMUNITY
End: 2021-09-09

## 2020-05-28 RX ORDER — ACETAMINOPHEN 500 MG
1000 TABLET ORAL EVERY 4 HOURS PRN
COMMUNITY
End: 2022-03-21

## 2020-05-28 RX ORDER — ALBUTEROL SULFATE 90 UG/1
2 AEROSOL, METERED RESPIRATORY (INHALATION) EVERY 6 HOURS PRN
Qty: 1 INHALER | Refills: 1 | Status: SHIPPED | OUTPATIENT
Start: 2020-05-28 | End: 2021-09-09

## 2020-05-28 ASSESSMENT — PATIENT HEALTH QUESTIONNAIRE - PHQ9
5. POOR APPETITE OR OVEREATING: MORE THAN HALF THE DAYS
SUM OF ALL RESPONSES TO PHQ QUESTIONS 1-9: 13

## 2020-05-28 ASSESSMENT — ANXIETY QUESTIONNAIRES
6. BECOMING EASILY ANNOYED OR IRRITABLE: NEARLY EVERY DAY
3. WORRYING TOO MUCH ABOUT DIFFERENT THINGS: MORE THAN HALF THE DAYS
2. NOT BEING ABLE TO STOP OR CONTROL WORRYING: NEARLY EVERY DAY
5. BEING SO RESTLESS THAT IT IS HARD TO SIT STILL: MORE THAN HALF THE DAYS
IF YOU CHECKED OFF ANY PROBLEMS ON THIS QUESTIONNAIRE, HOW DIFFICULT HAVE THESE PROBLEMS MADE IT FOR YOU TO DO YOUR WORK, TAKE CARE OF THINGS AT HOME, OR GET ALONG WITH OTHER PEOPLE: EXTREMELY DIFFICULT
7. FEELING AFRAID AS IF SOMETHING AWFUL MIGHT HAPPEN: MORE THAN HALF THE DAYS
GAD7 TOTAL SCORE: 17
1. FEELING NERVOUS, ANXIOUS, OR ON EDGE: NEARLY EVERY DAY

## 2020-05-28 ASSESSMENT — MIFFLIN-ST. JEOR: SCORE: 1931.48

## 2020-05-28 NOTE — PROGRESS NOTES
Holly Tolliver is a 35 year old female who presents today for a physical exam and to discuss some health issues.  Ana is a resident at [facility], she has been there for 10 days.  Her DOC is meth, she has used IV drugs in the past.  This is her second in resident treatment and her 4th overall treatment experience.  Of note is that Ana has an active child protection case at the moment, she is very sad and missing her children, 2 sons 8 and 4.  They are  and in different foster homes.     Ana said she was sober for 21 months and relapsed using meth once per week in January.     Ana's last pap was 2 or more years ago, she is menstruating fairly heavily right now and does not want to do a Pap today.  She had her IUD removed 2 months ago and she is using a contraceptive patch.      Ana is requesting a refill on her albuterol inhaler, she uses it during exercise.      Review Of Systems  Skin: negative  Eyes: negative  Ears/Nose/Throat: negative  Respiratory: No shortness of breath, dyspnea on exertion, cough, or hemoptysis, except she does need an inhaler for EIA.  Cardiovascular: negative  Gastrointestinal: negative  Genitourinary: negative  Musculoskeletal: negative  Neurologic: negative  Psychiatric: anxiety, grief over the loss of her children  Hematologic/Lymphatic/Immunologic: negative  Endocrine: negative    Past Medical History:   Diagnosis Date     ALCOH DEP NEC/NOS-UNSPEC 2007     Asthma     exercise induced     Chickenpox      Postpartum depression      Pregnancy induced hypertension      Urinary tract infections      Past Surgical History:   Procedure Laterality Date      SECTION Bilateral 2016    Procedure:  SECTION;  Surgeon: Beau Cardoso MD;  Location: [facility] OR     [facility]  age 3     Social History     Socioeconomic History     Marital status: Single     Spouse name: Not on file     Number of children: 1     Years of education: Not on file      Highest education level: Not on file   Occupational History     Occupation: PCA     Employer: [employer]   Social Needs     Financial resource strain: Not on file     Food insecurity     Worry: Not on file     Inability: Not on file     Transportation needs     Medical: Not on file     Non-medical: Not on file   Tobacco Use     Smoking status: Former Smoker     Packs/day: 0.00     Years: 12.00     Pack years: 0.00     Types: Cigarettes     Smokeless tobacco: Never Used     Tobacco comment: Never   Substance and Sexual Activity     Alcohol use: Not Currently     Alcohol/week: 0.0 standard drinks     Comment: attempting sobriety     Drug use: Not Currently     Types: Methamphetamines, Marijuana     Comment: relapse, attempting sobriety     Sexual activity: Not Currently     Partners: Male     Birth control/protection: Patch   Lifestyle     Physical activity     Days per week: Not on file     Minutes per session: Not on file     Stress: Not on file   Relationships     Social connections     Talks on phone: Not on file     Gets together: Not on file     Attends Jain service: Not on file     Active member of club or organization: Not on file     Attends meetings of clubs or organizations: Not on file     Relationship status: Not on file     Intimate partner violence     Fear of current or ex partner: Not on file     Emotionally abused: Not on file     Physically abused: Not on file     Forced sexual activity: Not on file   Other Topics Concern     Parent/sibling w/ CABG, MI or angioplasty before 65F 55M? No   Social History Narrative     Not on file     Family History   Problem Relation Age of Onset     Alcohol/Drug Mother         alcohol- recovered     Hypertension Mother      Depression Mother         also anxiety     Other - See Comments Mother         ankylosing spondylosis     Anxiety Disorder Mother      Alcohol/Drug Father         alcohol- recovered     Alcohol/Drug Sister          "A&D     Cancer Maternal Grandmother         lung     Depression Maternal Grandmother         also anxiety     Mental Illness Paternal Grandmother      Alcohol/Drug Brother         alcohol       /85   Pulse 75   Temp 98.1  F (36.7  C) (Oral)   Resp 16   Ht 1.824 m (5' 11.8\")   Wt 112.3 kg (247 lb 8 oz)   LMP 05/26/2020   SpO2 96%   BMI 33.75 kg/m      Exam:  Constitutional: healthy, alert and mild distress  Head: Normocephalic. No masses, lesions, tenderness or abnormalities  Neck: Neck supple. No adenopathy. Thyroid symmetric, normal size,, Carotids without bruits.  ENT: ENT exam normal, no neck nodes or sinus tenderness  Cardiovascular: negative, PMI normal. No lifts, heaves, or thrills. RRR. No murmurs, clicks gallops or rub  Respiratory: negative, Percussion normal. Good diaphragmatic excursion. Lungs clear  Breasts: normal without suspicious masses, skin changes or axillary nodes, symmetric fibrous changes in both upper outer quadrants, self exam is taught and encouraged.  Gastrointestinal: Abdomen soft, non-tender. BS normal. No masses, organomegaly  : Deferred  Musculoskeletal: extremities normal- no gross deformities noted, gait normal and normal muscle tone  Skin: no suspicious lesions or rashes  Neurologic: Gait normal. Reflexes normal and symmetric. Sensation grossly WNL.  Psychiatric: mentation appears normal, anxious and crying  Hematologic/Lymphatic/Immunologic: Normal cervical lymph nodes    Assessment/Plan:  1. Routine history and physical examination of adult    - CBC with platelets differential  - Lipid panel reflex to direct LDL Fasting  - Comprehensive metabolic panel    2. Anxiety  Continue Paxil and hydroxyzine    3. Potential exposure to STD    - Treponema Abs w Reflex to RPR and Titer  - NEISSERIA GONORRHOEA PCR  - CHLAMYDIA TRACHOMATIS PCR    4. Hx of intravenous drug use in remission    - HIV Antigen Antibody Combo    We will follow up in one week to discuss labs and " emotional issues.      Options for treatment and follow-up care were reviewed with the patient. Patient engaged in the decision making process and verbalized understanding of the options discussed and agreed with the final plan.

## 2020-05-29 LAB
C TRACH DNA SPEC QL NAA+PROBE: NEGATIVE
HCV AB SERPL QL IA: NONREACTIVE
HIV 1+2 AB+HIV1 P24 AG SERPL QL IA: NONREACTIVE
N GONORRHOEA DNA SPEC QL NAA+PROBE: NEGATIVE
SPECIMEN SOURCE: NORMAL
SPECIMEN SOURCE: NORMAL
T PALLIDUM AB SER QL: NONREACTIVE

## 2020-05-29 ASSESSMENT — ANXIETY QUESTIONNAIRES: GAD7 TOTAL SCORE: 17

## 2020-05-29 ASSESSMENT — ASTHMA QUESTIONNAIRES: ACT_TOTALSCORE: 17

## 2020-06-03 ENCOUNTER — VIRTUAL VISIT (OUTPATIENT)
Dept: FAMILY MEDICINE | Facility: CLINIC | Age: 35
End: 2020-06-03
Payer: COMMERCIAL

## 2020-06-03 DIAGNOSIS — K08.89 TOOTH PAIN: Primary | ICD-10-CM

## 2020-06-03 NOTE — NURSING NOTE
35 year old  Chief Complaint   Patient presents with     Dental Pain     Left sided mouth tooth pain that started around 3 am. No fever        Last menstrual period 2020, not currently breastfeeding. There is no height or weight on file to calculate BMI.  BP completed using cuff size:    Patient Active Problem List   Diagnosis     Tobacco use disorder     Mild intermittent asthma     CTS (carpal tunnel syndrome)     CARDIOVASCULAR SCREENING; LDL GOAL LESS THAN 160     Major depressive disorder, single episode, moderate (H)     Generalized anxiety disorder     GERD (gastroesophageal reflux disease)     Health Care Home     Prenatal care, subsequent pregnancy in third trimester     Prenatal care, subsequent pregnancy     Single liveborn, born in hospital, delivered     Placental abruption in third trimester     Placental abruption      delivery delivered     Routine postpartum follow-up     Mirena IUD     Obesity (BMI 35.0-39.9) with comorbidity (H)       Wt Readings from Last 2 Encounters:   20 112.3 kg (247 lb 8 oz)   20 104.3 kg (230 lb)     BP Readings from Last 3 Encounters:   20 129/85   20 123/80   20 121/75       No Known Allergies    Current Outpatient Medications   Medication     acetaminophen (TYLENOL) 500 MG tablet     albuterol (PROAIR HFA/PROVENTIL HFA/VENTOLIN HFA) 108 (90 Base) MCG/ACT inhaler     HYDROXYZINE HCL PO     ibuprofen (ADVIL/MOTRIN) 200 MG tablet     ketoconazole (NIZORAL) 2 % external shampoo     norelgestromin-ethinyl estradiol (ORTHO EVRA) 150-35 MCG/24HR patch     PARoxetine (PAXIL) 40 MG tablet     senna-docusate (SENEXON-S) 8.6-50 MG tablet     traZODone (DESYREL) 50 MG tablet     albuterol (PROAIR HFA/PROVENTIL HFA/VENTOLIN HFA) 108 (90 Base) MCG/ACT inhaler     No current facility-administered medications for this visit.        Social History     Tobacco Use     Smoking status: Former Smoker     Packs/day: 0.00     Years: 12.00     Pack  years: 0.00     Types: Cigarettes     Smokeless tobacco: Never Used     Tobacco comment: Never   Substance Use Topics     Alcohol use: Not Currently     Alcohol/week: 0.0 standard drinks     Comment: attempting sobriety     Drug use: Not Currently     Types: Methamphetamines, Marijuana     Comment: relapse, attempting sobriety       Honoring Choices - Health Care Directive Guide offered to patient at time of visit.    There are no preventive care reminders to display for this patient.    Immunization History   Administered Date(s) Administered     Influenza (IIV3) PF 09/30/2011     Influenza Vaccine IM > 6 months Valent IIV4 11/13/2015, 11/28/2018     Mantoux Tuberculin Skin Test 03/07/2012, 03/12/2013, 03/14/2017, 03/28/2017     TDAP Vaccine (Adacel) 12/02/2011, 03/04/2016       Lab Results   Component Value Date    PAP NIL 04/05/2018         Recent Labs   Lab Test 05/28/20  0800 03/19/18  1741 12/15/16  1030  09/28/15  1703   LDL 58  --   --   --   --    HDL 46*  --   --   --   --    TRIG 112  --   --   --   --    ALT 25 37 24   < >  --    CR 0.82 0.82  --    < >  --    GFRESTIMATED >90 81  --    < >  --    GFRESTBLACK >90 >90  --    < >  --    ALBUMIN 3.9 4.3 4.2  --   --    POTASSIUM 4.2 4.2  --   --   --    TSH  --   --   --   --  0.85    < > = values in this interval not displayed.       PHQ-2 ( 1999 Pfizer) 7/17/2019 3/28/2017   Q1: Little interest or pleasure in doing things 1 0   Q2: Feeling down, depressed or hopeless 1 1   PHQ-2 Score 2 1       PHQ-9 SCORE 10/3/2019 11/4/2019 3/16/2020 5/28/2020   PHQ-9 Total Score - - - -   PHQ-9 Total Score 10 8 5 13       RUBENS-7 SCORE 11/4/2019 3/16/2020 5/28/2020   Total Score - - -   Total Score 19 6 17       ACT Total Scores 3/25/2019 10/25/2019 5/28/2020   ACT TOTAL SCORE - - -   ASTHMA ER VISITS - - -   ASTHMA HOSPITALIZATIONS - - -   ACT TOTAL SCORE (Goal Greater than or Equal to 20) 14 20 17   In the past 12 months, how many times did you visit the emergency room  for your asthma without being admitted to the hospital? 0 0 0   In the past 12 months, how many times were you hospitalized overnight because of your asthma? 0 0 0           Shazia Elliott CMA  Kailyn 3, 2020 10:51 AM

## 2020-06-03 NOTE — PATIENT INSTRUCTIONS

## 2020-06-03 NOTE — PROGRESS NOTES
"Holly Tolliver is a 35 year old female who is being evaluated via a billable telephone visit.      The patient has been notified of following:     \"This telephone visit will be conducted via a call between you and your physician/provider. We have found that certain health care needs can be provided without the need for a physical exam.  This service lets us provide the care you need with a short phone conversation.  If a prescription is necessary we can send it directly to your pharmacy.  If lab work is needed we can place an order for that and you can then stop by our lab to have the test done at a later time.    Telephone visits are billed at different rates depending on your insurance coverage. During this emergency period, for some insurers they may be billed the same as an in-person visit.  Please reach out to your insurance provider with any questions.    If during the course of the call the physician/provider feels a telephone visit is not appropriate, you will not be charged for this service.\"    Patient has given verbal consent for Telephone visit?  Yes    What phone number would you like to be contacted at? [phone]    How would you like to obtain your AVS? Latesha Norton     Holly Tolliver is a 35 year old female who presents via phone visit today for the following health issues:  Tooth pain: Ana has a \"hole in her tooth.\" She explains that this tooth is on her upper left side. She goes on to say that this tooth has been bad for a while, however she woke at 3 am with excruciating pain. She went in the shower and gargled warm water. This helped some. She took Tylenol this am, that helped some as well. She cannot eat due to the pain. She has not been to a dentist for a while. Ana is currently at [facility]. She does have a history of meth use.   HPI    Reviewed and updated as needed this visit by Provider    Review of Systems   Constitutional, HEENT, cardiovascular, " pulmonary, gi and gu systems are negative, except as otherwise noted.    Objective   Reported vitals:  LMP 05/26/2020    healthy, alert and no distress  PSYCH: Alert and oriented times 3; coherent speech, normal   rate and volume, able to articulate logical thoughts, able   to abstract reason, no tangential thoughts, no hallucinations   or delusions  Her affect is normal  RESP: No cough, no audible wheezing, able to talk in full sentences  Remainder of exam unable to be completed due to telephone visits    Assessment/Plan:  1. Tooth pain  Return if symptoms worsen or fail to improve.    Phone call duration: 10 minutes  First, please see the Dentist. (Ana said she can be seen at [facility] which takes emergency appts). Next, please perform warm salt water rinses. Next, you may take Tylenol 1000 mg up to 4 times daily ensuring that you are not taking more than 4 grams in 24 hours. Finally, please call the clinic with questions or concerns. She verbalizes understanding of the plan.   Almaz Silva, APRN, CNP

## 2020-06-04 ENCOUNTER — TELEPHONE (OUTPATIENT)
Dept: FAMILY MEDICINE | Facility: CLINIC | Age: 35
End: 2020-06-04

## 2020-06-05 NOTE — TELEPHONE ENCOUNTER
[facility] - diagnostic verification form routed to DAPHNE Oneill to determine if form should be sent to AMARJIT Sneed for completion (last visit 11/2019) as DAPHNE Oneill last saw patient 4/2018   6

## 2020-06-11 ENCOUNTER — VIRTUAL VISIT (OUTPATIENT)
Dept: FAMILY MEDICINE | Facility: CLINIC | Age: 35
End: 2020-06-11
Payer: COMMERCIAL

## 2020-06-11 DIAGNOSIS — K08.89 TOOTH PAIN: ICD-10-CM

## 2020-06-11 DIAGNOSIS — F33.2 SEVERE EPISODE OF RECURRENT MAJOR DEPRESSIVE DISORDER, WITHOUT PSYCHOTIC FEATURES (H): Primary | ICD-10-CM

## 2020-06-11 DIAGNOSIS — Z30.09 ENCOUNTER FOR GENERAL COUNSELING AND ADVICE ON CONTRACEPTIVE MANAGEMENT: ICD-10-CM

## 2020-06-11 ASSESSMENT — PATIENT HEALTH QUESTIONNAIRE - PHQ9: SUM OF ALL RESPONSES TO PHQ QUESTIONS 1-9: 16

## 2020-06-11 NOTE — PROGRESS NOTES
"Holly Tolliver is a 35 year old female who is being evaluated via a billable telephone visit.      The patient has been notified of following:     \"This telephone visit will be conducted via a call between you and your physician/provider. We have found that certain health care needs can be provided without the need for a physical exam.  This service lets us provide the care you need with a short phone conversation.  If a prescription is necessary we can send it directly to your pharmacy.  If lab work is needed we can place an order for that and you can then stop by our lab to have the test done at a later time.    Telephone visits are billed at different rates depending on your insurance coverage. During this emergency period, for some insurers they may be billed the same as an in-person visit.  Please reach out to your insurance provider with any questions.    If during the course of the call the physician/provider feels a telephone visit is not appropriate, you will not be charged for this service.\"    Patient has given verbal consent for Telephone visit?  Yes    What phone number would you like to be contacted at? [phone]    How would you like to obtain your AVS? Mail a copy    Subjective     Holly Tolliver is a 35 year old female who presents via phone visit today for the following health issues:    HPI    Ana would like to discuss her depression, tooth pain and her contraceptive patch.    Ana is taking Paxil 40 mg daily.  She has been feeling more down and depressed lately, she sees a therapist who recommended she consider adding a different medication.  She wants to stay on Paxil, it has generally worked very well.    Ana has an infected tooth, there has been one attempt to pull it, there was such severe pain that she couldn't tolerate it.  She is on Amoxicillin and is going back tomorrow again to try to have it pulled.  She realizes that this is contributing to her mood.    She is also on a " contraceptive patch and is wondering if she can discontinue that.  She had an IUD and now she is not and not planning to be sexually active and would just like to be off hormones.    We also had planned to review her labs from her PE visit of 5/28, today.  [facility] [place] [facility] [place] for [place] pain and had labs then as well.  She was diagnosed with GERD, that is better.  She did not have any change in medications.    Review of Systems   Constitutional, HEENT, cardiovascular, pulmonary, gi and gu systems are negative, except as otherwise noted.     Objective   Reported vitals:  LMP 05/26/2020    healthy, alert and no distress  PSYCH: Alert and oriented times 3; coherent speech, normal   rate and volume, able to articulate logical thoughts, able   to abstract reason, no tangential thoughts, no hallucinations   or delusions  Her affect is normal  RESP: No cough, no audible wheezing, able to talk in full sentences  Remainder of exam unable to be completed due to telephone visits    Labs reviewed in Epic        Assessment/Plan:  1. Severe episode of recurrent major depressive disorder, without psychotic features (H)  We decided that Ana will have another phone visit next week when her tooth is better.  She will stay on the Paxil and I will connect with Rosa Mcbride to see what med may be a good adjunct. I will contact En at [facility] to schedule a phone visit for a week or so from now.    2. Tooth pain  The plan is to have her tooth pulled tomorrow by a dentist.  She will let me know how that goes.    3. Encounter for general counseling and advice on contraceptive management  Discontinue her contraceptive patch after this cycle.      Phone call duration:  15 minutes    Mali Colbert NP

## 2020-06-25 ENCOUNTER — VIRTUAL VISIT (OUTPATIENT)
Dept: FAMILY MEDICINE | Facility: CLINIC | Age: 35
End: 2020-06-25
Payer: COMMERCIAL

## 2020-06-25 DIAGNOSIS — F32.A DEPRESSION, UNSPECIFIED DEPRESSION TYPE: Primary | ICD-10-CM

## 2020-06-25 ASSESSMENT — PATIENT HEALTH QUESTIONNAIRE - PHQ9: SUM OF ALL RESPONSES TO PHQ QUESTIONS 1-9: 4

## 2020-06-25 NOTE — PROGRESS NOTES
"Holly Tolliver is a 35 year old female who is being evaluated via a billable telephone visit.      The patient has been notified of following:     \"This telephone visit will be conducted via a call between you and your physician/provider. We have found that certain health care needs can be provided without the need for a physical exam.  This service lets us provide the care you need with a short phone conversation.  If a prescription is necessary we can send it directly to your pharmacy.  If lab work is needed we can place an order for that and you can then stop by our lab to have the test done at a later time.    Telephone visits are billed at different rates depending on your insurance coverage. During this emergency period, for some insurers they may be billed the same as an in-person visit.  Please reach out to your insurance provider with any questions.    If during the course of the call the physician/provider feels a telephone visit is not appropriate, you will not be charged for this service.\"    Patient has given verbal consent for Telephone visit?  Yes    What phone number would you like to be contacted at? [phone]    How would you like to obtain your AVS? Mail a copy    Subjective     Holly Tolliver is a 35 year old female who presents via phone visit today for the following health issues:    Review of Systems   Constitutional, HEENT, cardiovascular, pulmonary, gi and gu systems are negative, except as otherwise noted.       Objective   Reported vitals:  [facility] 05/26/2020    healthy, alert and no distress  PSYCH: Alert and oriented times 3; coherent speech, normal   rate and volume, able to articulate logical thoughts, able   to abstract reason, no tangential thoughts, no hallucinations   or delusions  Her affect is normal  RESP: No cough, no audible wheezing, able to talk in full sentences  Remainder of exam unable to be completed due to telephone visits    Diagnostic Test Results:  Labs reviewed " in Epic        Assessment/Plan:  1. Depression, unspecified depression type    Because of polypharmacy and dual diagnoses, we have made a referral.  PSYCHIATRIST referral.   I emailed SWAPNIL Hernandez at [facility] regarding referral.    Phone call duration:  6 minutes    Mali Colbert NP

## 2020-07-01 NOTE — TELEPHONE ENCOUNTER
Carmen with [facility]  has called and wondering about the form below.  She stated that she had sent it again.  Please let her know  Carmen is at [phone]  Kelly Powell  [facility]

## 2020-07-02 NOTE — TELEPHONE ENCOUNTER
ERICA Rankin has not seen this patient since November 2019. Patient was referred to a long-term community-based psychiatry provider outside the [facility] network.    Forms will not be completed by ERICA Rankin.

## 2020-07-02 NOTE — TELEPHONE ENCOUNTER
I last saw patient 5/16/19 and Kieran Tapia last saw her 11/11/19- not sure if patient is seeing different psych

## 2020-07-02 NOTE — TELEPHONE ENCOUNTER
Left message for Carmen, [facility], to return call to clinic.  Ruby Oneill and Ariana Reynoso are unable to complete form for patient as they have not seen her.  Carmen will need to contact patient to determine who she is/has been seeing.      Eileen VALLES RN BSN

## 2020-07-02 NOTE — TELEPHONE ENCOUNTER
This form was sent to Ariana Larry her psychiatrist as I have not see the patient for this in > 1 year.

## 2020-07-14 DIAGNOSIS — G47.00 PERSISTENT INSOMNIA: ICD-10-CM

## 2020-07-14 NOTE — TELEPHONE ENCOUNTER
"Requested Prescriptions   Pending Prescriptions Disp Refills     traZODone (DESYREL) 50 MG tablet [Pharmacy Med Name: TRAZODONE 50MG TAB] 30 tablet      Sig: TAKE 1 TABLET BY MOUTH EVERY NIGHT AT BEDTIME       Serotonin Modulators Passed - 7/14/2020  8:56 AM        Passed - Recent (12 mo) or future (30 days) visit within the authorizing provider's specialty     Patient has had an office visit with the authorizing provider or a provider within the authorizing providers department within the previous 12 mos or has a future within next 30 days. See \"Patient Info\" tab in inbasket, or \"Choose Columns\" in Meds & Orders section of the refill encounter.              Passed - Medication is active on med list        Passed - Patient is age 18 or older        Passed - No active pregnancy on record        Passed - No positive pregnancy test in past 12 months           Last Written Prescription Date:  10/25/19  Last Fill Quantity: 31,  # refills: 2   Last office visit: 10/25/2019 with prescribing provider:  Mai   Future Office Visit:            "

## 2020-07-16 RX ORDER — TRAZODONE HYDROCHLORIDE 50 MG/1
TABLET, FILM COATED ORAL
Qty: 30 TABLET | Refills: 1 | Status: SHIPPED | OUTPATIENT
Start: 2020-07-16 | End: 2020-09-19

## 2020-08-27 ENCOUNTER — VIRTUAL VISIT (OUTPATIENT)
Dept: FAMILY MEDICINE | Facility: CLINIC | Age: 35
End: 2020-08-27
Payer: COMMERCIAL

## 2020-08-27 DIAGNOSIS — B36.0 TINEA VERSICOLOR: ICD-10-CM

## 2020-08-27 RX ORDER — FLUCONAZOLE 150 MG/1
300 TABLET ORAL WEEKLY
Qty: 4 TABLET | Refills: 0 | Status: SHIPPED | OUTPATIENT
Start: 2020-08-27 | End: 2020-09-04

## 2020-08-27 RX ORDER — CICLOPIROX OLAMINE 7.7 MG/G
CREAM TOPICAL 2 TIMES DAILY
Qty: 45 G | Refills: 0 | Status: SHIPPED | OUTPATIENT
Start: 2020-08-27 | End: 2021-10-15

## 2020-08-27 NOTE — PROGRESS NOTES
"Holly Tolliver is a 35 year old female who is being evaluated via a billable video visit.      The patient has been notified of following:     \"This video visit will be conducted via a call between you and your physician/provider. We have found that certain health care needs can be provided without the need for an in-person physical exam.  This service lets us provide the care you need with a video conversation.  If a prescription is necessary we can send it directly to your pharmacy.  If lab work is needed we can place an order for that and you can then stop by our lab to have the test done at a later time.    Video visits are billed at different rates depending on your insurance coverage.  Please reach out to your insurance provider with any questions.    If during the course of the call the physician/provider feels a video visit is not appropriate, you will not be charged for this service.\"    Patient has given verbal consent for Video visit? Yes  How would you like to obtain your AVS? MyChart  If you are dropped from the video visit, the video invite should be resent to: Send to e-mail at: [email or web address]  Will anyone else be joining your video visit? No      Subjective     Holly Tolliver is a 35 year old female who presents today via video visit for the following health issues:    CRISTINA Barrera reports a history of Tinea Versicolor (last time was 2 years ago) treated with both topical and oral antifungals. She gets these spots on her chest and shoulders. She reports having a flare up and is looking for treatment. She currently has spots on her right shoulder area.     Video Start Time: 1456     Review of Systems   Skin: Positive for rash.   All other systems reviewed and are negative.         Objective         Vitals:  No vitals were obtained today due to virtual visit.    Physical Exam     GENERAL: Healthy, alert and no distress  EYES: Eyes grossly normal to inspection.  No discharge or " erythema, or obvious scleral/conjunctival abnormalities.  RESP: No audible wheeze, cough, or visible cyanosis.  No visible retractions or increased work of breathing.    SKIN: Visible non-raised whit spots x3 on right shoulder  NEURO: Cranial nerves grossly intact.  Mentation and speech appropriate for age.  PSYCH: Mentation appears normal, affect normal/bright, judgement and insight intact, normal speech and appearance well-groomed.  Skin: Skin discoloration on right shoulder; 3 different areas.     Assessment & Plan     Tinea versicolor  - ciclopirox (LOPROX) 0.77 % cream  Dispense: 45 g; Refill: 0  - fluconazole (DIFLUCAN) 150 MG tablet  Dispense: 4 tablet; Refill: 0    Please follow up as needed if symptoms worsen or do not resolve after 2 weeks of undergoing treatment    Return if symptoms worsen or fail to improve.    WOLFGANG Jacques Student, [facility]  Almaz Silva NP  [facility]      Video-Visit Details    Type of service:  Video Visit    Video End Time: 1507    Originating Location (pt. Location): [facility]    Distant Location (provider location):  [facility]     Platform used for Video Visit: WOLFGANG Hernandes Student, [facility]    I was present with the NP student who participated in the service and in the documentation of the services provided. I have verified the history and personally performed the physical exam and medical decision making, as documented by the student and edited by me.  WOLFGANG Shelton, CNP

## 2020-08-27 NOTE — PATIENT INSTRUCTIONS

## 2020-08-27 NOTE — NURSING NOTE
35 year old  No chief complaint on file.      not currently breastfeeding. There is no height or weight on file to calculate BMI.  BP completed using cuff size:    Patient Active Problem List   Diagnosis     Tobacco use disorder     Mild intermittent asthma     CTS (carpal tunnel syndrome)     CARDIOVASCULAR SCREENING; LDL GOAL LESS THAN 160     Major depressive disorder, single episode, moderate (H)     Generalized anxiety disorder     GERD (gastroesophageal reflux disease)     Health Care Home     Prenatal care, subsequent pregnancy in third trimester     Prenatal care, subsequent pregnancy     Single liveborn, born in hospital, delivered     Placental abruption in third trimester     Placental abruption      delivery delivered     Routine postpartum follow-up     Mirena IUD     Obesity (BMI 35.0-39.9) with comorbidity (H)       Wt Readings from Last 2 Encounters:   20 112.3 kg (247 lb 8 oz)   20 104.3 kg (230 lb)     BP Readings from Last 3 Encounters:   20 129/85   20 123/80   20 121/75       No Known Allergies    Current Outpatient Medications   Medication     acetaminophen (TYLENOL) 500 MG tablet     albuterol (PROAIR HFA/PROVENTIL HFA/VENTOLIN HFA) 108 (90 Base) MCG/ACT inhaler     HYDROXYZINE HCL PO     ibuprofen (ADVIL/MOTRIN) 200 MG tablet     PARoxetine (PAXIL) 40 MG tablet     traZODone (DESYREL) 50 MG tablet     ketoconazole (NIZORAL) 2 % external shampoo     norelgestromin-ethinyl estradiol (ORTHO EVRA) 150-35 MCG/24HR patch     senna-docusate (SENEXON-S) 8.6-50 MG tablet     No current facility-administered medications for this visit.        Social History     Tobacco Use     Smoking status: Former Smoker     Packs/day: 0.00     Years: 12.00     Pack years: 0.00     Types: Cigarettes     Smokeless tobacco: Never Used     Tobacco comment: Never   Substance Use Topics     Alcohol use: Not Currently     Alcohol/week: 0.0 standard drinks     Comment: attempting  sobriety     Drug use: Not Currently     Types: Methamphetamines, Marijuana     Comment: relapse, attempting sobriety       Honoring Choices - Health Care Directive Guide offered to patient at time of visit.    Health Maintenance Due   Topic Date Due     HEPATITIS B IMMUNIZATION (1 of 3 - Risk 3-dose series) 04/08/2005     INFLUENZA VACCINE (1) 09/01/2020       Immunization History   Administered Date(s) Administered     Influenza (IIV3) PF 09/30/2011     Influenza Vaccine IM > 6 months Valent IIV4 11/13/2015, 11/28/2018     Mantoux Tuberculin Skin Test 03/07/2012, 03/12/2013, 03/14/2017, 03/28/2017     TDAP Vaccine (Adacel) 12/02/2011, 03/04/2016       Lab Results   Component Value Date    PAP NIL 04/05/2018         Recent Labs   Lab Test 05/28/20  0800 03/19/18  1741 12/15/16  1030  09/28/15  1703   LDL 58  --   --   --   --    HDL 46*  --   --   --   --    TRIG 112  --   --   --   --    ALT 25 37 24   < >  --    CR 0.82 0.82  --    < >  --    GFRESTIMATED >90 81  --    < >  --    GFRESTBLACK >90 >90  --    < >  --    ALBUMIN 3.9 4.3 4.2  --   --    POTASSIUM 4.2 4.2  --   --   --    TSH  --   --   --   --  0.85    < > = values in this interval not displayed.       PHQ-2 ( 1999 Pfizer) 6/11/2020 7/17/2019   Q1: Little interest or pleasure in doing things 2 1   Q2: Feeling down, depressed or hopeless 3 1   PHQ-2 Score 5 2       PHQ-9 SCORE 3/16/2020 5/28/2020 6/11/2020 6/25/2020   PHQ-9 Total Score - - - -   PHQ-9 Total Score 5 13 16 4       RUBENS-7 SCORE 11/4/2019 3/16/2020 5/28/2020   Total Score - - -   Total Score 19 6 17       ACT Total Scores 3/25/2019 10/25/2019 5/28/2020   ACT TOTAL SCORE - - -   ASTHMA ER VISITS - - -   ASTHMA HOSPITALIZATIONS - - -   ACT TOTAL SCORE (Goal Greater than or Equal to 20) 14 20 17   In the past 12 months, how many times did you visit the emergency room for your asthma without being admitted to the hospital? 0 0 0   In the past 12 months, how many times were you hospitalized  overnight because of your asthma? 0 0 0           Shazia Elliott, BRAXTON  August 27, 2020 2:33 PM

## 2020-09-16 DIAGNOSIS — G47.00 PERSISTENT INSOMNIA: ICD-10-CM

## 2020-09-19 RX ORDER — TRAZODONE HYDROCHLORIDE 50 MG/1
50 TABLET, FILM COATED ORAL AT BEDTIME
Qty: 90 TABLET | Refills: 3 | Status: SHIPPED | OUTPATIENT
Start: 2020-09-19 | End: 2021-09-09

## 2020-09-19 NOTE — TELEPHONE ENCOUNTER
traZODone (DESYREL) 50 MG tablet      Last Written Prescription Date:  7/16/20  Last Fill Quantity: 30,   # refills: 1  Last Office Visit : 8/27/20 NP  Future Office visit:  None    High med alert    Warnings Override History for traZODone (DESYREL) 50 MG tablet [[id number]]     Overridden by Mali Colbert NP on Jul 16, 2020 5:20 PM    Drug-Drug    1. SELECTIVE SEROTONIN REUPTAKE INHIBITORS / SEROTONERGIC NON-OPIOID CNS DEPRESSANTS [Level: Major]    Other Orders:  PARoxetine (PAXIL) 40 MG tablet

## 2020-10-13 ENCOUNTER — VIRTUAL VISIT (OUTPATIENT)
Dept: FAMILY MEDICINE | Facility: OTHER | Age: 35
End: 2020-10-13
Payer: COMMERCIAL

## 2020-10-13 PROCEDURE — 99421 OL DIG E/M SVC 5-10 MIN: CPT | Performed by: FAMILY MEDICINE

## 2020-10-13 NOTE — PROGRESS NOTES
"Date: 10/13/2020 08:58:54  Clinician: Andrea Enamorado  Clinician NPI: [id number]  Patient: Holly Tolliver  Patient : 1985  Patient Address: [address] [place] [id number]  Patient Phone: [phone]  Visit Protocol: URI  Patient Summary:  Holly is a 35 year old ( : 1985 ) female who initiated a [facility] Visit for COVID-19 (Coronavirus) evaluation and screening. When asked the question \"Please sign me up to receive news, health information and promotions from [facility].\", Holly responded \"No\".    Holly states her symptoms started 1-2 days ago.   Her symptoms consist of ear pain, a headache, enlarged lymph nodes, nasal congestion, myalgia, malaise, and a sore throat.   Symptom details     Nasal secretions: The color of her mucus is clear.    Sore throat: Holly reports having moderate throat pain (4-6 on a 10 point pain scale), has exudate on her tonsils, and can swallow liquids. The lymph nodes in her neck are enlarged. A rash has not appeared on the skin since the sore throat started.     Headache: She states the headache is moderate (4-6 on a 10 point pain scale).      Holly denies having wheezing, fever, cough, anosmia, vomiting, rhinitis, nausea, facial pain or pressure, chills, teeth pain, ageusia, and diarrhea. She also denies taking antibiotic medication in the past month and having recent facial or sinus surgery in the past 60 days. She is not experiencing dyspnea.   Precipitating events  Within the past week, Holly has not been exposed to someone with strep throat. She has not recently been exposed to someone with influenza. Holly has been in close contact with the following high risk individuals: pregnant women, people with asthma, heart disease or diabetes, and children under the age of 5.   Pertinent COVID-19 (Coronavirus) information  In the past 14 days, Holly has not worked in a congregate living setting.   She does not work or volunteer as healthcare " worker or a  and does not work or volunteer in a healthcare facility.   Holly has lived in a congregate living setting in the past 14 days. She does not live with a healthcare worker.   Holly has not had a close contact with a laboratory-confirmed COVID-19 patient within 14 days of symptom onset.   Since December 2019, Holly and has not had upper respiratory infection or influenza-like illness. Has not been diagnosed with lab-confirmed COVID-19 test   Triage Point(s) temporarily suspended for COVID-19 (Coronavirus) screening  Holly reported the following symptoms which were previously protocol referral points. These protocol referral points have temporarily been removed for purposes of COVID-19 (Coronavirus) screening.   Meets at least 3/5 centor score criteria     Swollen lymph nodes    Exudate on tonsils    Absence of cough         Pertinent medical history  Holly does not get yeast infections when she takes antibiotics.   Holly does not need a return to work/school note.   Weight: 250 lbs   Holly does not smoke or use smokeless tobacco.   She denies pregnancy and denies breastfeeding. She has menstruated in the past month.   Weight: 250 lbs    MEDICATIONS: trazodone oral, Paxil oral, ALLERGIES: NKDA  Clinician Response:  Dear Holly,   Your symptoms show that you may have coronavirus (COVID-19). This illness can cause fever, cough and trouble breathing. Many people get a mild case and get better on their own. Some people can get very sick.  What should I do?  We would like to test you for this virus.   1. Please call [phone] to schedule your visit. Explain that [facility]. Be ready to share your [facility] number.  The following will serve as your written order for this COVID Test, ordered by me, for the indication of suspected COVID [Z20.828]: The test will be ordered in [employer], our electronic health record, after you are scheduled.  "It will show as ordered and authorized by Bijan Rich MD.  Order: COVID-19 (Coronavirus) PCR [facility]OMATIC [facility].      2. When it's time for your COVID test:  Stay at least 6 feet away from others. (If someone will drive you to your test, stay in the backseat, as far away from the  as you can.)   Cover your mouth and nose with a mask, tissue or washcloth.  Go straight to the testing site. Don't make any stops on the way there or back.      3.Starting now: Stay home and away from others (self-isolate) until:   You've had no fever---and no medicine that reduces fever---for one full day (24 hours). And...   Your other symptoms have gotten better. For example, your cough or breathing has improved. And...   At least 10 days have passed since your symptoms started.       During this time, don't leave the house except for testing or medical care.   Stay in your own room, even for meals. Use your own bathroom if you can.   Stay away from others in your home. No hugging, kissing or shaking hands. No visitors.  Don't go to work, school or anywhere else.    Clean \"high touch\" surfaces often (doorknobs, counters, handles, etc.). Use a household cleaning spray or wipes. You'll find a full list of  on the EPA website: www.epa.gov/pesticide-registration/list-n-disinfectants-use-against-sars-cov-2.   Cover your mouth and nose with a mask, tissue or washcloth to avoid spreading germs.  Wash your hands and face often. Use soap and water.  Caregivers in these groups are at risk for severe illness due to COVID-19:  o People 65 years and older  o People who live in a nursing home or long-term care facility  o People with chronic disease (lung, heart, cancer, diabetes, kidney, liver, immunologic)  o People who have a weakened immune system, including those who:   Are in cancer treatment  Take medicine that weakens the immune system, such as corticosteroids  Had a bone marrow or organ transplant  Have an " immune deficiency  Have poorly controlled HIV or AIDS  Are obese (body mass index of 40 or higher)  Smoke regularly   o Caregivers should wear gloves while washing dishes, handling laundry and cleaning bedrooms and bathrooms.  o Use caution when washing and drying laundry: Don't shake dirty laundry, and use the warmest water setting that you can.  o For more tips, go to www.cdc.gov/coronavirus/2019-ncov/downloads/10Things.pdf.    4.Sign up for [employer]. We know it's scary to hear that you might have COVID-19. We want to track your symptoms to make sure you're okay over the next 2 weeks. Please look for an email from [employer]---this is a free, online program that we'll use to keep in touch. To sign up, follow the link in the email. Learn more at [email or web address]  How can I take care of myself?   Get lots of rest. Drink extra fluids (unless a doctor has told you not to).   Take Tylenol (acetaminophen) for fever or pain. If you have liver or kidney problems, ask your family doctor if it's okay to take Tylenol.   Adults can take either:    650 mg (two 325 mg pills) every 4 to 6 hours, or...   1,000 mg (two 500 mg pills) every 8 hours as needed.    Note: Don't take more than 3,000 mg in one day. Acetaminophen is found in many medicines (both prescribed and over-the-counter medicines). Read all labels to be sure you don't take too much.   For children, check the Tylenol bottle for the right dose. The dose is based on the child's age or weight.    If you have other health problems (like cancer, heart failure, an organ transplant or severe kidney disease): Call your specialty clinic if you don't feel better in the next 2 days.       Know when to call 911. Emergency warning signs include:    Trouble breathing or shortness of breath Pain or pressure in the chest that doesn't go away Feeling confused like you haven't felt before, or not being able to wake up Bluish-colored lips or face.  Where can I  get more information?   [facility] -- About COVID-19: [email or web address]   CDC -- What to Do If You're Sick: www.cdc.gov/coronavirus/2019-ncov/about/steps-when-sick.html   [facility] -- Ending Home Isolation: www.cdc.gov/coronavirus/2019-ncov/hcp/disposition-in-home-patients.html   [facility] -- Caring for Someone: www.cdc.gov/coronavirus/2019-ncov/if-you-are-sick/care-for-someone.html   [facility] -- Interim Guidance for Hospital Discharge to Home: www.[facility].[place]/diseases/coronavirus/hcp/hospdischarge.pdf   [facility] clinical trials (COVID-19 research studies): clinicalaffairs.[facility]-clinical-trials    Below are the COVID-19 hotlines at the [place] Department of [facility] ([facility]). Interpreters are available.    For health questions: Call 181-433-1798 or 1-571.382.1064 (7 a.m. to 7 p.m.) For questions about schools and childcare: Call 832-012-6665 or 1-627.282.4959 (7 a.m. to 7 p.m.)    Diagnosis: Other malaise  Diagnosis ICD: R53.81

## 2020-11-16 ENCOUNTER — HEALTH MAINTENANCE LETTER (OUTPATIENT)
Age: 35
End: 2020-11-16

## 2020-12-15 ENCOUNTER — TELEPHONE (OUTPATIENT)
Dept: FAMILY MEDICINE | Facility: CLINIC | Age: 35
End: 2020-12-15

## 2020-12-15 NOTE — LETTER
December 15, 2020      Holly Tolliver  [address]   [place] [address]        Dear Holly,     Your healthcare team cares about your health. To provide you with the best care, we have reviewed your chart and based on our findings, we see that you are due for:   1.  An Asthma Control Test (ACT) that our clinic uses to monitor and manage your asthma. This test is an assessment tool that we use to determine how well your asthma is controlled.  Please complete the enclosed form and return in the provided envelope.  2.  An update on the depression and anxiety questionnaires.  These tools help your provider to monitor and manage your symptoms and treatment plan.  Please complete the enclosed questionnaires and send back to the clinic in the provided envelope.  Thank you for choosing [facility] for your healthcare needs. For any questions, concerns, or to schedule an appointment please contact the clinic.   Healthy Regards,    Your [facility] Care Team

## 2020-12-15 NOTE — TELEPHONE ENCOUNTER
Patient Quality Outreach Summary      Summary:    Patient is due/failing the following:   ACT needed and PHQ-9 Needed    Type of outreach:    Sent letter.    Questions for provider review:    None                                                                                                                    NORMA Pete MA

## 2021-02-01 ENCOUNTER — ANCILLARY PROCEDURE (OUTPATIENT)
Dept: GENERAL RADIOLOGY | Facility: CLINIC | Age: 36
End: 2021-02-01
Attending: NURSE PRACTITIONER
Payer: COMMERCIAL

## 2021-02-01 ENCOUNTER — OFFICE VISIT (OUTPATIENT)
Dept: FAMILY MEDICINE | Facility: CLINIC | Age: 36
End: 2021-02-01
Payer: COMMERCIAL

## 2021-02-01 VITALS
TEMPERATURE: 96.7 F | SYSTOLIC BLOOD PRESSURE: 120 MMHG | HEIGHT: 72 IN | HEART RATE: 83 BPM | DIASTOLIC BLOOD PRESSURE: 70 MMHG | WEIGHT: 241.1 LBS | BODY MASS INDEX: 32.66 KG/M2 | OXYGEN SATURATION: 97 %

## 2021-02-01 DIAGNOSIS — S99.921A TOE INJURY, RIGHT, INITIAL ENCOUNTER: ICD-10-CM

## 2021-02-01 DIAGNOSIS — B96.89 BV (BACTERIAL VAGINOSIS): Primary | ICD-10-CM

## 2021-02-01 DIAGNOSIS — M79.641 PAIN OF RIGHT HAND: ICD-10-CM

## 2021-02-01 DIAGNOSIS — H66.001 NON-RECURRENT ACUTE SUPPURATIVE OTITIS MEDIA OF RIGHT EAR WITHOUT SPONTANEOUS RUPTURE OF TYMPANIC MEMBRANE: ICD-10-CM

## 2021-02-01 DIAGNOSIS — N76.0 BV (BACTERIAL VAGINOSIS): Primary | ICD-10-CM

## 2021-02-01 LAB
ALBUMIN UR-MCNC: NEGATIVE MG/DL
APPEARANCE UR: ABNORMAL
BACTERIA #/AREA URNS HPF: ABNORMAL /HPF
BILIRUB UR QL STRIP: NEGATIVE
COLOR UR AUTO: YELLOW
GLUCOSE UR STRIP-MCNC: NEGATIVE MG/DL
HGB UR QL STRIP: ABNORMAL
KETONES UR STRIP-MCNC: NEGATIVE MG/DL
LEUKOCYTE ESTERASE UR QL STRIP: NEGATIVE
NITRATE UR QL: NEGATIVE
NON-SQ EPI CELLS #/AREA URNS LPF: ABNORMAL /LPF
PH UR STRIP: 5 PH (ref 5–7)
RBC #/AREA URNS AUTO: ABNORMAL /HPF
SOURCE: ABNORMAL
SP GR UR STRIP: 1.02 (ref 1–1.03)
SPECIMEN SOURCE: ABNORMAL
UROBILINOGEN UR STRIP-ACNC: 0.2 EU/DL (ref 0.2–1)
WBC #/AREA URNS AUTO: ABNORMAL /HPF
WET PREP SPEC: ABNORMAL

## 2021-02-01 PROCEDURE — 73660 X-RAY EXAM OF TOE(S): CPT | Mod: RT | Performed by: RADIOLOGY

## 2021-02-01 PROCEDURE — 99214 OFFICE O/P EST MOD 30 MIN: CPT | Performed by: NURSE PRACTITIONER

## 2021-02-01 PROCEDURE — 87210 SMEAR WET MOUNT SALINE/INK: CPT | Performed by: NURSE PRACTITIONER

## 2021-02-01 PROCEDURE — 81001 URINALYSIS AUTO W/SCOPE: CPT | Performed by: NURSE PRACTITIONER

## 2021-02-01 PROCEDURE — 87491 CHLMYD TRACH DNA AMP PROBE: CPT | Performed by: NURSE PRACTITIONER

## 2021-02-01 PROCEDURE — 87591 N.GONORRHOEAE DNA AMP PROB: CPT | Performed by: NURSE PRACTITIONER

## 2021-02-01 RX ORDER — METRONIDAZOLE 500 MG/1
500 TABLET ORAL 2 TIMES DAILY
Qty: 14 TABLET | Refills: 0 | Status: SHIPPED | OUTPATIENT
Start: 2021-02-01 | End: 2021-02-05

## 2021-02-01 RX ORDER — AMOXICILLIN 875 MG
875 TABLET ORAL 2 TIMES DAILY
Qty: 14 TABLET | Refills: 0 | Status: SHIPPED | OUTPATIENT
Start: 2021-02-01 | End: 2021-02-04

## 2021-02-01 ASSESSMENT — ASTHMA QUESTIONNAIRES
QUESTION_3 LAST FOUR WEEKS HOW OFTEN DID YOUR ASTHMA SYMPTOMS (WHEEZING, COUGHING, SHORTNESS OF BREATH, CHEST TIGHTNESS OR PAIN) WAKE YOU UP AT NIGHT OR EARLIER THAN USUAL IN THE MORNING: NOT AT ALL
QUESTION_4 LAST FOUR WEEKS HOW OFTEN HAVE YOU USED YOUR RESCUE INHALER OR NEBULIZER MEDICATION (SUCH AS ALBUTEROL): ONE OR TWO TIMES PER DAY
QUESTION_1 LAST FOUR WEEKS HOW MUCH OF THE TIME DID YOUR ASTHMA KEEP YOU FROM GETTING AS MUCH DONE AT WORK, SCHOOL OR AT HOME: A LITTLE OF THE TIME
ACT_TOTALSCORE: 15
QUESTION_2 LAST FOUR WEEKS HOW OFTEN HAVE YOU HAD SHORTNESS OF BREATH: MORE THAN ONCE A DAY
QUESTION_5 LAST FOUR WEEKS HOW WOULD YOU RATE YOUR ASTHMA CONTROL: SOMEWHAT CONTROLLED

## 2021-02-01 ASSESSMENT — MIFFLIN-ST. JEOR: SCORE: 1905.62

## 2021-02-01 ASSESSMENT — ANXIETY QUESTIONNAIRES
IF YOU CHECKED OFF ANY PROBLEMS ON THIS QUESTIONNAIRE, HOW DIFFICULT HAVE THESE PROBLEMS MADE IT FOR YOU TO DO YOUR WORK, TAKE CARE OF THINGS AT HOME, OR GET ALONG WITH OTHER PEOPLE: EXTREMELY DIFFICULT
3. WORRYING TOO MUCH ABOUT DIFFERENT THINGS: SEVERAL DAYS
2. NOT BEING ABLE TO STOP OR CONTROL WORRYING: SEVERAL DAYS
GAD7 TOTAL SCORE: 7
1. FEELING NERVOUS, ANXIOUS, OR ON EDGE: SEVERAL DAYS
5. BEING SO RESTLESS THAT IT IS HARD TO SIT STILL: NOT AT ALL
6. BECOMING EASILY ANNOYED OR IRRITABLE: NEARLY EVERY DAY
7. FEELING AFRAID AS IF SOMETHING AWFUL MIGHT HAPPEN: NOT AT ALL

## 2021-02-01 ASSESSMENT — PATIENT HEALTH QUESTIONNAIRE - PHQ9
5. POOR APPETITE OR OVEREATING: SEVERAL DAYS
SUM OF ALL RESPONSES TO PHQ QUESTIONS 1-9: 22

## 2021-02-01 NOTE — PATIENT INSTRUCTIONS
You have an ear infection.  Please begin taking the antibiotic, amoxicillin, to treat this infection.  You will take this twice daily for 7 days.  Please complete all the medication.    Please use Tylenol 650mg every 4 hours or ibuprofen 600mg every 6 hours by mouth for pain/fever.  Do not exceed 4000mg of acetaminophen or 2400mg of ibuprofen from any source in a 24 hour period.  Taking Tylenol and ibuprofen together may be helpful in reducing pain.    If you are still having symptoms following completing the medication, please see your primary care provider for a recheck.  Return to clinic with new or worsening symptoms.    Can try Voltaren gel to your hand.    Continue to tape your toes. Can use a post op shoe for comfort.    You have bacterial vaginosis.    See below for more information regarding bacterial vaginosis.    Take metronidazole as prescribed x 7 days.  Do not drink any alcohol while taking this medication or for 7 days after completing it as it can make you very ill. Take a probiotic while on the antibiotic.    Follow up with primary care provider if no improvement or worsening in 1 week.      Bacterial Vaginosis  Bacterial vaginosis is a vaginal infection that occurs when the normal balance of bacteria in the vagina is disrupted. It results from an overgrowth of certain bacteria. This is the most common vaginal infection in women of childbearing age. Treatment is important to prevent complications, especially in pregnant women, as it can cause a premature delivery.  CAUSES   Bacterial vaginosis is caused by an increase in harmful bacteria that are normally present in smaller amounts in the vagina. Several different kinds of bacteria can cause bacterial vaginosis. However, the reason that the condition develops is not fully understood.  RISK FACTORS  Certain activities or behaviors can put you at an increased risk of developing bacterial vaginosis, including:    Having a new sex partner or multiple  sex partners.    Douching.    Using an intrauterine device (IUD) for contraception.  Women do not get bacterial vaginosis from toilet seats, bedding, swimming pools, or contact with objects around them.  SIGNS AND SYMPTOMS   Some women with bacterial vaginosis have no signs or symptoms. Common symptoms include:    Grey vaginal discharge.    A fishlike odor with discharge, especially after sexual intercourse.    Itching or burning of the vagina and vulva.    Burning or pain with urination.  DIAGNOSIS   Your health care provider will take a medical history and examine the vagina for signs of bacterial vaginosis. A sample of vaginal fluid may be taken. Your health care provider will look at this sample under a microscope to check for bacteria and abnormal cells. A vaginal pH test may also be done.   TREATMENT   Bacterial vaginosis may be treated with antibiotic medicines. These may be given in the form of a pill or a vaginal cream. A second round of antibiotics may be prescribed if the condition comes back after treatment. Because bacterial vaginosis increases your risk for sexually transmitted diseases, getting treated can help reduce your risk for chlamydia, gonorrhea, HIV, and herpes.  HOME CARE INSTRUCTIONS     Only take over-the-counter or prescription medicines as directed by your health care provider.    If antibiotic medicine was prescribed, take it as directed. Make sure you finish it even if you start to feel better.    Tell all sexual partners that you have a vaginal infection. They should see their health care provider and be treated if they have problems, such as a mild rash or itching.    During treatment, it is important that you follow these instructions:    Avoid sexual activity or use condoms correctly.    Do not douche.    Avoid alcohol as directed by your health care provider.    Avoid breastfeeding as directed by your health care provider.  SEEK MEDICAL CARE IF:     Your symptoms are not improving  after 3 days of treatment.    You have increased discharge or pain.    You have a fever.  MAKE SURE YOU:     Understand these instructions.    Will watch your condition.    Will get help right away if you are not doing well or get worse.  FOR MORE INFORMATION   Centers for Disease Control and Prevention, Division of STD Prevention: www.cdc.gov/std  American Sexual Health Association (BRE): www.ashastd.org      This information is not intended to replace advice given to you by your health care provider. Make sure you discuss any questions you have with your health care provider.     Document Released: 12/18/2006 Document Revised: 10/06/2015 Document Reviewed: 07/30/2014  ExitCare  Patient Information  2015 [employer]

## 2021-02-01 NOTE — PROGRESS NOTES
Assessment & Plan     BV (bacterial vaginosis)  UA negative for infection, some clue cells on wet prep. Given that she is symptomatic, will start metronidazole. GC/chlamydia pending.  - *UA reflex to Microscopic and Culture ([place] and [facility] (except [place] and [place])  - Urine Microscopic  - Wet prep  - NEISSERIA GONORRHOEA PCR  - CHLAMYDIA TRACHOMATIS PCR  - metroNIDAZOLE (FLAGYL) 500 MG tablet; Take 1 tablet (500 mg) by mouth 2 times daily for 7 days    Toe injury, right, initial encounter  No acute fracture. Symptomatic care advised.  - XR Toe Right G/E 2 Views  - POST OP SHOE DELUXE FEMALE M    Non-recurrent acute suppurative otitis media of right ear without spontaneous rupture of tympanic membrane  In the setting of URI, will treat AOM with amoxicillin x 7 days.  - amoxicillin (AMOXIL) 875 MG tablet; Take 1 tablet (875 mg) by mouth 2 times daily for 7 days    Pain of right hand  Exam normal, no splinter noted. Pain at MCPJ, can try Volatren gel, oral NSAIDs, icing.         BMI:   Estimated body mass index is 32.7 kg/m  as calculated from the following:    Height as of this encounter: 1.829 m (6').    Weight as of this encounter: 109.4 kg (241 lb 1.6 oz).         Patient Instructions   You have an ear infection.  Please begin taking the antibiotic, amoxicillin, to treat this infection.  You will take this twice daily for 7 days.  Please complete all the medication.    Please use Tylenol 650mg every 4 hours or ibuprofen 600mg every 6 hours by mouth for pain/fever.  Do not exceed 4000mg of acetaminophen or 2400mg of ibuprofen from any source in a 24 hour period.  Taking Tylenol and ibuprofen together may be helpful in reducing pain.    If you are still having symptoms following completing the medication, please see your primary care provider for a recheck.  Return to clinic with new or worsening symptoms.    Can try Voltaren gel to your hand.    Continue to tape your toes. Can use a post op shoe  for comfort.    You have bacterial vaginosis.    See below for more information regarding bacterial vaginosis.    Take metronidazole as prescribed x 7 days.  Do not drink any alcohol while taking this medication or for 7 days after completing it as it can make you very ill. Take a probiotic while on the antibiotic.    Follow up with primary care provider if no improvement or worsening in 1 week.      Bacterial Vaginosis  Bacterial vaginosis is a vaginal infection that occurs when the normal balance of bacteria in the vagina is disrupted. It results from an overgrowth of certain bacteria. This is the most common vaginal infection in women of childbearing age. Treatment is important to prevent complications, especially in pregnant women, as it can cause a premature delivery.  CAUSES   Bacterial vaginosis is caused by an increase in harmful bacteria that are normally present in smaller amounts in the vagina. Several different kinds of bacteria can cause bacterial vaginosis. However, the reason that the condition develops is not fully understood.  RISK FACTORS  Certain activities or behaviors can put you at an increased risk of developing bacterial vaginosis, including:    Having a new sex partner or multiple sex partners.    Douching.    Using an intrauterine device (IUD) for contraception.  Women do not get bacterial vaginosis from toilet seats, bedding, swimming pools, or contact with objects around them.  SIGNS AND SYMPTOMS   Some women with bacterial vaginosis have no signs or symptoms. Common symptoms include:    Grey vaginal discharge.    A fishlike odor with discharge, especially after sexual intercourse.    Itching or burning of the vagina and vulva.    Burning or pain with urination.  DIAGNOSIS   Your health care provider will take a medical history and examine the vagina for signs of bacterial vaginosis. A sample of vaginal fluid may be taken. Your health care provider will look at this sample under a  microscope to check for bacteria and abnormal cells. A vaginal pH test may also be done.   TREATMENT   Bacterial vaginosis may be treated with antibiotic medicines. These may be given in the form of a pill or a vaginal cream. A second round of antibiotics may be prescribed if the condition comes back after treatment. Because bacterial vaginosis increases your risk for sexually transmitted diseases, getting treated can help reduce your risk for chlamydia, gonorrhea, HIV, and herpes.  HOME CARE INSTRUCTIONS     Only take over-the-counter or prescription medicines as directed by your health care provider.    If antibiotic medicine was prescribed, take it as directed. Make sure you finish it even if you start to feel better.    Tell all sexual partners that you have a vaginal infection. They should see their health care provider and be treated if they have problems, such as a mild rash or itching.    During treatment, it is important that you follow these instructions:    Avoid sexual activity or use condoms correctly.    Do not douche.    Avoid alcohol as directed by your health care provider.    Avoid breastfeeding as directed by your health care provider.  SEEK MEDICAL CARE IF:     Your symptoms are not improving after 3 days of treatment.    You have increased discharge or pain.    You have a fever.  MAKE SURE YOU:     Understand these instructions.    Will watch your condition.    Will get help right away if you are not doing well or get worse.  FOR MORE INFORMATION   Centers for Disease Control and Prevention, Division of STD Prevention: www.cdc.gov/std  American Sexual Health Association (BRE): www.ashastd.org      This information is not intended to replace advice given to you by your health care provider. Make sure you discuss any questions you have with your health care provider.     Document Released: 12/18/2006 Document Revised: 10/06/2015 Document Reviewed: 07/30/2014  ExitCare  Patient Information  2015  [employer]        Return in about 1 month (around 3/1/2021) for worsening or continued symptoms.    WOLFGANG Celis CNP  [facility] is a 35 year old who presents to clinic today for the following health issues     HPI   Chief Complaint   Patient presents with     Ear Problem     Left Ear - hard time hearing, plugged feeling, pain      Toe Pain     Right second toe pain      Urinary Problem     Derm Problem     possible foreign object in palm of Right Hand      Health Maintenance     declined flu shot      LAB REQUEST     patient requesting vitamin D level be checked         Musculoskeletal problem/pain  Onset/Duration: x 1 week   Description  Location: Right Second Toe    Joint Swelling: no  Redness: no  Pain: YES  Warmth: yes  Intensity:  Mild to severe   Progression of Symptoms:  same  Accompanying signs and symptoms:   Fevers: no  Numbness/tingling/weakness: YES  History  Trauma to the area: YES- stubbed toe very hard   Recent illness:  no  Previous similar problem: no  Previous evaluation:  no  Precipitating or alleviating factors:  Aggravating factors include: walking and climbing stairs  Therapies tried and outcome: jonathan taping toe to toe next to it  - does give relief     Genitourinary - Female  Onset/Duration: x 1 week   Description:   Painful urination (Dysuria): YES           Frequency: YES  Blood in urine (Hematuria): no  Does see particles in urine  Odor to the urine   Delay in urine (Hesitency): YES  Intensity: mild  Progression of Symptoms:  improving  Accompanying Signs & Symptoms:  Fever/chills: hot and cold feeling last week  Flank pain: no  Nausea and vomiting: YES- Both   Vaginal symptoms: itching and pain last week - None now   Abdominal/Pelvic Pain: YES  History:   History of frequent UTI s: No  History of kidney stones: no  Sexually Active: YES  Possibility of pregnancy: No  Precipitating or alleviating factors: None  Therapies tried  and outcome: Increase fluid intake    Above HPI reviewed. Additionally, increased fluids and urinary hesitance improved. Does have strong vaginal odor. Some concern for STI      Concern - Left Ear Plugged   Onset: x 3 week   Description: Left Ear plugged, pain   Intensity: mild  Progression of Symptoms:  same  Accompanying Signs & Symptoms: none   Previous history of similar problem: Did have tubes placed as a child   Precipitating factors:        Worsened by: none   Alleviating factors:        Improved by: none   Therapies tried and outcome:  none     Above HPI reviewed. Additionally, has had ongoing pain, also had URI symptoms. No current fevers, cough, has had runny nose.      Concern - Foreign Object in Palm of Right Hand   Onset: 2 weeks ago  Description: she feels like there is a sliver or object in palm of Right hand . Hurts to touch   Intensity: mild  Progression of Symptoms:  same  Accompanying Signs & Symptoms: some swelling   Previous history of similar problem: none   Precipitating factors:        Worsened by: none   Alleviating factors:        Improved by: none   Therapies tried and outcome:  none     Above HPI reviewed. Additionally, pain at base of 5th metacarpal. No reason to believe she has splinter. No pain with movement of finger.        Review of Systems   Constitutional, HEENT, cardiovascular, pulmonary, gi and gu systems are negative, except as otherwise noted.      Objective    /70 (BP Location: Left arm, Patient Position: Chair, Cuff Size: Adult Large)   Pulse 83   Temp 96.7  F (35.9  C) (Tympanic)   Ht 1.829 m (6')   Wt 109.4 kg (241 lb 1.6 oz)   LMP 01/28/2021   SpO2 97%   Breastfeeding No   BMI 32.70 kg/m    Body mass index is 32.7 kg/m .  Physical Exam  Vitals signs and nursing note reviewed.   Constitutional:       General: She is not in acute distress.     Appearance: Normal appearance.   HENT:      Head: Normocephalic and atraumatic.      Right Ear: Tympanic membrane and  ear canal normal.      Left Ear: Ear canal normal. A middle ear effusion is present. Tympanic membrane is erythematous and bulging.      Nose: Nose normal. No rhinorrhea.      Right Sinus: No maxillary sinus tenderness or frontal sinus tenderness.      Left Sinus: No maxillary sinus tenderness or frontal sinus tenderness.      Mouth/Throat:      Lips: Pink.      Mouth: Mucous membranes are moist.      Pharynx: Oropharynx is clear.   Eyes:      General: Lids are normal.      Conjunctiva/sclera: Conjunctivae normal.      Comments: Non icteric   Neck:      Musculoskeletal: Neck supple.   Cardiovascular:      Rate and Rhythm: Normal rate.      Pulses: Normal pulses.      Heart sounds: S1 normal and S2 normal.   Pulmonary:      Effort: Pulmonary effort is normal.      Breath sounds: Normal air entry.   Abdominal:      Tenderness: There is no right CVA tenderness or left CVA tenderness.   Musculoskeletal:      Comments: Right second toe - no deformity, mild swelling.    Right hand - mild swelling noted at 5th MCPJ, TTP. Normal ROM and CMS   Lymphadenopathy:      Cervical: No cervical adenopathy.   Skin:     General: Skin is warm and dry.   Neurological:      General: No focal deficit present.      Mental Status: She is alert and oriented to person, place, and time.   Psychiatric:         Mood and Affect: Mood normal.         Behavior: Behavior normal.         Thought Content: Thought content normal.         Judgment: Judgment normal.            Results for orders placed or performed in visit on 02/01/21 (from the past 24 hour(s))   *UA reflex to Microscopic and Culture ([place] and [facility] (except [place] and [place])    Specimen: Midstream Urine   Result Value Ref Range    Color Urine Yellow     Appearance Urine Slightly Cloudy     Glucose Urine Negative NEG^Negative mg/dL    Bilirubin Urine Negative NEG^Negative    Ketones Urine Negative NEG^Negative mg/dL    Specific Gravity Urine 1.025 1.003 - 1.035     Blood Urine Small (A) NEG^Negative    pH Urine 5.0 5.0 - 7.0 pH    Protein Albumin Urine Negative NEG^Negative mg/dL    Urobilinogen Urine 0.2 0.2 - 1.0 EU/dL    Nitrite Urine Negative NEG^Negative    Leukocyte Esterase Urine Negative NEG^Negative    Source Midstream Urine    Urine Microscopic   Result Value Ref Range    WBC Urine 0 - 5 OTO5^0 - 5 /HPF    RBC Urine 2-5 (A) OTO2^O - 2 /HPF    Squamous Epithelial /LPF Urine Moderate (A) FEW^Few /LPF    Bacteria Urine Moderate (A) NEG^Negative /HPF   Wet prep    Specimen: Vagina   Result Value Ref Range    Specimen Description Vagina     Wet Prep No Trichomonas seen     Wet Prep Clue cells seen  Rare   (A)     Wet Prep No yeast seen     Wet Prep WBC'S seen  Rare      XR Toe Right G/E 2 Views    Narrative    XR TOE RIGHT G/E 2 VIEWS  2/1/2021 4:46 PM     HISTORY: trauma to second toe; Toe injury, right, initial encounter    COMPARISON: None      Impression    IMPRESSION: Somewhat limited evaluation due to patient positioning. No  acute fracture is identified. There is normal joint spacing and  alignment.     JOSE D SCOTT MD

## 2021-02-02 ASSESSMENT — ASTHMA QUESTIONNAIRES: ACT_TOTALSCORE: 15

## 2021-02-02 ASSESSMENT — ANXIETY QUESTIONNAIRES: GAD7 TOTAL SCORE: 7

## 2021-02-04 ENCOUNTER — TELEPHONE (OUTPATIENT)
Dept: FAMILY MEDICINE | Facility: CLINIC | Age: 36
End: 2021-02-04

## 2021-02-04 DIAGNOSIS — H66.001 NON-RECURRENT ACUTE SUPPURATIVE OTITIS MEDIA OF RIGHT EAR WITHOUT SPONTANEOUS RUPTURE OF TYMPANIC MEMBRANE: ICD-10-CM

## 2021-02-04 DIAGNOSIS — B96.89 BV (BACTERIAL VAGINOSIS): ICD-10-CM

## 2021-02-04 DIAGNOSIS — N76.0 BV (BACTERIAL VAGINOSIS): ICD-10-CM

## 2021-02-04 RX ORDER — AMOXICILLIN 875 MG
875 TABLET ORAL 2 TIMES DAILY
Qty: 14 TABLET | Refills: 0 | Status: CANCELLED | OUTPATIENT
Start: 2021-02-04

## 2021-02-04 NOTE — TELEPHONE ENCOUNTER
Pended prescription with only 10 tablets, as rx was prescribed 2/1/21.    Provider please review and advise. Thank you.

## 2021-02-04 NOTE — TELEPHONE ENCOUNTER
Reason for Call:  Other prescription    Detailed comments: Patient lost her antibiotic prescription for her ear pain while in the process of moving. Patient called the pharmacy and they told her she needs to follow up with Dr. Vela.     Patient has not taken an antibiotic in over 24 hours.    Phone Number Patient can be reached at: Cell number on file:    Telephone Information:   Mobile [phone]       Best Time: Anytime    Can we leave a detailed message on this number? YES    Call taken on 2/4/2021 at 5:00 PM by Aristeo Paul

## 2021-02-05 RX ORDER — AMOXICILLIN 875 MG
875 TABLET ORAL 2 TIMES DAILY
Qty: 10 TABLET | Refills: 0 | Status: SHIPPED | OUTPATIENT
Start: 2021-02-05 | End: 2021-09-09

## 2021-02-05 RX ORDER — METRONIDAZOLE 500 MG/1
500 TABLET ORAL 2 TIMES DAILY
Qty: 14 TABLET | Refills: 0 | Status: SHIPPED | OUTPATIENT
Start: 2021-02-05 | End: 2021-09-09

## 2021-07-13 ENCOUNTER — APPOINTMENT (OUTPATIENT)
Dept: GENERAL RADIOLOGY | Facility: CLINIC | Age: 36
End: 2021-07-13
Attending: PHYSICIAN ASSISTANT
Payer: COMMERCIAL

## 2021-07-13 ENCOUNTER — HOSPITAL ENCOUNTER (EMERGENCY)
Facility: CLINIC | Age: 36
Discharge: HOME | End: 2021-07-13
Attending: PHYSICIAN ASSISTANT | Admitting: PHYSICIAN ASSISTANT
Payer: COMMERCIAL

## 2021-07-13 VITALS
HEART RATE: 89 BPM | OXYGEN SATURATION: 98 % | BODY MASS INDEX: 30.1 KG/M2 | DIASTOLIC BLOOD PRESSURE: 75 MMHG | TEMPERATURE: 97.4 F | SYSTOLIC BLOOD PRESSURE: 111 MMHG | HEIGHT: 71 IN | WEIGHT: 215 LBS

## 2021-07-13 DIAGNOSIS — S93.401A MODERATE RIGHT ANKLE SPRAIN, INITIAL ENCOUNTER: ICD-10-CM

## 2021-07-13 PROCEDURE — 99214 OFFICE O/P EST MOD 30 MIN: CPT | Performed by: PHYSICIAN ASSISTANT

## 2021-07-13 PROCEDURE — 73110 X-RAY EXAM OF WRIST: CPT | Mod: RT

## 2021-07-13 PROCEDURE — 250N000013 HC RX MED GY IP 250 OP 250 PS 637: Performed by: PHYSICIAN ASSISTANT

## 2021-07-13 PROCEDURE — 73630 X-RAY EXAM OF FOOT: CPT | Mod: RT

## 2021-07-13 PROCEDURE — 73610 X-RAY EXAM OF ANKLE: CPT | Mod: RT

## 2021-07-13 PROCEDURE — G0463 HOSPITAL OUTPT CLINIC VISIT: HCPCS | Performed by: PHYSICIAN ASSISTANT

## 2021-07-13 RX ORDER — HYDROCODONE BITARTRATE AND ACETAMINOPHEN 5; 325 MG/1; MG/1
1 TABLET ORAL ONCE
Status: COMPLETED | OUTPATIENT
Start: 2021-07-13 | End: 2021-07-13

## 2021-07-13 RX ORDER — HYDROCODONE BITARTRATE AND ACETAMINOPHEN 5; 325 MG/1; MG/1
1 TABLET ORAL EVERY 6 HOURS PRN
Qty: 10 TABLET | Refills: 0 | Status: SHIPPED | OUTPATIENT
Start: 2021-07-13 | End: 2021-07-16

## 2021-07-13 RX ORDER — HYDROXYZINE HYDROCHLORIDE 25 MG/1
50 TABLET, FILM COATED ORAL ONCE
Status: COMPLETED | OUTPATIENT
Start: 2021-07-13 | End: 2021-07-13

## 2021-07-13 RX ADMIN — HYDROXYZINE HYDROCHLORIDE 50 MG: 25 TABLET ORAL at 12:53

## 2021-07-13 RX ADMIN — HYDROCODONE BITARTRATE AND ACETAMINOPHEN 1 TABLET: 5; 325 TABLET ORAL at 12:47

## 2021-07-13 ASSESSMENT — MIFFLIN-ST. JEOR: SCORE: 1766.36

## 2021-07-13 ASSESSMENT — ENCOUNTER SYMPTOMS
JOINT SWELLING: 1
CARDIOVASCULAR NEGATIVE: 1
GASTROINTESTINAL NEGATIVE: 1
CONSTITUTIONAL NEGATIVE: 1
RESPIRATORY NEGATIVE: 1

## 2021-07-13 NOTE — ED PROVIDER NOTES
History     Chief Complaint   Patient presents with     Foot Injury     rt foot injury     The history is provided by the patient.     Holly Tolliver is a 36 year old female who presents with right foot pain after a fall from the steps while going into her backyard last night.  The patient was initially able to walk on her right ankle last night but developed moderate pain this morning.  She placed ice on the ankle immediately after injury which helped reduce the swelling.  Also took 600 mg of ibuprofen about 2 hours ago which has not helped much with the pain.  The patient rates her pain as 8/10 currently.  She is unable to walk on her right lower extremity.  Has feeling in her big toe but states that she cannot feel her second third and fourth toes on the right foot.  She broke her fall with the right wrist, has been mild bruising and swelling around the right wrist.  States that she has sprained her right ankle 3 times previously.  Denies trauma to her head, no nausea or vomiting, no headaches, no acute vision changes, no memory loss.  No previous surgeries to the right foot or ankle.  Has normal strength in the right lower extremity.    Allergies:  No Known Allergies    Problem List:    Patient Active Problem List    Diagnosis Date Noted     Obesity (BMI 35.0-39.9) with comorbidity (H) 2019     Priority: Medium     Routine postpartum follow-up 2016     Priority: Medium     Mirena IUD 2016     Priority: Medium     Lot: [id number]  Exp:        Single liveborn, born in hospital, delivered 2016     Priority: Medium     Placental abruption in third trimester 2016     Priority: Medium     Placental abruption 2016     Priority: Medium      delivery delivered 2016     Priority: Medium     Prenatal care, subsequent pregnancy 2016     Priority: Medium     Prenatal care, subsequent pregnancy in third trimester 2016     Priority: Medium     Health Care  Home 2015     Priority: Medium     Care Coordinator: Kelly DANIEL, MSW  See letters for Health Care home care plan    SW covering in [place], [place] SW absence    FREDY Luz, MSW   [phone]  3/24/2015 12:24 PM       Generalized anxiety disorder 2013     Priority: Medium     Diagnosis updated by automated process. Provider to review and confirm.       GERD (gastroesophageal reflux disease) 2013     Priority: Medium     Major depressive disorder, single episode, moderate (H) 2013     Priority: Medium     CARDIOVASCULAR SCREENING; LDL GOAL LESS THAN 160 10/23/2012     Priority: Medium     CTS (carpal tunnel syndrome) 10/14/2011     Priority: Medium     Tobacco use disorder 2008     Priority: Medium     Has decreased from 1 ppd to 1 cigarette per day since pregnancy.       Mild intermittent asthma 2008     Priority: Medium        Past Medical History:    Past Medical History:   Diagnosis Date     ALCOH DEP NEC/NOS-UNSPEC 2007     Asthma      Chickenpox      Postpartum depression      Pregnancy induced hypertension      Urinary tract infections        Past Surgical History:    Past Surgical History:   Procedure Laterality Date      SECTION Bilateral 2016    Procedure:  SECTION;  Surgeon: Beau Cardoso MD;  Location: [place] OR     PE TUBES  age 3       Family History:    Family History   Problem Relation Age of Onset     Alcohol/Drug Mother         alcohol- recovered     Hypertension Mother      Depression Mother         also anxiety     Other - See Comments Mother         ankylosing spondylosis     Anxiety Disorder Mother      Alcohol/Drug Father         alcohol- recovered     Alcohol/Drug Sister         A&D     Cancer Maternal Grandmother         lung     Depression Maternal Grandmother         also anxiety     Mental Illness Paternal Grandmother      Alcohol/Drug Brother         alcohol       Social History:  Marital Status:   "Single [1]  Social History     Tobacco Use     Smoking status: Former Smoker     Packs/day: 0.00     Years: 12.00     Pack years: 0.00     Types: Cigarettes     Smokeless tobacco: Never Used     Tobacco comment: Never   Substance Use Topics     Alcohol use: Not Currently     Alcohol/week: 0.0 standard drinks     Comment: attempting sobriety     Drug use: Not Currently     Types: Methamphetamines, Marijuana     Comment: relapse, attempting sobriety        Medications:    HYDROcodone-acetaminophen (NORCO) 5-325 MG tablet  metroNIDAZOLE (FLAGYL) 500 MG tablet  acetaminophen (TYLENOL) 500 MG tablet  albuterol (PROAIR HFA/PROVENTIL HFA/VENTOLIN HFA) 108 (90 Base) MCG/ACT inhaler  amoxicillin (AMOXIL) 875 MG tablet  ciclopirox (LOPROX) 0.77 % cream  HYDROXYZINE HCL PO  ibuprofen (ADVIL/MOTRIN) 200 MG tablet  ketoconazole (NIZORAL) 2 % external shampoo  Misc. Devices (CRUTCHES-ALUMINUM) MISC  norelgestromin-ethinyl estradiol (ORTHO EVRA) 150-35 MCG/24HR patch  PARoxetine (PAXIL) 40 MG tablet  senna-docusate (SENEXON-S) 8.6-50 MG tablet  traZODone (DESYREL) 50 MG tablet          Review of Systems   Constitutional: Negative.    HENT: Negative.    Respiratory: Negative.    Cardiovascular: Negative.    Gastrointestinal: Negative.    Musculoskeletal: Positive for gait problem and joint swelling.   Skin: Negative.        Physical Exam   BP: 111/75  Pulse: 89  Temp: 97.4  F (36.3  C)  Height: 180.3 cm (5' 11\")  Weight: 97.5 kg (215 lb)  SpO2: 98 %      Physical Exam  Constitutional:       General: She is not in acute distress.     Appearance: Normal appearance. She is normal weight.   HENT:      Mouth/Throat:      Mouth: Mucous membranes are moist.      Pharynx: No oropharyngeal exudate or posterior oropharyngeal erythema.   Eyes:      General:         Right eye: No discharge.         Left eye: No discharge.      Extraocular Movements: Extraocular movements intact.      Conjunctiva/sclera: Conjunctivae normal.   Cardiovascular: "      Pulses: Normal pulses.   Musculoskeletal:         General: No swelling.      Right wrist: Swelling and tenderness present. Normal range of motion.      Left wrist: Normal. No tenderness. Normal range of motion.      Cervical back: Normal range of motion and neck supple. No rigidity. No muscular tenderness.      Right ankle: Swelling and ecchymosis present. Tenderness present over the lateral malleolus and medial malleolus. No base of 5th metatarsal or proximal fibula tenderness. Decreased range of motion. Normal pulse.      Right Achilles Tendon: No tenderness.      Left ankle: Normal. No base of 5th metatarsal or proximal fibula tenderness.      Left Achilles Tendon: No tenderness.      Right foot: Decreased range of motion. Swelling and tenderness present. No deformity.      Left foot: Normal range of motion. No swelling, deformity or tenderness.      Comments: Has edema and ecchymosis especially around the lateral malleolus of the right foot, has ecchymosis and edema over the dorsal aspect of her right foot.  Has normal sensation and capillary refill less than 2 seconds, right foot.  Has mild tenderness and ecchymosis over the ulnar head, right wrist   Skin:     Capillary Refill: Capillary refill takes less than 2 seconds.      Coloration: Skin is not jaundiced or pale.      Findings: No bruising, erythema, lesion or rash.   Neurological:      General: No focal deficit present.      Mental Status: She is alert and oriented to person, place, and time.      Cranial Nerves: No cranial nerve deficit.      Sensory: No sensory deficit.      Motor: No weakness.      Coordination: Coordination normal.      Deep Tendon Reflexes: Reflexes normal.   Psychiatric:         Mood and Affect: Mood normal.         Behavior: Behavior normal.         Thought Content: Thought content normal.         Judgment: Judgment normal.         ED Course        Procedures                  No results found for this or any previous visit  (from the past 24 hour(s)).    Medications   HYDROcodone-acetaminophen (NORCO) 5-325 MG per tablet 1 tablet (1 tablet Oral Given 7/13/21 1247)   hydrOXYzine (ATARAX) tablet 50 mg (50 mg Oral Given 7/13/21 1253)       Assessments & Plan (with Medical Decision Making)   Patient is a 36-year-old female with a past medical history of mild intermittent asthma, major depressive disorder, generalized anxiety disorder, and gastroesophageal reflux disease who presents with right ankle and foot pain after falling off the steps going to her backyard last night.  The patient was initially able to bear weight last night but developed moderate pain this morning and is currently unable to bear weight.  She has taken 600 mg of ibuprofen about 2 hours ago which has not helped alleviate the pain.  Currently rates her pain as 8/10.  She has previously sprained her right ankle 3 times.  Denies any trauma to her head, no nausea or vomiting, no headaches, no acute vision changes, no memory loss or other symptoms consistent with a TBI or concussion.    The patient broke her fall by landing on her right wrist.  Has some pain and bruising, minor swelling around the right wrist.  However has normal strength and sensation in the right wrist.     X-rays of the right foot and ankle revealed soft tissue swelling especially at the lateral aspect.  Normal joint spacing and alignment, no evidence for acute fracture.  Ligament injury is suspected the right ankle.    I provided the patient with a single dose of Norco while in clinic for pain control.  The patient developed symptoms of anxiety while in clinic due to moderate pain symptoms improved after receiving 50 mg of hydroxyzine.  No symptoms of chest pain or shortness of breath, no difficulties with breathing or swallowing.  Patient was discharged in stable condition.    I provided the patient with a referral to orthopedics for further evaluation and management as well. Provided the patient with  an ankle brace, recommended that she remain nonweightbearing until further evaluation by orthopedics.    Prescribed [place] for pain control, may also use ibuprofen for additional pain control as needed. Recommend applying ice at 15-minute intervals to the affected area, minimum of 4 times per day.    Recommend urgent medical evaluation if the patient develops worsening pain, numbness or tingling or loss of feeling, or redness/tenderness/swelling in the right ankle or right lower extremity.    I have reviewed the nursing notes.    I have reviewed the findings, diagnosis, plan and need for follow up with the patient.    Discharge Medication List as of 7/13/2021  2:06 PM      START taking these medications    Details   HYDROcodone-acetaminophen (NORCO) 5-325 MG tablet Take 1 tablet by mouth every 6 hours as needed for severe pain, Disp-10 tablet, R-0, E-Prescribe             Final diagnoses:   Moderate right ankle sprain, initial encounter       7/13/2021   [facility] EMERGENCY DEPT     Buster Faith PA-C  07/16/21 1784

## 2021-07-16 ENCOUNTER — OFFICE VISIT (OUTPATIENT)
Dept: PODIATRY | Facility: CLINIC | Age: 36
End: 2021-07-16
Payer: COMMERCIAL

## 2021-07-16 VITALS
BODY MASS INDEX: 30.1 KG/M2 | HEIGHT: 71 IN | WEIGHT: 215 LBS | DIASTOLIC BLOOD PRESSURE: 93 MMHG | SYSTOLIC BLOOD PRESSURE: 142 MMHG

## 2021-07-16 DIAGNOSIS — S93.401A MODERATE RIGHT ANKLE SPRAIN, INITIAL ENCOUNTER: ICD-10-CM

## 2021-07-16 PROCEDURE — 99203 OFFICE O/P NEW LOW 30 MIN: CPT | Performed by: PODIATRIST

## 2021-07-16 ASSESSMENT — MIFFLIN-ST. JEOR: SCORE: 1766.36

## 2021-07-16 ASSESSMENT — PAIN SCALES - GENERAL: PAINLEVEL: EXTREME PAIN (8)

## 2021-07-16 NOTE — PATIENT INSTRUCTIONS
ANKLE SPRAIN  An ankle sprain is an injury to one or more ligaments in the ankle, usually on the outside of the ankle. Ligaments are bands of tissue - like rubber bands - that connect one bone to another and bind the joints together. In the ankle joint, ligaments provide stability by limiting side-to-side movement.  Some ankle sprains are much worse than others. The severity of an ankle sprain depends on whether the ligament is stretched, partially torn, or completely torn, as well as on the number of ligaments involved. Ankle sprains are not the same as strains, which affect muscles rather than ligaments.  CAUSES  Sprained ankles often result from a fall, a sudden twist, or a blow that forces the ankle joint out of its normal position. Ankle sprains commonly occur while participating in sports, wearing inappropriate shoes, or walking or running on an uneven surface.  Sometimes ankle sprains occur because of a person is born with weak ankles. Previous ankle or foot injuries can also weaken the ankle and lead to sprains.  SYMPTOMS  The symptoms of ankle sprains may include: pain or soreness, swelling, bruising, difficulty walking, and stiffness in the joint.  These symptoms may vary in intensity, depending on the severity of the sprain. Sometimes pain and swelling are absent in people with previous ankle sprains. Instead, they may simply feel the ankle is wobbly and unsteady when they walk. Even if there is no pain or swelling with a sprained ankle, treatment is crucial. Any ankle sprain - whether it s your first or your fifth - requires prompt medical attention.  DIAGNOSIS  In evaluating your injury, the foot and ankle surgeon will obtain a thorough history of your symptoms and examine your foot. X-rays or other advanced imaging studies may be ordered to help determine the severity of the injury.  MEDICAL TREATMENT  There are four key reasons why an ankle sprain should be promptly evaluated and treated by a foot and  ankle surgeon:  An untreated ankle sprain may lead to chronic ankle instability, a condition marked by persistent discomfort and a  giving way  of the ankle. Weakness in the leg may also develop.   A more severe ankle injury may have occurred along with the sprain. This might include a serious bone fracture that, if left untreated, could lead to troubling complications.   An ankle sprain may be accompanied by a foot injury that causes discomfort but has gone unnoticed thus far.   Rehabilitation of a sprained ankle needs to begin right away. If rehabilitation is delayed, the injury may be less likely to heal properly.   NON-SURGICAL TREATMENT  When you have an ankle sprain, rehabilitation is crucial--and it starts the moment your treatment begins. Your foot and ankle surgeon may recommend one or more of the following treatment options:  Rest. Stay off the injured ankle. Walking may cause further injury.   Ice. Apply an ice pack to the injured area, placing a thin towel between the ice and the skin. Use ice for 20 minutes and then wait at least 40 minutes before icing again.   Compression. An elastic wrap may be recommended to control swelling.   Elevation. The ankle should be raised slightly above the level of your heart to reduce swelling.   Early physical therapy. Your doctor will start you on a rehabilitation program as soon as possible to promote healing and increase your range of motion. This includes doing prescribed exercises.   Medications. Nonsteroidal anti-inflammatory drugs (NSAIDs), such as ibuprofen, may be recommended to reduce pain and inflammation. In some cases, prescription pain medications are needed to provide adequate relief.   SURGICAL TREATMENT  In more severe cases, surgery may be required to adequately treat an ankle sprain. Surgery often involves repairing the damaged ligament or ligaments. The foot and ankle surgeon will select the surgical procedure best suited for your case based on the  type and severity of your injury as well as your activity level.  After surgery, rehabilitation is extremely important. Completing your rehabilitation program is crucial to a successful outcome. Be sure to continue to see your foot and ankle surgeon during this period to ensure that your ankle heals properly and function is restored.

## 2021-07-16 NOTE — PROGRESS NOTES
PATIENT HISTORY:  Holly Tolliver is a 36 year old female who presents to clinic with a chief complaint of a painful right ankle.  The patient relates the pain is primarily located around the outside of the right ankle.  The patient relates recently twisting the right ankle.  The patient relates trying ice and offloading with little relief.   The patient was referred by Buster Faith PA-C for consultation on the right ankle.  Any previous notes and studies that pertain to the patient's condition were reviewed.    Pertinent medical, surgical and family history was reviewed in Epic chart and include gastroesophageal reflux disease    Medications:   Current Outpatient Medications:      Misc. Devices (CRUTCHES-ALUMINUM) MISC, 1 Units daily, Disp: 1 each, Rfl: 0     acetaminophen (TYLENOL) 500 MG tablet, Take 1,000 mg by mouth every 4 hours as needed for mild pain, Disp: , Rfl:      albuterol (PROAIR HFA/PROVENTIL HFA/VENTOLIN HFA) 108 (90 Base) MCG/ACT inhaler, Inhale 2 puffs into the lungs every 6 hours as needed for shortness of breath / dyspnea or wheezing, Disp: 1 Inhaler, Rfl: 1     amoxicillin (AMOXIL) 875 MG tablet, Take 1 tablet (875 mg) by mouth 2 times daily, Disp: 10 tablet, Rfl: 0     ciclopirox (LOPROX) 0.77 % cream, Apply topically 2 times daily (Patient not taking: Reported on 2/1/2021), Disp: 45 g, Rfl: 0     HYDROcodone-acetaminophen (NORCO) 5-325 MG tablet, Take 1 tablet by mouth every 6 hours as needed for severe pain, Disp: 10 tablet, Rfl: 0     HYDROXYZINE HCL PO, Take 25-50 mg by mouth 3 times daily as needed for itching, Disp: , Rfl:      ibuprofen (ADVIL/MOTRIN) 200 MG tablet, Take 200-600 mg by mouth every 4 hours as needed for mild pain, Disp: , Rfl:      ketoconazole (NIZORAL) 2 % external shampoo, Apply to the affected area and wash off after 5 minutes. (Patient not taking: Reported on 8/27/2020), Disp: 120 mL, Rfl: 1     metroNIDAZOLE (FLAGYL) 500 MG tablet, Take 1 tablet (500 mg) by  "mouth 2 times daily, Disp: 14 tablet, Rfl: 0     norelgestromin-ethinyl estradiol (ORTHO EVRA) 150-35 MCG/24HR patch, Remove old patch and apply new patch onto the skin once a week for 3 weeks (21 days). Do not wear patch week 4 (days 22-28), then repeat. (Patient not taking: Reported on 6/25/2020), Disp: 12 patch, Rfl: 3     PARoxetine (PAXIL) 40 MG tablet, Take 1 tablet (40 mg) by mouth every morning, Disp: 30 tablet, Rfl: 0     senna-docusate (SENEXON-S) 8.6-50 MG tablet, Take 2-4 tablets by mouth 2 times daily, Disp: , Rfl:      traZODone (DESYREL) 50 MG tablet, Take 1 tablet (50 mg) by mouth At Bedtime, Disp: 90 tablet, Rfl: 3     Allergies:  No Known Allergies    Vitals: BP (!) 142/93   Ht 1.803 m (5' 11\")   Wt 97.5 kg (215 lb)   BMI 29.99 kg/m    BMI= Body mass index is 29.99 kg/m .    LOWER EXTREMITY PHYSICAL EXAM    Dermatologic: Skin is intact to right lower extremity without significant lesions, rash or abrasion.        Vascular: DP & PT pulses are intact & regular on the right.   CFT and skin temperature is normal to the right lower extremity.     Neurologic: Lower extremity sensation is intact to light touch.  No evidence of weakness in the right lower extremity.        Musculoskeletal: Patient is ambulatory without assistive device or brace.  No gross ankle deformity noted.  No foot or ankle joint effusion is noted.  Noted pain on palpation over the Anterior Talofibular ligament and calcaneofibular ligament on the right.  No ecchymosis noted.    Diagnostics:  Radiographs included three views of the right foot and ankle demonstrating   no cortical erosions or periosteal elevation.  All joint margins appear stable.  There is no apparent fracture or tumor formation noted.  There is no evidence of foreign body.  The images were independently reviewed by myself along with the patient explaining the findings.      ASSESSMENT / PLAN:     ICD-10-CM    1. Moderate right ankle sprain, initial encounter  " S93.401A Orthopedic  Referral     [facility]. Devices (CRUTCHES-ALUMINUM) MISC     Ankle/Foot Bracing Supplies DME       I have explained to Holly about the conditions.  We discussed the underlying contributing factors to the condition as well as treatment options along with expected length of recovery.  At this time, the patient was educated on the importance of offloading supportive shoes and other devices.  I demonstrated to the patient calf stretches to perform every hour daily until symptoms resolve.  After symptoms resolve, the patient was advised to perform the stretches 3 times daily to prevent future recurrence.  The patient was instructed to perform warm soaks with Epson salt after which to also apply over-the-counter Voltaren gel to deeply massage the injured tissue.  The patient was instructed to do this on a daily basis until symptoms resolve.  The patient may also take over-the-counter NSAID medication, if tolerated, to help further reduce the inflammation tissue.   The patient was advised to take this type of medication with food to prevent stomach irritation and to stop taking the medication if stomach irritation occurs.  The patient was fitted with a Tri-Lock ankle brace that will aid in offloading the tension forces to the soft tissues and prevent further inflammation.  The patient was dispensed a pair of crutches to remain nonweightbearing.  The patient's previous crutches were broken.  The patient will return in four weeks for reevaluation if the symptoms do not resolve.      Holly verbalized agreement with and understanding of the rational for the diagnosis and treatment plan.  All questions were answered to best of my ability and the patient's satisfaction. The patient was advised to contact the clinic with any questions that may arise after the clinic visit.      Disclaimer: This note consists of symbols derived from keyboarding, dictation and/or voice recognition software. As a result,  there may be errors in the script that have gone undetected. Please consider this when interpreting information found in this chart.       RUIZ Mckeon D.P.M., [email or web address].

## 2021-07-16 NOTE — LETTER
7/16/2021         RE: Holly Tolliver  [address] [facility] [address]        Dear Colleague,    Thank you for referring your patient, Holly Tolliver, to the [facility]. Please see a copy of my visit note below.    PATIENT HISTORY:  Holly Tolliver is a 36 year old female who presents to clinic with a chief complaint of a painful right ankle.  The patient relates the pain is primarily located around the outside of the right ankle.  The patient relates recently twisting the right ankle.  The patient relates trying ice and offloading with little relief.   The patient was referred by Buster Faith PA-C for consultation on the right ankle.  Any previous notes and studies that pertain to the patient's condition were reviewed.    Pertinent medical, surgical and family history was reviewed in Epic chart and include gastroesophageal reflux disease    Medications:   Current Outpatient Medications:      Misc. Devices (CRUTCHES-ALUMINUM) MISC, 1 Units daily, Disp: 1 each, Rfl: 0     acetaminophen (TYLENOL) 500 MG tablet, Take 1,000 mg by mouth every 4 hours as needed for mild pain, Disp: , Rfl:      albuterol (PROAIR HFA/PROVENTIL HFA/VENTOLIN HFA) 108 (90 Base) MCG/ACT inhaler, Inhale 2 puffs into the lungs every 6 hours as needed for shortness of breath / dyspnea or wheezing, Disp: 1 Inhaler, Rfl: 1     amoxicillin (AMOXIL) 875 MG tablet, Take 1 tablet (875 mg) by mouth 2 times daily, Disp: 10 tablet, Rfl: 0     ciclopirox (LOPROX) 0.77 % cream, Apply topically 2 times daily (Patient not taking: Reported on 2/1/2021), Disp: 45 g, Rfl: 0     HYDROcodone-acetaminophen (NORCO) 5-325 MG tablet, Take 1 tablet by mouth every 6 hours as needed for severe pain, Disp: 10 tablet, Rfl: 0     HYDROXYZINE HCL PO, Take 25-50 mg by mouth 3 times daily as needed for itching, Disp: , Rfl:      ibuprofen (ADVIL/MOTRIN) 200 MG tablet, Take 200-600 mg by mouth every 4 hours as needed for mild  "pain, Disp: , Rfl:      ketoconazole (NIZORAL) 2 % external shampoo, Apply to the affected area and wash off after 5 minutes. (Patient not taking: Reported on 8/27/2020), Disp: 120 mL, Rfl: 1     metroNIDAZOLE (FLAGYL) 500 MG tablet, Take 1 tablet (500 mg) by mouth 2 times daily, Disp: 14 tablet, Rfl: 0     norelgestromin-ethinyl estradiol (ORTHO EVRA) 150-35 MCG/24HR patch, Remove old patch and apply new patch onto the skin once a week for 3 weeks (21 days). Do not wear patch week 4 (days 22-28), then repeat. (Patient not taking: Reported on 6/25/2020), Disp: 12 patch, Rfl: 3     PARoxetine (PAXIL) 40 MG tablet, Take 1 tablet (40 mg) by mouth every morning, Disp: 30 tablet, Rfl: 0     senna-docusate (SENEXON-S) 8.6-50 MG tablet, Take 2-4 tablets by mouth 2 times daily, Disp: , Rfl:      traZODone (DESYREL) 50 MG tablet, Take 1 tablet (50 mg) by mouth At Bedtime, Disp: 90 tablet, Rfl: 3     Allergies:  No Known Allergies    Vitals: BP (!) 142/93   Ht 1.803 m (5' 11\")   Wt 97.5 kg (215 lb)   BMI 29.99 kg/m    BMI= Body mass index is 29.99 kg/m .    LOWER EXTREMITY PHYSICAL EXAM    Dermatologic: Skin is intact to right lower extremity without significant lesions, rash or abrasion.        Vascular: DP & PT pulses are intact & regular on the right.   CFT and skin temperature is normal to the right lower extremity.     Neurologic: Lower extremity sensation is intact to light touch.  No evidence of weakness in the right lower extremity.        Musculoskeletal: Patient is ambulatory without assistive device or brace.  No gross ankle deformity noted.  No foot or ankle joint effusion is noted.  Noted pain on palpation over the Anterior Talofibular ligament and calcaneofibular ligament on the right.  No ecchymosis noted.    Diagnostics:  Radiographs included three views of the right foot and ankle demonstrating   no cortical erosions or periosteal elevation.  All joint margins appear stable.  There is no apparent fracture " or tumor formation noted.  There is no evidence of foreign body.  The images were independently reviewed by myself along with the patient explaining the findings.      ASSESSMENT / PLAN:     ICD-10-CM    1. Moderate right ankle sprain, initial encounter  S93.401A Orthopedic  Referral     [facility]. Devices (CRUTCHES-ALUMINUM) MISC     Ankle/Foot Bracing Supplies DME       I have explained to Holly about the conditions.  We discussed the underlying contributing factors to the condition as well as treatment options along with expected length of recovery.  At this time, the patient was educated on the importance of offloading supportive shoes and other devices.  I demonstrated to the patient calf stretches to perform every hour daily until symptoms resolve.  After symptoms resolve, the patient was advised to perform the stretches 3 times daily to prevent future recurrence.  The patient was instructed to perform warm soaks with Epson salt after which to also apply over-the-counter Voltaren gel to deeply massage the injured tissue.  The patient was instructed to do this on a daily basis until symptoms resolve.  The patient may also take over-the-counter NSAID medication, if tolerated, to help further reduce the inflammation tissue.   The patient was advised to take this type of medication with food to prevent stomach irritation and to stop taking the medication if stomach irritation occurs.  The patient was fitted with a Tri-Lock ankle brace that will aid in offloading the tension forces to the soft tissues and prevent further inflammation.  The patient was dispensed a pair of crutches to remain nonweightbearing.  The patient's previous crutches were broken.  The patient will return in four weeks for reevaluation if the symptoms do not resolve.      Holly verbalized agreement with and understanding of the rational for the diagnosis and treatment plan.  All questions were answered to best of my ability and the  patient's satisfaction. The patient was advised to contact the clinic with any questions that may arise after the clinic visit.      Disclaimer: This note consists of symbols derived from keyboarding, dictation and/or voice recognition software. As a result, there may be errors in the script that have gone undetected. Please consider this when interpreting information found in this chart.       RUIZ Mckeon D.P.M., F.LEAH.C.F.A.S.        Again, thank you for allowing me to participate in the care of your patient.        Sincerely,        Buster Mckeon DPM

## 2021-07-24 ENCOUNTER — HEALTH MAINTENANCE LETTER (OUTPATIENT)
Age: 36
End: 2021-07-24

## 2021-08-19 ENCOUNTER — OFFICE VISIT (OUTPATIENT)
Dept: PODIATRY | Facility: CLINIC | Age: 36
End: 2021-08-19
Payer: COMMERCIAL

## 2021-08-19 VITALS
SYSTOLIC BLOOD PRESSURE: 131 MMHG | BODY MASS INDEX: 30.1 KG/M2 | HEIGHT: 71 IN | WEIGHT: 215 LBS | HEART RATE: 100 BPM | DIASTOLIC BLOOD PRESSURE: 85 MMHG

## 2021-08-19 DIAGNOSIS — S93.401D MODERATE RIGHT ANKLE SPRAIN, SUBSEQUENT ENCOUNTER: Primary | ICD-10-CM

## 2021-08-19 PROCEDURE — 99212 OFFICE O/P EST SF 10 MIN: CPT | Performed by: PODIATRIST

## 2021-08-19 ASSESSMENT — MIFFLIN-ST. JEOR: SCORE: 1766.36

## 2021-08-19 NOTE — PATIENT INSTRUCTIONS
Next Steps:      1. Support:  a. Wear supportive shoes, sandals, boots and/or inserts that have a rigid supportive sole.    i. This will offload the majority of tension forces that travel through your feet every step you take.    1. [employer] Max Cushioning Elite/Premier   2. [employer] Relax Fit D'Lux Walker  3. Superfeet inserts (www.superfeet.com)  b. It is important that you also wear supportive shoe wear in the house to continue providing support to your feet.    c. You may always use a cushioned liner for your shoes if that makes your feet feel better.  2. Stretching  a. Calf stretching is essential to offload the tension forces that travel through your feet every step you take  b. Preferred calf stretch is the Runner's Stretch  i. Place one foot behind the other foot, flat against the ground (it is important to keep the heel on the ground).  The back leg is the one that will be stretched.  1. Start with the knee straight and lean your hips into the wall, counter or whatever you are leaning into - count to ten.  2. Next, bend the knee.  You should feel the stretch lower in the calf muscle - count to ten.  c. Repeat this stretch once an hour to start off with.  When symptoms subside, I recommend performing the stretch 3 times daily to prevent any future problems.                3. Tissue Massage  a. It is important that you physically loosen the inflammation tissue to help your body heal the injured tissue.  b. I recommend soaking your foot in warm water to increase the microcirculation to the soft tissues.  You may add Epson salt to the water if you prefer.  c. You may apply an over-the-counter muscle rub, such as Voltaren gel, and deeply massage the injured tissue.  4. Reduce Inflammation  a. You can ice the injured tissue with an ice pack with a light cloth covering or soaking in ice water 20 minutes to reduce any acute inflammation, typically at the end of the day.  b. If tolerated, you may take a  Non-Steroidal Antiinflammatory medication (NSAID), such as Advil or Aleve, to help reduce the inflammation tissue.  This can help the overall healing of the injured tissue.  i. It is important to take food with any NSAID medication as the most common side effect is stomach irritation.  If you encounter any problems when taking NSAID, it is recommended that you stop taking the medication and notify your provider.    It is important to understand that most problems that develop in the foot and ankle are caused by excessive tension that cause microinjury to the soft tissues and inflammation in the foot and ankle.  By addressing the underlying causes with support and stretching as well as treating the current inflammatory conditions with tissue massage and anti-inflammatory treatments, most foot and ankle musculoskeletal conditions will resolve.  This may take time to heal.  However, if symptoms persist past 4 weeks you should return to the office for reevaluation to determine further treatment options.

## 2021-08-19 NOTE — LETTER
"    8/19/2021         RE: Holly Tolliver  [address] [facility] [facility] [address]        Dear Colleague,    Thank you for referring your patient, Holly Tolliver, to the [facility] [place]. Please see a copy of my visit note below.    Holly returns to the office for reevaluation of the right ankle.  The patient relates following the instructions given at the last visit with noted overall less pain and more improvement in function of the right foot.   The patient relates no other problems.    Vitals: /85   Pulse 100   Ht 1.803 m (5' 11\")   Wt 97.5 kg (215 lb)   BMI 29.99 kg/m    BMI= Body mass index is 29.99 kg/m .    Lower Extremity Physical Exam:      Neurovascular status remains unchanged.  Muscular exam is within normal limits to major muscle groups.  Integument is intact.      Noted pain on palpation over the anterior talofibular ligament on the right ankle.  Mild edema noted.  No erythema noted.         Assessment:     ICD-10-CM    1. Moderate right ankle sprain, subsequent encounter  S93.401D        Plan:    I have explained to Holly about the progress of the conditions.  At this time, the patient was educated on the importance of offloading supportive shoes and other devices.  I demonstrated to the patient calf stretches to perform every hour daily until symptoms resolve.  After symptoms resolve, the patient was advised to perform the stretches 3 times daily to prevent future recurrence.  The patient was instructed to perform warm soaks with Epson salt after which to also apply over-the-counter Voltaren gel to deeply massage the injured tissue.  The patient was instructed to do this on a daily basis until symptoms resolve.  The patient may also take over-the-counter NSAID medication, if tolerated, to help further reduce the inflammation tissue.   The patient was advised to take this type of medication with food to prevent stomach irritation and to stop taking the " medication if stomach irritation occurs.    The patient will return in four weeks for reevaluation if the symptoms do not resolve.      Holly verbalized agreement with and understanding of the rational for the diagnosis and treatment plan.  All questions were answered to best of my ability and the patient's satisfaction. The patient was advised to contact the clinic with any questions that may arise after the clinic visit.      Disclaimer: This note consists of symbols derived from keyboarding, dictation and/or voice recognition software. As a result, there may be errors in the script that have gone undetected. Please consider this when interpreting information found in this chart.       RUIZ Mckeon D.P.M., F.A.C.F.A.S.        Again, thank you for allowing me to participate in the care of your patient.        Sincerely,        Buster Mckeon DPM

## 2021-08-19 NOTE — NURSING NOTE
"Chief Complaint   Patient presents with     RECHECK     Moderate right ankle sprain \"still gets swollen and having pain with activity\"       Initial /85   Pulse 100   Ht 1.803 m (5' 11\")   Wt 97.5 kg (215 lb)   BMI 29.99 kg/m   Estimated body mass index is 29.99 kg/m  as calculated from the following:    Height as of this encounter: 1.803 m (5' 11\").    Weight as of this encounter: 97.5 kg (215 lb).  Medications and allergies reviewed.      Lelia NICHOLE MA    "

## 2021-08-19 NOTE — PROGRESS NOTES
"Holly returns to the office for reevaluation of the right ankle.  The patient relates following the instructions given at the last visit with noted overall less pain and more improvement in function of the right foot.   The patient relates no other problems.    Vitals: /85   Pulse 100   Ht 1.803 m (5' 11\")   Wt 97.5 kg (215 lb)   BMI 29.99 kg/m    BMI= Body mass index is 29.99 kg/m .    Lower Extremity Physical Exam:      Neurovascular status remains unchanged.  Muscular exam is within normal limits to major muscle groups.  Integument is intact.      Noted pain on palpation over the anterior talofibular ligament on the right ankle.  Mild edema noted.  No erythema noted.         Assessment:     ICD-10-CM    1. Moderate right ankle sprain, subsequent encounter  S93.401D        Plan:    I have explained to Holly about the progress of the conditions.  At this time, the patient was educated on the importance of offloading supportive shoes and other devices.  I demonstrated to the patient calf stretches to perform every hour daily until symptoms resolve.  After symptoms resolve, the patient was advised to perform the stretches 3 times daily to prevent future recurrence.  The patient was instructed to perform warm soaks with Epson salt after which to also apply over-the-counter Voltaren gel to deeply massage the injured tissue.  The patient was instructed to do this on a daily basis until symptoms resolve.  The patient may also take over-the-counter NSAID medication, if tolerated, to help further reduce the inflammation tissue.   The patient was advised to take this type of medication with food to prevent stomach irritation and to stop taking the medication if stomach irritation occurs.    The patient will return in four weeks for reevaluation if the symptoms do not resolve.      Holly verbalized agreement with and understanding of the rational for the diagnosis and treatment plan.  All questions were " answered to best of my ability and the patient's satisfaction. The patient was advised to contact the clinic with any questions that may arise after the clinic visit.      Disclaimer: This note consists of symbols derived from keyboarding, dictation and/or voice recognition software. As a result, there may be errors in the script that have gone undetected. Please consider this when interpreting information found in this chart.       RUIZ Mckeon D.P.M., F.LEAH.PEYMAN.[email or web address].S.

## 2021-09-09 ENCOUNTER — VIRTUAL VISIT (OUTPATIENT)
Dept: FAMILY MEDICINE | Facility: CLINIC | Age: 36
End: 2021-09-09
Payer: COMMERCIAL

## 2021-09-09 DIAGNOSIS — F33.9 EPISODE OF RECURRENT MAJOR DEPRESSIVE DISORDER, UNSPECIFIED DEPRESSION EPISODE SEVERITY (H): ICD-10-CM

## 2021-09-09 DIAGNOSIS — F41.9 ANXIETY: ICD-10-CM

## 2021-09-09 DIAGNOSIS — J30.2 SEASONAL ALLERGIC RHINITIS, UNSPECIFIED TRIGGER: Primary | ICD-10-CM

## 2021-09-09 DIAGNOSIS — G47.00 PERSISTENT INSOMNIA: ICD-10-CM

## 2021-09-09 DIAGNOSIS — B36.0 TINEA VERSICOLOR: ICD-10-CM

## 2021-09-09 DIAGNOSIS — J45.990 EXERCISE-INDUCED ASTHMA: ICD-10-CM

## 2021-09-09 PROCEDURE — 99214 OFFICE O/P EST MOD 30 MIN: CPT | Mod: TEL | Performed by: NURSE PRACTITIONER

## 2021-09-09 RX ORDER — KETOCONAZOLE 20 MG/G
CREAM TOPICAL DAILY
Qty: 30 G | Refills: 0 | Status: SHIPPED | OUTPATIENT
Start: 2021-09-09 | End: 2022-01-24

## 2021-09-09 RX ORDER — TRAZODONE HYDROCHLORIDE 50 MG/1
TABLET, FILM COATED ORAL
Qty: 60 TABLET | Refills: 3 | Status: SHIPPED | OUTPATIENT
Start: 2021-09-09 | End: 2022-02-17

## 2021-09-09 RX ORDER — FLUCONAZOLE 150 MG/1
TABLET ORAL
Qty: 4 TABLET | Refills: 0 | Status: SHIPPED | OUTPATIENT
Start: 2021-09-09 | End: 2021-09-23

## 2021-09-09 RX ORDER — ALBUTEROL SULFATE 90 UG/1
2 AEROSOL, METERED RESPIRATORY (INHALATION) EVERY 6 HOURS PRN
Qty: 8 G | Refills: 1 | Status: SHIPPED | OUTPATIENT
Start: 2021-09-09 | End: 2023-04-07

## 2021-09-09 ASSESSMENT — ASTHMA QUESTIONNAIRES
QUESTION_1 LAST FOUR WEEKS HOW MUCH OF THE TIME DID YOUR ASTHMA KEEP YOU FROM GETTING AS MUCH DONE AT WORK, SCHOOL OR AT HOME: SOME OF THE TIME
ACT_TOTALSCORE: 13
QUESTION_3 LAST FOUR WEEKS HOW OFTEN DID YOUR ASTHMA SYMPTOMS (WHEEZING, COUGHING, SHORTNESS OF BREATH, CHEST TIGHTNESS OR PAIN) WAKE YOU UP AT NIGHT OR EARLIER THAN USUAL IN THE MORNING: ONCE OR TWICE
QUESTION_5 LAST FOUR WEEKS HOW WOULD YOU RATE YOUR ASTHMA CONTROL: WELL CONTROLLED
QUESTION_2 LAST FOUR WEEKS HOW OFTEN HAVE YOU HAD SHORTNESS OF BREATH: MORE THAN ONCE A DAY
QUESTION_4 LAST FOUR WEEKS HOW OFTEN HAVE YOU USED YOUR RESCUE INHALER OR NEBULIZER MEDICATION (SUCH AS ALBUTEROL): THREE OR MORE TIMES PER DAY

## 2021-09-09 ASSESSMENT — PATIENT HEALTH QUESTIONNAIRE - PHQ9
SUM OF ALL RESPONSES TO PHQ QUESTIONS 1-9: 24
5. POOR APPETITE OR OVEREATING: SEVERAL DAYS

## 2021-09-09 ASSESSMENT — ANXIETY QUESTIONNAIRES
2. NOT BEING ABLE TO STOP OR CONTROL WORRYING: SEVERAL DAYS
3. WORRYING TOO MUCH ABOUT DIFFERENT THINGS: SEVERAL DAYS
GAD7 TOTAL SCORE: 8
5. BEING SO RESTLESS THAT IT IS HARD TO SIT STILL: SEVERAL DAYS
7. FEELING AFRAID AS IF SOMETHING AWFUL MIGHT HAPPEN: NOT AT ALL
6. BECOMING EASILY ANNOYED OR IRRITABLE: NEARLY EVERY DAY
IF YOU CHECKED OFF ANY PROBLEMS ON THIS QUESTIONNAIRE, HOW DIFFICULT HAVE THESE PROBLEMS MADE IT FOR YOU TO DO YOUR WORK, TAKE CARE OF THINGS AT HOME, OR GET ALONG WITH OTHER PEOPLE: EXTREMELY DIFFICULT
1. FEELING NERVOUS, ANXIOUS, OR ON EDGE: SEVERAL DAYS

## 2021-09-09 NOTE — PROGRESS NOTES
Ana is a 36 year old who is being evaluated via a billable telephone visit.      What phone number would you like to be contacted at? [phone]  How would you like to obtain your AVS? MyChart    Assessment & Plan     Seasonal allergic rhinitis, unspecified trigger  Start Claritin or Zyrtec daily     Exercise-induced asthma  Worsening with allergies  - Refilled albuterol (PROAIR HFA/PROVENTIL HFA/VENTOLIN HFA) 108 (90 Base) MCG/ACT inhaler; Inhale 2 puffs into the lungs every 6 hours as needed for shortness of breath / dyspnea or wheezing- cautioned about over using   - Start fluticasone-salmeterol (ADVAIR) 250-50 MCG/DOSE inhaler; Inhale 1 puff into the lungs every 12 hours    Persistent insomnia  Worsening due to giving custody up of children  Recommend staring therapy with her therapist   - traZODone (DESYREL) 50 MG tablet; Take 1-2 tablets as needed at bedtime for insomnia (Increase from 1 tablet to 2 tablets prn)    Episode of recurrent major depressive disorder, unspecified depression episode severity (H)  Worsening due to giving custody up of children  Recommend staring therapy with her therapist   - MENTAL HEALTH REFERRAL  - Adult; Psychiatry; Psychiatry; Collaborative Care Psychiatry Service/Bridge to Long-Term Psychiatry as indicated ([phone]); No - Patient must be evaluated prior to placing referral OR document reason for referral -...; Future  - continue Paxil at current dose- patient would like to see Psych before trying medication changes    Anxiety  Worsening due to giving custody up of children  Recommend staring therapy with her therapist   continue Paxil at current dose- patient would like to see Psych before trying medication changes  - recommend to use Hydroxyzine prn or on schedule basis   - MENTAL HEALTH REFERRAL  - Adult; Psychiatry; Psychiatry; Collaborative Care Psychiatry Service/Bridge to Long-Term Psychiatry as indicated ([phone]); No - Patient must be evaluated prior to  placing referral OR document reason for referral -...; Future    Tinea versicolor    - fluconazole (DIFLUCAN) 150 MG tablet; Take 2 tablets once a week for two weeks  - ketoconazole (NIZORAL) 2 % external cream; Apply topically daily     :[id number]}      No follow-ups on file.    WOLFGANG Freire CNP  [facility]    Errol Perez is a 36 year old who presents for the following health issues  HPI     History of tinea versicolor :  She wants a refill of Ketoconazole and Diflucan.     Depression and Anxiety Follow-Up    How are you doing with your depression since your last visit? Worsened - patient recently had to give up custody of her two children ages 5 and 9 due to her history of chemical dependency     How are you doing with your anxiety since your last visit?  No change    Are you having other symptoms that might be associated with depression or anxiety? Yes:  difficulty falling asleep or sleeping too much    Have you had a significant life event? Relationship Concerns     Do you have any concerns with your use of alcohol or other drugs? No    Social History     Tobacco Use     Smoking status: Former Smoker     Packs/day: 0.00     Years: 12.00     Pack years: 0.00     Types: Cigarettes     Smokeless tobacco: Never Used     Tobacco comment: Never   Substance Use Topics     Alcohol use: Not Currently     Alcohol/week: 0.0 standard drinks     Comment: attempting sobriety     Drug use: Not Currently     Types: Methamphetamines, Marijuana     Comment: relapse, attempting sobriety     PHQ 6/25/2020 2/1/2021 9/9/2021   PHQ-9 Total Score 4 22 24   Q9: Thoughts of better off dead/self-harm past 2 weeks Not at all Not at all Not at all     RUBENS-7 SCORE 5/28/2020 2/1/2021 9/9/2021   Total Score - - -   Total Score 17 7 8     Last PHQ-9 9/9/2021   1.  Little interest or pleasure in doing things 3   2.  Feeling down, depressed, or hopeless 3   3.  Trouble falling or staying asleep, or sleeping too  much 3   4.  Feeling tired or having little energy 3   5.  Poor appetite or overeating 3   6.  Feeling bad about yourself 3   7.  Trouble concentrating 3   8.  Moving slowly or restless 3   Q9: Thoughts of better off dead/self-harm past 2 weeks 0   PHQ-9 Total Score 24   Difficulty at work, home, or with people Extremely dIfficult     RUBENS-7  9/9/2021   1. Feeling nervous, anxious, or on edge 1   2. Not being able to stop or control worrying 1   3. Worrying too much about different things 1   4. Trouble relaxing 1   5. Being so restless that it is hard to sit still 1   6. Becoming easily annoyed or irritable 3   7. Feeling afraid, as if something awful might happen 0   RUBENS-7 Total Score 8   If you checked any problems, how difficult have they made it for you to do your work, take care of things at home, or get along with other people? Extremely difficult       Suicide Assessment Five-step Evaluation and Treatment (SAFE-T)    Asthma Follow-Up    Was ACT completed today?    Yes    ACT Total Scores 9/9/2021   ACT TOTAL SCORE -   ASTHMA ER VISITS -   ASTHMA HOSPITALIZATIONS -   ACT TOTAL SCORE (Goal Greater than or Equal to 20) 13   In the past 12 months, how many times did you visit the emergency room for your asthma without being admitted to the hospital? 0   In the past 12 months, how many times were you hospitalized overnight because of your asthma? 0              Review of Systems   Constitutional, HEENT, cardiovascular, pulmonary, GI, , musculoskeletal, neuro, skin, endocrine and psych systems are negative, except as otherwise noted.      Objective           Vitals:  No vitals were obtained today due to virtual visit.    Physical Exam   healthy, alert and no distress  PSYCH: Alert and oriented times 3; coherent speech, normal   rate and volume, able to articulate logical thoughts, able   to abstract reason, no tangential thoughts, no hallucinations   or delusions  Her affect is normal  RESP: No cough, no audible  wheezing, able to talk in full sentences  Remainder of exam unable to be completed due to telephone visits              Phone call duration: 19 minutes

## 2021-09-10 ASSESSMENT — ASTHMA QUESTIONNAIRES: ACT_TOTALSCORE: 13

## 2021-09-10 ASSESSMENT — ANXIETY QUESTIONNAIRES: GAD7 TOTAL SCORE: 8

## 2021-09-18 ENCOUNTER — HEALTH MAINTENANCE LETTER (OUTPATIENT)
Age: 36
End: 2021-09-18

## 2021-09-23 ENCOUNTER — OFFICE VISIT (OUTPATIENT)
Dept: FAMILY MEDICINE | Facility: CLINIC | Age: 36
End: 2021-09-23
Payer: COMMERCIAL

## 2021-09-23 VITALS
HEART RATE: 71 BPM | OXYGEN SATURATION: 98 % | HEIGHT: 71 IN | BODY MASS INDEX: 30.52 KG/M2 | SYSTOLIC BLOOD PRESSURE: 132 MMHG | TEMPERATURE: 98.2 F | WEIGHT: 218 LBS | DIASTOLIC BLOOD PRESSURE: 80 MMHG

## 2021-09-23 DIAGNOSIS — M25.571 PAIN IN JOINT INVOLVING ANKLE AND FOOT, RIGHT: ICD-10-CM

## 2021-09-23 DIAGNOSIS — R11.0 NAUSEA: Primary | ICD-10-CM

## 2021-09-23 LAB
ALBUMIN SERPL-MCNC: 3.4 G/DL (ref 3.4–5)
ALP SERPL-CCNC: 54 U/L (ref 40–150)
ALT SERPL W P-5'-P-CCNC: 17 U/L (ref 0–50)
ANION GAP SERPL CALCULATED.3IONS-SCNC: 1 MMOL/L (ref 3–14)
AST SERPL W P-5'-P-CCNC: 13 U/L (ref 0–45)
BILIRUB SERPL-MCNC: 0.3 MG/DL (ref 0.2–1.3)
BUN SERPL-MCNC: 13 MG/DL (ref 7–30)
CALCIUM SERPL-MCNC: 8.4 MG/DL (ref 8.5–10.1)
CHLORIDE BLD-SCNC: 107 MMOL/L (ref 94–109)
CO2 SERPL-SCNC: 30 MMOL/L (ref 20–32)
CREAT SERPL-MCNC: 0.82 MG/DL (ref 0.52–1.04)
ERYTHROCYTE [DISTWIDTH] IN BLOOD BY AUTOMATED COUNT: 12.6 % (ref 10–15)
GFR SERPL CREATININE-BSD FRML MDRD: >90 ML/MIN/1.73M2
GLUCOSE BLD-MCNC: 92 MG/DL (ref 70–99)
HCT VFR BLD AUTO: 39.1 % (ref 35–47)
HGB BLD-MCNC: 13.3 G/DL (ref 11.7–15.7)
MCH RBC QN AUTO: 29.4 PG (ref 26.5–33)
MCHC RBC AUTO-ENTMCNC: 34 G/DL (ref 31.5–36.5)
MCV RBC AUTO: 86 FL (ref 78–100)
PLATELET # BLD AUTO: 193 10E3/UL (ref 150–450)
POTASSIUM BLD-SCNC: 4 MMOL/L (ref 3.4–5.3)
PROT SERPL-MCNC: 7.2 G/DL (ref 6.8–8.8)
RBC # BLD AUTO: 4.53 10E6/UL (ref 3.8–5.2)
SODIUM SERPL-SCNC: 138 MMOL/L (ref 133–144)
TSH SERPL DL<=0.005 MIU/L-ACNC: 2.72 MU/L (ref 0.4–4)
WBC # BLD AUTO: 5.5 10E3/UL (ref 4–11)

## 2021-09-23 PROCEDURE — 84443 ASSAY THYROID STIM HORMONE: CPT | Performed by: NURSE PRACTITIONER

## 2021-09-23 PROCEDURE — 36415 COLL VENOUS BLD VENIPUNCTURE: CPT | Performed by: NURSE PRACTITIONER

## 2021-09-23 PROCEDURE — 83516 IMMUNOASSAY NONANTIBODY: CPT | Performed by: NURSE PRACTITIONER

## 2021-09-23 PROCEDURE — 85027 COMPLETE CBC AUTOMATED: CPT | Performed by: NURSE PRACTITIONER

## 2021-09-23 PROCEDURE — 80053 COMPREHEN METABOLIC PANEL: CPT | Performed by: NURSE PRACTITIONER

## 2021-09-23 PROCEDURE — 99214 OFFICE O/P EST MOD 30 MIN: CPT | Performed by: NURSE PRACTITIONER

## 2021-09-23 ASSESSMENT — MIFFLIN-ST. JEOR: SCORE: 1779.97

## 2021-09-23 NOTE — LETTER
September 23, 2021      Holly Tolliver  [address] [address]        Dear ,    We are writing to inform you of your test results.    Noé Perez,     All of your labs look stable.     Thank you,   Yue Oneill CNP       Resulted Orders   CBC with platelets   Result Value Ref Range    WBC Count 5.5 4.0 - 11.0 10e3/uL    RBC Count 4.53 3.80 - 5.20 10e6/uL    Hemoglobin 13.3 11.7 - 15.7 g/dL    Hematocrit 39.1 35.0 - 47.0 %    MCV 86 78 - 100 fL    MCH 29.4 26.5 - 33.0 pg    MCHC 34.0 31.5 - 36.5 g/dL    RDW 12.6 10.0 - 15.0 %    Platelet Count 193 150 - 450 10e3/uL   TSH with free T4 reflex   Result Value Ref Range    TSH 2.72 0.40 - 4.00 mU/L   Comprehensive metabolic panel (BMP + Alb, Alk Phos, ALT, AST, Total. Bili, TP)   Result Value Ref Range    Sodium 138 133 - 144 mmol/L    Potassium 4.0 3.4 - 5.3 mmol/L    Chloride 107 94 - 109 mmol/L    Carbon Dioxide (CO2) 30 20 - 32 mmol/L    Anion Gap 1 (L) 3 - 14 mmol/L    Urea Nitrogen 13 7 - 30 mg/dL    Creatinine 0.82 0.52 - 1.04 mg/dL    Calcium 8.4 (L) 8.5 - 10.1 mg/dL    Glucose 92 70 - 99 mg/dL    Alkaline Phosphatase 54 40 - 150 U/L    AST 13 0 - 45 U/L    ALT 17 0 - 50 U/L    Protein Total 7.2 6.8 - 8.8 g/dL    Albumin 3.4 3.4 - 5.0 g/dL    Bilirubin Total 0.3 0.2 - 1.3 mg/dL    GFR Estimate >90 >60 mL/min/1.73m2      Comment:      As of July 11, 2021, eGFR is calculated by the CKD-EPI creatinine equation, without race adjustment. eGFR can be influenced by muscle mass, exercise, and diet. The reported eGFR is an estimation only and is only applicable if the renal function is stable.       If you have any questions or concerns, please call the clinic at the number listed above.       Sincerely,      WOLFGANG Freire CNP

## 2021-09-23 NOTE — PROGRESS NOTES
Assessment & Plan     Nausea  Patient reports 3 month history of intermittent nausea with eating meat- no weight loss or dysphagia. No new medications were started at time of symptoms .   - CBC with platelets  - TSH with free T4 reflex  - Comprehensive metabolic panel (BMP + Alb, Alk Phos, ALT, AST, Total. Bili, TP)  - Adult Gastro Ref - Consult Only; Future  - Tissue transglutaminase adwoa IgA and IgG    Pain in joint involving ankle and foot, right  History of ankle sprain 4 months ago- was in CAM boot now has recurrent pain/swelling when ambulating for long periods of time-  Patient previously had follow up with podiatry - recommended Voltaren gel/ stretching and offloading supportive shoes   - Orthopedic  Referral; Future  - RICE  - consider physical therapy for ankle strengthening     No follow-ups on file.    WOLFGANG Freire CNP  [facility]    Errol Perez is a 36 year old who presents for the following health issues  accompanied by herself:    HPI     Concern - Problems with eating meat.  Onset: 3 months  Description: Problems with eating meat.  She will take a bite and have to spit it out.  At times she will eat the meat and then vomit.  Other times she can be fine.  She denies trouble swallowing / choking. She states that it is just the sensation in her mouth that makes her spit out the meat. Other days she is able to tolerate it . Her weight has been stable. Her appetite waxes and wanes. She occasionally has watery diarrhea after eating meat.   Intensity: severe  Progression of Symptoms:  Just varies on the day.  Accompanying Signs & Symptoms:  Changes with smell and taste.  Previous history of similar problem: None  Precipitating factors:        Worsened by: See above.  Alleviating factors:        Improved by: None  Therapies tried and outcome: None      ANKLE:  Patient has been evaluated with Dr. Mckeon for a right ankle sprain 4 months ago. She wore a walking  "boot for several months.   The ankle was feeling better until yesterday when she put pressure on the right foot.  Some swelling and pain for the ankle today.  She did take some Ibuprofen.  She had right ankle x-ray on 7/13/21.             Review of Systems   Constitutional, HEENT, cardiovascular, pulmonary, GI, , musculoskeletal, neuro, skin, endocrine and psych systems are negative, except as otherwise noted.      Objective    /80   Pulse 71   Temp 98.2  F (36.8  C) (Tympanic)   Ht 1.803 m (5' 11\")   Wt 98.9 kg (218 lb)   SpO2 98%   BMI 30.40 kg/m    Body mass index is 30.4 kg/m .  Physical Exam   GENERAL: healthy, alert and no distress  RESP: lungs clear to auscultation - no rales, rhonchi or wheezes  CV: regular rate and rhythm, normal S1 S2, no S3 or S4, no murmur, click or rub, no peripheral edema and peripheral pulses strong  ABDOMEN: soft, nontender, no hepatosplenomegaly, no masses and bowel sounds normal  MS: right ankle : tenderness and swelling around lateral malleolus . Pain with eversion                 "

## 2021-09-23 NOTE — PATIENT INSTRUCTIONS
Thank you for choosing [facility].  You may be receiving an email and/or telephone survey request from [facility] Customer Experience regarding your visit today.  Please take a few minutes to respond to the survey to let us know how we are doing.      If you have questions or concerns, please contact us via [employer] or you can contact your care team at [phone] option 2.    Our Clinic hours are:  Monday - Thursday 7am-6pm  Friday 7am-5pm    The [place] outpatient lab hours are:  Monday - Friday 7am-4:30pm    Appointments are required, call [phone]    If you have clinical questions after hours or would like to schedule an appointment,  call the clinic at [phone].

## 2021-09-24 LAB
TTG IGA SER-ACNC: 0.4 U/ML
TTG IGG SER-ACNC: <0.6 U/ML

## 2021-10-15 ENCOUNTER — VIRTUAL VISIT (OUTPATIENT)
Dept: FAMILY MEDICINE | Facility: CLINIC | Age: 36
End: 2021-10-15
Payer: COMMERCIAL

## 2021-10-15 DIAGNOSIS — J20.9 ACUTE BRONCHITIS, UNSPECIFIED ORGANISM: Primary | ICD-10-CM

## 2021-10-15 PROCEDURE — 99213 OFFICE O/P EST LOW 20 MIN: CPT | Mod: TEL | Performed by: NURSE PRACTITIONER

## 2021-10-15 RX ORDER — BENZONATATE 100 MG/1
100 CAPSULE ORAL 3 TIMES DAILY PRN
Qty: 21 CAPSULE | Refills: 0 | Status: SHIPPED | OUTPATIENT
Start: 2021-10-15 | End: 2022-01-24

## 2021-10-15 RX ORDER — PREDNISONE 20 MG/1
TABLET ORAL
Qty: 15 TABLET | Refills: 0 | Status: SHIPPED | OUTPATIENT
Start: 2021-10-15 | End: 2021-10-25

## 2021-10-15 NOTE — PROGRESS NOTES
"Ana is a 36 year old who is being evaluated via a billable telephone visit.      What phone number would you like to be contacted at? [phone]   How would you like to obtain your AVS? MyChart    Assessment & Plan     Acute bronchitis, unspecified organism  -Covid 19 pending   - Symptomatic COVID-19 Virus (Coronavirus) by PCR; Future  - recommended to start predniSONE (DELTASONE) 20 MG tablet; Take 1 tablet (20 mg) by mouth 2 times daily for 5 days, THEN 1 tablet (20 mg) daily for 5 days.  - benzonatate (TESSALON) 100 MG capsule; Take 1 capsule (100 mg) by mouth 3 times daily as needed for cough  -continue Albuterol as needed   -continue quarantine, if negative can return to work on 10/19 th, in case of positive results will need to extended until 10/21 (symptoms started on 10/10)             BMI:   Estimated body mass index is 30.4 kg/m  as calculated from the following:    Height as of 9/23/21: 1.803 m (5' 11\").    Weight as of 9/23/21: 98.9 kg (218 lb).       See Patient Instructions    Return in about 3 days (around 10/18/2021), or if symptoms worsen or fail to improve.    WOLFGANG Ndiaye CNP  [facility]    Subjective   Ana is a 36 year old who presents for the following health issues     HPI     Acute Illness  Acute illness concerns: COUGH   Onset/Duration: 5 DAYS   Symptoms:   Fever: YES- 101 yesterday   Chills/Sweats: YES  Headache (location?): YES- frontal area   Sinus Pressure: no  Conjunctivitis:  no  Ear Pain: YES: right  Rhinorrhea: YES  Congestion: YES  Sore Throat: YES  Cough: YES-non-productive  Wheeze: no  Decreased Appetite: YES  Nausea: no  Vomiting: no  Diarrhea: no  Dysuria/Freq.: no  Dysuria or Hematuria: no  Fatigue/Achiness: YES  Sick/Strep Exposure: no  Therapies tried and outcome: None      Review of Systems   Constitutional, HEENT, cardiovascular, pulmonary, gi and gu systems are negative, except as otherwise noted.      Objective           Vitals:  No " vitals were obtained today due to virtual visit.    Physical Exam   healthy, alert and no distress  PSYCH: Alert and oriented times 3; coherent speech, normal   rate and volume, able to articulate logical thoughts, able   to abstract reason, no tangential thoughts, no hallucinations   or delusions  Her affect is normal  RESP: dry cough, no audible wheezing, able to talk in full sentences  Remainder of exam unable to be completed due to telephone visits          Phone call duration: 11 minutes

## 2021-10-15 NOTE — PATIENT INSTRUCTIONS
Start Prednisone  Tessalon 1 capsule 3 times daily as needed for cough  Get Covid test   Continue quarantine for now

## 2021-10-15 NOTE — LETTER
[facility]  [address] [address]  Phone: [phone]    October 15, 2021        Holly Tolliver  [address] [address]          To whom it may concern:    RE: Holly Tolliver    Patient was seen and treated today at our clinic and missed work.  Patient may return to work 10/19 th if feeling better with negative Covid test. In case of positive result recommend to stay off work until 10/21/2021  Please contact me for questions or concerns.      Sincerely,        WOLFGANG Ndiaye CNP

## 2021-10-16 ENCOUNTER — LAB (OUTPATIENT)
Dept: FAMILY MEDICINE | Facility: CLINIC | Age: 36
End: 2021-10-16
Attending: NURSE PRACTITIONER
Payer: COMMERCIAL

## 2021-10-16 DIAGNOSIS — J20.9 ACUTE BRONCHITIS, UNSPECIFIED ORGANISM: ICD-10-CM

## 2021-10-16 PROCEDURE — U0005 INFEC AGEN DETEC AMPLI PROBE: HCPCS

## 2021-10-16 PROCEDURE — U0003 INFECTIOUS AGENT DETECTION BY NUCLEIC ACID (DNA OR RNA); SEVERE ACUTE RESPIRATORY SYNDROME CORONAVIRUS 2 (SARS-COV-2) (CORONAVIRUS DISEASE [COVID-19]), AMPLIFIED PROBE TECHNIQUE, MAKING USE OF HIGH THROUGHPUT TECHNOLOGIES AS DESCRIBED BY CMS-2020-01-R: HCPCS

## 2021-10-17 LAB — SARS-COV-2 RNA RESP QL NAA+PROBE: NEGATIVE

## 2021-10-18 PROCEDURE — 99284 EMERGENCY DEPT VISIT MOD MDM: CPT | Mod: 25 | Performed by: EMERGENCY MEDICINE

## 2021-10-18 PROCEDURE — 99284 EMERGENCY DEPT VISIT MOD MDM: CPT | Performed by: EMERGENCY MEDICINE

## 2021-10-19 ENCOUNTER — HOSPITAL ENCOUNTER (EMERGENCY)
Facility: CLINIC | Age: 36
Discharge: HOME | End: 2021-10-19
Attending: EMERGENCY MEDICINE | Admitting: EMERGENCY MEDICINE
Payer: COMMERCIAL

## 2021-10-19 ENCOUNTER — APPOINTMENT (OUTPATIENT)
Dept: GENERAL RADIOLOGY | Facility: CLINIC | Age: 36
End: 2021-10-19
Attending: EMERGENCY MEDICINE
Payer: COMMERCIAL

## 2021-10-19 VITALS
SYSTOLIC BLOOD PRESSURE: 152 MMHG | OXYGEN SATURATION: 100 % | DIASTOLIC BLOOD PRESSURE: 101 MMHG | BODY MASS INDEX: 28.04 KG/M2 | RESPIRATION RATE: 16 BRPM | TEMPERATURE: 98.7 F | HEART RATE: 88 BPM | WEIGHT: 207 LBS | HEIGHT: 72 IN

## 2021-10-19 DIAGNOSIS — R21 RASH AND NONSPECIFIC SKIN ERUPTION: ICD-10-CM

## 2021-10-19 LAB
ALBUMIN SERPL-MCNC: 4 G/DL (ref 3.4–5)
ALP SERPL-CCNC: 54 U/L (ref 40–150)
ALT SERPL W P-5'-P-CCNC: 25 U/L (ref 0–50)
ANION GAP SERPL CALCULATED.3IONS-SCNC: 7 MMOL/L (ref 3–14)
AST SERPL W P-5'-P-CCNC: 16 U/L (ref 0–45)
BASOPHILS # BLD AUTO: 0 10E3/UL (ref 0–0.2)
BASOPHILS NFR BLD AUTO: 0 %
BILIRUB SERPL-MCNC: 0.3 MG/DL (ref 0.2–1.3)
BUN SERPL-MCNC: 12 MG/DL (ref 7–30)
CALCIUM SERPL-MCNC: 9.1 MG/DL (ref 8.5–10.1)
CHLORIDE BLD-SCNC: 108 MMOL/L (ref 94–109)
CO2 SERPL-SCNC: 26 MMOL/L (ref 20–32)
CREAT SERPL-MCNC: 0.82 MG/DL (ref 0.52–1.04)
D DIMER PPP FEU-MCNC: 0.31 UG/ML FEU (ref 0–0.5)
EOSINOPHIL # BLD AUTO: 0 10E3/UL (ref 0–0.7)
EOSINOPHIL NFR BLD AUTO: 0 %
ERYTHROCYTE [DISTWIDTH] IN BLOOD BY AUTOMATED COUNT: 12.7 % (ref 10–15)
GFR SERPL CREATININE-BSD FRML MDRD: >90 ML/MIN/1.73M2
GLUCOSE BLD-MCNC: 119 MG/DL (ref 70–99)
HCT VFR BLD AUTO: 41.1 % (ref 35–47)
HGB BLD-MCNC: 14.2 G/DL (ref 11.7–15.7)
HOLD SPECIMEN: NORMAL
HOLD SPECIMEN: NORMAL
IMM GRANULOCYTES # BLD: 0.1 10E3/UL
IMM GRANULOCYTES NFR BLD: 1 %
LYMPHOCYTES # BLD AUTO: 3.3 10E3/UL (ref 0.8–5.3)
LYMPHOCYTES NFR BLD AUTO: 32 %
MCH RBC QN AUTO: 29.3 PG (ref 26.5–33)
MCHC RBC AUTO-ENTMCNC: 34.5 G/DL (ref 31.5–36.5)
MCV RBC AUTO: 85 FL (ref 78–100)
MONOCYTES # BLD AUTO: 1.1 10E3/UL (ref 0–1.3)
MONOCYTES NFR BLD AUTO: 10 %
NEUTROPHILS # BLD AUTO: 6 10E3/UL (ref 1.6–8.3)
NEUTROPHILS NFR BLD AUTO: 57 %
NRBC # BLD AUTO: 0 10E3/UL
NRBC BLD AUTO-RTO: 0 /100
PLATELET # BLD AUTO: 258 10E3/UL (ref 150–450)
POTASSIUM BLD-SCNC: 3.4 MMOL/L (ref 3.4–5.3)
PROT SERPL-MCNC: 7.9 G/DL (ref 6.8–8.8)
RBC # BLD AUTO: 4.84 10E6/UL (ref 3.8–5.2)
SODIUM SERPL-SCNC: 141 MMOL/L (ref 133–144)
TROPONIN I SERPL-MCNC: <0.015 UG/L (ref 0–0.04)
WBC # BLD AUTO: 10.4 10E3/UL (ref 4–11)

## 2021-10-19 PROCEDURE — 82040 ASSAY OF SERUM ALBUMIN: CPT | Performed by: EMERGENCY MEDICINE

## 2021-10-19 PROCEDURE — 36415 COLL VENOUS BLD VENIPUNCTURE: CPT | Performed by: EMERGENCY MEDICINE

## 2021-10-19 PROCEDURE — 85004 AUTOMATED DIFF WBC COUNT: CPT | Performed by: EMERGENCY MEDICINE

## 2021-10-19 PROCEDURE — 85379 FIBRIN DEGRADATION QUANT: CPT | Performed by: EMERGENCY MEDICINE

## 2021-10-19 PROCEDURE — 84484 ASSAY OF TROPONIN QUANT: CPT | Performed by: EMERGENCY MEDICINE

## 2021-10-19 PROCEDURE — 71046 X-RAY EXAM CHEST 2 VIEWS: CPT

## 2021-10-19 PROCEDURE — 250N000013 HC RX MED GY IP 250 OP 250 PS 637: Performed by: EMERGENCY MEDICINE

## 2021-10-19 RX ORDER — CETIRIZINE HYDROCHLORIDE 10 MG/1
10 TABLET ORAL 2 TIMES DAILY PRN
Qty: 10 TABLET | Refills: 0 | Status: SHIPPED | OUTPATIENT
Start: 2021-10-19 | End: 2021-10-24

## 2021-10-19 RX ORDER — CETIRIZINE HYDROCHLORIDE 10 MG/1
10 TABLET ORAL ONCE
Status: COMPLETED | OUTPATIENT
Start: 2021-10-19 | End: 2021-10-19

## 2021-10-19 RX ADMIN — CETIRIZINE HYDROCHLORIDE 10 MG: 10 TABLET, FILM COATED ORAL at 03:16

## 2021-10-19 ASSESSMENT — MIFFLIN-ST. JEOR: SCORE: 1741.98

## 2021-10-19 ASSESSMENT — ENCOUNTER SYMPTOMS
NEUROLOGICAL NEGATIVE: 1
HEMATOLOGIC/LYMPHATIC NEGATIVE: 1
GASTROINTESTINAL NEGATIVE: 1
COLOR CHANGE: 1
CHEST TIGHTNESS: 1
PSYCHIATRIC NEGATIVE: 1
EYES NEGATIVE: 1
CONSTITUTIONAL NEGATIVE: 1
COUGH: 1
BACK PAIN: 1
ENDOCRINE NEGATIVE: 1
ALLERGIC/IMMUNOLOGIC NEGATIVE: 1
CARDIOVASCULAR NEGATIVE: 1

## 2021-10-19 NOTE — ED PROVIDER NOTES
History     Chief Complaint   Patient presents with     Rash     rash on chest  thinks it is medication reaction      Bleeding/Bruising     bruising appearing over body  no know injuries  legs worst area of bruising      Chest Pain     started yesterday  worse tonight      Back Pain     Cough     cough for week      HPI  Holly Tolliver is a 36 year old female who presents with concern about a rash. Patient reports, she is also concerned that she has bruising about her lower extremities. Patient reports chest pain, back pain and cough.  Patient has multiple medical diagnoses including history of tobacco use disorder, mild intermittent asthma, generalized anxiety disorder, GERD.  History of obesity.  Patient is currently prescribed albuterol, Advair, Paxil, trazodone, prednisone.  Patient is also prescribed Tylenol hydroxyzine ibuprofen.  On examination patient arrived alone by car from home reporting that since she started prednisone taper that she was placed on 3 days ago for concern for possible bronchitis/COVID-19 she  noted that she has had flushing around her anterior chest and back. She reportsbruising around her knee and foot without trauma.  Patient reports she did injure her toes after dropping a door on her foot.  She was concerned that she has  chest pain and back pain and that her cough persists although it seems to be improving.  She was evaluated on October 16, 2021 with possible COVID-19 illness.  She had a negative rapid COVID test.  Patient reports she has not received a COVID vaccine.  No chills or fever.  No abdominal pain.  Due to constellation of symptoms she presents for further care.    Allergies:  Allergies   Allergen Reactions     Amoxicillin        Problem List:    Patient Active Problem List    Diagnosis Date Noted     Obesity (BMI 35.0-39.9) with comorbidity (H) 03/25/2019     Priority: Medium     Routine postpartum follow-up 05/31/2016     Priority: Medium     Mirena IUD 05/31/2016      Priority: Medium     Lot: [id number]  Exp:        Single liveborn, born in hospital, delivered 2016     Priority: Medium     Placental abruption in third trimester 2016     Priority: Medium     Placental abruption 2016     Priority: Medium      delivery delivered 2016     Priority: Medium     Prenatal care, subsequent pregnancy 2016     Priority: Medium     Prenatal care, subsequent pregnancy in third trimester 2016     Priority: Medium     Health Care Home 2015     Priority: Medium     Care Coordinator: Kelly DANIEL, MSW  See letters for Health Care home care plan    SW covering in [place], [place] SW absence    Kelly Simon, FREDY, MSW   [phone]  3/24/2015 12:24 PM       Generalized anxiety disorder 2013     Priority: Medium     Diagnosis updated by automated process. Provider to review and confirm.       GERD (gastroesophageal reflux disease) 2013     Priority: Medium     Major depressive disorder, single episode, moderate (H) 2013     Priority: Medium     CARDIOVASCULAR SCREENING; LDL GOAL LESS THAN 160 10/23/2012     Priority: Medium     CTS (carpal tunnel syndrome) 10/14/2011     Priority: Medium     Tobacco use disorder 2008     Priority: Medium     Has decreased from 1 ppd to 1 cigarette per day since pregnancy.       Mild intermittent asthma 2008     Priority: Medium        Past Medical History:    Past Medical History:   Diagnosis Date     ALCOH DEP NEC/NOS-UNSPEC 2007     Asthma      Chickenpox      Postpartum depression      Pregnancy induced hypertension      Urinary tract infections        Past Surgical History:    Past Surgical History:   Procedure Laterality Date      SECTION Bilateral 2016    Procedure:  SECTION;  Surgeon: Beau Cardoso MD;  Location: [place] OR     PE TUBES  age 3       Family History:    Family History   Problem Relation Age of Onset     Alcohol/Drug  Mother         alcohol- recovered     Hypertension Mother      Depression Mother         also anxiety     Other - See Comments Mother         ankylosing spondylosis     Anxiety Disorder Mother      Alcohol/Drug Father         alcohol- recovered     Alcohol/Drug Sister         A&D     Cancer Maternal Grandmother         lung     Depression Maternal Grandmother         also anxiety     Mental Illness Paternal Grandmother      Alcohol/Drug Brother         alcohol       Social History:  Marital Status:  Single [1]  Social History     Tobacco Use     Smoking status: Former Smoker     Packs/day: 0.00     Years: 12.00     Pack years: 0.00     Types: Cigarettes     Smokeless tobacco: Never Used     Tobacco comment: Never   Vaping Use     Vaping Use: Some days   Substance Use Topics     Alcohol use: Not Currently     Alcohol/week: 0.0 standard drinks     Comment: attempting sobriety     Drug use: Not Currently     Types: Methamphetamines, Marijuana     Comment: relapse, attempting sobriety        Medications:    cetirizine (ZYRTEC) 10 MG tablet  acetaminophen (TYLENOL) 500 MG tablet  albuterol (PROAIR HFA/PROVENTIL HFA/VENTOLIN HFA) 108 (90 Base) MCG/ACT inhaler  benzonatate (TESSALON) 100 MG capsule  fluticasone-salmeterol (ADVAIR) 250-50 MCG/DOSE inhaler  HYDROXYZINE HCL PO  ibuprofen (ADVIL/MOTRIN) 200 MG tablet  ketoconazole (NIZORAL) 2 % external cream  norelgestromin-ethinyl estradiol (ORTHO EVRA) 150-35 MCG/24HR patch  PARoxetine (PAXIL) 40 MG tablet  predniSONE (DELTASONE) 20 MG tablet  traZODone (DESYREL) 50 MG tablet          Review of Systems   Constitutional: Negative.    HENT: Negative.    Eyes: Negative.    Respiratory: Positive for cough and chest tightness.    Cardiovascular: Negative.    Gastrointestinal: Negative.    Endocrine: Negative.    Genitourinary: Negative.    Musculoskeletal: Positive for back pain.   Skin: Positive for color change (bruising) and rash.   Allergic/Immunologic: Negative.   "  Neurological: Negative.    Hematological: Negative.    Psychiatric/Behavioral: Negative.    All other systems reviewed and are negative.      Physical Exam   BP: (!) 151/117  Pulse: 74  Temp: 98.2  F (36.8  C)  Resp: 18  Height: 182.2 cm (5' 11.75\")  Weight: 93.9 kg (207 lb)  SpO2: 98 %      Physical Exam  Constitutional:       General: She is not in acute distress.     Appearance: She is well-developed. She is not ill-appearing, toxic-appearing or diaphoretic.   HENT:      Head: Normocephalic and atraumatic.   Eyes:      Extraocular Movements: Extraocular movements intact.      Pupils: Pupils are equal, round, and reactive to light.   Cardiovascular:      Rate and Rhythm: Normal rate and regular rhythm.      Heart sounds: Normal heart sounds.   Pulmonary:      Effort: Pulmonary effort is normal.      Breath sounds: Normal breath sounds. No decreased breath sounds.   Chest:      Chest wall: No mass, deformity, tenderness, crepitus or edema. There is no dullness to percussion.   Musculoskeletal:      Cervical back: Normal range of motion and neck supple.   Skin:     Findings: Ecchymosis present.   Neurological:      Mental Status: She is alert.         ED Course        Procedures              Critical Care time:  none               ED medications:  Medications   cetirizine (zyrTEC) tablet 10 mg (has no administration in time range)         ED Vitals:  Vitals:    10/19/21 0050 10/19/21 0100 10/19/21 0115 10/19/21 0200   BP: (!) 151/97 (!) 168/132 (!) 145/80 (!) 163/103   Pulse: 81 102 76 71   Resp:       Temp:       TempSrc:       SpO2: 98% 98% 100% 98%   Weight:       Height:         Vitals:    10/19/21 0100 10/19/21 0115 10/19/21 0200 10/19/21 0316   BP: (!) 168/132 (!) 145/80 (!) 163/103 (!) 152/101   Pulse: 102 76 71 88   Resp:    16   Temp:    98.7  F (37.1  C)   TempSrc:    Oral   SpO2: 98% 100% 98% 100%   Weight:       Height:           ED labs and imaging:  Results for orders placed or performed during the " hospital encounter of 10/19/21   Chest XR,  PA & LAT     Status: None (Preliminary result)    Narrative    EXAM: CHEST 2 VIEWS  LOCATION: [facility]  DATE/TIME: 10/19/2021 2:14 AM    INDICATION: COVID. Cough and chest tightness. Back pain.  COMPARISON: 01/28/2008.    FINDINGS: The lungs are clear. Normal size cardiac silhouette.      Impression    IMPRESSION: No evidence of active cardiopulmonary disease.    Comprehensive metabolic panel     Status: Abnormal   Result Value Ref Range    Sodium 141 133 - 144 mmol/L    Potassium 3.4 3.4 - 5.3 mmol/L    Chloride 108 94 - 109 mmol/L    Carbon Dioxide (CO2) 26 20 - 32 mmol/L    Anion Gap 7 3 - 14 mmol/L    Urea Nitrogen 12 7 - 30 mg/dL    Creatinine 0.82 0.52 - 1.04 mg/dL    Calcium 9.1 8.5 - 10.1 mg/dL    Glucose 119 (H) 70 - 99 mg/dL    Alkaline Phosphatase 54 40 - 150 U/L    AST 16 0 - 45 U/L    ALT 25 0 - 50 U/L    Protein Total 7.9 6.8 - 8.8 g/dL    Albumin 4.0 3.4 - 5.0 g/dL    Bilirubin Total 0.3 0.2 - 1.3 mg/dL    GFR Estimate >90 >60 mL/min/1.73m2   D dimer quantitative     Status: Normal   Result Value Ref Range    D-Dimer Quantitative 0.31 0.00 - 0.50 ug/mL FEU    Narrative    This D-dimer assay is intended for use in conjunction with a clinical pretest probability assessment model to exclude pulmonary embolism (PE) and deep venous thrombosis (DVT) in outpatients suspected of PE or DVT. The cut-off value is 0.50 ug/mL FEU.   Troponin I (second draw)     Status: Normal   Result Value Ref Range    Troponin I <0.015 0.000 - 0.045 ug/L   CBC with platelets and differential     Status: Abnormal   Result Value Ref Range    WBC Count 10.4 4.0 - 11.0 10e3/uL    RBC Count 4.84 3.80 - 5.20 10e6/uL    Hemoglobin 14.2 11.7 - 15.7 g/dL    Hematocrit 41.1 35.0 - 47.0 %    MCV 85 78 - 100 fL    MCH 29.3 26.5 - 33.0 pg    MCHC 34.5 31.5 - 36.5 g/dL    RDW 12.7 10.0 - 15.0 %    Platelet Count 258 150 - 450 10e3/uL    % Neutrophils 57 %    %  Lymphocytes 32 %    % Monocytes 10 %    % Eosinophils 0 %    % Basophils 0 %    % Immature Granulocytes 1 %    NRBCs per 100 WBC 0 <1 /100    Absolute Neutrophils 6.0 1.6 - 8.3 10e3/uL    Absolute Lymphocytes 3.3 0.8 - 5.3 10e3/uL    Absolute Monocytes 1.1 0.0 - 1.3 10e3/uL    Absolute Eosinophils 0.0 0.0 - 0.7 10e3/uL    Absolute Basophils 0.0 0.0 - 0.2 10e3/uL    Absolute Immature Granulocytes 0.1 (H) <=0.0 10e3/uL    Absolute NRBCs 0.0 10e3/uL   Extra Red Top Tube     Status: None   Result Value Ref Range    Hold Specimen JIC    Extra Green Top (Lithium Heparin) Tube     Status: None   Result Value Ref Range    Hold Specimen JIC    CBC with platelets differential     Status: Abnormal    Narrative    The following orders were created for panel order CBC with platelets differential.  Procedure                               Abnormality         Status                     ---------                               -----------         ------                     CBC with platelets and d...[[id number]]  Abnormal            Final result                 Please view results for these tests on the individual orders.   Extra Tube     Status: None    Narrative    The following orders were created for panel order Extra Tube.  Procedure                               Abnormality         Status                     ---------                               -----------         ------                     Extra Red Top Tube[[id number]]                               Final result               Extra Green Top (Lithium...[[id number]]                      Final result                 Please view results for these tests on the individual orders.             Assessments & Plan (with Medical Decision Making)   Assessment Summary and Clinical Impression: 36-year-old female who presented with concern about bruising about her lower extremity over the last 24 hours, flushing around the anterior chest and upper back. Patient also reported chest pain and  "back pain and shortness of breath. The cause of her symptoms as reported is unclear. Relation to prednisone use recently started 72hrs prior is unclear. Close outpatient follow-up advised with low threshold to return for re-evaluation. Patient reported her cough is improving after recent evaluation for COVID-19 illness 3 days ago.  She was prescribed prednisone taper over a 5-day period for working diagnosis of acute bronchitis.  On exam she was in no acute distress she was afebrile.  She had some subtle bruises around her knee.  She had flushing around anterior chest and upper back but no urticaria.  Lungs are clear to auscultation no wheezing or rhonchi. Work-up was unrevealing.    ED course and Plan:  Reviewed the medical record.  Patient had recent evaluation for acute bronchitis 4 days earlier.  She was prescribed Tessalon Perles, and albuterol inhaler.  Rapid COVID-19 testing was obtained.  Rapid COVID-19 test was negative-3 days ago.  With recent prednisone therapy and patient reporting ongoing cough, chest tightness, back pain blood work was obtained.  Her D-dimer was within normal limits.  Chest imaging was reviewed independently and the radiology report was reviewed.  See details in the medical record.  No acute findings in the chest.  Patient was reevaluated.  After a period of observation.  She fell asleep during her ED course.  As I entered the room to ready her for discharge and review plan of care patient reports \"so I just wasted my time.\" \" What am I suppose to do about my rash\" \" I feel sick and hot\".  We discussed remedies that could be helpful to manage her symptoms as described. I reviewed over the counter medications that maybe helpful for symptom management..  She had no generalized itching during her ED course and her rash did not seem to evolve.  My suspicion that she has a dermatologic emergency is low. Patient expressed displeasure about proposed plan of care despite attempts to reassure " her and discussion about next steps for care. It is unclear if symptoms are a medication reaction with recent prednisone therapy. Patient expressed understanding.      Disclaimer: This note consists of symbols derived from keyboarding, dictation and/or voice recognition software. As a result, there may be errors in the script that have gone undetected. Please consider this when interpreting information found in this chart.  I have reviewed the nursing notes.    I have reviewed the findings, diagnosis, plan and need for follow up with the patient.       New Prescriptions    CETIRIZINE (ZYRTEC) 10 MG TABLET    Take 1 tablet (10 mg) by mouth 2 times daily as needed for allergies (1 tab up to twice a day as needed for itch, rash, hives, allergy)       Final diagnoses:   Rash and nonspecific skin eruption - After starting 5-day prednisone taper for suspicion of bronchitis/COVID-19 illness       10/18/2021   [facility] EMERGENCY DEPT     Tone Paul MD  10/19/21 [id number]

## 2021-10-19 NOTE — DISCHARGE INSTRUCTIONS
1) Your evaluation today does not suggest an emergency diagnosis of the cause of your symptoms is not clear.  The rash reported with symptoms of feeling warm is not clear.  It could be related to prednisone therapy which is the newest medication he reported taking the last 3 days.  We have agreed that you stop taking the prednisone and trial remedies such as Zyrtec up to 10 mg twice a day if needed over the next 5 days per and/or diphenhydramine or Benadryl-25 to 50 mg every 8 hours as needed over the next 3 days.  Benadryl and/or diphenhydramine can be sedating so do not take this and operate machinery. You have declined hydroxyzine.    2) we have also discussed that your testing was reassuring showing no evidence of heart attack, pneumonia, blood clot, collapsed lung.    3) we have discussed that you should have a recheck with your clinic or primary care team in the next 3 days for symptom recheck and follow-up given that the cause of your symptoms reported is not clear.

## 2021-10-19 NOTE — ED NOTES
Pt had been sleeping soundly prior to CXR, now back she states she has abdominal discomfort and maybe nauseaed, and her neck feels tight.  Comfort and reassurance given.  Will await further orders.

## 2021-10-19 NOTE — ED NOTES
Patient concerned about back and chest pain  Cough and bruising  Had a virtual visit was put on prednisone and tessalon pearls  For chest congestion and cough Patient is now having increas in pain    Patient has elevated B/P

## 2022-01-24 ENCOUNTER — APPOINTMENT (OUTPATIENT)
Dept: MRI IMAGING | Facility: CLINIC | Age: 37
End: 2022-01-24
Attending: EMERGENCY MEDICINE
Payer: COMMERCIAL

## 2022-01-24 ENCOUNTER — HOSPITAL ENCOUNTER (EMERGENCY)
Facility: CLINIC | Age: 37
Discharge: HOME | End: 2022-01-24
Attending: EMERGENCY MEDICINE | Admitting: EMERGENCY MEDICINE
Payer: COMMERCIAL

## 2022-01-24 ENCOUNTER — OFFICE VISIT (OUTPATIENT)
Dept: FAMILY MEDICINE | Facility: CLINIC | Age: 37
End: 2022-01-24
Payer: COMMERCIAL

## 2022-01-24 VITALS
RESPIRATION RATE: 16 BRPM | DIASTOLIC BLOOD PRESSURE: 82 MMHG | WEIGHT: 200.4 LBS | HEART RATE: 75 BPM | BODY MASS INDEX: 27.14 KG/M2 | TEMPERATURE: 98.5 F | SYSTOLIC BLOOD PRESSURE: 118 MMHG | OXYGEN SATURATION: 96 % | HEIGHT: 72 IN

## 2022-01-24 VITALS
TEMPERATURE: 98.7 F | DIASTOLIC BLOOD PRESSURE: 75 MMHG | OXYGEN SATURATION: 99 % | HEART RATE: 69 BPM | BODY MASS INDEX: 27.09 KG/M2 | SYSTOLIC BLOOD PRESSURE: 121 MMHG | HEIGHT: 72 IN | WEIGHT: 200 LBS | RESPIRATION RATE: 18 BRPM

## 2022-01-24 DIAGNOSIS — I67.1 CEREBRAL ANEURYSM, NONRUPTURED: ICD-10-CM

## 2022-01-24 DIAGNOSIS — M79.645 PAIN OF FINGER OF LEFT HAND: ICD-10-CM

## 2022-01-24 DIAGNOSIS — B36.0 TINEA VERSICOLOR: ICD-10-CM

## 2022-01-24 DIAGNOSIS — M54.2 CERVICALGIA: ICD-10-CM

## 2022-01-24 DIAGNOSIS — R42 DIZZINESS: ICD-10-CM

## 2022-01-24 DIAGNOSIS — F33.9 EPISODE OF RECURRENT MAJOR DEPRESSIVE DISORDER, UNSPECIFIED DEPRESSION EPISODE SEVERITY (H): ICD-10-CM

## 2022-01-24 DIAGNOSIS — F41.1 GENERALIZED ANXIETY DISORDER: ICD-10-CM

## 2022-01-24 DIAGNOSIS — R35.0 URINARY FREQUENCY: Primary | ICD-10-CM

## 2022-01-24 LAB
ALBUMIN SERPL-MCNC: 3.8 G/DL (ref 3.4–5)
ALBUMIN UR-MCNC: NEGATIVE MG/DL
ALP SERPL-CCNC: 73 U/L (ref 40–150)
ALT SERPL W P-5'-P-CCNC: 16 U/L (ref 0–50)
ANION GAP SERPL CALCULATED.3IONS-SCNC: 5 MMOL/L (ref 3–14)
APPEARANCE UR: CLEAR
AST SERPL W P-5'-P-CCNC: 13 U/L (ref 0–45)
BASOPHILS # BLD AUTO: 0 10E3/UL (ref 0–0.2)
BASOPHILS NFR BLD AUTO: 0 %
BILIRUB SERPL-MCNC: 0.6 MG/DL (ref 0.2–1.3)
BILIRUB UR QL STRIP: NEGATIVE
BUN SERPL-MCNC: 11 MG/DL (ref 7–30)
CALCIUM SERPL-MCNC: 8.9 MG/DL (ref 8.5–10.1)
CHLORIDE BLD-SCNC: 105 MMOL/L (ref 94–109)
CO2 SERPL-SCNC: 27 MMOL/L (ref 20–32)
COLOR UR AUTO: YELLOW
CREAT SERPL-MCNC: 0.76 MG/DL (ref 0.52–1.04)
EOSINOPHIL # BLD AUTO: 0.1 10E3/UL (ref 0–0.7)
EOSINOPHIL NFR BLD AUTO: 1 %
ERYTHROCYTE [DISTWIDTH] IN BLOOD BY AUTOMATED COUNT: 12.2 % (ref 10–15)
GFR SERPL CREATININE-BSD FRML MDRD: >90 ML/MIN/1.73M2
GLUCOSE BLD-MCNC: 85 MG/DL (ref 70–99)
GLUCOSE UR STRIP-MCNC: NEGATIVE MG/DL
HCT VFR BLD AUTO: 39.4 % (ref 35–47)
HGB BLD-MCNC: 14 G/DL (ref 11.7–15.7)
HGB UR QL STRIP: NEGATIVE
HOLD SPECIMEN: NORMAL
IMM GRANULOCYTES # BLD: 0 10E3/UL
IMM GRANULOCYTES NFR BLD: 0 %
KETONES UR STRIP-MCNC: NEGATIVE MG/DL
LEUKOCYTE ESTERASE UR QL STRIP: NEGATIVE
LYMPHOCYTES # BLD AUTO: 2.4 10E3/UL (ref 0.8–5.3)
LYMPHOCYTES NFR BLD AUTO: 34 %
MCH RBC QN AUTO: 29.3 PG (ref 26.5–33)
MCHC RBC AUTO-ENTMCNC: 35.5 G/DL (ref 31.5–36.5)
MCV RBC AUTO: 82 FL (ref 78–100)
MONOCYTES # BLD AUTO: 0.4 10E3/UL (ref 0–1.3)
MONOCYTES NFR BLD AUTO: 5 %
MUCOUS THREADS #/AREA URNS LPF: PRESENT /LPF
NEUTROPHILS # BLD AUTO: 4.1 10E3/UL (ref 1.6–8.3)
NEUTROPHILS NFR BLD AUTO: 60 %
NITRATE UR QL: NEGATIVE
NRBC # BLD AUTO: 0 10E3/UL
NRBC BLD AUTO-RTO: 0 /100
PH UR STRIP: 6 [PH] (ref 5–7)
PLATELET # BLD AUTO: 225 10E3/UL (ref 150–450)
POTASSIUM BLD-SCNC: 3.5 MMOL/L (ref 3.4–5.3)
PROT SERPL-MCNC: 7.8 G/DL (ref 6.8–8.8)
RBC # BLD AUTO: 4.78 10E6/UL (ref 3.8–5.2)
RBC URINE: 0 /HPF
SODIUM SERPL-SCNC: 137 MMOL/L (ref 133–144)
SP GR UR STRIP: 1.01 (ref 1–1.03)
SQUAMOUS EPITHELIAL: 2 /HPF
UROBILINOGEN UR STRIP-MCNC: NORMAL MG/DL
WBC # BLD AUTO: 6.9 10E3/UL (ref 4–11)
WBC URINE: <1 /HPF

## 2022-01-24 PROCEDURE — 250N000011 HC RX IP 250 OP 636: Performed by: EMERGENCY MEDICINE

## 2022-01-24 PROCEDURE — 93010 ELECTROCARDIOGRAM REPORT: CPT | Performed by: EMERGENCY MEDICINE

## 2022-01-24 PROCEDURE — 85025 COMPLETE CBC W/AUTO DIFF WBC: CPT | Performed by: EMERGENCY MEDICINE

## 2022-01-24 PROCEDURE — 70549 MR ANGIOGRAPH NECK W/O&W/DYE: CPT

## 2022-01-24 PROCEDURE — 70544 MR ANGIOGRAPHY HEAD W/O DYE: CPT | Mod: XS

## 2022-01-24 PROCEDURE — 36415 COLL VENOUS BLD VENIPUNCTURE: CPT | Performed by: FAMILY MEDICINE

## 2022-01-24 PROCEDURE — 258N000003 HC RX IP 258 OP 636: Performed by: EMERGENCY MEDICINE

## 2022-01-24 PROCEDURE — 255N000002 HC RX 255 OP 636: Performed by: EMERGENCY MEDICINE

## 2022-01-24 PROCEDURE — 99285 EMERGENCY DEPT VISIT HI MDM: CPT | Mod: 25 | Performed by: EMERGENCY MEDICINE

## 2022-01-24 PROCEDURE — 93005 ELECTROCARDIOGRAM TRACING: CPT | Performed by: EMERGENCY MEDICINE

## 2022-01-24 PROCEDURE — 85025 COMPLETE CBC W/AUTO DIFF WBC: CPT | Performed by: FAMILY MEDICINE

## 2022-01-24 PROCEDURE — 99214 OFFICE O/P EST MOD 30 MIN: CPT | Performed by: NURSE PRACTITIONER

## 2022-01-24 PROCEDURE — A9585 GADOBUTROL INJECTION: HCPCS | Performed by: EMERGENCY MEDICINE

## 2022-01-24 PROCEDURE — 82310 ASSAY OF CALCIUM: CPT | Performed by: FAMILY MEDICINE

## 2022-01-24 PROCEDURE — 81001 URINALYSIS AUTO W/SCOPE: CPT | Performed by: EMERGENCY MEDICINE

## 2022-01-24 PROCEDURE — 70553 MRI BRAIN STEM W/O & W/DYE: CPT

## 2022-01-24 PROCEDURE — 80053 COMPREHEN METABOLIC PANEL: CPT | Performed by: EMERGENCY MEDICINE

## 2022-01-24 RX ORDER — KETOCONAZOLE 20 MG/G
CREAM TOPICAL DAILY
Qty: 30 G | Refills: 0 | Status: SHIPPED | OUTPATIENT
Start: 2022-01-24 | End: 2023-04-07

## 2022-01-24 RX ORDER — MECLIZINE HYDROCHLORIDE 25 MG/1
25-50 TABLET ORAL EVERY 6 HOURS PRN
Qty: 30 TABLET | Refills: 1 | Status: SHIPPED | OUTPATIENT
Start: 2022-01-24 | End: 2022-03-24

## 2022-01-24 RX ORDER — PAROXETINE 40 MG/1
40 TABLET, FILM COATED ORAL EVERY MORNING
Qty: 30 TABLET | Refills: 2 | Status: SHIPPED | OUTPATIENT
Start: 2022-01-24 | End: 2023-04-07

## 2022-01-24 RX ORDER — BENZTROPINE MESYLATE 1 MG/ML
1 INJECTION, SOLUTION INTRAMUSCULAR; INTRAVENOUS ONCE
Status: DISCONTINUED | OUTPATIENT
Start: 2022-01-24 | End: 2022-01-24

## 2022-01-24 RX ORDER — KETOROLAC TROMETHAMINE 30 MG/ML
30 INJECTION, SOLUTION INTRAMUSCULAR; INTRAVENOUS ONCE
Status: COMPLETED | OUTPATIENT
Start: 2022-01-24 | End: 2022-01-24

## 2022-01-24 RX ORDER — GADOBUTROL 604.72 MG/ML
10 INJECTION INTRAVENOUS ONCE
Status: COMPLETED | OUTPATIENT
Start: 2022-01-24 | End: 2022-01-24

## 2022-01-24 RX ORDER — METOCLOPRAMIDE HYDROCHLORIDE 5 MG/ML
10 INJECTION INTRAMUSCULAR; INTRAVENOUS ONCE
Status: COMPLETED | OUTPATIENT
Start: 2022-01-24 | End: 2022-01-24

## 2022-01-24 RX ORDER — SUMATRIPTAN 50 MG/1
TABLET, FILM COATED ORAL
Qty: 9 TABLET | Refills: 0 | Status: SHIPPED | OUTPATIENT
Start: 2022-01-24 | End: 2022-03-21

## 2022-01-24 RX ADMIN — SODIUM CHLORIDE 1000 ML: 9 INJECTION, SOLUTION INTRAVENOUS at 19:03

## 2022-01-24 RX ADMIN — METOCLOPRAMIDE HYDROCHLORIDE 10 MG: 5 INJECTION INTRAMUSCULAR; INTRAVENOUS at 20:59

## 2022-01-24 RX ADMIN — GADOBUTROL 10 ML: 604.72 INJECTION INTRAVENOUS at 20:35

## 2022-01-24 RX ADMIN — KETOROLAC TROMETHAMINE 30 MG: 30 INJECTION, SOLUTION INTRAMUSCULAR at 19:04

## 2022-01-24 RX ADMIN — MIDAZOLAM 2 MG: 1 INJECTION INTRAMUSCULAR; INTRAVENOUS at 17:40

## 2022-01-24 ASSESSMENT — ANXIETY QUESTIONNAIRES
IF YOU CHECKED OFF ANY PROBLEMS ON THIS QUESTIONNAIRE, HOW DIFFICULT HAVE THESE PROBLEMS MADE IT FOR YOU TO DO YOUR WORK, TAKE CARE OF THINGS AT HOME, OR GET ALONG WITH OTHER PEOPLE: EXTREMELY DIFFICULT
5. BEING SO RESTLESS THAT IT IS HARD TO SIT STILL: MORE THAN HALF THE DAYS
7. FEELING AFRAID AS IF SOMETHING AWFUL MIGHT HAPPEN: NEARLY EVERY DAY
2. NOT BEING ABLE TO STOP OR CONTROL WORRYING: MORE THAN HALF THE DAYS
1. FEELING NERVOUS, ANXIOUS, OR ON EDGE: NEARLY EVERY DAY
GAD7 TOTAL SCORE: 19
3. WORRYING TOO MUCH ABOUT DIFFERENT THINGS: NEARLY EVERY DAY
6. BECOMING EASILY ANNOYED OR IRRITABLE: NEARLY EVERY DAY

## 2022-01-24 ASSESSMENT — PATIENT HEALTH QUESTIONNAIRE - PHQ9
SUM OF ALL RESPONSES TO PHQ QUESTIONS 1-9: 22
5. POOR APPETITE OR OVEREATING: NEARLY EVERY DAY

## 2022-01-24 ASSESSMENT — MIFFLIN-ST. JEOR
SCORE: 1708.07
SCORE: 1714.19

## 2022-01-24 NOTE — PROGRESS NOTES
Assessment & Plan     Generalized anxiety disorder    - PARoxetine (PAXIL) 40 MG tablet; Take 1 tablet (40 mg) by mouth every morning  - Adult Mental Health  Referral; Future    Tinea versicolor    - Refilled ketoconazole (NIZORAL) 2 % external cream; Apply topically daily    Urinary frequency  Patient unable to leave UA  - UA macro with reflex to Microscopic and Culture - Clinc Collect    Dizziness  Patient unable to get off exam table due to dizziness when performing Orthostatic blood pressure's.   Patient was escorted to ER for further workup and management of her symptoms   - Basic metabolic panel  (Ca, Cl, CO2, Creat, Gluc, K, Na, BUN)  - Other Specialty Referral; Future    Episode of recurrent major depressive disorder, unspecified depression episode severity (H)    - Adult Mental Health  Referral; Future    Pain of finger of left hand  Unable to complete- patient was escorted to ER  - XR Finger Left G/E 2 Views; Future        No follow-ups on file.    WOLFGANG Freire CNP  [facility]    Errol Perez is a 36 year old who presents for the following health issues     UTI         Genitourinary - Female  Onset/Duration: started two months ago, burning with urination, frequency, feels like patient has to go to the bathroom but nothing comes out, lower back pain associated  Description:   Painful urination (Dysuria): YES           Frequency: YES  Blood in urine (Hematuria): no  Delay in urine (Hesitency): YES  Intensity: moderate  Progression of Symptoms:  worsening  Accompanying Signs & Symptoms:  Fever/chills: YES  Flank pain: YES  Nausea and vomiting: Nausea but hasn't eaten today  Vaginal symptoms: odor  Abdominal/Pelvic Pain: YES little bit of cramping  History:   History of frequent UTI s: YES  History of kidney stones: no  Sexually Active: YES  Possibility of pregnancy: No  Precipitating or alleviating factors: None  Therapies tried and outcome: OTC advil  "or tylenol and nothing works         Chief Complaint   Patient presents with     Otitis Media     left ear, started two weeks ago, not sure if that is what's causing the dizziness or her current medication?     UTI     burning, frequency and hard time going to the bathroom, been going on for two months, lower back pain     Imm/Inj     declined any vaccines today        Intermittent Dizziness: ongoing for 3 months- room does not spin. She just feels dizzy when sitting or walking- she feels like she needs to hold onto something of she will fall.   Patient states that she has had these episodes before and workup was negative and symptoms resolved.   Occasional typical headache.   No vision changes- bright light bothers at times-   Left ear pain at times    Depression: I feel severely depressed- her kids were adopted ages 5 and 10 yrs old recently- she was not able to see them due to COVID prior to adoption. She is starting therapy this week. Feels really down, no SI/HI.     Left index finger- painful X 1 month- no injury.     Review of Systems   Constitutional, HEENT, cardiovascular, pulmonary, GI, , musculoskeletal, neuro, skin, endocrine and psych systems are negative, except as otherwise noted.      Objective    /82 (BP Location: Left arm, Patient Position: Chair, Cuff Size: Adult Large)   Pulse 75   Temp 98.5  F (36.9  C) (Tympanic)   Resp 16   Ht 1.816 m (5' 11.5\")   Wt 90.9 kg (200 lb 6.4 oz)   SpO2 96%   BMI 27.56 kg/m    Body mass index is 27.56 kg/m .  Physical Exam   GENERAL: healthy, alert and no distress  NECK: no adenopathy, no asymmetry, masses, or scars and thyroid normal to palpation  RESP: lungs clear to auscultation - no rales, rhonchi or wheezes  CV: regular rate and rhythm, normal S1 S2, no S3 or S4, no murmur, click or rub, no peripheral edema and peripheral pulses strong  MS: no gross musculoskeletal defects noted, no edema              "

## 2022-01-25 ENCOUNTER — TELEPHONE (OUTPATIENT)
Dept: NEUROSURGERY | Facility: CLINIC | Age: 37
End: 2022-01-25
Payer: COMMERCIAL

## 2022-01-25 ENCOUNTER — HOSPITAL ENCOUNTER (OUTPATIENT)
Dept: PHYSICAL THERAPY | Facility: CLINIC | Age: 37
Setting detail: THERAPIES SERIES
End: 2022-01-25
Attending: EMERGENCY MEDICINE
Payer: COMMERCIAL

## 2022-01-25 DIAGNOSIS — M54.2 CERVICALGIA: ICD-10-CM

## 2022-01-25 PROCEDURE — 97140 MANUAL THERAPY 1/> REGIONS: CPT | Mod: GP | Performed by: PHYSICAL THERAPIST

## 2022-01-25 PROCEDURE — 97110 THERAPEUTIC EXERCISES: CPT | Mod: GP | Performed by: PHYSICAL THERAPIST

## 2022-01-25 PROCEDURE — 97162 PT EVAL MOD COMPLEX 30 MIN: CPT | Mod: GP | Performed by: PHYSICAL THERAPIST

## 2022-01-25 ASSESSMENT — ANXIETY QUESTIONNAIRES: GAD7 TOTAL SCORE: 19

## 2022-01-25 NOTE — ED NOTES
"Pt states that she has had had dizziness and feels like she is goiung to fall over. She states that she has had these symptoms intermittently for 2 months. She was at clinic today and was sent here.  She has had nausea.  She has photo sensitivity. She denies \"spinning\". She states possible UTI.  "

## 2022-01-25 NOTE — PROGRESS NOTES
"   Physical Therapy Evaluation  01/25/22 1500   Quick Adds   Quick Adds Certification;Vestibular Eval   Type of Visit Initial OP PT Evaluation   General Information   Start of Care Date 01/25/22   Referring Physician Dr. Layton Llanos   Orders Evaluate and Treat as Indicated   Order Date 01/25/21   Medical Diagnosis Dizziness; Postural Dizziness   Onset of illness/injury or Date of Surgery 11/25/21   Precautions/Limitations no known precautions/limitations   Surgical/Medical history reviewed Yes   Pertinent history of current vestibular problem (include personal factors and/or comorbidities that impact the POC)  Anxiety;Depression   Pertinent history of current problem (include personal factors and/or comorbidities that impact the POC) Pt reports that she has been struggling with dizziness on a daily basis for the past 2 daily.  Reports some days she has multiple episodes of dizziness and some days she has no dizziness.  Reports sitting down and standing up quickly increase dizziness.  Reports sensitivity to light.  Reports poor balance.  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  Denies dizziness with laying down and rolling in bed.  Reports mainly with quick movements.   Prior level of function comment independent   Diagnostic Tests X-ray;MRI   X-ray Results Results   X-ray results refer to epic   MRI Results Results   MRI results refer to epic   Patient role/Employment history Employed  ([employer])   Living environment House/[facility]   Home/Community Accessibility Comments Flight of stairs within home   Assistive Devices Comments none   Patient/Family Goals Statement \"to be able to not be dizzy\"   Fall Risk Screen   Fall screen completed by PT   Have you fallen 2 or more times in the past year? No   Have you fallen and had an injury in the past year? No   Is patient a fall risk? No   Abuse Screen (yes response referral indicated)   Feels Unsafe at Home or Work/School no   Feels Threatened by " Someone no   Does Anyone Try to Keep You From Having Contact with Others or Doing Things Outside Your Home? no   Physical Signs of Abuse Present no   System Outcome Measures   Outcome Measures BPPV   Dizziness Handicap Inventory (score out of 100) A decrease in score by 17.18 or greater indicates a clinically significant change in symptoms. 28   Pain   Patient currently in pain Yes   Pain location back   Pain rating 6/10   Pain description Ache   Cognitive Status Examination   Orientation orientation to person, place and time   Level of Consciousness alert   Follows Commands and Answers Questions 100% of the time   Personal Safety and Judgment intact   Memory intact   Observation   Observation When pt has an episode of dizziness with testing, she has to stop movement and hold her head in her hands to help reduce symptoms.  Pt reports dizziness coming from her left temporal/frontal area   Posture   Posture Forward head position;Protracted shoulders   Palpation   Palpation hypertonicity of LS, UT, scalenes, SCM, and suboccipitals B; hypomobility C3-C7   Strength   Manual Muscle Testing Quick Adds No deficits were identified   Gait   Gait Comments Slow gait   Balance   Balance Comments CTSIB; Tandem stance, and SLS needs to be assessed   Sensory Examination   Sensory Perception no deficits were identified   Cervicogenic Screen   Neck ROM R rot: 55* - pain and dizziness; L rot: 50* - pain and dizziness; Flex: 55* - painful; Ext: 50* - painful   Vertebral Artery Test Comments due to neck pain, not assessed   Alar Ligament Test Normal   Alar Ligament Test Comments Neck pain with testing   Transverse Ligament Test Normal   Transverse Ligament Test Comments Neck pain wiht testing   Neck Torsion Test (head still, body rotating) Abnormal   Neck Torsion Test (head still, body rotating) Comments increased symptoms   Neck Torsion Test (head and body rotating) Negative   Oculomotor Exam   Smooth Pursuit Normal   Saccades Abnormal    Saccades Comments Reports increased dizziness with horizontal   VOR Normal   VOR Comments reports neck pain - reports symptoms improving with this   VOR Cancellation Normal   VOR Cancellation Comments reports improvement of symptom   Rapid Head Thrust Comments not assessed due to neck pain   Convergence Testing Normal   Infrared Goggle Exam or Frenzel Lense Exam   Vestibular Suppressant in Last 24 Hours? Yes   [place]-Hallpike (right) comments per subjective hx not tested due to not consistent with BPPV symptoms   [place]-Hallpike (left) comments per subjective hx not tested due to not consistent with BPPV symptoms   [facility] Supine Roll Test (Right) Comments per subjective hx not tested due to not consistent with BPPV symptoms   [facility] Supine Roll Test (Left) Comments  per subjective hx not tested due to not consistent with BPPV symptoms   Dynamic Visual Acuity (DVA)   DVA Comments not assessed due to increasing dizziness with testing   Planned Therapy Interventions   Planned Therapy Interventions joint mobilization;balance training;neuromuscular re-education;ROM;strengthening;stretching;manual therapy   Clinical Impression   Criteria for Skilled Therapeutic Interventions Met yes, treatment indicated   PT Diagnosis Cervicogenic dizziness   Influenced by the following impairments (+) dizziness with neck movements; (+) testing with neck torsion testing; joint hypomobility; decreased flexibility; weakness in scapular muscles and cervical muscles   Functional limitations due to impairments difficulty with work tasks, quick head movements while driving, house hold tasks   Clinical Presentation Evolving/Changing   Clinical Presentation Rationale anxiety, depression, aneurysm non-ruptured; photosensitivity   Clinical Decision Making (Complexity) Moderate complexity   Therapy Frequency 2 times/Week   Predicted Duration of Therapy Intervention (days/wks) 8 weeks   Risk & Benefits of therapy have been explained Yes   Patient, Family &  other staff in agreement with plan of care Yes   GOALS   PT Eval Goals 1;2;3   Goal 1   Goal Identifier Frequency   Goal Description Pt will report a 50% reduciton in frequency of episodes of dizziness in order to have increase tolerance to work tasks.   Target Date 03/08/22   Goal 2   Goal Identifier Neck ROM   Goal Description Pt will demonstrate 70* of cervical Rotation in order to be able to look over her shoulder while driving.   Target Date 03/22/22   Goal 3   Goal Identifier HEP   Goal Description Pt will be independent with HEP in order to maintain improvements upon discharge.   Target Date 03/22/22   Total Evaluation Time   PT Eval, Moderate Complexity Minutes (78292) 25   Therapy Certification   Certification date from 01/25/22   Certification date to 03/22/22   Medical Diagnosis Dizziness; Postural dizziness   Certification I certify the need for these services furnished under this plan of treatment and while under my care.  (Physician co-signature of this document indicates review and certification of the therapy plan).

## 2022-01-25 NOTE — DISCHARGE INSTRUCTIONS
Your primary care provider has ordered a left index finger x-ray evaluation for you, please call our diagnostic imaging department to schedule this:  [phone]       Decrease Trazodone from 100 mg at bedtime to 50 mg at that time to see if this improves dizziness.

## 2022-01-25 NOTE — PROGRESS NOTES
[facility]                                                                                   OUTPATIENT PHYSICAL THERAPY FUNCTIONAL EVALUATION  PLAN OF TREATMENT FOR OUTPATIENT REHABILITATION  (COMPLETE FOR INITIAL CLAIMS ONLY)  Patient's Last Name, First Name, M.I.  YOB: 1985  Holly Tolliver     Provider's Name   [facility]   Medical Record No.  [id number]     Start of Care Date:  01/25/22   Onset Date:  11/25/21   Type:     _X__PT   ____OT  ____SLP Medical Diagnosis:  Dizziness; Postural dizziness     PT Diagnosis:  Cervicogenic dizziness Visits from SOC:  1                              __________________________________________________________________________________  Plan of Treatment/Functional Goals:  joint mobilization,balance training,neuromuscular re-education,ROM,strengthening,stretching,manual therapy    GOALS  Frequency  Pt will report a 50% reduciton in frequency of episodes of dizziness in order to have increase tolerance to work tasks.  03/08/22    Neck ROM  Pt will demonstrate 70* of cervical Rotation in order to be able to look over her shoulder while driving.  03/22/22    HEP  Pt will be independent with HEP in order to maintain improvements upon discharge.  03/22/22      Therapy Frequency:  2 times/Week   Predicted Duration of Therapy Intervention:  8 weeks    Misty Lobo, PT                                    I CERTIFY THE NEED FOR THESE SERVICES FURNISHED UNDER        THIS PLAN OF TREATMENT AND WHILE UNDER MY CARE     (Physician co-signature of this document indicates review and certification of the therapy plan).                Certification Date From:  01/25/22   Certification Date To:  03/22/22    Referring Provider:  Dr. Layton Llanos    Initial Assessment  See Epic Evaluation- Start of Care Date: 01/25/22

## 2022-01-25 NOTE — TELEPHONE ENCOUNTER
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    Action Taken: Message routed to:  [facility] ([facility]    Travel Screening: Not Applicable

## 2022-01-25 NOTE — ED PROVIDER NOTES
History     Chief Complaint   Patient presents with     Dizziness     Sent from [facility] office with dizziness. Pt unable to complete orthostatics     HPI  Holly Tolliver is a 36 year old female with history of anxiety, depression and migraine headaches who presents from primary care clinic with severe dizziness which has been present for 2 months, is present daily and causes her to feel that she is going to fall over.  She denies vertiginous dizziness.  Dizziness is exacerbated by standing up and changing position and associated with photophobia (daily).  She has had intermittent tinnitus and left ear pain, but no hearing loss.  No other neurologic deficit or abnormality and she has had no speech difficulty or dysarthria, no facial droop, swallowing difficulty or motor or sensory deficit.  She reports having photophobia with the dizziness, but no unusual headaches. She has had atraumatic posterior neck pain without radicular pain or extremity motor or sensory deficit.  No neck stiffness or rash.  She ? dizziness as a side effect of Paxil or Trazodone, which she researched and reports these are side effects of these.  She also also reports concern about possible UTI with 2 months of stable symptoms of urinary frequency, hesitancy and dysuria.  She also has had atraumatic dorsal left index finger pain in the area of the PIP joint and just distal to this.  No finger swelling, redness or CMS dysfunction.  She was seen in clinic prior to transfer to the ED for further evaluation and had a urinalysis and left index finger x-rays ordered, but not yet completed.    Allergies:  Allergies   Allergen Reactions     Amoxicillin        Problem List:    Patient Active Problem List    Diagnosis Date Noted     Obesity (BMI 35.0-39.9) with comorbidity (H) 03/25/2019     Priority: Medium     Routine postpartum follow-up 05/31/2016     Priority: Medium     Mirena IUD 05/31/2016     Priority: Medium     Lot: [id number]  Exp:         Single liveborn, born in hospital, delivered 2016     Priority: Medium     Placental abruption in third trimester 2016     Priority: Medium     Placental abruption 2016     Priority: Medium      delivery delivered 2016     Priority: Medium     Prenatal care, subsequent pregnancy 2016     Priority: Medium     Prenatal care, subsequent pregnancy in third trimester 2016     Priority: Medium     Health Care Home 2015     Priority: Medium     Care Coordinator: Kelly DANIEL, MSW  See letters for Health Care home care plan    SW covering in [place], [place] SW absence    Kelly Simon, FREDY, MSW   [phone]  3/24/2015 12:24 PM       Generalized anxiety disorder 2013     Priority: Medium     Diagnosis updated by automated process. Provider to review and confirm.       GERD (gastroesophageal reflux disease) 2013     Priority: Medium     Major depressive disorder, single episode, moderate (H) 2013     Priority: Medium     CARDIOVASCULAR SCREENING; LDL GOAL LESS THAN 160 10/23/2012     Priority: Medium     CTS (carpal tunnel syndrome) 10/14/2011     Priority: Medium     Tobacco use disorder 2008     Priority: Medium     Has decreased from 1 ppd to 1 cigarette per day since pregnancy.       Mild intermittent asthma 2008     Priority: Medium        Past Medical History:    Past Medical History:   Diagnosis Date     ALCOH DEP NEC/NOS-UNSPEC 2007     Asthma      Chickenpox      Postpartum depression 2011     Pregnancy induced hypertension      Urinary tract infections        Past Surgical History:    Past Surgical History:   Procedure Laterality Date      SECTION Bilateral 2016    Procedure:  SECTION;  Surgeon: Beau Cardoso MD;  Location: [place] OR     [facility]  age 3       Family History:    Family History   Problem Relation Age of Onset     Alcohol/Drug Mother         alcohol- recovered      Hypertension Mother      Depression Mother         also anxiety     Other - See Comments Mother         ankylosing spondylosis     Anxiety Disorder Mother      Alcohol/Drug Father         alcohol- recovered     Alcohol/Drug Sister         A&D     Cancer Maternal Grandmother         lung     Depression Maternal Grandmother         also anxiety     Mental Illness Paternal Grandmother      Alcohol/Drug Brother         alcohol       Social History:  Marital Status:  Single [1]  Social History     Tobacco Use     Smoking status: Former Smoker     Packs/day: 0.00     Years: 12.00     Pack years: 0.00     Types: Cigarettes     Smokeless tobacco: Never Used     Tobacco comment: Never   Vaping Use     Vaping Use: Former   Substance Use Topics     Alcohol use: Not Currently     Alcohol/week: 0.0 standard drinks     Comment: attempting sobriety     Drug use: Not Currently     Types: Methamphetamines, Marijuana     Comment: relapse, attempting sobriety        Medications:    meclizine (ANTIVERT) 25 MG tablet  SUMAtriptan (IMITREX) 50 MG tablet  acetaminophen (TYLENOL) 500 MG tablet  albuterol (PROAIR HFA/PROVENTIL HFA/VENTOLIN HFA) 108 (90 Base) MCG/ACT inhaler  fluticasone-salmeterol (ADVAIR) 250-50 MCG/DOSE inhaler  HYDROXYZINE HCL PO  ibuprofen (ADVIL/MOTRIN) 200 MG tablet  ketoconazole (NIZORAL) 2 % external cream  PARoxetine (PAXIL) 40 MG tablet  traZODone (DESYREL) 50 MG tablet        Review of Systems  As mentioned above in the history present illness.  All other systems were reviewed and are negative.    Physical Exam   BP: 126/64  Pulse: 69  Temp: 99  F (37.2  C)  Resp: 18  Height: 182.9 cm (6')  Weight: 90.7 kg (200 lb)  SpO2: 96 %      Physical Exam  Vitals and nursing note reviewed.   Constitutional:       General: She is not in acute distress.     Appearance: Normal appearance. She is well-developed. She is not ill-appearing or diaphoretic.   HENT:      Head: Normocephalic and atraumatic.      Right Ear: Tympanic  membrane, ear canal and external ear normal.      Left Ear: Tympanic membrane, ear canal and external ear normal.      Nose: Nose normal.      Mouth/Throat:      Mouth: Mucous membranes are moist.      Pharynx: Oropharynx is clear.   Eyes:      General: No scleral icterus.     Extraocular Movements: Extraocular movements intact.      Conjunctiva/sclera: Conjunctivae normal.      Pupils: Pupils are equal, round, and reactive to light.      Comments: No nystagmus.   Neck:      Trachea: No tracheal deviation.   Cardiovascular:      Rate and Rhythm: Normal rate and regular rhythm.      Heart sounds: Normal heart sounds. No murmur heard.  No friction rub. No gallop.    Pulmonary:      Effort: Pulmonary effort is normal. No respiratory distress.      Breath sounds: Normal breath sounds. No stridor. No wheezing, rhonchi or rales.   Abdominal:      General: There is no distension.      Palpations: Abdomen is soft.      Tenderness: There is no abdominal tenderness. There is no right CVA tenderness or left CVA tenderness.   Musculoskeletal:         General: No tenderness. Normal range of motion.      Cervical back: Normal range of motion and neck supple.      Right lower leg: No edema.      Left lower leg: No edema.   Skin:     General: Skin is warm and dry.      Coloration: Skin is not pale.      Findings: No erythema or rash.   Neurological:      General: No focal deficit present.      Mental Status: She is alert and oriented to person, place, and time.      Cranial Nerves: Cranial nerves are intact. No cranial nerve deficit ( 2-12 intact), dysarthria or facial asymmetry.      Sensory: Sensation is intact. No sensory deficit.      Motor: Motor function is intact. No weakness.      Coordination: Coordination normal.      Gait: Gait normal.   Psychiatric:         Mood and Affect: Mood normal.         Behavior: Behavior normal.         ED Course                 Procedures              EKG Interpretation:      Interpreted by  Layton Llanos MD  Time reviewed: upon completion  Symptoms at time of EKG: dizziness  Rhythm: normal sinus   Rate: normal  Axis: normal  Ectopy: none  Conduction: normal  ST Segments/ T Waves: No ST-T wave changes  Q Waves: none  Comparison to prior: 3/18/19  Clinical Impression: normal EKG       Results for orders placed or performed during the hospital encounter of 01/24/22   MR Brain w/o & w Contrast     Status: None    Narrative    EXAM: MR BRAIN W/O and W CONTRAST, MRA NECK (CAROTIDS) WO and W CONTRAST, MRA BRAIN ([place]) WO CONTRAST  LOCATION: [facility]  DATE/TIME: 1/24/2022 8:25 PM    INDICATION: Dizziness, nonspecific.  COMPARISON: None.  CONTRAST: 8.5 mL Gadavist.  TECHNIQUE: Routine multiplanar multisequence head MRI without and with intravenous contrast.    FINDINGS:  INTRACRANIAL CONTENTS: There is no evidence for acute or subacute infarction. No evidence for restricted diffusion abnormality. No mass, acute hemorrhage, or extra-axial fluid collections. Normal brain parenchymal signal. Normal ventricles and sulci.   Corpus callosum is satisfactory. Normal position of the cerebellar tonsils. Postcontrast imaging shows no pathologic intracranial enhancement. No abnormal intraparenchymal, leptomeningeal or dural enhancement. No pathologic orbital enhancement.   Satisfactory enhancement characteristics of the skull base, Meckel's cave and cavernous sinus level.    SELLA: No abnormality accounting for technique.    OSSEOUS STRUCTURES/SOFT TISSUES: Normal marrow signal. The major intracranial vascular flow voids are maintained.     ORBITS: No abnormality accounting for technique.     SINUSES/MASTOIDS: Mild mucosal thickening scattered about the paranasal sinuses. Polypoid membrane thickening notable in the anterior left maxillary sinus. No air-fluid levels. No middle ear or mastoid effusion.       Impression    IMPRESSION:  1.  No evidence for recent infarction.    2.   No intracranial mass, mass effect or hemorrhage.    3.  No pathologic enhancement.    4.  Polypoid membrane thickening partially opacifies the paranasal sinuses with no air-fluid levels.      MRA HEAD AND NECK WITHOUT AND WITH GADOLINIUM    CLINICAL HISTORY: Dizziness, non-specific; 2 months of severe daily dizziness.     TECHNIQUE: 3-D Time-of-flight MRA of the head without contrast. Unenhanced 2-D Time-of-flight and gadolinium-enhanced 3-D Time-of-flight images of the neck arteries.  3D reconstructions were obtained.  Estimates of carotid stenosis are made relative to   the distal internal carotid artery diameters except as noted.    CONTRAST: 8.5 mL Gadavist utilized.    COMPARISON: Brain MRI.    FINDINGS:     RIGHT CAROTID:  No evidence of dissection, aneurysm or significant stenosis.     LEFT CAROTID:   No evidence of dissection, aneurysm or significant stenosis.     VERTEBRAL ARTERIES:   No evidence of dissection, aneurysm or significant stenosis.     ARCH: Normal arch anatomy. No evidence for great vessel origin stenosis.    HEAD: There is a small 1.5 mm-2.0 mm saccular type aneurysm which is directed anteriorly arising from the left A2 segment located at the anterior interhemispheric fissure series 2 image 117. This is not a flow-limiting aneurysm. No evidence for   additional aneurysms of the anterior or posterior circulation. No high-grade stenosis. Symmetric branch arborization otherwise of the AMANDA, MCA and PCA vascular territories.    IMPRESSION:   1. No high-grade stenosis intracranially or extracranially.  2. No evidence for thromboembolic occlusion or dissection. Specifically no evidence for vertebral arterial dissection with the vertebral arteries appearing codominant within the neck and V4 segments.  3. 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  4. Please see above for details and full  description.       MRA Brain ([place] [facility] [place]) wo Contrast     Status: None    Narrative    EXAM: MR BRAIN W/O and W CONTRAST, MRA NECK (CAROTIDS) WO and W CONTRAST, MRA BRAIN ([place]) WO CONTRAST  LOCATION: [facility]  DATE/TIME: 1/24/2022 8:25 PM    INDICATION: Dizziness, nonspecific.  COMPARISON: None.  CONTRAST: 8.5 mL Gadavist.  TECHNIQUE: Routine multiplanar multisequence head MRI without and with intravenous contrast.    FINDINGS:  INTRACRANIAL CONTENTS: There is no evidence for acute or subacute infarction. No evidence for restricted diffusion abnormality. No mass, acute hemorrhage, or extra-axial fluid collections. Normal brain parenchymal signal. Normal ventricles and sulci.   Corpus callosum is satisfactory. Normal position of the cerebellar tonsils. Postcontrast imaging shows no pathologic intracranial enhancement. No abnormal intraparenchymal, leptomeningeal or dural enhancement. No pathologic orbital enhancement.   Satisfactory enhancement characteristics of the skull base, Meckel's cave and cavernous sinus level.    SELLA: No abnormality accounting for technique.    OSSEOUS STRUCTURES/SOFT TISSUES: Normal marrow signal. The major intracranial vascular flow voids are maintained.     ORBITS: No abnormality accounting for technique.     SINUSES/MASTOIDS: Mild mucosal thickening scattered about the paranasal sinuses. Polypoid membrane thickening notable in the anterior left maxillary sinus. No air-fluid levels. No middle ear or mastoid effusion.       Impression    IMPRESSION:  1.  No evidence for recent infarction.    2.  No intracranial mass, mass effect or hemorrhage.    3.  No pathologic enhancement.    4.  Polypoid membrane thickening partially opacifies the paranasal sinuses with no air-fluid levels.      MRA HEAD AND NECK WITHOUT AND WITH GADOLINIUM    CLINICAL HISTORY: Dizziness, non-specific; 2 months of severe daily dizziness.     TECHNIQUE: 3-D  Time-of-flight MRA of the head without contrast. Unenhanced 2-D Time-of-flight and gadolinium-enhanced 3-D Time-of-flight images of the neck arteries.  3D reconstructions were obtained.  Estimates of carotid stenosis are made relative to   the distal internal carotid artery diameters except as noted.    CONTRAST: 8.5 mL Gadavist utilized.    COMPARISON: Brain MRI.    FINDINGS:     RIGHT CAROTID:  No evidence of dissection, aneurysm or significant stenosis.     LEFT CAROTID:   No evidence of dissection, aneurysm or significant stenosis.     VERTEBRAL ARTERIES:   No evidence of dissection, aneurysm or significant stenosis.     ARCH: Normal arch anatomy. No evidence for great vessel origin stenosis.    HEAD: There is a small 1.5 mm-2.0 mm saccular type aneurysm which is directed anteriorly arising from the left A2 segment located at the anterior interhemispheric fissure series 2 image 117. This is not a flow-limiting aneurysm. No evidence for   additional aneurysms of the anterior or posterior circulation. No high-grade stenosis. Symmetric branch arborization otherwise of the AMANDA, MCA and PCA vascular territories.    IMPRESSION:   1. No high-grade stenosis intracranially or extracranially.  2. No evidence for thromboembolic occlusion or dissection. Specifically no evidence for vertebral arterial dissection with the vertebral arteries appearing codominant within the neck and V4 segments.  3. 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  4. Please see above for details and full description.       MRA Neck (Carotids) wo & w Contrast     Status: None    Narrative    EXAM: MR BRAIN W/O and W CONTRAST, MRA NECK (CAROTIDS) WO and W CONTRAST, MRA BRAIN ([place]) WO CONTRAST  LOCATION: [facility]  DATE/TIME: 1/24/2022 8:25 PM    INDICATION: Dizziness, nonspecific.  COMPARISON:  None.  CONTRAST: 8.5 mL Gadavist.  TECHNIQUE: Routine multiplanar multisequence head MRI without and with intravenous contrast.    FINDINGS:  INTRACRANIAL CONTENTS: There is no evidence for acute or subacute infarction. No evidence for restricted diffusion abnormality. No mass, acute hemorrhage, or extra-axial fluid collections. Normal brain parenchymal signal. Normal ventricles and sulci.   Corpus callosum is satisfactory. Normal position of the cerebellar tonsils. Postcontrast imaging shows no pathologic intracranial enhancement. No abnormal intraparenchymal, leptomeningeal or dural enhancement. No pathologic orbital enhancement.   Satisfactory enhancement characteristics of the skull base, Meckel's cave and cavernous sinus level.    SELLA: No abnormality accounting for technique.    OSSEOUS STRUCTURES/SOFT TISSUES: Normal marrow signal. The major intracranial vascular flow voids are maintained.     ORBITS: No abnormality accounting for technique.     SINUSES/MASTOIDS: Mild mucosal thickening scattered about the paranasal sinuses. Polypoid membrane thickening notable in the anterior left maxillary sinus. No air-fluid levels. No middle ear or mastoid effusion.       Impression    IMPRESSION:  1.  No evidence for recent infarction.    2.  No intracranial mass, mass effect or hemorrhage.    3.  No pathologic enhancement.    4.  Polypoid membrane thickening partially opacifies the paranasal sinuses with no air-fluid levels.      MRA HEAD AND NECK WITHOUT AND WITH GADOLINIUM    CLINICAL HISTORY: Dizziness, non-specific; 2 months of severe daily dizziness.     TECHNIQUE: 3-D Time-of-flight MRA of the head without contrast. Unenhanced 2-D Time-of-flight and gadolinium-enhanced 3-D Time-of-flight images of the neck arteries.  3D reconstructions were obtained.  Estimates of carotid stenosis are made relative to   the distal internal carotid artery diameters except as noted.    CONTRAST: 8.5 mL Gadavist  utilized.    COMPARISON: Brain MRI.    FINDINGS:     RIGHT CAROTID:  No evidence of dissection, aneurysm or significant stenosis.     LEFT CAROTID:   No evidence of dissection, aneurysm or significant stenosis.     VERTEBRAL ARTERIES:   No evidence of dissection, aneurysm or significant stenosis.     ARCH: Normal arch anatomy. No evidence for great vessel origin stenosis.    HEAD: There is a small 1.5 mm-2.0 mm saccular type aneurysm which is directed anteriorly arising from the left A2 segment located at the anterior interhemispheric fissure series 2 image 117. This is not a flow-limiting aneurysm. No evidence for   additional aneurysms of the anterior or posterior circulation. No high-grade stenosis. Symmetric branch arborization otherwise of the AMANDA, MCA and PCA vascular territories.    IMPRESSION:   1. No high-grade stenosis intracranially or extracranially.  2. No evidence for thromboembolic occlusion or dissection. Specifically no evidence for vertebral arterial dissection with the vertebral arteries appearing codominant within the neck and V4 segments.  3. 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  4. Please see above for details and full description.       Extra Blue Top Tube     Status: None   Result Value Ref Range    Hold Specimen JIC    Extra Red Top Tube     Status: None   Result Value Ref Range    Hold Specimen JIC    Extra Green Top (Lithium Heparin) Tube     Status: None   Result Value Ref Range    Hold Specimen JIC    Extra Green Top (Lithium Heparin) Tube     Status: None   Result Value Ref Range    Hold Specimen JIC    Extra Purple Top Tube     Status: None   Result Value Ref Range    Hold Specimen JIC    Comprehensive metabolic panel     Status: Normal   Result Value Ref Range    Sodium 137 133 - 144 mmol/L    Potassium 3.5 3.4 - 5.3 mmol/L    Chloride 105 94 - 109 mmol/L    Carbon Dioxide  (CO2) 27 20 - 32 mmol/L    Anion Gap 5 3 - 14 mmol/L    Urea Nitrogen 11 7 - 30 mg/dL    Creatinine 0.76 0.52 - 1.04 mg/dL    Calcium 8.9 8.5 - 10.1 mg/dL    Glucose 85 70 - 99 mg/dL    Alkaline Phosphatase 73 40 - 150 U/L    AST 13 0 - 45 U/L    ALT 16 0 - 50 U/L    Protein Total 7.8 6.8 - 8.8 g/dL    Albumin 3.8 3.4 - 5.0 g/dL    Bilirubin Total 0.6 0.2 - 1.3 mg/dL    GFR Estimate >90 >60 mL/min/1.73m2   CBC with platelets and differential     Status: None   Result Value Ref Range    WBC Count 6.9 4.0 - 11.0 10e3/uL    RBC Count 4.78 3.80 - 5.20 10e6/uL    Hemoglobin 14.0 11.7 - 15.7 g/dL    Hematocrit 39.4 35.0 - 47.0 %    MCV 82 78 - 100 fL    MCH 29.3 26.5 - 33.0 pg    MCHC 35.5 31.5 - 36.5 g/dL    RDW 12.2 10.0 - 15.0 %    Platelet Count 225 150 - 450 10e3/uL    % Neutrophils 60 %    % Lymphocytes 34 %    % Monocytes 5 %    % Eosinophils 1 %    % Basophils 0 %    % Immature Granulocytes 0 %    NRBCs per 100 WBC 0 <1 /100    Absolute Neutrophils 4.1 1.6 - 8.3 10e3/uL    Absolute Lymphocytes 2.4 0.8 - 5.3 10e3/uL    Absolute Monocytes 0.4 0.0 - 1.3 10e3/uL    Absolute Eosinophils 0.1 0.0 - 0.7 10e3/uL    Absolute Basophils 0.0 0.0 - 0.2 10e3/uL    Absolute Immature Granulocytes 0.0 <=0.4 10e3/uL    Absolute NRBCs 0.0 10e3/uL   UA with Microscopic reflex to Culture     Status: Abnormal    Specimen: Urine, NOS   Result Value Ref Range    Color Urine Yellow Colorless, Straw, Light Yellow, Yellow    Appearance Urine Clear Clear    Glucose Urine Negative Negative mg/dL    Bilirubin Urine Negative Negative    Ketones Urine Negative Negative mg/dL    Specific Gravity Urine 1.009 1.003 - 1.035    Blood Urine Negative Negative    pH Urine 6.0 5.0 - 7.0    Protein Albumin Urine Negative Negative mg/dL    Urobilinogen Urine Normal Normal, 2.0 mg/dL    Nitrite Urine Negative Negative    Leukocyte Esterase Urine Negative Negative    Mucus Urine Present (A) None Seen /LPF    RBC Urine 0 <=2 /HPF    WBC Urine <1 <=5 /HPF     Squamous Epithelials Urine 2 (H) <=1 /HPF    Narrative    Urine Culture not indicated   [place] Draw     Status: None    Narrative    The following orders were created for panel order [place] Draw.  Procedure                               Abnormality         Status                     ---------                               -----------         ------                     Extra Blue Top Tube[[id number]]                              Final result               Extra Red Top Tube[[id number]]                               Final result               Extra Green Top (Lithium...[[id number]]                      Final result               Extra Green Top (Lithium...[[id number]]                      Final result               Extra Purple Top Tube[[id number]]                            Final result                 Please view results for these tests on the individual orders.   CBC with platelets, differential     Status: None    Narrative    The following orders were created for panel order CBC with platelets, differential.  Procedure                               Abnormality         Status                     ---------                               -----------         ------                     CBC with platelets and d...[[id number]]                      Final result                 Please view results for these tests on the individual orders.       Medications   0.9% sodium chloride BOLUS (0 mLs Intravenous Stopped 1/24/22 2253)   ketorolac (TORADOL) injection 30 mg (30 mg Intravenous Given 1/24/22 1904)   metoclopramide (REGLAN) injection 10 mg (10 mg Intravenous Given 1/24/22 2059)   midazolam (VERSED) injection 2 mg (2 mg Intravenous Given 1/24/22 1740)   gadobutrol (GADAVIST) injection 10 mL (10 mLs Intravenous Given 1/24/22 2035)     9:08 PM - I reviewed the results of her MRI evaluation with her, including the incidental finding of an unruptured 1.5-2.0 mm saccular type aneurysm arising from the left A2 segment directed  anteriorly at the anterior interhemispheric level. She expressed understanding this and understanding of the need for it's follow-up with neurosurgery.  I will make a referral for her to facilitate her follow-up of this.    Assessments & Plan (with Medical Decision Making)   36 year old female with history of anxiety, depression and migraine headaches who presents from primary care clinic with severe dizziness which has been stable and present for 2 months, is present daily and causes her to feel that she is going to fall over.  Nonvertiginous dizziness is exacerbated by standing up and changing position and associated with photophobia (daily), but no unusual headaches.  She has had intermittent tinnitus and left ear pain, but no hearing loss.  No other neurologic deficit or abnormality. She ? dizziness as a side effect of Paxil or Trazodone, which she researched and reports these are side effects of these.  She has also had some posterior neck pain which appears to be a benign muscular etiology, primarily due to spasm in the left paraspinous musculature. She is neurologically intact and has a benign examination in the ED today.  MRI/MRA brain and neck evaluation did not show any cause for her symptoms and I doubt meningitis or encephalitis and do not feel that lumbar puncture is indicated. Symptoms of photophobia make vertiginous migraines a possibility. Could consider switching her Paxil and Trazodone in the future with reported side effects of dizziness with these. I will have her decrease Trazodone from 100 mg nightly to 50 mg nightly to see if this helps and she will follow up in primary care clinic for reevaluation and further evaluation for medication changes as needed based on her response to this.  I will prescribe Imitrex for possible vestibular migraines, given her history of photophobia associated with the dizziness (without headache) and history of migraine headaches.  I will prescribe Meclizine to try  for possible dizziness due to peripheral vertigo, refer her to physical therapy for evaluation for balance and vestibular therapy and for what appears to be benign cervicalgia and soft tissue neck tenderness. I will refer her to ENT and Neurology for follow-up dizziness, in addition I will refer her to Neurosurgery clinic for follow-up of her incidentally found 1.5-2.0 mm saccular type aneurysm arising from the left A2 segment.   She also complained of 2 months of UTI symptoms, and had a negative urinalysis, and atraumatic dorsal left index finger pain with an otherwise normal finger examination. Left index finger x-rays were ordered in the ED, but not performed after returning from MRI and she elected to defer x-rays to be performed on an outpatient basis, her primary care provider ordered these for her earlier today.    She was provided instructions for supportive care and will return as needed for worsened condition or worsening symptoms, or new problems or concerns.  I recommended she follow-up with her primary care provider in the next week or so and     I have reviewed the nursing notes.    I have reviewed the findings, diagnosis, plan and need for follow up with the patient.    Discharge Medication List as of 1/24/2022 10:53 PM      START taking these medications    Details   meclizine (ANTIVERT) 25 MG tablet Take 1-2 tablets (25-50 mg) by mouth every 6 hours as needed for dizziness .  Do not exceed 4 tablets in one day., Disp-30 tablet, R-1, E-Prescribe      SUMAtriptan (IMITREX) 50 MG tablet 1 to 2 tabs by mouth for headache, may repeat in 2 hrs. If needed. Do not exceed 4 tabs in 24 hrs., Disp-9 tablet, R-0, E-Prescribe             Final diagnoses:   Dizziness   Cerebral aneurysm, nonruptured - Incidentally found 1.5-2.0 mm saccular aneurysm arising from the left A2 segment   Cervicalgia       1/24/2022   [facility] EMERGENCY DEPT     Romi, Layton PURVIS MD  01/26/22 [id number]

## 2022-01-27 ENCOUNTER — OFFICE VISIT (OUTPATIENT)
Dept: FAMILY MEDICINE | Facility: CLINIC | Age: 37
End: 2022-01-27
Payer: COMMERCIAL

## 2022-01-27 ENCOUNTER — ANCILLARY PROCEDURE (OUTPATIENT)
Dept: GENERAL RADIOLOGY | Facility: CLINIC | Age: 37
End: 2022-01-27
Attending: NURSE PRACTITIONER
Payer: COMMERCIAL

## 2022-01-27 ENCOUNTER — HOSPITAL ENCOUNTER (OUTPATIENT)
Dept: PHYSICAL THERAPY | Facility: CLINIC | Age: 37
Setting detail: THERAPIES SERIES
End: 2022-01-27
Attending: EMERGENCY MEDICINE
Payer: COMMERCIAL

## 2022-01-27 VITALS
BODY MASS INDEX: 27.09 KG/M2 | DIASTOLIC BLOOD PRESSURE: 82 MMHG | OXYGEN SATURATION: 99 % | HEART RATE: 71 BPM | RESPIRATION RATE: 16 BRPM | WEIGHT: 200 LBS | HEIGHT: 72 IN | SYSTOLIC BLOOD PRESSURE: 112 MMHG | TEMPERATURE: 97.6 F

## 2022-01-27 DIAGNOSIS — R42 DIZZINESS: ICD-10-CM

## 2022-01-27 DIAGNOSIS — M79.645 PAIN OF FINGER OF LEFT HAND: ICD-10-CM

## 2022-01-27 DIAGNOSIS — G47.00 INSOMNIA, UNSPECIFIED TYPE: ICD-10-CM

## 2022-01-27 DIAGNOSIS — M79.645 PAIN OF FINGER OF LEFT HAND: Primary | ICD-10-CM

## 2022-01-27 PROCEDURE — 97140 MANUAL THERAPY 1/> REGIONS: CPT | Mod: GP | Performed by: PHYSICAL THERAPIST

## 2022-01-27 PROCEDURE — 99214 OFFICE O/P EST MOD 30 MIN: CPT | Performed by: NURSE PRACTITIONER

## 2022-01-27 PROCEDURE — 73140 X-RAY EXAM OF FINGER(S): CPT | Mod: LT | Performed by: RADIOLOGY

## 2022-01-27 ASSESSMENT — MIFFLIN-ST. JEOR: SCORE: 1714.19

## 2022-01-27 ASSESSMENT — PAIN SCALES - GENERAL: PAINLEVEL: SEVERE PAIN (7)

## 2022-01-27 NOTE — PROGRESS NOTES
Assessment & Plan     Pain of finger of left hand  R/u fracture  - XR Finger Left G/E 2 Views; Future    Dizziness  - patient reports improved since ER  ? Due to trazodone vs BPPV vs aneurysm.   Patient has appointment with neurology  Patient has referral to physical therapy if needed  Continue to hold trazodone     Insomnia, unspecified type  Patient stopped Trazodone- she prefers to meet with Psych before starting another medication        No follow-ups on file.    WOLFGANG Freire CNP  [facility] is a 36 year [facility] who presents for the following health issues     HPI   Chief Complaint   Patient presents with     ER F/U     follow up from ER 1/24 dizziness      Finger     Left index finger     Medication Reconciliation     stopped taking trazodone 2 days ago , she is thinking it could have something to due with dizziness, but now she is Not sleeping        ED/UC Followup:    Facility:  [facility]   Date of visit: 1/24/2022  Reason for visit: Dizziness   Current Status: still having dizzy spells, meclizine does help some . Started P>T for dizziness, now having neck pain, did have this before as well . Symptoms started 2 months ago- no injury.   Patient stopped Trazodone thinking that this may be the cause of her dizziness.   Patient has follow up appointment with Neurology and physical therapy for possible BPPV.        COPIED ER NOTE:    36 year old female with history of anxiety, depression and migraine headaches who presents from primary care clinic with severe dizziness which has been stable and present for 2 months, is present daily and causes her to feel that she is going to fall over.  Nonvertiginous dizziness is exacerbated by standing up and changing position and associated with photophobia (daily), but no unusual headaches.  She has had intermittent tinnitus and left ear pain, but no hearing loss.  No other neurologic deficit or abnormality. She ?  dizziness as a side effect of Paxil or Trazodone, which she researched and reports these are side effects of these.  She has also had some posterior neck pain which appears to be a benign muscular etiology, primarily due to spasm in the left paraspinous musculature. She is neurologically intact and has a benign examination in the ED today.  MRI/MRA brain and neck evaluation did not show any cause for her symptoms and I doubt meningitis or encephalitis and do not feel that lumbar puncture is indicated. Symptoms of photophobia make vertiginous migraines a possibility. Could consider switching her Paxil and Trazodone in the future with reported side effects of dizziness with these. I will have her decrease Trazodone from 100 mg nightly to 50 mg nightly to see if this helps and she will follow up in primary care clinic for reevaluation and further evaluation for medication changes as needed based on her response to this.  I will prescribe Imitrex for possible vestibular migraines, given her history of photophobia associated with the dizziness (without headache) and history of migraine headaches.  I will prescribe Meclizine to try for possible dizziness due to peripheral vertigo, refer her to physical therapy for evaluation for balance and vestibular therapy and for what appears to be benign cervicalgia and soft tissue neck tenderness. I will refer her to ENT and Neurology for follow-up dizziness, in addition I will refer her to Neurosurgery clinic for follow-up of her incidentally found 1.5-2.0 mm saccular type aneurysm arising from the left A2 segment.   She also complained of 2 months of UTI symptoms, and had a negative urinalysis, and atraumatic dorsal left index finger pain with an otherwise normal finger examination. Left index finger x-rays were ordered in the ED, but not performed after returning from MRI and she elected to defer x-rays to be performed on an outpatient basis, her primary care provider ordered  these for her earlier today.    She was provided instructions for supportive care and will return as needed for worsened condition or worsening symptoms, or new problems or concerns.  I recommended she follow-up with her primary care provider in the next week or so and      I have reviewed the nursing notes.     I have reviewed the findings, diagnosis, plan and need for follow up with the patient.      Concern - Left Index Finger  Onset: x 2 months   Description: Left Index Finger pain x 2 months, hurts to bend it   Intensity: mild  Progression of Symptoms:  improving  Accompanying Signs & Symptoms: swelling   Precipitating factors:        Worsened by: bending   Alleviating factors:        Improved by: rest , not bending   Therapies tried and outcome:  none         Review of Systems   Constitutional, HEENT, cardiovascular, pulmonary, GI, , musculoskeletal, neuro, skin, endocrine and psych systems are negative, except as otherwise noted.      Objective    There were no vitals taken for this visit.  There is no height or weight on file to calculate BMI.  Physical Exam   GENERAL: healthy, alert and no distress  NECK: no adenopathy, no asymmetry, masses, or scars and thyroid normal to palpation  RESP: lungs clear to auscultation - no rales, rhonchi or wheezes  CV: regular rate and rhythm, normal S1 S2, no S3 or S4, no murmur, click or rub, no peripheral edema and peripheral pulses strong  MS: Left index finger tender throughout

## 2022-01-27 NOTE — TELEPHONE ENCOUNTER
RECORDS RECEIVED FROM: Internal   REASON FOR VISIT: Cerebral aneurysm, nonruptured    Date of Appt: 2/1/22   NOTES (FOR ALL VISITS) STATUS DETAILS   OFFICE NOTE from referring provider Internal SEE INPATIENT NOTES   OFFICE NOTE from other specialist N/A    DISCHARGE SUMMARY from hospital N/A    DISCHARGE REPORT from the ER Internal [facility]:  1/24/22   OPERATIVE REPORT N/A    MEDICATION LIST Internal    IMAGING  (FOR ALL VISITS)     EMG N/A    EEG N/A    LUMBAR PUNCTURE N/A    DEANDRE SCAN N/A    ULTRASOUND (CAROTID BILAT) *VASCULAR* N/A    MRI (HEAD, NECK, SPINE) Internal [facility]:  MRI Brain 1/24/22  MRA Brain COW 1/24/22  MRA Neck Carotids 1/24/22   CT (HEAD, NECK, SPINE) N/A

## 2022-02-01 ENCOUNTER — VIRTUAL VISIT (OUTPATIENT)
Dept: NEUROSURGERY | Facility: CLINIC | Age: 37
End: 2022-02-01
Attending: EMERGENCY MEDICINE
Payer: COMMERCIAL

## 2022-02-01 ENCOUNTER — HOSPITAL ENCOUNTER (OUTPATIENT)
Dept: PHYSICAL THERAPY | Facility: CLINIC | Age: 37
Setting detail: THERAPIES SERIES
End: 2022-02-01
Attending: EMERGENCY MEDICINE
Payer: COMMERCIAL

## 2022-02-01 ENCOUNTER — PRE VISIT (OUTPATIENT)
Dept: NEUROSURGERY | Facility: CLINIC | Age: 37
End: 2022-02-01

## 2022-02-01 DIAGNOSIS — I67.1 CEREBRAL ANEURYSM, NONRUPTURED: ICD-10-CM

## 2022-02-01 PROCEDURE — 99207 PR NO BILLABLE SERVICE THIS VISIT: CPT | Mod: TEL | Performed by: RADIOLOGY

## 2022-02-01 PROCEDURE — 97140 MANUAL THERAPY 1/> REGIONS: CPT | Mod: GP | Performed by: PHYSICAL THERAPIST

## 2022-02-01 NOTE — PATIENT INSTRUCTIONS
Follow-up with Dr. Walker in 1 year with an MRA prior to your appointment.    If you have any questions please contact me at [phone], option 3.    Jerod Hill RN, CNRN  Stroke & Endovascular Care Coordinator    Thank you for choosing [facility] for your health care needs.

## 2022-02-01 NOTE — LETTER
2/1/2022       RE: Holly Tolliver  [address] [id number]     Dear Colleague,    Thank you for referring your patient, Holly Tolliver, to the [facility] NEUROSURGERY [facility]INIC [place] at [facility]. Please see a copy of my visit note below.    Ana is a 36 year old who is being evaluated via a billable telephone visit.      What phone number would you like to be contacted at? [phone]  How would you like to obtain your AVS? [facility]    Endovascular surgical neuroradiology clinic note    36-year-old right-handed woman with past medical history of  anxiety on paroxetine/hydroxyzine (also on PRN Trazodone) who is a former smoker (quit 5 years ago - smoked 0.5 packs per day for ~5years) with no family history of aneurysm/SAH/stroke -seen as a initial consult after she was found to have a incidental aneurysm during work-up of dizziness.    The patient states that she developed positional dizziness about 2 months ago and had a MRI of the brain as a part of that work-up.  The MRI is reported to show a 1.5-2 mm left A2 aneurysm.  On personal review of the imaging, no definite aneurysm is identified with the left A2 dilatation potentially representing a vessel branch.  I discussed the neuroimaging findings with the patient.  We discussed that, even if this was a 2 mm aneurysm subarachnoid risk was low given that she did not have any other risk factors.  She denies any family history of intracranial aneurysms or subarachnoid hemorrhage or stroke.  She denies a personal history of hypertension and has not smoked in over 5 years.  We discussed measuring blood pressure at least a few times a week and the patient reported that she will buy a blood pressure cuff from [employer].    Mother had autoimmune disorders - Vitiligo, Litchen Planus, and hypertension.  Father has T2DM.    Other than the medications listed above and as needed meclizine for dizziness, she  does not take any other over-the-counter/prescription medications.      Plan:  [] MRA COW in 1 year followed by clinic visit.    Discussed with Dr. Aaron Sal  ESN fellow,  Pager [phone]      Attestation signed by Yoel Walker MD at 2/2/2022  8:08 AM:  I agree with the above note by Dr. Sal         on  2/01/22        Again, thank you for allowing me to participate in the care of your patient.      Sincerely,    Yoel Walker MD

## 2022-02-01 NOTE — PROGRESS NOTES
Endovascular surgical neuroradiology clinic note    36-year-old right-handed woman with past medical history of  anxiety on paroxetine/hydroxyzine (also on PRN Trazodone) who is a former smoker (quit 5 years ago - smoked 0.5 packs per day for ~5years) with no family history of aneurysm/SAH/stroke -seen as a initial consult after she was found to have a incidental aneurysm during work-up of dizziness.    The patient states that she developed positional dizziness about 2 months ago and had a MRI of the brain as a part of that work-up.  The MRI is reported to show a 1.5-2 mm left A2 aneurysm.  On personal review of the imaging, no definite aneurysm is identified with the left A2 dilatation potentially representing a vessel branch.  I discussed the neuroimaging findings with the patient.  We discussed that, even if this was a 2 mm aneurysm subarachnoid risk was low given that she did not have any other risk factors.  She denies any family history of intracranial aneurysms or subarachnoid hemorrhage or stroke.  She denies a personal history of hypertension and has not smoked in over 5 years.  We discussed measuring blood pressure at least a few times a week and the patient reported that she will buy a blood pressure cuff from [employer].    Mother had autoimmune disorders - Vitiligo, Litchen Planus, and hypertension.  Father has T2DM.    Other than the medications listed above and as needed meclizine for dizziness, she does not take any other over-the-counter/prescription medications.      Plan:  [] MRA COW in 1 year followed by clinic visit.    Discussed with Dr. Aaron HERNANDEZ fellow,  Pager [phone]

## 2022-02-01 NOTE — PROGRESS NOTES
Ana is a 36 year old who is being evaluated via a billable telephone visit.      What phone number would you like to be contacted at? [phone]  How would you like to obtain your AVS? [employer]

## 2022-02-03 ENCOUNTER — HOSPITAL ENCOUNTER (OUTPATIENT)
Dept: PHYSICAL THERAPY | Facility: CLINIC | Age: 37
Setting detail: THERAPIES SERIES
End: 2022-02-03
Attending: EMERGENCY MEDICINE
Payer: COMMERCIAL

## 2022-02-03 PROCEDURE — 97140 MANUAL THERAPY 1/> REGIONS: CPT | Mod: GP | Performed by: PHYSICAL THERAPIST

## 2022-02-03 PROCEDURE — 97112 NEUROMUSCULAR REEDUCATION: CPT | Mod: GP | Performed by: PHYSICAL THERAPIST

## 2022-02-10 ENCOUNTER — HOSPITAL ENCOUNTER (OUTPATIENT)
Dept: PHYSICAL THERAPY | Facility: CLINIC | Age: 37
Setting detail: THERAPIES SERIES
End: 2022-02-10
Attending: EMERGENCY MEDICINE
Payer: COMMERCIAL

## 2022-02-10 PROCEDURE — 97112 NEUROMUSCULAR REEDUCATION: CPT | Mod: GP | Performed by: PHYSICAL THERAPIST

## 2022-02-17 ENCOUNTER — VIRTUAL VISIT (OUTPATIENT)
Dept: PSYCHIATRY | Facility: CLINIC | Age: 37
End: 2022-02-17
Attending: NURSE PRACTITIONER
Payer: COMMERCIAL

## 2022-02-17 ENCOUNTER — VIRTUAL VISIT (OUTPATIENT)
Dept: BEHAVIORAL HEALTH | Facility: CLINIC | Age: 37
End: 2022-02-17
Attending: NURSE PRACTITIONER
Payer: COMMERCIAL

## 2022-02-17 DIAGNOSIS — F41.1 GENERALIZED ANXIETY DISORDER: Primary | ICD-10-CM

## 2022-02-17 DIAGNOSIS — F41.1 GENERALIZED ANXIETY DISORDER: ICD-10-CM

## 2022-02-17 DIAGNOSIS — F33.0 MILD EPISODE OF RECURRENT MAJOR DEPRESSIVE DISORDER (H): Primary | ICD-10-CM

## 2022-02-17 DIAGNOSIS — F32.1 MAJOR DEPRESSIVE DISORDER, SINGLE EPISODE, MODERATE (H): ICD-10-CM

## 2022-02-17 PROCEDURE — 99204 OFFICE O/P NEW MOD 45 MIN: CPT | Mod: GT | Performed by: NURSE PRACTITIONER

## 2022-02-17 PROCEDURE — 90791 PSYCH DIAGNOSTIC EVALUATION: CPT | Mod: 52 | Performed by: COUNSELOR

## 2022-02-17 RX ORDER — ARIPIPRAZOLE 2 MG/1
2 TABLET ORAL DAILY
Qty: 30 TABLET | Refills: 1 | Status: SHIPPED | OUTPATIENT
Start: 2022-02-17 | End: 2023-04-07
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
## 2022-02-17 NOTE — PROGRESS NOTES
Ana is a 36 year old who is being evaluated via a billable video visit.      How would you like to obtain your AVS? MyChart  If the video visit is dropped, the invitation should be resent by: Text to cell phone: [phone]  Will anyone else be joining your video visit? No      Video Start Time: 8:01 AM  Video-Visit Details    Type of service:  Video Visit    Video End Time:8:36 AM    Originating Location (pt. Location): Home    Distant Location (provider location):  [facility]     Platform used for Video Visit: [facility]                                                  Outpatient Psychiatric Evaluation - Standard Adult    Name:  Holly Tolliver  : 1985    Source of Referral:  Primary Care Provider: WOLFGANG Freire CNP   Last visit: 2022  Current Psychotherapist: yes      Identifying Data:  Patient is a 36 year old, single  White American female  who presents for initial visit with me.  Patient is currently employed part time. Patient attended the session alone. Consent to communicate signed for no one . Consent for treatment signed and included in electronic medical record. Discussed limits of confidentiality today. My Practice Policy was reviewed and signed.     Patient prefers to be called: Ana     Chief Complaint:    Chief Complaint   Patient presents with      Treatment Plan         HPI:        Ana is a 36-year-old female who is familiar to me.  She is interested in getting medication to help her get out of bed and focus and stay organized.  She also endorses symptoms of depression and anxiety.  Of note I am the third  PS provider seeing her and she was told at the start of our conversation today that the plan is to refer her for long-term psychiatry care.  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  She has received inpatient treatment and now has a sponsor and  is attending meetings.  She does have a counselor she meets with regularly for therapy, who diagnosed her with ADHD.  When I last saw her in 2019 I prescribed Strattera and had increased it at one point.  She did not find it helpful and had stopped taking it.  She now is taking Paxil but has changed the time to taking it in the evening due to excessive dizziness.    Several factors contributing to her symptoms.  Her children have been adopted out 1 year ago and she has great difficulties talking about this.  When I tried to interview her about historical information, she became slightly irritable and felt like this was not relevant to getting medication she wanted.  One of her concerns is how it is difficult for her to get out of bed in the morning and she wants to be able to wake up easier.  Anxiety is constant for her.  She has a job doing [employer] work but sometimes has difficulties attending the job regularly.  Her day today routine varies which makes it difficult for her to function.  For the past month she has been living with her mother who helps her to get into more of a routine.  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    With regards to depression she tells me that she has had this since childhood.  Winter months make her symptoms worse.  She denies any thoughts to harm herself or other people.  Sometimes she feels hopeless.  Anxiety is generalized in nature.  She tells me she has severe social anxiety that create panic attacks within her with shortness of breath and tachycardia.  She worries about the future, what her kids are doing, and what other people think of her.    With regards to ADHD she tells me she was diagnosed 3 years ago by her therapist through testing.  She was diagnosed with combined type of ADHD and I started her on Strattera which was not successful in controlling her symptoms.  It is unclear if she was using substances at the time when she started taking the Strattera.  She has  difficulties right now doing her laundry and picking up after herself.  She also has difficulties completing tasks at work without getting distracted.    Psychiatric Review of Symptoms:  Depression: Interest: Decrease  Depressed Mood  Sleep: Decrease   Appetite: Decrease   Suicide: No  Ruminations: Increase    PHQ-9 scores:   PHQ-9 SCORE 2/1/2021 9/9/2021 1/24/2022   PHQ-9 Total Score - - -   PHQ-9 Total Score 22 24 22     Jazmin:  Distractibility: Increase  Impulsiveness: Increase  Racing Thoughts: Increase   MDQ Score: Borderline Postive Screen  Anxiety: Feeling nervous, anxious, or on edge  Uncontrolled worrying  Worrying too much about different things  Restlessness    RUBENS-7 scores:    RUBENS-7 SCORE 2/1/2021 9/9/2021 1/24/2022   Total Score - - -   Total Score 7 8 19     Panic:  No symptoms   Agoraphobia:  No   PTSD:  History of Trauma   OCD:  No symptoms   Psychosis: No symptoms   ADD / ADHD: Attention Problem(s)  Poor Organization  Distractible  Previous Diagnosis  Previous Treatment: Strattera  Gambling or shoplifting: No   Eating Disorder:  No symptoms  Sleep:   Trouble falling asleep  Trouble staying asleep     Psychiatric History:   Hospitalizations: Yes  History of Commitment? No   Past Treatment: counseling, medication(s) from physician / PCP and psychiatry  Suicide Attempts: None  Self-injurious Behavior: Denies  Electroconvulsive Therapy (ECT) or Transcranial Magnetic Stimulation (TMS): No   Genetic Testing: No     Substance Use History:  Current use of drugs or alcohol: Denies   Patient reported the following problems as a result of drinking and drug use: child custody, financial problems, legal issues, occupational / vocational problems and relationship problems.   Based on the clinical interview, there  Reports being sober for 2 years.  Tobacco use: No  Caffeine: No  Patient Unsure about treatment  Recovery Programming Involvement: None    Past Medical History:  Past Medical History:   Diagnosis Date      ALCOH DEP NEC/NOS-UNSPEC 2007     Asthma     exercise induced     Chickenpox      Postpartum depression      Pregnancy induced hypertension      Urinary tract infections       Surgery:   Past Surgical History:   Procedure Laterality Date      SECTION Bilateral 2016    Procedure:  SECTION;  Surgeon: Beau Cardoso MD;  Location: [place] OR     PE TUBES  age 3     Allergies:     Allergies   Allergen Reactions     Amoxicillin      Primary Care Provider: WOLFGANG Freire CNP  Seizures or Head Injury: No  Diet: No Restrictions  Food Allergies: No   Exercise: No regular exercise program  Supplements: Reviewed per Electronic Medical Record Today    acetaminophen (TYLENOL) 500 MG tablet, Take 1,000 mg by mouth every 4 hours as needed for mild pain (Patient not taking: Reported on 2022)  albuterol (PROAIR HFA/PROVENTIL HFA/VENTOLIN HFA) 108 (90 Base) MCG/ACT inhaler, Inhale 2 puffs into the lungs every 6 hours as needed for shortness of breath / dyspnea or wheezing  fluticasone-salmeterol (ADVAIR) 250-50 MCG/DOSE inhaler, Inhale 1 puff into the lungs every 12 hours  HYDROXYZINE HCL PO, Take 25-50 mg by mouth 3 times daily as needed for itching  ibuprofen (ADVIL/MOTRIN) 200 MG tablet, Take 200-600 mg by mouth every 4 hours as needed for mild pain   ketoconazole (NIZORAL) 2 % external cream, Apply topically daily  meclizine (ANTIVERT) 25 MG tablet, Take 1-2 tablets (25-50 mg) by mouth every 6 hours as needed for dizziness .  Do not exceed 4 tablets in one day.  PARoxetine (PAXIL) 40 MG tablet, Take 1 tablet (40 mg) by mouth every morning  SUMAtriptan (IMITREX) 50 MG tablet, 1 to 2 tabs by mouth for headache, may repeat in 2 hrs. If needed. Do not exceed 4 tabs in 24 hrs.  traZODone (DESYREL) 50 MG tablet, Take 1-2 tablets as needed at bedtime for insomnia (Patient not taking: Reported on 2022)    No current facility-administered medications on file prior to visit.       The  [place] Prescription Monitoring Program has been reviewed and there are no concerns about diversionary activity for controlled substances at this time.     Vital Signs:  Vitals: There were no vitals taken for this visit.    Labs:    Most recent labs reviewed and no new labs.   Most recent EKG from January 2022 reviewed. QTc interval 450.  Normal EKG.    Review of Systems:  10 systems (general, cardiovascular, respiratory, eyes, ENT, endocrine, GI, , M/S, neurological) were reviewed. Most pertinent finding(s) is/are: She reports no chest pain, no shortness of breath, history of brain aneurysms, no skin rashes. The remaining systems are all unremarkable.  Family History:   Patient reported family history includes:   Family History   Problem Relation Age of Onset     Alcohol/Drug Mother         alcohol- recovered     Hypertension Mother      Depression Mother         also anxiety     Other - See Comments Mother         ankylosing spondylosis     Anxiety Disorder Mother      Alcohol/Drug Father         alcohol- recovered     Alcohol/Drug Sister         A&D     Cancer Maternal Grandmother         lung     Depression Maternal Grandmother         also anxiety     Mental Illness Paternal Grandmother      Alcohol/Drug Brother         alcohol     Mental Illness History: Yes: Per EPIC Electronic Medical Record  Substance Abuse History: Yes: Per EPIC [employer] Medical Record  Suicide History: Unknown  Medications: Unknown     Social History:     Please see behavioral health clinician diagnostic assessment for further information    Current Living situation: [facility] with mother. Feels safe at home.  Children: Yes  Firearms/Weapons Access: No: Patient denies   Service: No    Mental Status Examination:     Appearance:  awake, alert, mild distress and casually dressed  Attitude:  cooperative   Eye Contact:  adequate  Gait and Station: No assistive Devices used and No dizziness or falls  Psychomotor  Behavior:  intact station, gait and muscle tone  Oriented to:  time, person, and place  Attention Span and Concentration:  Normal  Speech:  clear, coherent and Speaks: English  Mood:  anxious and depressed  Affect:  mood congruent  Associations:  no loose associations  Thought Process:  goal oriented  Thought Content:  no evidence of suicidal ideation or homicidal ideation, no auditory hallucinations present and no visual hallucinations present  Recent and Remote Memory:  intact Not formally assessed. No amnesia.  Fund of Knowledge: appropriate  Insight:  good  Judgment:  intact  Impulse Control:  intact    Suicide Risk Assessment:  Today Holly Tolliver reports that she is having no thoughts to want to end her life or to harm other people. In addition, there are notable risk factors for self-harm, including single status, anxiety, substance abuse, withdrawing and mood change. However, risk is mitigated by commitment to family, history of seeking help when needed, future oriented, denies suicidal intent or plan and denies homicidal ideation, intent, or plan. Therefore, based on all available evidence including the factors cited above, Holly Tolliver does not appear to be at imminent risk for self-harm, does not meet criteria for a 72-hr hold, and therefore remains appropriate for ongoing outpatient level of care.  A thorough assessment of risk factors related to suicide and self-harm have been reviewed and are noted above. The patient convincingly denies acute suicidality on several occasions. Local community safety resources reviewed and printed for patient to use if needed. There was no deceit detected, and the patient presented in a manner that was believable.     DSM5  Diagnosis:  296.31 (F33.0) Major Depressive Disorder, Recurrent Episode, Mild _ and With mixed features  300.02 (F41.1) Generalized Anxiety Disorder    Medical Comorbidities Include:   Patient Active Problem List    Diagnosis Date Noted      Obesity (BMI 35.0-39.9) with comorbidity (H) 2019     Priority: Medium     Routine postpartum follow-up 2016     Priority: Medium     Mirena IUD 2016     Priority: Medium     Lot: [id number]  Exp:        Single liveborn, born in hospital, delivered 2016     Priority: Medium     Placental abruption in third trimester 2016     Priority: Medium     Placental abruption 2016     Priority: Medium      delivery delivered 2016     Priority: Medium     Prenatal care, subsequent pregnancy 2016     Priority: Medium     Prenatal care, subsequent pregnancy in third trimester 2016     Priority: Medium     Health Care Home 2015     Priority: Medium     Care Coordinator: Kelly DANIEL, MSW  See letters for Health Care home care plan    SW covering in [place]  absence    FREDY Luz, MSW   [phone]  3/24/2015 12:24 PM       Generalized anxiety disorder 2013     Priority: Medium     Diagnosis updated by automated process. Provider to review and confirm.       GERD (gastroesophageal reflux disease) 2013     Priority: Medium     Major depressive disorder, single episode, moderate (H) 2013     Priority: Medium     CARDIOVASCULAR SCREENING; LDL GOAL LESS THAN 160 10/23/2012     Priority: Medium     CTS (carpal tunnel syndrome) 10/14/2011     Priority: Medium     Tobacco use disorder 2008     Priority: Medium     Has decreased from 1 ppd to 1 cigarette per day since pregnancy.       Mild intermittent asthma 2008     Priority: Medium       A 12-item WHODAS 2.0 assessment was completed by the patient today and recorded in EPIC.    WHODAS 2.0 Total Score 9/15/2017 10/3/2019   Total Score 25 20       The Patient Activation Measure (MOUNA) score was completed and recorded in Closely. This assesses patient knowledge, skill, and confidence for self-management.   MOUNA Score (Last Two) 2015   MOUNA Raw Score 44 31    Activation Score 70.8 59.3   MOUNA Level 4 3                Impression:  Holly Tolliver met with me again to review her recent history and to talk about medication options to better manage her mood symptoms.  This is her third visit to a different provider through  PS and it is now time to refer her for long-term psychiatric care.  Since our last meeting October 2019, she has since lost custody of her children.  Depression has increased making it difficult to get out of bed.  She had tried Strattera in the past to try to help her with attentional deficits but that was unsuccessful.  She will continue paroxetine at bedtime to help with depression and anxiety.  Hydroxyzine was discontinued because she was not taking that.  I did add Abilify 2 mg daily to help as a depression adjunct medication for her Paxil.  She is to contact me in 2 weeks to determine if she would like to start gabapentin for her anxiety.  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  She was counseled to maintain sobriety and connection with meetings and her sponsor.  She also will continue talk therapy with her counselor to process life stressors.    Medication side effects and alternatives reviewed. Health promotion activities recommended and reviewed today. All questions addressed. Education and counseling completed regarding risks and benefits of medications and psychotherapy options. Collaborative Care Psychiatry Service model reviewed today. Recommend therapy for additional support.     Treatment Plan:     1.  Add Abilify 2 mg daily  2.  Contact me in 2 weeks for determination of starting gabapentin 100 mg up to three times daily for anxiety  3.  Continue Paxil 40 mg daily at bedtime  4.  Hydroxyzine discontinued-not taking  5.  Maintain sobriety and connection with meetings and your sponsor  6.  Continue talk therapy with your counselor to process life stressors        Continue all  other medical directions per primary care provider.     Continue all other medications as reviewed per electronic medical record today.     Safety plan reviewed. To the Emergency Department as needed or call after hours crisis line at [phone] or [phone]. [place] Crisis Text [place]: Text [place] to 049228  or  Suicide LifeLine Chat: suicideModCloth.org/chat/    To schedule individual or family therapy, call [facility] at [phone].     Schedule an appointment with me in March or sooner as needed.  Call [facility] at [phone] to schedule.    Follow up with primary care provider as planned or for acute medical concerns.    Call the psychiatric nurse line with medication questions or concerns at [phone].    Sounder may be used to communicate with your provider, but this is not intended to be used for emergencies.    Crisis Resources:    National Suicide Prevention Lifeline: 943.345.4357 (TTY: 765.758.3355). Call anytime for help.  (www.suicidepreventionlifeline.org)  National [place] on Mental Illness (www.billie.org): 269.390.5439 or 126-079-9480.   Mental Health Association (www.mentalhealth.org): 656.604.7688 or 887-639-4240.  [place] Crisis Text [place]: Text [place] to 894372  Suicide LifeLine Chat: suicideModCloth.org/chat    Administrative Billing:   Time spent with patient was 60 minutes and greater than 50% of time or 40 minutes was spent in counseling and coordination of care regarding above diagnoses and treatment plan.    Patient Status:  The patient is being referred to long term community psychiatry care and provider will provide bridging until patient is established with new community provider.     Signed:   YVAN Rankin-BC   Psychiatry

## 2022-02-17 NOTE — PATIENT INSTRUCTIONS
1.  Add Abilify 2 mg daily  2.  Contact me in 2 weeks for determination of starting gabapentin 100 mg up to three times daily for anxiety  3.  Continue Paxil 40 mg daily at bedtime  4.  Hydroxyzine discontinued-not taking  5.  Maintain sobriety and connection with meetings and your sponsor  6.  Continue talk therapy with your counselor to process life stressors        Continue all other medical directions per primary care provider.     Continue all other medications as reviewed per electronic medical record today.     Safety plan reviewed. To the Emergency Department as needed or call after hours crisis line at [phone] or [phone]. [place] Crisis Text [place]: Text [place] to 636969  or  Suicide LifeLine Chat: AMSC.org/chat/    To schedule individual or family therapy, call [facility] at [phone].     Schedule an appointment with me in March or sooner as needed.  Call [facility] at [phone] to schedule.    Follow up with primary care provider as planned or for acute medical concerns.    Call the psychiatric nurse line with medication questions or concerns at [phone].    Syntertainment may be used to communicate with your provider, but this is not intended to be used for emergencies.    Crisis Resources:    National Suicide Prevention Lifeline: 331.523.7944 (TTY: 997.128.1573). Call anytime for help.  (www.suicidepreventionlifeline.org)  National [place] on Mental Illness (www.billie.org): 418.231.9481 or 641-161-8570.   Mental Health Association (www.mentalhealth.org): 905.231.5548 or 236-543-4419.  [place] Crisis Text Line: Text [place] to [address]0020  Suicide LifeLine Chat: AMSC.org/chat

## 2022-02-21 ENCOUNTER — TELEPHONE (OUTPATIENT)
Dept: FAMILY MEDICINE | Facility: CLINIC | Age: 37
End: 2022-02-21
Payer: COMMERCIAL

## 2022-02-21 ASSESSMENT — COLUMBIA-SUICIDE SEVERITY RATING SCALE - C-SSRS
5. HAVE YOU STARTED TO WORK OUT OR WORKED OUT THE DETAILS OF HOW TO KILL YOURSELF? DO YOU INTEND TO CARRY OUT THIS PLAN?: NO
1. IN THE PAST MONTH, HAVE YOU WISHED YOU WERE DEAD OR WISHED YOU COULD GO TO SLEEP AND NOT WAKE UP?: NO
6. HAVE YOU EVER DONE ANYTHING, STARTED TO DO ANYTHING, OR PREPARED TO DO ANYTHING TO END YOUR LIFE?: NO
2. HAVE YOU ACTUALLY HAD ANY THOUGHTS OF KILLING YOURSELF IN THE PAST MONTH?: NO
4. HAVE YOU HAD THESE THOUGHTS AND HAD SOME INTENTION OF ACTING ON THEM?: NO
3. HAVE YOU BEEN THINKING ABOUT HOW YOU MIGHT KILL YOURSELF?: NO

## 2022-02-21 NOTE — TELEPHONE ENCOUNTER
Patient Quality Outreach    Patient is due for the following:   Asthma  -  ACT needed  Cervical Cancer Screening - PAP Needed    NEXT STEPS:   Schedule a office visit for pap/pe.   Complete questionnaire    Type of outreach:    Sent letter.      Questions for provider review:    None     Cecille Pete, CMA

## 2022-02-21 NOTE — LETTER
February 21, 2022      Holly Tolliver  [address] [address]        Dear Holly,     Your healthcare team cares about your health. To provide you with the best care,   we have reviewed your chart and based on our findings, we see that you are due to:     - CERVICAL CANCER SCREENING: Schedule a Cervical Cancer Screening, with Pap and wellness exam.      An Asthma Control Test (ACT) that our clinic uses to monitor and manage your asthma. This test is an assessment tool that we use to determine how well your asthma is controlled.  Please complete the enclosed form and return in the provided envelope.  If you have already completed these items, please contact the clinic via phone or   [email or web address] so your care team can review and update your records. Thank you for   choosing [facility] for your healthcare needs. For any questions,   concerns, or to schedule an appointment please contact the clinic.       Healthy Regards,      Your [facility] Care Team

## 2022-02-22 ENCOUNTER — HOSPITAL ENCOUNTER (OUTPATIENT)
Dept: PHYSICAL THERAPY | Facility: CLINIC | Age: 37
Setting detail: THERAPIES SERIES
End: 2022-02-22
Attending: EMERGENCY MEDICINE
Payer: COMMERCIAL

## 2022-02-22 ENCOUNTER — E-VISIT (OUTPATIENT)
Dept: FAMILY MEDICINE | Facility: CLINIC | Age: 37
End: 2022-02-22
Payer: COMMERCIAL

## 2022-02-22 DIAGNOSIS — K04.7 DENTAL INFECTION: Primary | ICD-10-CM

## 2022-02-22 PROCEDURE — 97112 NEUROMUSCULAR REEDUCATION: CPT | Mod: GP | Performed by: PHYSICAL THERAPIST

## 2022-02-22 PROCEDURE — 99421 OL DIG E/M SVC 5-10 MIN: CPT | Performed by: INTERNAL MEDICINE

## 2022-02-22 RX ORDER — CLINDAMYCIN HCL 300 MG
300 CAPSULE ORAL 4 TIMES DAILY
Qty: 40 CAPSULE | Refills: 0 | Status: SHIPPED | OUTPATIENT
Start: 2022-02-22 | End: 2022-03-04

## 2022-02-22 NOTE — PATIENT INSTRUCTIONS
Thank you for choosing us for your care. I have placed an order for a prescription for clindamycin antibiotics so that you can start treatment. View your full visit summary for details by clicking on the link below. Your pharmacist will able to address any questions you may have about the medication.     For pain control, I'd recommend alternating between Tylenol and ibuprofen:    Here are the max doses for these medications:    Acetaminophen (Tylenol) 1000mg every 6 hours with food (Maximum of 3000mg/day)   Ibuprofen (Advil) maximum of 800mg three times a day with food     Please also schedule a follow-up with a dentist.     If you're not feeling better within 5-7 days, please schedule an appointment in person.  You can schedule an appointment right here in [facility], or call [phone]  If the visit is for the same symptoms as your eVisit, we'll refund the cost of your eVisit if seen within seven days.      Hilario Caba MD  Covering for PCP (out of clinic today)

## 2022-03-15 ENCOUNTER — HOSPITAL ENCOUNTER (OUTPATIENT)
Dept: PHYSICAL THERAPY | Facility: CLINIC | Age: 37
Setting detail: THERAPIES SERIES
Discharge: HOME | End: 2022-03-15
Attending: EMERGENCY MEDICINE
Payer: COMMERCIAL

## 2022-03-15 PROCEDURE — 97112 NEUROMUSCULAR REEDUCATION: CPT | Mod: GP | Performed by: PHYSICAL THERAPIST

## 2022-03-18 ENCOUNTER — TELEPHONE (OUTPATIENT)
Dept: PSYCHIATRY | Facility: CLINIC | Age: 37
End: 2022-03-18
Payer: COMMERCIAL

## 2022-03-18 DIAGNOSIS — F41.1 GENERALIZED ANXIETY DISORDER: Primary | ICD-10-CM

## 2022-03-18 RX ORDER — GABAPENTIN 100 MG/1
100 CAPSULE ORAL 3 TIMES DAILY
Qty: 90 CAPSULE | Refills: 0 | Status: SHIPPED | OUTPATIENT
Start: 2022-03-18 | End: 2023-04-07

## 2022-03-18 NOTE — TELEPHONE ENCOUNTER
Reason for call:  Medication     If this is a refill request, has the caller requested the refill from the pharmacy already? No    Will the patient be using a [facility]? No    Name of the pharmacy and phone number for the current request:  [facility] [place] [place] [id number] - [place], [place] - [address] [phone]    Name of the medication requested: Unknown    Other request: Pt missed appointment today due to technology issues, next oepning isn't until April. Pt stated that she and Dr. Reynoso had talked about her starting a new medicaiton and she would still like to start that before her follow-up appointment if possible.     Phone number to reach patient:  Home number on file [phone] (home)    Best Time:  ANY    Can we leave a detailed message on this number?  YES    Travel screening: Not Applicable

## 2022-03-21 ENCOUNTER — VIRTUAL VISIT (OUTPATIENT)
Dept: PHARMACY | Facility: CLINIC | Age: 37
End: 2022-03-21
Payer: COMMERCIAL

## 2022-03-21 DIAGNOSIS — J45.20 MILD INTERMITTENT ASTHMA, UNSPECIFIED WHETHER COMPLICATED: ICD-10-CM

## 2022-03-21 DIAGNOSIS — R42 DIZZINESS: Primary | ICD-10-CM

## 2022-03-21 DIAGNOSIS — H81.10 BENIGN PAROXYSMAL POSITIONAL VERTIGO, UNSPECIFIED LATERALITY: ICD-10-CM

## 2022-03-21 DIAGNOSIS — F32.1 MAJOR DEPRESSIVE DISORDER, SINGLE EPISODE, MODERATE (H): ICD-10-CM

## 2022-03-21 DIAGNOSIS — R51.9 NONINTRACTABLE HEADACHE, UNSPECIFIED CHRONICITY PATTERN, UNSPECIFIED HEADACHE TYPE: ICD-10-CM

## 2022-03-21 DIAGNOSIS — B36.0 TINEA VERSICOLOR: ICD-10-CM

## 2022-03-21 DIAGNOSIS — F41.1 GENERALIZED ANXIETY DISORDER: Primary | ICD-10-CM

## 2022-03-21 PROCEDURE — 99607 MTMS BY PHARM ADDL 15 MIN: CPT | Performed by: PHARMACIST

## 2022-03-21 PROCEDURE — 99605 MTMS BY PHARM NP 15 MIN: CPT | Performed by: PHARMACIST

## 2022-03-21 NOTE — LETTER
My Asthma Action Plan    Name: Holly Tolliver   YOB: 1985  Date: 3/21/2022   My doctor: Celestina Loomis [facility]   My clinic: [facility]        My Control Medicine: Fluticasone propionate + salmeterol (Advair Diskus or Wixela Inhub) -  250/50 mcg 1 puff every 12 hours  My Rescue Medicine: Albuterol (Proair/Ventolin/Proventil HFA) 2 puffs EVERY 6 HOURS as needed. Use a spacer if recommended by your provider. My Asthma Severity:   Mild Intermittent  Know your asthma triggers:  exercise or sports            GREEN ZONE   Good Control    I feel good    No cough or wheeze    Can work, sleep and play without asthma symptoms       Take your asthma control medicine every day.     1. If exercise triggers your asthma, take your rescue medication    15 minutes before exercise or sports, and    During exercise if you have asthma symptoms  2. Spacer to use with inhaler: If you have a spacer, make sure to use it with your inhaler             YELLOW ZONE Getting Worse  I have ANY of these:    I do not feel good    Cough or wheeze    Chest feels tight    Wake up at night   1. Keep taking your Green Zone medications  2. Start taking your rescue medicine:    every 20 minutes for up to 1 hour. Then every 4 hours for 24-48 hours.  3. If you stay in the Yellow Zone for more than 12-24 hours, contact your doctor.  4. If you do not return to the Green Zone in 12-24 hours or you get worse, start taking your oral steroid medicine if prescribed by your provider.           RED ZONE Medical Alert - Get Help  I have ANY of these:    I feel awful    Medicine is not helping    Breathing getting harder    Trouble walking or talking    Nose opens wide to breathe       1. Take your rescue medicine NOW  2. If your provider has prescribed an oral steroid medicine, start taking it NOW  3. Call your doctor NOW  4. If you are still in the Red Zone after 20 minutes and you have not reached your doctor:    Take your  rescue medicine again and    Call 911 or go to the emergency room right away    See your regular doctor within 2 weeks of an Emergency Room or Urgent Care visit for follow-up treatment.          Annual Reminders:  Meet with Asthma Educator,  Flu Shot in the Fall, consider Pneumonia Vaccination for patients with asthma (aged 19 and older).    Pharmacy:    [id number] - [place] [address] [facility], [place] - [address] [facility] [place] [place] [address] - [place], [place] - [address]    Electronically signed by Celestina Loomis [facility]   Date: 03/21/22                      Asthma Triggers  How To Control Things That Make Your Asthma Worse    Triggers are things that make your asthma worse.  Look at the list below to help you find your triggers and what you can do about them.  You can help prevent asthma flare-ups by staying away from your triggers.      Trigger                                                          What you can do   Cigarette Smoke  Tobacco smoke can make asthma worse. Do not allow smoking in your home, car or around you.  Be sure no one smokes at a child s day care or school.  If you smoke, ask your health care provider for ways to help you quit.  Ask family members to quit too.  Ask your health care provider for a referral to Quit Plan to help you quit smoking, or call 6-431-157-PLAN.     Colds, Flu, Bronchitis  These are common triggers of asthma. Wash your hands often.  Don t touch your eyes, nose or mouth.  Get a flu shot every year.     Dust Mites  These are tiny bugs that live in cloth or carpet. They are too small to see. Wash sheets and blankets in hot water every week.   Encase pillows and mattress in dust mite proof covers.  Avoid having carpet if you can. If you have carpet, vacuum weekly.   Use a dust mask and HEPA vacuum.   Pollen and Outdoor Mold  Some people are allergic to trees, grass, or  weed pollen, or molds. Try to keep your windows closed.  Limit time out doors when pollen count is high.   Ask you health care provider about taking medicine during allergy season.     Animal Dander  Some people are allergic to skin flakes, urine or saliva from pets with fur or feathers. Keep pets with fur or feathers out of your home.    If you can t keep the pet outdoors, then keep the pet out of your bedroom.  Keep the bedroom door closed.  Keep pets off cloth furniture and away from stuffed toys.     Mice, Rats, and Cockroaches   Some people are allergic to the waste from these pests.   Cover food and garbage.  Clean up spills and food crumbs.  Store grease in the refrigerator.   Keep food out of the bedroom.   Indoor Mold  This can be a trigger if your home has high moisture. Fix leaking faucets, pipes, or other sources of water.   Clean moldy surfaces.  Dehumidify basement if it is damp and smelly.   Smoke, Strong Odors, and Sprays  These can reduce air quality. Stay away from strong odors and sprays, such as perfume, powder, hair spray, paints, smoke incense, paint, cleaning products, candles and new carpet.   Exercise or Sports  Some people with asthma have this trigger. Be active!  Ask your doctor about taking medicine before sports or exercise to prevent symptoms.    Warm up for 5-10 minutes before and after sports or exercise.     Other Triggers of Asthma  Cold air:  Cover your nose and mouth with a scarf.  Sometimes laughing or crying can be a trigger.  Some medicines and food can trigger asthma.

## 2022-03-21 NOTE — TELEPHONE ENCOUNTER
"Hi there,  Patient is requesting a refill of meclizine 25 mg tablets - refill denied by prescribing Dr Layton Llanos Jr \"patient must see PCP for refill\"\".   Please send new prescription to [facility]. Please contact patient with any questions or concerns.    Thank You!    Terence Arthur CPhT  [facility]    "

## 2022-03-21 NOTE — PROGRESS NOTES
Medication Therapy Management (MTM) Encounter    ASSESSMENT:                            Medication Adherence/Access: See below for considerations    Depression/Anxiety:  Improving with addition of Abilify. Provided education on gabapentin for anxiety and side effect monitoring today. Follow-up plan in place with psychiatry.    Tinea Versicolor: Stable.    Vertigo: Encouraged patient to do physical therapy exercises on a regular basis and can request meclizine refills from pharmacy if desired.    Headaches: Stable.     Asthma: Last ACT not at goal of 20 or greater. Patient may benefit from adherence to maintenance inhaler. Discussed relationship between breathing and anxiety. Also completed asthma action plan today.    PLAN:                            1. Start gabapentin 100mg 3 times daily as directed. Suggest taking first dose at bedtime and monitoring for dizziness side effects.  2. Try using Advair inhaler 1 puff twice daily as prescribed. Perhaps improving your breathing control might also help with your anxiety symptoms.    Follow-up: Return in about 6 months (around 9/21/2022) for Medication Therapy Management.    SUBJECTIVE/OBJECTIVE:                          Holly Tolliver is a 36 year old female called for an initial visit. She was referred to me from her insurance plan [employer].      Reason for visit: Comprehensive medication review, no specific concerns today.    Allergies/ADRs: Reviewed in chart  Past Medical History: Reviewed in chart  Tobacco: She reports that she has quit smoking. Her smoking use included cigarettes. She smoked 0.00 packs per day for 12.00 years. She has never used smokeless tobacco.  Alcohol: not currently using    Medication Adherence/Access:   Patient takes medications directly from bottles.  Patient takes medications 1 time(s) per day.   Per patient, misses medication 1 times per week.   Medication barriers: none.   The patient fills medications at [facility]: NO, fills  medications at [facility].    Depression/Anxiety:  Current medications include: paroxetine 40mg daily and aripiprazole 2mg daily (started ~1 month ago). Feels adding Abilify is helping with both depression and anxiety. Recently was prescribed gabapentin 100mg 3 times daily although hasn't started it yet. Previously tried hydroxyzine which didn't work. She switched meds from morning to bedtime which was really helpful for dizziness. Denies any insomnia issues. Reports having dizziness issues which seem to be related to times she feels anxiety, stress, and overwhelm. Follows with psychiatrist Ariana Reynoso NP and therapist Pavithra Casas.    PHQ-9 SCORE 2/1/2021 9/9/2021 1/24/2022   PHQ-9 Total Score - - -   PHQ-9 Total Score 22 24 22     RUBENS-7 SCORE 2/1/2021 9/9/2021 1/24/2022   Total Score - - -   Total Score 7 8 19     Tinea Versicolor: Currently using ketoconazole 2% cream as needed. Uses in the summertime for white spots. No concerns today.    Vertigo: Finished vestibular physical therapy. Admits she's not doing her exercises because work is busy lately. Ran out of meclizine and would like some more.     Headaches: Currently taking ibuprofen 600-800mg as needed (intermittent use). No longer using sumatriptan. She's glad no headaches in a while.     Asthma: Current medications: ICS/LABA- Advair Diskus 250-50mcg 1 puff twice daily, Short-Acting Bronchodilator: Albuterol MDI as needed (uses for exercise only). Patient admits she hasn't been using Advair lately but feels like she should. Patient rinses their mouth after using steroid inhaler. Triggers include: exercise.  Patient reports the following symptoms: increased shortness of breath upon exertion.  Asthma Action Plan on file: NO  ACT Total Scores 5/28/2020 2/1/2021 9/9/2021   ACT TOTAL SCORE - - -   ASTHMA ER VISITS - - -   ASTHMA HOSPITALIZATIONS - - -   ACT TOTAL SCORE (Goal Greater than or Equal to 20) 17 15 13   In the past 12 months, how many times did  you visit the emergency room for your asthma without being admitted to the hospital? 0 0 0   In the past 12 months, how many times were you hospitalized overnight because of your asthma? 0 0 0     Today's Vitals: There were no vitals taken for this visit.     Last Comprehensive Metabolic Panel:  Sodium   Date Value Ref Range Status   01/24/2022 137 133 - 144 mmol/L Final   05/28/2020 137 133 - 144 mmol/L Final     Potassium   Date Value Ref Range Status   01/24/2022 3.5 3.4 - 5.3 mmol/L Final   05/28/2020 4.2 3.4 - 5.3 mmol/L Final     Chloride   Date Value Ref Range Status   01/24/2022 105 94 - 109 mmol/L Final   05/28/2020 106 94 - 109 mmol/L Final     Carbon Dioxide   Date Value Ref Range Status   05/28/2020 26 20 - 32 mmol/L Final     Carbon Dioxide (CO2)   Date Value Ref Range Status   01/24/2022 27 20 - 32 mmol/L Final     Anion Gap   Date Value Ref Range Status   01/24/2022 5 3 - 14 mmol/L Final   05/28/2020 5 3 - 14 mmol/L Final     Glucose   Date Value Ref Range Status   01/24/2022 85 70 - 99 mg/dL Final   05/28/2020 85 70 - 99 mg/dL Final     Urea Nitrogen   Date Value Ref Range Status   01/24/2022 11 7 - 30 mg/dL Final   05/28/2020 15 7 - 30 mg/dL Final     Creatinine   Date Value Ref Range Status   01/24/2022 0.76 0.52 - 1.04 mg/dL Final   05/28/2020 0.82 0.52 - 1.04 mg/dL Final     GFR Estimate   Date Value Ref Range Status   01/24/2022 >90 >60 mL/min/1.73m2 Final     Comment:     Effective December 21, 2021 eGFRcr in adults is calculated using the 2021 CKD-EPI creatinine equation which includes age and gender (Cyndie et al., NEJM, DOI: 10.1056/QWEIvs7610035)   05/28/2020 >90 >60 mL/min/[1.73_m2] Final     Comment:     Non  GFR Calc  Starting 12/18/2018, serum creatinine based estimated GFR (eGFR) will be   calculated using the Chronic Kidney Disease Epidemiology Collaboration   (CKD-EPI) equation.       Calcium   Date Value Ref Range Status   01/24/2022 8.9 8.5 - 10.1 mg/dL Final    05/28/2020 8.7 8.5 - 10.1 mg/dL Final     ----------------    I spent 30 minutes with this patient today. All changes were made via collaborative practice agreement with WOLFGANG Freire CNP. A copy of the visit note was provided to the patient's provider(s).    The patient was sent via [employer] a summary of these recommendations.     Celestina Loomis, PharmD, BCACP  Medication Therapy Management Pharmacist  Pager: [phone]    Telemedicine Visit Details  Type of service:  Telephone visit  Start Time: 0900  End Time: 0930  Originating Location (patient location): [place]  Distant Location (provider location):  [facility]     Medication Therapy Recommendations  Generalized anxiety disorder    Current Medication: gabapentin (NEURONTIN) 100 MG capsule   Rationale: Does not understand instructions - Adherence - Adherence   Recommendation: Provide Education - Start gabapentin as directed.   Status: Patient Agreed - Adherence/Education         Mild intermittent asthma    Current Medication: fluticasone-salmeterol (ADVAIR) 250-50 MCG/DOSE inhaler   Rationale: Patient forgets to take - Adherence - Adherence   Recommendation: Provide Education - Start Advair as directed.   Status: Patient Agreed - Adherence/Education

## 2022-03-21 NOTE — PATIENT INSTRUCTIONS
Recommendations from today's MTM visit:                                                    MTM (medication therapy management) is a service provided by a clinical pharmacist designed to help you get the most of out of your medicines.   Today we reviewed what your medicines are for, how to know if they are working, that your medicines are safe and how to make your medicine regimen as easy as possible.      1. Start gabapentin 100mg 3 times daily as directed. Suggest taking first dose at bedtime and monitoring for dizziness side effects.  2. Try using Advair inhaler 1 puff twice daily as prescribed. Perhaps improving your breathing control might also help with your anxiety symptoms.    Follow-up: Return in about 6 months (around 9/21/2022) for Medication Therapy Management.    It was great to speak with you today.  I value your experience and would be very thankful for your time with providing feedback on our clinic survey. You may receive a survey via email or text message in the next few days.     To schedule another MTM appointment, please call the clinic directly or you may call the MTM scheduling line at [phone] or toll-free at [phone].     My Clinical Pharmacist's contact information:                                                      Please feel free to contact me with any questions or concerns you have.      Celestina Loomis, PharmD, BCACP  Medication Therapy Management Pharmacist  Pager: [phone]

## 2022-03-24 RX ORDER — MECLIZINE HYDROCHLORIDE 25 MG/1
25-50 TABLET ORAL EVERY 6 HOURS PRN
Qty: 30 TABLET | Refills: 3 | Status: SHIPPED | OUTPATIENT
Start: 2022-03-24 | End: 2023-04-07

## 2022-03-24 NOTE — TELEPHONE ENCOUNTER
Routing refill request to provider for review/approval because:  Passes protocol but needs to be prescribed under PCP.     Marleny Oconnor RN BSN

## 2022-03-24 NOTE — TELEPHONE ENCOUNTER
Covering:  Med was discussed at MTM visit on 3/21 with plan to continue prn.  Refills sent.    Thanks,  Hilario Caba MD

## 2022-06-09 NOTE — PROGRESS NOTES
[facility]    Outpatient Physical Therapy Discharge Note  Patient: Holly Tolliver  : 1985    Beginning/End Dates of Reporting Period:  22 to 3/15/2022    Referring Provider: Dr. Layton Llanos    Therapy Diagnosis: Cervicogenic dizziness     Client Self Report: Report an improvement in symptoms, still will get some symptoms when she is really stressed at work, however with sitting down they go away.    Objective Measurements:  Objective Measure: gaze stabilizaiton  Details: 200 bpm horizontal and vertical gaze romberg  Objective Measure: DVA  Details: Baseline: 11; 1 Hz: 10; 2 Hz 8    Outcome Measures (most recent score):  Dizziness Handicap Inventory (score out of 100) A decrease in score by 17.18 or greater indicates a clinically significant change in symptoms.: 14    Goals:  Goal Identifier Frequency   Goal Description Pt will report a 50% reduciton in frequency of episodes of dizziness in order to have increase tolerance to work tasks.   Target Date 22   Date Met  22   Progress (detail required for progress note):       Goal Identifier Neck ROM   Goal Description Pt will demonstrate 70* of cervical Rotation in order to be able to look over her shoulder while driving.   Target Date 22   Date Met  03/15/22   Progress (detail required for progress note):       Goal Identifier HEP   Goal Description Pt will be independent with HEP in order to maintain improvements upon discharge.   Target Date 22   Date Met  03/15/22   Progress (detail required for progress note): independent with HEP     Plan:  Discharge from therapy.    Discharge:    Reason for Discharge: Pt has met all goals and demonstrates improved DHI and DVA scoring.  Pt reports and demonstrates independence with HEP and is to conitnue with HEP upon discharge.    Equipment Issued: none    Discharge Plan: Patient to  continue home program.    Misty Lobo, PT, DPT, CLT  Physical Therapist & Certified Lymphedema Therapist  [facility]

## 2022-06-20 ENCOUNTER — TELEPHONE (OUTPATIENT)
Dept: FAMILY MEDICINE | Facility: CLINIC | Age: 37
End: 2022-06-20

## 2022-06-20 NOTE — TELEPHONE ENCOUNTER
Patient Quality Outreach    Patient is due for the following:   Asthma  -  ACT needed  Cervical Cancer Screening - PAP Needed    NEXT STEPS:   Schedule a yearly physical  complete act    Type of outreach:    Sent [employer] message.  Will postpone x 1 week, if not viewed or completed please mail ACT.         Questions for provider review:    None     Cecille Pete, [facility]

## 2022-06-20 NOTE — LETTER
June 27, 2022      Holly Tolliver  [address] [address]        Dear Holly,     Your healthcare team cares about your health. To provide you with the best care,   we have reviewed your chart and based on our findings, we see that you are due to:     - CERVICAL CANCER SCREENING: Schedule a Cervical Cancer Screening, with Pap and wellness exam.      -An Asthma Control Test (ACT) that our clinic uses to monitor and manage your asthma. This test is an assessment tool that we use to determine how well your asthma is controlled.  Please complete the enclosed form and return in the provided envelope.  If you have already completed these items, please contact the clinic via phone or   [employer] so your care team can review and update your records. Thank you for   choosing [facility] for your healthcare needs. For any questions,   concerns, or to schedule an appointment please contact the clinic.       Healthy Regards,      Your [facility] Care Team

## 2022-08-14 ENCOUNTER — HEALTH MAINTENANCE LETTER (OUTPATIENT)
Age: 37
End: 2022-08-14

## 2022-09-06 ENCOUNTER — E-VISIT (OUTPATIENT)
Dept: PEDIATRICS | Facility: CLINIC | Age: 37
End: 2022-09-06

## 2022-09-06 DIAGNOSIS — J11.1 INFLUENZA-LIKE ILLNESS: Primary | ICD-10-CM

## 2022-09-06 PROCEDURE — 99207 PR NO BILLABLE SERVICE THIS VISIT: CPT | Performed by: PREVENTIVE MEDICINE

## 2022-11-19 ENCOUNTER — HEALTH MAINTENANCE LETTER (OUTPATIENT)
Age: 37
End: 2022-11-19

## 2022-12-01 ENCOUNTER — TELEPHONE (OUTPATIENT)
Dept: NEUROSURGERY | Facility: CLINIC | Age: 37
End: 2022-12-01

## 2022-12-02 ENCOUNTER — TELEPHONE (OUTPATIENT)
Dept: NEUROSURGERY | Facility: CLINIC | Age: 37
End: 2022-12-02

## 2023-01-23 ENCOUNTER — TELEPHONE (OUTPATIENT)
Dept: FAMILY MEDICINE | Facility: CLINIC | Age: 38
End: 2023-01-23
Payer: COMMERCIAL

## 2023-01-23 NOTE — LETTER
2023      Holly Tolliver  [address] [address]        Dear Holly,     Your team at [facility] cares about your health. We have reviewed your chart and based on our findings; we are making the following recommendations to better manage your health.     You are in particular need of attention regarding the followin.  Asthma Control Test     This screening tool helps us to assess how well your asthma is controlled.Good asthma control leads to fewer asthma symptoms and greater health. If your asthma is not in good control (score is 19 or less) or you have been to the ER or urgent care for your asthma, it is recommended you be seen by your provider for medication and lifestyle adjustments.      Please complete and return the enclosed Asthma Control Test.     2.  Schedule a primary care office visit with your provider for a Pap Smear to screen for Cervical Cancer.    If you have already completed these items, please contact the clinic via phone or   [employer] so your care team can review and update your records. Thank you for   choosing [facility] for your healthcare needs. For any questions,   concerns, or to schedule an appointment please contact our clinic.    Healthy Regards,      Your [facility] Care Team

## 2023-01-23 NOTE — TELEPHONE ENCOUNTER
Patient Quality Outreach    Patient is due for the following:   Asthma  -  ACT needed  Cervical Cancer Screening - PAP Needed    Next Steps:   Schedule a Adult Preventative  Complete ACT    Type of outreach:    Sent [employer] message.   Will postpone x 1 week, if not viewed or completed please mail ACT.         Questions for provider review:    None     Cecille Pete, [facility]

## 2023-01-25 ENCOUNTER — HOSPITAL ENCOUNTER (OUTPATIENT)
Dept: MRI IMAGING | Facility: CLINIC | Age: 38
Discharge: HOME | End: 2023-01-25
Payer: COMMERCIAL

## 2023-01-25 DIAGNOSIS — I67.1 CEREBRAL ANEURYSM, NONRUPTURED: ICD-10-CM

## 2023-01-25 PROCEDURE — 70544 MR ANGIOGRAPHY HEAD W/O DYE: CPT

## 2023-01-27 ENCOUNTER — VIRTUAL VISIT (OUTPATIENT)
Dept: FAMILY MEDICINE | Facility: CLINIC | Age: 38
End: 2023-01-27
Payer: COMMERCIAL

## 2023-01-27 ENCOUNTER — LAB (OUTPATIENT)
Dept: LAB | Facility: CLINIC | Age: 38
End: 2023-01-27
Payer: COMMERCIAL

## 2023-01-27 DIAGNOSIS — Z11.3 SCREEN FOR STD (SEXUALLY TRANSMITTED DISEASE): Primary | ICD-10-CM

## 2023-01-27 DIAGNOSIS — Z11.3 SCREEN FOR STD (SEXUALLY TRANSMITTED DISEASE): ICD-10-CM

## 2023-01-27 DIAGNOSIS — Z20.2 EXPOSURE TO STD: ICD-10-CM

## 2023-01-27 PROCEDURE — 99213 OFFICE O/P EST LOW 20 MIN: CPT | Mod: 93 | Performed by: NURSE PRACTITIONER

## 2023-01-27 PROCEDURE — 86803 HEPATITIS C AB TEST: CPT

## 2023-01-27 PROCEDURE — 87491 CHLMYD TRACH DNA AMP PROBE: CPT | Performed by: NURSE PRACTITIONER

## 2023-01-27 PROCEDURE — 87591 N.GONORRHOEAE DNA AMP PROB: CPT | Performed by: NURSE PRACTITIONER

## 2023-01-27 PROCEDURE — 36415 COLL VENOUS BLD VENIPUNCTURE: CPT

## 2023-01-27 PROCEDURE — 86780 TREPONEMA PALLIDUM: CPT

## 2023-01-27 PROCEDURE — 87389 HIV-1 AG W/HIV-1&-2 AB AG IA: CPT

## 2023-01-27 NOTE — PROGRESS NOTES
Ana is a 37 year old who is being evaluated via a billable telephone visit.      What phone number would you like to be contacted at? [phone]  How would you like to obtain your AVS? Latesha  Distant Location (provider location):  On-site    Assessment & Plan     Screen for STD (sexually transmitted disease)  Ordered STD testing and will notify patient of results.  - NEISSERIA GONORRHOEA PCR  - CHLAMYDIA TRACHOMATIS PCR  - HIV Antigen Antibody Combo; Future  - Treponema Abs w Reflex to RPR and Titer; Future  - Hepatitis C Screen Reflex to HCV RNA Quant and Genotype; Future    Exposure to STD  I would order treatment typically with just exposure but since patient wants further STD testing, I will wait results for treatment.  - NEISSERIA GONORRHOEA PCR    See Patient Instructions    Return if symptoms worsen or fail to improve.    Blessing Cabello NP  [facility]    Subjective   Ana is a 37 year old, presenting for the following health issues:  STD      HPI     Patient is a 37-year-old who calls in today's due to the fact that her boyfriend tested for gonorrhea.  She is looking to get tested for all STDs.  She states that overall she feels ill but this has been going on since she had COVID and no vaginal discharge or symptoms but some pain with intercourse vaginally.    Review of Systems   CONSTITUTIONAL: NEGATIVE for fever, chills, change in weight  : some pain with intercourse vaginally  PSYCHIATRIC: NEGATIVE for changes in mood or affect  ROS otherwise negative      Objective           Vitals:  No vitals were obtained today due to virtual visit.    Physical Exam   healthy, alert and no distress  PSYCH: Alert and oriented times 3; coherent speech, normal   rate and volume, able to articulate logical thoughts, able   to abstract reason, no tangential thoughts, no hallucinations   or delusions  Her affect is normal  RESP: No cough, no audible wheezing, able to talk in full  sentences  Remainder of exam unable to be completed due to telephone visits    Phone call duration: 6 minutes

## 2023-01-27 NOTE — PATIENT INSTRUCTIONS
Normally I would treat with an exposure but since you are wanting other testing we will check for chlamydia/gonorrhea, HIV, Syphillis and Hepatitis C which are the STD's we can test for .    I will notify you of results when I see them back.      No intercourse until tests are back and appropriate treatment is completed.    Let me know if you have any further questions.

## 2023-01-28 LAB
C TRACH DNA SPEC QL NAA+PROBE: NEGATIVE
N GONORRHOEA DNA SPEC QL NAA+PROBE: POSITIVE
T PALLIDUM AB SER QL: NONREACTIVE

## 2023-01-30 ENCOUNTER — ALLIED HEALTH/NURSE VISIT (OUTPATIENT)
Dept: FAMILY MEDICINE | Facility: CLINIC | Age: 38
End: 2023-01-30
Payer: COMMERCIAL

## 2023-01-30 DIAGNOSIS — A54.9 GONORRHEA: Primary | ICD-10-CM

## 2023-01-30 LAB
HCV AB SERPL QL IA: NONREACTIVE
HIV 1+2 AB+HIV1 P24 AG SERPL QL IA: NONREACTIVE

## 2023-01-30 PROCEDURE — 96372 THER/PROPH/DIAG INJ SC/IM: CPT | Performed by: NURSE PRACTITIONER

## 2023-01-30 PROCEDURE — 99207 PR NO CHARGE NURSE ONLY: CPT

## 2023-01-30 RX ORDER — CEFTRIAXONE SODIUM 1 G
500 VIAL (EA) INJECTION ONCE
Status: COMPLETED | OUTPATIENT
Start: 2023-01-30 | End: 2023-01-30

## 2023-01-30 RX ADMIN — Medication 500 MG: at 14:09

## 2023-01-30 NOTE — TELEPHONE ENCOUNTER
Patient Quality Outreach    Patient is due for the following:   Asthma  -  ACT needed  Cervical Cancer Screening - PAP Needed    Next Steps:   Schedule a Adult Preventative    Type of outreach:    mychart message has not been viewed, letter mailed with recommendations      Questions for provider review:    None     Cecille Pete, [facility]

## 2023-01-30 NOTE — PROGRESS NOTES
Clinic Administered Medication Documentation    Administrations This Visit     cefTRIAXone (ROCEPHIN) in lidocaine 1% (PF) for IM administration only 500 mg     Admin Date  01/30/2023 Action  Given Dose  500 mg Route  Intramuscular Site   Administered By  Karmen Walden RN    Ordering Provider: Blessing Cabello NP    Patient Supplied?: No                  Injectable Medication Documentation    Patient was given Ceftriaxone Sodium (Rocephin). Prior to medication administration, verified patients identity using patient s name and date of birth. Please see MAR and medication order for additional information. Patient instructed to remain in clinic for 15 minutes and report any adverse reaction to staff immediately .      Was entire vial of medication used? Yes  Vial/Syringe: Single dose vial  Expiration Date:  6/1/2024  Was this medication supplied by the patient? No

## 2023-02-07 ENCOUNTER — VIRTUAL VISIT (OUTPATIENT)
Dept: NEUROSURGERY | Facility: CLINIC | Age: 38
End: 2023-02-07
Payer: COMMERCIAL

## 2023-02-07 DIAGNOSIS — I67.1 CEREBRAL ANEURYSM, NONRUPTURED: Primary | ICD-10-CM

## 2023-02-07 PROCEDURE — 99203 OFFICE O/P NEW LOW 30 MIN: CPT | Mod: VID | Performed by: RADIOLOGY

## 2023-02-07 ASSESSMENT — PATIENT HEALTH QUESTIONNAIRE - PHQ9: SUM OF ALL RESPONSES TO PHQ QUESTIONS 1-9: 12

## 2023-02-07 NOTE — LETTER
Date:February 9, 2023      Provider requested that no letter be sent. Do not send.       [facility]

## 2023-02-07 NOTE — NURSING NOTE
Is the patient currently in the state of [place]? YES    Visit mode:VIDEO    If the visit is dropped, the patient can be reconnected by: TELEPHONE VISIT: Phone number: [phone]    Will anyone else be joining the visit? NO      How would you like to obtain your AVS? MyChart    Are changes needed to the allergy or medication list? NO    Comments or concerns related to today's visit:

## 2023-02-07 NOTE — LETTER
2/7/2023       RE: Holly Tolliver  [address] [address]     Dear Colleague,    Thank you for referring your patient, Holly Tolliver, to the [facility]UROSURGERY [facility]. Please see a copy of my visit note below.    Ms. 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    On her most recent MR angiogram of the head, there was no significant change in this aneurysm/infundibulum when compared to the prior MR angiogram of the head.  This aneurysm/infundibulum is seen to arise from the left anterior cerebral artery, A2 segment and is linear in configuration.    I assured Ms. Tolliver that this aneurysm/infundibulum poses very little risk of rupture and cerebral hemorrhage at this point.  The shape of the lesion is also unusual for an aneurysm.  Given the stability of this finding, we will follow-up with another MRA angiogram in 1 year.  Ms. Tolliver was agreeable to this plan of action.    I spent about 15 minutes on a video call with Ms. Tolliver.      Again, thank you for allowing me to participate in the care of your patient.      Sincerely,    Yoel Walker MD

## 2023-02-07 NOTE — PROGRESS NOTES
Ms. 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    On her most recent MR angiogram of the head, there was no significant change in this aneurysm/infundibulum when compared to the prior MR angiogram of the head.  This aneurysm/infundibulum is seen to arise from the left anterior cerebral artery, A2 segment and is linear in configuration.    I assured Ms. Tolliver that this aneurysm/infundibulum poses very little risk of rupture and cerebral hemorrhage at this point.  The shape of the lesion is also unusual for an aneurysm.  Given the stability of this finding, we will follow-up with another MRA angiogram in 1 year.  Ms. Tolliver was agreeable to this plan of action.    I spent about 15 minutes on a video call with Ms. Tolliver.

## 2023-02-09 NOTE — PATIENT INSTRUCTIONS
Follow-up with Dr. Walker in 1 year with an MRA prior to your appointment.    Stroke & Endovascular RN Care Coordinators:    Juan Garcia, SWAPNIL Hill, RN, CNRN, SCRN    If you have any questions please contact the RN Care Coordinators at [phone], option 1.     After business hours call the  at [phone] and have the Neuro-Interventional Fellow paged.    Thank you for choosing [facility] for your health care needs.

## 2023-02-15 ENCOUNTER — TELEPHONE (OUTPATIENT)
Dept: BEHAVIORAL HEALTH | Facility: CLINIC | Age: 38
End: 2023-02-15
Payer: COMMERCIAL

## 2023-02-15 NOTE — TELEPHONE ENCOUNTER
CC called patient to introduce [facility] services and to discuss enrollment.  No answer and no voicemail so was unable to leave a message.  [facility] sent [employer] message with information on [facility] services including [facility] contact information.    Valentina Forman [facility]  [facility] ([facility])   [facility]  [phone]

## 2023-03-01 ENCOUNTER — TELEPHONE (OUTPATIENT)
Dept: PHARMACY | Facility: CLINIC | Age: 38
End: 2023-03-01
Payer: COMMERCIAL

## 2023-03-01 NOTE — TELEPHONE ENCOUNTER
This patient is due for MTM follow-up. I called the patient to schedule an appointment, however voicemail box is full. We have been unable to reach this patient for MTM follow-up after several attempts. We will stop reaching out to the patient at this time. Please let us know if we can assist in this patient's care in the future.    Celestina Loomis, PharmD, BCACP  Medication Therapy Management Pharmacist  Pager: [phone]

## 2023-03-16 ENCOUNTER — TELEPHONE (OUTPATIENT)
Dept: FAMILY MEDICINE | Facility: CLINIC | Age: 38
End: 2023-03-16
Payer: COMMERCIAL

## 2023-03-16 NOTE — LETTER
[facility]  [address]  [facility] [address]  Phone: [phone]    March 16, 2023    To  Holly Tolliver  [address] [place] [id number]    Your team at [facility] cares about your health. We have reviewed your chart and based on our findings; we are making the following recommendations to better manage your health.     You are in particular need of attention regarding the following:     Asthma Control Test     This screening tool helps us to assess how well your asthma is controlled.Good asthma control leads to fewer asthma symptoms and greater health. If your asthma is not in good control (score is 19 or less) or you have been to the ER or urgent care for your asthma, it is recommended you be seen by your provider for medication and lifestyle adjustments.      Please complete and return the attached Asthma Control Test respond below with you answers for each question: Adult ACT     This is valuable information that is requested by your Care Team.        If you have already completed these items, please contact the clinic via phone or   [employer] so your care team can review and update your records. Thank you for   choosing [facility] for your healthcare needs. For any questions,   concerns, or to schedule an appointment please contact our clinic.    Healthy Regards,      Your [facility] Care Team

## 2023-04-07 ENCOUNTER — OFFICE VISIT (OUTPATIENT)
Dept: FAMILY MEDICINE | Facility: CLINIC | Age: 38
End: 2023-04-07
Payer: COMMERCIAL

## 2023-04-07 VITALS
HEART RATE: 71 BPM | RESPIRATION RATE: 16 BRPM | SYSTOLIC BLOOD PRESSURE: 120 MMHG | OXYGEN SATURATION: 98 % | HEIGHT: 72 IN | WEIGHT: 216 LBS | DIASTOLIC BLOOD PRESSURE: 82 MMHG | TEMPERATURE: 96.8 F | BODY MASS INDEX: 29.26 KG/M2

## 2023-04-07 DIAGNOSIS — F32.1 MAJOR DEPRESSIVE DISORDER, SINGLE EPISODE, MODERATE (H): ICD-10-CM

## 2023-04-07 DIAGNOSIS — J45.990 EXERCISE-INDUCED ASTHMA: ICD-10-CM

## 2023-04-07 DIAGNOSIS — Z11.3 SCREEN FOR STD (SEXUALLY TRANSMITTED DISEASE): ICD-10-CM

## 2023-04-07 DIAGNOSIS — F15.10 METHAMPHETAMINE ABUSE (H): ICD-10-CM

## 2023-04-07 DIAGNOSIS — Z12.4 CERVICAL CANCER SCREENING: ICD-10-CM

## 2023-04-07 DIAGNOSIS — Z01.419 ENCOUNTER FOR GYNECOLOGICAL EXAMINATION WITHOUT ABNORMAL FINDING: Primary | ICD-10-CM

## 2023-04-07 PROBLEM — E66.01 MORBID OBESITY (H): Status: RESOLVED | Noted: 2019-03-25 | Resolved: 2023-04-07

## 2023-04-07 PROCEDURE — 86780 TREPONEMA PALLIDUM: CPT | Performed by: NURSE PRACTITIONER

## 2023-04-07 PROCEDURE — 87624 HPV HI-RISK TYP POOLED RSLT: CPT | Performed by: NURSE PRACTITIONER

## 2023-04-07 PROCEDURE — 36415 COLL VENOUS BLD VENIPUNCTURE: CPT | Performed by: NURSE PRACTITIONER

## 2023-04-07 PROCEDURE — 87591 N.GONORRHOEAE DNA AMP PROB: CPT | Performed by: NURSE PRACTITIONER

## 2023-04-07 PROCEDURE — 87389 HIV-1 AG W/HIV-1&-2 AB AG IA: CPT | Performed by: NURSE PRACTITIONER

## 2023-04-07 PROCEDURE — 87491 CHLMYD TRACH DNA AMP PROBE: CPT | Performed by: NURSE PRACTITIONER

## 2023-04-07 PROCEDURE — 99395 PREV VISIT EST AGE 18-39: CPT | Performed by: NURSE PRACTITIONER

## 2023-04-07 PROCEDURE — G0145 SCR C/V CYTO,THINLAYER,RESCR: HCPCS | Performed by: NURSE PRACTITIONER

## 2023-04-07 PROCEDURE — 86803 HEPATITIS C AB TEST: CPT | Performed by: NURSE PRACTITIONER

## 2023-04-07 RX ORDER — ALBUTEROL SULFATE 90 UG/1
2 AEROSOL, METERED RESPIRATORY (INHALATION) EVERY 6 HOURS PRN
Qty: 8 G | Refills: 4 | Status: SHIPPED | OUTPATIENT
Start: 2023-04-07 | End: 2023-07-19

## 2023-04-07 ASSESSMENT — PATIENT HEALTH QUESTIONNAIRE - PHQ9
SUM OF ALL RESPONSES TO PHQ QUESTIONS 1-9: 10
10. IF YOU CHECKED OFF ANY PROBLEMS, HOW DIFFICULT HAVE THESE PROBLEMS MADE IT FOR YOU TO DO YOUR WORK, TAKE CARE OF THINGS AT HOME, OR GET ALONG WITH OTHER PEOPLE: SOMEWHAT DIFFICULT
SUM OF ALL RESPONSES TO PHQ QUESTIONS 1-9: 10

## 2023-04-07 ASSESSMENT — ASTHMA QUESTIONNAIRES
ACT_TOTALSCORE: 17
QUESTION_1 LAST FOUR WEEKS HOW MUCH OF THE TIME DID YOUR ASTHMA KEEP YOU FROM GETTING AS MUCH DONE AT WORK, SCHOOL OR AT HOME: SOME OF THE TIME
QUESTION_3 LAST FOUR WEEKS HOW OFTEN DID YOUR ASTHMA SYMPTOMS (WHEEZING, COUGHING, SHORTNESS OF BREATH, CHEST TIGHTNESS OR PAIN) WAKE YOU UP AT NIGHT OR EARLIER THAN USUAL IN THE MORNING: NOT AT ALL
QUESTION_5 LAST FOUR WEEKS HOW WOULD YOU RATE YOUR ASTHMA CONTROL: SOMEWHAT CONTROLLED
QUESTION_4 LAST FOUR WEEKS HOW OFTEN HAVE YOU USED YOUR RESCUE INHALER OR NEBULIZER MEDICATION (SUCH AS ALBUTEROL): NOT AT ALL
ACT_TOTALSCORE: 17
QUESTION_2 LAST FOUR WEEKS HOW OFTEN HAVE YOU HAD SHORTNESS OF BREATH: MORE THAN ONCE A DAY

## 2023-04-07 ASSESSMENT — PAIN SCALES - GENERAL: PAINLEVEL: NO PAIN (0)

## 2023-04-07 ASSESSMENT — ENCOUNTER SYMPTOMS
DIZZINESS: 1
NERVOUS/ANXIOUS: 1
BREAST MASS: 0
HEADACHES: 1

## 2023-04-07 NOTE — PROGRESS NOTES
SUBJECTIVE:   CC: Ana is an 37 year old who presents for preventive health visit.       4/7/2023     1:28 PM   Additional Questions   Roomed by Nguyen prabhakar     Patient has been advised of split billing requirements and indicates understanding: Yes  Healthy Habits:     Getting at least 3 servings of Calcium per day:  Yes    Bi-annual eye exam:  NO    Dental care twice a year:  Yes    Sleep apnea or symptoms of sleep apnea:  None    Diet:  Regular (no restrictions)    Frequency of exercise:  2-3 days/week    Duration of exercise:  15-30 minutes    Taking medications regularly:  Not Applicable    Medication side effects:  Not applicable    PHQ-2 Total Score: 2    Additional concerns today:  Yes        PATIENT ASKING FOR STD CHECK AND PAP SMEAR TODAY.    REFILL ALBUTEROL    Asthma Follow-Up    Was ACT completed today?  Yes        4/7/2023     1:20 PM   ACT Total Scores   ACT TOTAL SCORE (Goal Greater than or Equal to 20) 17   In the past 12 months, how many times did you visit the emergency room for your asthma without being admitted to the hospital? 0   In the past 12 months, how many times were you hospitalized overnight because of your asthma? 0         How many days per week do you miss taking your asthma controller medication?  I do not have an asthma controller medication    Please describe any recent triggers for your asthma: exercise or sports    Have you had any Emergency Room Visits, Urgent Care Visits, or Hospital Admissions since your last office visit?  No      Today's PHQ-2 Score:       4/7/2023     1:19 PM   PHQ-2 ( 1999 [employer])   Q1: Little interest or pleasure in doing things 1   Q2: Feeling down, depressed or hopeless 1   PHQ-2 Score 2   Q1: Little interest or pleasure in doing things Several days   Q2: Feeling down, depressed or hopeless Several days   PHQ-2 Score 2       Have you ever done Advance Care Planning? (For example, a Health Directive, POLST, or a discussion with a medical provider or your  loved ones about your wishes): No, advance care planning information given to patient to review.  Advanced care planning was discussed at today's visit.    Social History     Tobacco Use     Smoking status: Former     Packs/day: 0.00     Years: 12.00     Pack years: 0.00     Types: Cigarettes, Vaping Device     Smokeless tobacco: Never     Tobacco comments:     Never   Vaping Use     Vaping status: Every Day     Substances: Nicotine   Substance Use Topics     Alcohol use: Not Currently     Alcohol/week: 0.0 standard drinks of alcohol     Comment: attempting sobriety             2023     1:19 PM   Alcohol Use   Prescreen: >3 drinks/day or >7 drinks/week? Not Applicable     Reviewed orders with patient.  Reviewed health maintenance and updated orders accordingly - Yes      Breast Cancer Screenin/7/2023     1:20 PM   Breast CA Risk Assessment (FHS-7)   Do you have a family history of breast, colon, or ovarian cancer? No / Unknown       Patient under 40 years of age: Routine Mammogram Screening not recommended.   Pertinent mammograms are reviewed under the imaging tab.    History of abnormal Pap smear: NO - age 30-65 PAP every 5 years with negative HPV co-testing recommended      Latest Ref Rng & Units 2018    10:30 AM 2018    10:00 AM 2015    12:00 AM   PAP / HPV   PAP (Historical)  NIL    NIL     HPV 16 DNA NEG^Negative  Negative      HPV 18 DNA NEG^Negative  Negative      Other HR HPV NEG^Negative  Negative        Reviewed and updated as needed this visit by clinical staff   Tobacco  Allergies  Meds  Problems  Med Hx  Surg Hx  Fam Hx          Reviewed and updated as needed this visit by Provider   Tobacco  Allergies  Meds  Problems  Med Hx  Surg Hx  Fam Hx             Review of Systems   Breasts:  Negative for tenderness, breast mass and discharge.   Genitourinary: Negative for pelvic pain, vaginal bleeding and vaginal discharge.   Neurological: Positive for dizziness and  headaches.   Psychiatric/Behavioral: The patient is nervous/anxious.      CONSTITUTIONAL: NEGATIVE for fever, chills, change in weight  INTEGUMENTARU/SKIN: NEGATIVE for worrisome rashes, moles or lesions  EYES: NEGATIVE for vision changes or irritation  ENT: NEGATIVE for ear, mouth and throat problems  RESP: NEGATIVE for significant cough or SOB  BREAST: NEGATIVE for masses, tenderness or discharge  CV: NEGATIVE for chest pain, palpitations or peripheral edema  GI: NEGATIVE for nausea, abdominal pain, heartburn, or change in bowel habits  : NEGATIVE for unusual urinary or vaginal symptoms. Periods are regular.  MUSCULOSKELETAL: NEGATIVE for significant arthralgias or myalgia  NEURO: NEGATIVE for weakness, dizziness or paresthesias  PSYCHIATRIC: NEGATIVE for changes in mood or affect     OBJECTIVE:   /82 (BP Location: Right arm, Cuff Size: Adult Large)   Pulse 71   Temp 96.8  F (36  C) (Tympanic)   Resp 16   Ht 1.829 m (6')   Wt 98 kg (216 lb)   LMP 03/27/2023 (Approximate)   SpO2 98%   BMI 29.29 kg/m    Physical Exam  GENERAL: healthy, alert and no distress  EYES: Eyes grossly normal to inspection, PERRL and conjunctivae and sclerae normal  HENT: ear canals and TM's normal, nose and mouth without ulcers or lesions  NECK: no adenopathy, no asymmetry, masses, or scars and thyroid normal to palpation  RESP: lungs clear to auscultation - no rales, rhonchi or wheezes  BREAST: normal without masses, tenderness or nipple discharge and no palpable axillary masses or adenopathy  CV: regular rate and rhythm, normal S1 S2, no S3 or S4, no murmur, click or rub, no peripheral edema and peripheral pulses strong  ABDOMEN: soft, nontender, no hepatosplenomegaly, no masses and bowel sounds normal   (female): normal female external genitalia, normal urethral meatus, vaginal mucosa pink, moist, well rugated, and normal cervix/adnexa/uterus without masses or discharge  MS: no gross musculoskeletal defects noted, no  edema  SKIN: no suspicious lesions or rashes  NEURO: Normal strength and tone, mentation intact and speech normal  PSYCH: mentation appears normal, affect normal/bright        ASSESSMENT/PLAN:       ICD-10-CM    1. Encounter for gynecological examination without abnormal finding  Z01.419       2. Cervical cancer screening  Z12.4 Pap Screen with HPV - recommended age 30 - 65 years      3. Exercise-induced asthma  J45.990 albuterol (PROAIR HFA/PROVENTIL HFA/VENTOLIN HFA) 108 (90 Base) MCG/ACT inhaler      4. Screen for STD (sexually transmitted disease)  Z11.3 NEISSERIA GONORRHOEA PCR     CHLAMYDIA TRACHOMATIS PCR     HIV Antigen Antibody Combo     Treponema Abs w Reflex to RPR and Titer     Hepatitis C Screen Reflex to HCV RNA Quant and Genotype     HIV Antigen Antibody Combo     Treponema Abs w Reflex to RPR and Titer     Hepatitis C Screen Reflex to HCV RNA Quant and Genotype      5. Methamphetamine abuse (H)  F15.10 Known issue that I take into account for their medical decisions, no current exacerbations or new concerns        6. Major depressive disorder, single episode, moderate (H)  F32.1 Known issue that I take into account for their medical decisions, no current exacerbations or new concerns            Patient has been advised of split billing requirements and indicates understanding: Yes by AFR and CMA      COUNSELING:  Reviewed preventive health counseling, as reflected in patient instructions        She reports that she has quit smoking. Her smoking use included cigarettes and vaping device. She has never used smokeless tobacco.      The risks, benefits and treatment options of prescribed medications or other treatments have been discussed with the patient. The patient verbalized their understanding and should call or follow up if no improvement or if they develop further problems.    WOLFGANG [facility]

## 2023-04-08 LAB
C TRACH DNA SPEC QL NAA+PROBE: NEGATIVE
HCV AB SERPL QL IA: NONREACTIVE
HIV 1+2 AB+HIV1 P24 AG SERPL QL IA: NONREACTIVE
N GONORRHOEA DNA SPEC QL NAA+PROBE: NEGATIVE
T PALLIDUM AB SER QL: NONREACTIVE

## 2023-04-11 LAB
BKR LAB AP GYN ADEQUACY: NORMAL
BKR LAB AP GYN INTERPRETATION: NORMAL
BKR LAB AP HPV REFLEX: NORMAL
BKR LAB AP PREVIOUS ABNORMAL: NORMAL
PATH REPORT.COMMENTS IMP SPEC: NORMAL
PATH REPORT.COMMENTS IMP SPEC: NORMAL
PATH REPORT.RELEVANT HX SPEC: NORMAL

## 2023-04-12 LAB
HUMAN PAPILLOMA VIRUS 16 DNA: NEGATIVE
HUMAN PAPILLOMA VIRUS 18 DNA: NEGATIVE
HUMAN PAPILLOMA VIRUS FINAL DIAGNOSIS: NORMAL
HUMAN PAPILLOMA VIRUS OTHER HR: NEGATIVE

## 2023-06-29 ENCOUNTER — HOSPITAL ENCOUNTER (EMERGENCY)
Facility: CLINIC | Age: 38
Discharge: HOME | End: 2023-06-29
Attending: PHYSICIAN ASSISTANT | Admitting: PHYSICIAN ASSISTANT
Payer: OTHER MISCELLANEOUS

## 2023-06-29 VITALS
DIASTOLIC BLOOD PRESSURE: 81 MMHG | RESPIRATION RATE: 16 BRPM | HEART RATE: 76 BPM | OXYGEN SATURATION: 98 % | TEMPERATURE: 97.8 F | SYSTOLIC BLOOD PRESSURE: 140 MMHG

## 2023-06-29 DIAGNOSIS — M54.50 ACUTE LEFT-SIDED LOW BACK PAIN WITHOUT SCIATICA: ICD-10-CM

## 2023-06-29 LAB — HCG UR QL: NEGATIVE

## 2023-06-29 PROCEDURE — 81025 URINE PREGNANCY TEST: CPT | Performed by: PHYSICIAN ASSISTANT

## 2023-06-29 PROCEDURE — 99214 OFFICE O/P EST MOD 30 MIN: CPT | Performed by: PHYSICIAN ASSISTANT

## 2023-06-29 PROCEDURE — G0463 HOSPITAL OUTPT CLINIC VISIT: HCPCS | Performed by: PHYSICIAN ASSISTANT

## 2023-06-29 PROCEDURE — 250N000011 HC RX IP 250 OP 636: Performed by: PHYSICIAN ASSISTANT

## 2023-06-29 RX ORDER — ONDANSETRON 4 MG/1
4 TABLET, ORALLY DISINTEGRATING ORAL ONCE
Status: COMPLETED | OUTPATIENT
Start: 2023-06-29 | End: 2023-06-29

## 2023-06-29 RX ORDER — LIDOCAINE 4 G/G
1 PATCH TOPICAL EVERY 24 HOURS
Qty: 5 PATCH | Refills: 0 | Status: SHIPPED | OUTPATIENT
Start: 2023-06-29 | End: 2023-10-04

## 2023-06-29 RX ADMIN — ONDANSETRON 4 MG: 4 TABLET, ORALLY DISINTEGRATING ORAL at 18:59

## 2023-06-29 ASSESSMENT — ENCOUNTER SYMPTOMS
DIZZINESS: 0
JOINT SWELLING: 0
WEAKNESS: 0
MYALGIAS: 0
ACTIVITY CHANGE: 1
NUMBNESS: 0
BACK PAIN: 1

## 2023-06-29 ASSESSMENT — ACTIVITIES OF DAILY LIVING (ADL): ADLS_ACUITY_SCORE: 35

## 2023-06-29 NOTE — LETTER
[facility] EMERGENCY DEPT  [address] [address]  [phone]      2023    Holly Tolliver  [address] [address]  [phone] (home) [phone] (work)    : 1985      To Whom it may concern:    Holly Tolliver was seen in our Urgent Care today, 2023 for an injury that was reported to be work related.      For the next 1 days she should not work.  Please excuse patient from work tomorrow (2023).  She may return to work after tomorrow as scheduled with restrictions for the next week.    After returning to work the following restrictions apply for 7 days:   No bending or lifting      Sincerely,    Tessa Ng PA-C

## 2023-06-29 NOTE — ED TRIAGE NOTES
Pt reports work comp claim and having back pain. 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 Incident happened today

## 2023-06-29 NOTE — ED PROVIDER NOTES
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 Patient has had constant achy left-sided lower back pain since the incident this afternoon. She describes the pain as spastic and notes it worsens with twisting movements or bending over. Has mild relief when sitting still. Denies any numbness or tingling. No radiation down her legs. Patient has been icing and taking tylenol for pain with little to no relief. She believes she may be pregnant.  Denies saddle anesthesia, bowel or bladder incontinence, or lower extremity numbness/tingling/weakness.  Gait is steady but guarded.  Denies fevers, chills, nausea, vomiting, abdominal pain, urinary symptoms, or leg pain/swelling.        Allergies:  Allergies   Allergen Reactions     Amoxicillin Rash       Problem List:    Patient Active Problem List    Diagnosis Date Noted     Methamphetamine abuse (H) 2023     Priority: Medium     Routine postpartum follow-up 2016     Priority: Medium     Mirena IUD 2016     Priority: Medium     Lot: [id number]  Exp:        Single liveborn, born in hospital, delivered 2016     Priority: Medium     Placental abruption in third trimester 2016     Priority: Medium     Placental abruption 2016     Priority: Medium      delivery delivered 2016     Priority: Medium     Prenatal care, subsequent pregnancy 2016     Priority: Medium     Prenatal care, subsequent pregnancy in third trimester 2016     Priority: Medium     Health Care Home 2015     Priority: Medium     Care Coordinator: Kelly DANIEL, MSW  See letters for Health Care home care plan     covering in [facility] absence    FREDY Luz, MSW   [phone]  3/24/2015 12:24 PM       Generalized anxiety disorder 2013     Priority: Medium     Diagnosis updated  by automated process. Provider to review and confirm.       GERD (gastroesophageal reflux disease) 2013     Priority: Medium     Major depressive disorder, single episode, moderate (H) 2013     Priority: Medium     CARDIOVASCULAR SCREENING; LDL GOAL LESS THAN 160 10/23/2012     Priority: Medium     CTS (carpal tunnel syndrome) 10/14/2011     Priority: Medium     Tobacco use disorder 2008     Priority: Medium     Has decreased from 1 ppd to 1 cigarette per day since pregnancy.       Mild intermittent asthma 2008     Priority: Medium        Past Medical History:    Past Medical History:   Diagnosis Date     ALCOH DEP NEC/NOS-UNSPEC 2007     Asthma      Chickenpox      Postpartum depression      Pregnancy induced hypertension      Urinary tract infections        Past Surgical History:    Past Surgical History:   Procedure Laterality Date      SECTION Bilateral 2016    Procedure:  SECTION;  Surgeon: Beau Cardoso MD;  Location: [facility] OR     PE TUBES  age 3       Family History:    Family History   Problem Relation Age of Onset     Alcohol/Drug Mother         alcohol- recovered     Hypertension Mother      Depression Mother         also anxiety     Other - See Comments Mother         ankylosing spondylosis     Anxiety Disorder Mother      Alcohol/Drug Father         alcohol- recovered     Alcohol/Drug Sister         A&D     Cancer Maternal Grandmother         lung     Depression Maternal Grandmother         also anxiety     Mental Illness Paternal Grandmother      Alcohol/Drug Brother         alcohol       Social History:  Marital Status:  Single [1]  Social History     Tobacco Use     Smoking status: Former     Packs/day: 0.00     Years: 12.00     Pack years: 0.00     Types: Cigarettes, Vaping Device     Smokeless tobacco: Never     Tobacco comments:     Never   Vaping Use     Vaping Use: Every day     Substances: Nicotine   Substance Use Topics     Alcohol  use: Not Currently     Alcohol/week: 0.0 standard drinks of alcohol     Comment: attempting sobriety     Drug use: Not Currently     Types: Methamphetamines, Marijuana     Comment: relapse, attempting sobriety        Medications:    Lidocaine (LIDOCARE) 4 % Patch  albuterol (PROAIR HFA/PROVENTIL HFA/VENTOLIN HFA) 108 (90 Base) MCG/ACT inhaler          Review of Systems   Constitutional: Positive for activity change.   Musculoskeletal: Positive for back pain (left-sided lower). Negative for gait problem, joint swelling and myalgias.   Skin: Negative for rash.   Neurological: Negative for dizziness, weakness and numbness.   All other systems reviewed and are negative.      Physical Exam   BP: (!) 140/81  Pulse: 76  Temp: 97.8  F (36.6  C)  Resp: 16  SpO2: 98 %      Physical Exam  Constitutional:       General: She is not in acute distress.     Appearance: Normal appearance. She is well-developed. She is not ill-appearing, toxic-appearing or diaphoretic.   HENT:      Head: Normocephalic and atraumatic.      Right Ear: External ear normal.      Left Ear: External ear normal.      Nose: Nose normal.      Mouth/Throat:      Lips: Pink.   Eyes:      Extraocular Movements: Extraocular movements intact.      Conjunctiva/sclera: Conjunctivae normal.      Pupils: Pupils are equal, round, and reactive to light.   Cardiovascular:      Rate and Rhythm: Normal rate and regular rhythm.      Heart sounds: Normal heart sounds.   Pulmonary:      Effort: Pulmonary effort is normal. No respiratory distress.      Breath sounds: Normal breath sounds. No stridor. No wheezing, rhonchi or rales.   Abdominal:      General: There is no distension.      Palpations: Abdomen is soft.      Tenderness: There is no abdominal tenderness. There is no guarding or rebound.   Musculoskeletal:         General: Normal range of motion.      Cervical back: Normal, normal range of motion and neck supple. No rigidity, spasms, tenderness, bony tenderness or  crepitus. No pain with movement. Normal range of motion.      Thoracic back: Normal. No swelling, spasms, tenderness or bony tenderness. Normal range of motion.      Lumbar back: Tenderness present. No swelling, edema, spasms or bony tenderness. Normal range of motion. Negative right straight leg raise test and negative left straight leg raise test.      Comments: No midline back tenderness on exam.  There is diffuse left-sided lumbar paraspinal muscle tenderness to palpation.     Lymphadenopathy:      Cervical: No cervical adenopathy.   Skin:     General: Skin is warm and dry.   Neurological:      General: No focal deficit present.      Mental Status: She is alert and oriented to person, place, and time.      Sensory: Sensation is intact.      Motor: Motor function is intact.      Gait: Gait is intact.   Psychiatric:         Behavior: Behavior is cooperative.         ED Course             Results for orders placed or performed during the hospital encounter of 06/29/23 (from the past 24 hour(s))   HCG qualitative urine   Result Value Ref Range    hCG Urine Qualitative Negative Negative       Medications   ondansetron (ZOFRAN ODT) ODT tab 4 mg (4 mg Oral $Given 6/29/23 7877)       Assessments & Plan (with Medical Decision Making)     I have reviewed the nursing notes.    I have reviewed the findings, diagnosis, plan and need for follow up with the patient.  A 38-year-old female presents today with left-sided lower back pain after transferring a patient at work this afternoon. Describes the pain as spastic in nature.It is likely her symptoms are due to a muscle strain as she has decreased ROM to her thoracic/lumbar spine. Plan is to send the patient home with lidocaine patches. She denied any pain medication as she is in sobriety. Patient understands and is in agreement with plan. Return precautions discussed.    Pt is afebrile on arrival.  Exam as above.  No midline back tenderness on exam.  There is diffuse  left-sided lumbar paraspinal muscle tenderness to palpation.  No evidence of cauda equina.  Denies saddle anesthesia, bowel or bladder incontinence, lower extremity numbness/tingling/weakness.  Normal lower extremity strength.  Gait is steady.  Suspect musculoskeletal cause of pt's back pain given history and physical exam.  Lower suspicion for occult fracture as there is no high energy mechanism of injury and therefore x-ray imaging not indicated.  No infectious symptoms.  No indication for emergent MRI imaging today as pt has no new trauma or neurologic symptoms or objective findings of weakness or signs of cauda equina on exam.  Encouraged symptomatic treatments at home.  Physical therapy referral was placed.  UPT was negative today.  Return precautions were reviewed.  Hand-outs were provided.    Patient was sent with Lidocaine patches and was instructed to follow-up with PCP for continued care and management.  She is to return to the ED for persistent and/or worsening symptoms.  Patient expressed understanding of the diagnosis and plan and was discharged home in good condition.    I, Tessa Ng, have reviewed and discussed with the advanced practice provider student, Brook SHORT, their history, physical, and plan for Holly Tolliver. I participated in a shared visit by interviewing and examining the patient as well.  I have reviewed, added to, and edited the note as necessary.    Tessa Ng PA-C    Date of Service (when I saw the patient): 06/29/23  I personally evaluated this patient today.     Discharge Medication List as of 6/29/2023  6:51 PM      START taking these medications    Details   Lidocaine (LIDOCARE) 4 % Patch Place 1 patch onto the skin every 24 hours To prevent lidocaine toxicity, patient should be patch free for 12 hrs daily.Disp-5 patch, [id number]             Final diagnoses:   Acute left-sided low back pain without sciatica       6/29/2023   [facility]  EMERGENCY DEPT     Berenice, Tessa Rich PA-C  06/29/23 2025

## 2023-07-10 ENCOUNTER — ANCILLARY PROCEDURE (OUTPATIENT)
Dept: GENERAL RADIOLOGY | Facility: CLINIC | Age: 38
End: 2023-07-10
Attending: NURSE PRACTITIONER
Payer: COMMERCIAL

## 2023-07-10 ENCOUNTER — OFFICE VISIT (OUTPATIENT)
Dept: URGENT CARE | Facility: URGENT CARE | Age: 38
End: 2023-07-10
Payer: COMMERCIAL

## 2023-07-10 VITALS
DIASTOLIC BLOOD PRESSURE: 82 MMHG | BODY MASS INDEX: 28.89 KG/M2 | SYSTOLIC BLOOD PRESSURE: 119 MMHG | HEART RATE: 77 BPM | OXYGEN SATURATION: 93 % | TEMPERATURE: 97.6 F | RESPIRATION RATE: 18 BRPM | WEIGHT: 213 LBS

## 2023-07-10 DIAGNOSIS — M25.561 ACUTE PAIN OF RIGHT KNEE: Primary | ICD-10-CM

## 2023-07-10 DIAGNOSIS — M25.561 ACUTE PAIN OF RIGHT KNEE: ICD-10-CM

## 2023-07-10 PROCEDURE — 73562 X-RAY EXAM OF KNEE 3: CPT | Mod: TC | Performed by: RADIOLOGY

## 2023-07-10 PROCEDURE — 99213 OFFICE O/P EST LOW 20 MIN: CPT | Performed by: NURSE PRACTITIONER

## 2023-07-10 NOTE — LETTER
July 10, 2023      Holly Tolliver  [address] [address]        To Whom It May Concern:    Holly Tolliver was seen in our clinic. Please continue work restrictions until seen again July 19th.     Sincerely,        Tia Cadena NP

## 2023-07-11 NOTE — PROGRESS NOTES
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       Past Medical History:   Diagnosis Date     ALCOH DEP NEC/NOS-UNSPEC 12/31/2007     Asthma     exercise induced     Chickenpox      Postpartum depression 2011     Pregnancy induced hypertension      Urinary tract infections      Family History   Problem Relation Age of Onset     Alcohol/Drug Mother         alcohol- recovered     Hypertension Mother      Depression Mother         also anxiety     Other - See Comments Mother         ankylosing spondylosis     Anxiety Disorder Mother      Alcohol/Drug Father         alcohol- recovered     Alcohol/Drug Sister         A&D     Cancer Maternal Grandmother         lung     Depression Maternal Grandmother         also anxiety     Mental Illness Paternal Grandmother      Alcohol/Drug Brother         alcohol     Current Outpatient Medications   Medication Sig Dispense Refill     albuterol (PROAIR HFA/PROVENTIL HFA/VENTOLIN HFA) 108 (90 Base) MCG/ACT inhaler Inhale 2 puffs into the lungs every 6 hours as needed for shortness of breath or wheezing 8 g 4     Lidocaine (LIDOCARE) 4 % Patch Place 1 patch onto the skin every 24 hours To prevent lidocaine toxicity, patient should be patch free for 12 hrs daily. 5 patch 0     Social History     Tobacco Use     Smoking status: Former     Packs/day: 0.00     Years: 12.00     Pack years: 0.00     Types: Cigarettes, Vaping Device     Smokeless tobacco: Never     Tobacco comments:     Never   Substance Use Topics     Alcohol use: Not Currently      Alcohol/week: 0.0 standard drinks of alcohol     Comment: attempting sobriety       OBJECTIVE  /82   Pulse 77   Temp 97.6  F (36.4  C) (Tympanic)   Resp 18   Wt 96.6 kg (213 lb)   SpO2 93%   BMI 28.89 kg/m      Physical Exam  Eyes:      Extraocular Movements: Extraocular movements intact.      Conjunctiva/sclera: Conjunctivae normal.   Pulmonary:      Effort: Pulmonary effort is normal.   Musculoskeletal:         General: Tenderness present.      Cervical back: Normal range of motion.      Comments: Tenderness to palpation over medial right knee. No obvious injury, no swelling or bruising.    Skin:     General: Skin is warm and dry.      Capillary Refill: Capillary refill takes less than 2 seconds.   Neurological:      General: No focal deficit present.      Mental Status: She is alert.   Psychiatric:         Mood and Affect: Mood normal.       Xray: Normal joint spaces and alignment. No fracture or joint effusion    ASSESSMENT:  1. Acute pain of right knee  - XR Knee Right 3 Views; Future      PLAN:  Rest, ice or heat.  Tylenol or ibuprofen for pain.  Wear the brace for support, as long as you are having pain.  Continue with restrictions until seen on the 19th.

## 2023-07-11 NOTE — PATIENT INSTRUCTIONS
Rest, ice or heat.  Tylenol or ibuprofen for pain.  Wear the brace for support, as long as you are having pain.  Continue with restrictions until seen on the 19th.

## 2023-07-19 ENCOUNTER — OFFICE VISIT (OUTPATIENT)
Dept: FAMILY MEDICINE | Facility: CLINIC | Age: 38
End: 2023-07-19
Payer: COMMERCIAL

## 2023-07-19 VITALS
WEIGHT: 209.2 LBS | DIASTOLIC BLOOD PRESSURE: 78 MMHG | BODY MASS INDEX: 28.37 KG/M2 | HEART RATE: 98 BPM | OXYGEN SATURATION: 96 % | TEMPERATURE: 98.7 F | RESPIRATION RATE: 16 BRPM | SYSTOLIC BLOOD PRESSURE: 122 MMHG

## 2023-07-19 DIAGNOSIS — M25.561 ACUTE PAIN OF RIGHT KNEE: Primary | ICD-10-CM

## 2023-07-19 DIAGNOSIS — J45.990 EXERCISE-INDUCED ASTHMA: ICD-10-CM

## 2023-07-19 DIAGNOSIS — F10.21 ALCOHOL DEPENDENCE IN REMISSION (H): ICD-10-CM

## 2023-07-19 PROCEDURE — 99214 OFFICE O/P EST MOD 30 MIN: CPT | Performed by: NURSE PRACTITIONER

## 2023-07-19 RX ORDER — KETOROLAC TROMETHAMINE 10 MG/1
10 TABLET, FILM COATED ORAL EVERY 6 HOURS PRN
Qty: 20 TABLET | Refills: 0 | Status: SHIPPED | OUTPATIENT
Start: 2023-07-19 | End: 2023-09-02

## 2023-07-19 RX ORDER — OXYCODONE HYDROCHLORIDE 5 MG/1
5 TABLET ORAL 2 TIMES DAILY PRN
Qty: 15 TABLET | Refills: 0 | Status: SHIPPED | OUTPATIENT
Start: 2023-07-19 | End: 2023-07-24

## 2023-07-19 RX ORDER — ALBUTEROL SULFATE 90 UG/1
2 AEROSOL, METERED RESPIRATORY (INHALATION) EVERY 6 HOURS PRN
Qty: 8 G | Refills: 4 | Status: SHIPPED | OUTPATIENT
Start: 2023-07-19 | End: 2024-09-30

## 2023-07-19 ASSESSMENT — ASTHMA QUESTIONNAIRES
QUESTION_2 LAST FOUR WEEKS HOW OFTEN HAVE YOU HAD SHORTNESS OF BREATH: NOT AT ALL
QUESTION_1 LAST FOUR WEEKS HOW MUCH OF THE TIME DID YOUR ASTHMA KEEP YOU FROM GETTING AS MUCH DONE AT WORK, SCHOOL OR AT HOME: NONE OF THE TIME
ACT_TOTALSCORE: 25
QUESTION_5 LAST FOUR WEEKS HOW WOULD YOU RATE YOUR ASTHMA CONTROL: COMPLETELY CONTROLLED
ACUTE_EXACERBATION_TODAY: NO
QUESTION_4 LAST FOUR WEEKS HOW OFTEN HAVE YOU USED YOUR RESCUE INHALER OR NEBULIZER MEDICATION (SUCH AS ALBUTEROL): NOT AT ALL
ACT_TOTALSCORE: 25
QUESTION_3 LAST FOUR WEEKS HOW OFTEN DID YOUR ASTHMA SYMPTOMS (WHEEZING, COUGHING, SHORTNESS OF BREATH, CHEST TIGHTNESS OR PAIN) WAKE YOU UP AT NIGHT OR EARLIER THAN USUAL IN THE MORNING: NOT AT ALL

## 2023-07-19 ASSESSMENT — ANXIETY QUESTIONNAIRES
7. FEELING AFRAID AS IF SOMETHING AWFUL MIGHT HAPPEN: NOT AT ALL
2. NOT BEING ABLE TO STOP OR CONTROL WORRYING: NOT AT ALL
3. WORRYING TOO MUCH ABOUT DIFFERENT THINGS: NOT AT ALL
GAD7 TOTAL SCORE: 1
6. BECOMING EASILY ANNOYED OR IRRITABLE: NOT AT ALL
4. TROUBLE RELAXING: NOT AT ALL
GAD7 TOTAL SCORE: 1
1. FEELING NERVOUS, ANXIOUS, OR ON EDGE: SEVERAL DAYS
5. BEING SO RESTLESS THAT IT IS HARD TO SIT STILL: NOT AT ALL

## 2023-07-19 ASSESSMENT — PAIN SCALES - GENERAL: PAINLEVEL: NO PAIN (0)

## 2023-07-19 NOTE — PATIENT INSTRUCTIONS
Tylenol 500 mg 4 times daily  Toradol 10 mg every 6 hrs as needed for pain, take it with food    Oxycodone 5 mg twice daily as needed for pain    Continue Brace    Follow up with ortho

## 2023-07-19 NOTE — LETTER
July 19, 2023      Holly Tolliver  [address] [facility] [address]        To Whom It May Concern:    Holly Tolliver was seen in our clinic. She may return to work on July 24 th with restrictions: no bending, twisting, lifting over 10 lbs for 2 weeks.       Sincerely,        WOLFGANG Ndiaye CNP

## 2023-07-19 NOTE — PROGRESS NOTES
Assessment & Plan     Acute pain of right knee  -discussed recent knee Xray results-normal  -recommended to continue to use knee brace  -follow up with ortho   -return to work with restrictions   - Orthopedic  Referral; Future  - ketorolac (TORADOL) 10 MG tablet; Take 1 tablet (10 mg) by mouth every 6 hours as needed for moderate pain  - oxyCODONE (ROXICODONE) 5 MG tablet; Take 1 tablet (5 mg) by mouth 2 times daily as needed for pain, provided since patient reports severe pain, will use for short term only     Exercise-induced asthma  -stable, well controlled   - albuterol (PROAIR HFA/PROVENTIL HFA/VENTOLIN HFA) 108 (90 Base) MCG/ACT inhaler; Inhale 2 puffs into the lungs every 6 hours as needed for shortness of breath or wheezing    Alcohol dependence in remission (H)  -in remission       WOLFGANG Ndiaye CNP  [facility] NAN Perez is a 38 year old, presenting for the following health issues:  RECHECK        7/19/2023    10:18 AM   Additional Questions   Roomed by Hanh EVANS     ED/UC Followup:    Facility:  [facility]   Date of visit: 6/29 7/11/23   Reason for visit: Acute left-sided low back pain without sciatica Acute otitis externa of left ear, unspecified type (Primary Dx);   Non-recurrent acute serous otitis media of both ears   Current Status: Still has pain in her right knee pain when bending. Still has back pain when she bends to grab something. Ear pain has improved.         Review of Systems   Constitutional, HEENT, cardiovascular, pulmonary, gi and gu systems are negative, except as otherwise noted.      Objective    /78 (BP Location: Left arm, Patient Position: Sitting, Cuff Size: Adult Large)   Pulse 98   Temp 98.7  F (37.1  C) (Tympanic)   Resp 16   Wt 94.9 kg (209 lb 3.2 oz)   SpO2 96%   BMI 28.37 kg/m    Body mass index is 28.37 kg/m .  Physical Exam   GENERAL: healthy, alert and no distress  EYES: Eyes grossly normal to  inspection, PERRL and conjunctivae and sclerae normal  HENT: ear canals and TM's normal, nose and mouth without ulcers or lesions  NECK: no adenopathy, no asymmetry, masses, or scars and thyroid normal to palpation  MS: no gross musculoskeletal defects noted, mild right knee edema, limited ROM due to pain   SKIN: no suspicious lesions or rashes  NEURO: Normal strength and tone, mentation intact and speech normal  PSYCH: mentation appears normal, affect normal/bright

## 2023-07-24 ENCOUNTER — VIRTUAL VISIT (OUTPATIENT)
Dept: FAMILY MEDICINE | Facility: CLINIC | Age: 38
End: 2023-07-24
Payer: COMMERCIAL

## 2023-07-24 DIAGNOSIS — S29.012D UPPER BACK STRAIN, SUBSEQUENT ENCOUNTER: Primary | ICD-10-CM

## 2023-07-24 DIAGNOSIS — S89.91XA KNEE INJURY, RIGHT, INITIAL ENCOUNTER: ICD-10-CM

## 2023-07-24 PROCEDURE — 99213 OFFICE O/P EST LOW 20 MIN: CPT | Mod: VID | Performed by: NURSE PRACTITIONER

## 2023-07-24 ASSESSMENT — PATIENT HEALTH QUESTIONNAIRE - PHQ9
10. IF YOU CHECKED OFF ANY PROBLEMS, HOW DIFFICULT HAVE THESE PROBLEMS MADE IT FOR YOU TO DO YOUR WORK, TAKE CARE OF THINGS AT HOME, OR GET ALONG WITH OTHER PEOPLE: EXTREMELY DIFFICULT
SUM OF ALL RESPONSES TO PHQ QUESTIONS 1-9: 9
SUM OF ALL RESPONSES TO PHQ QUESTIONS 1-9: 9

## 2023-07-24 NOTE — PROGRESS NOTES
Ana is a 38 year old who is being evaluated via a billable video visit.      How would you like to obtain your AVS? MyChart  If the video visit is dropped, the invitation should be resent by: Text to cell phone: [phone]  Will anyone else be joining your video visit? No          Assessment & Plan     Upper back strain, subsequent encounter  - Physical Therapy Referral; Future    Knee injury, right, initial encounter  - Physical Therapy Referral; Future    38-year-old female who reports work-related injuries of left upper back strain and a right knee strain that occurred on June 29.  She has been treating her pain with lidocaine patches and NSAIDs.  She has also been using ice and has rested.  She has been off work for the last week and reports that her pain feels better at rest, but every time she is active the pain restarts.  It is unclear to me why she is still having so much pain a month after injury.  Assessment today was limited due to this visit being a video visit, and exam not possible.  Discussed with patient that resting does not seem to be improving her pain or healing her injury.  Recommended physical therapy and referral placed.  Orthopedics referral was ordered by Wanda last week but appointment not yet made.  Agreed to give patient 1 more week off of work; expect that she will be able to return to work in 1 week with light duty restrictions.      The risks, benefits and treatment options of prescribed medications or other treatments have been discussed with the patient. The patient verbalized their understanding and should call or follow up if no improvement or if they develop further problems.  WOLFGANG Leonard [facility]              Subjective   Ana is a 38 year old, presenting for the following health issues:  Work Comp (Work comp follow up , 6/29, Knee and Back  - needs letter for work )        7/24/2023    10:55 AM   Additional Questions   Roomed by Son  B CMA   Accompanied by self     History of Present Illness       Reason for visit:  Knee injury    She eats 2-3 servings of fruits and vegetables daily.She consumes 2 sweetened beverage(s) daily.She exercises with enough effort to increase her heart rate 10 to 19 minutes per day.  She exercises with enough effort to increase her heart rate 3 or less days per week.   She is taking medications regularly.     WORK COMP - Needs letter for work.   ED/UC Followup:    Facility:  [facility]   Date of visit: 6/29/23  Reason for visit: Upper Left Back - at work while re positioning patient , Started having Right Knee Pain next day from this as well   Current Status: see below   Pain History:  When did you first notice your pain? 6/29   Have you seen anyone else for your pain? Yes - ER  How has your pain affected your ability to work? 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 Back Pain  Onset/Duration: 6/29/23  Description:   Location of pain: upper back left  Also has a lump in this area   Character of pain: no  pain , just warm , can be shooting at times depends on activity   Pain radiation: none  New numbness or weakness in legs, not attributed to pain: YES- comes and goes   Intensity: none   Progression of Symptoms: improving  History:   Specific cause: Repositioning a patient in wheelchair   Pain interferes with job: YES  History of back problems: past work injury   Any previous MRI or X-rays: None  Sees a specialist for back pain: No  Alleviating factors:   Improved by: lidocaine patch , rest, Not bending     Precipitating factors:  Worsened by: Lifting and Bending  Therapies tried and outcome: lidocaine patch , and Rest - Helped        Musculoskeletal problem/pain  Onset/Duration: 6/30/2023 next day after work injury   Description  Location: knee - right   Knee Cap Area  Joint Swelling: No  Redness:  No  Pain: YES  Warmth: YES  Intensity:  mild  Progression of Symptoms:  worsening  Accompanying signs and symptoms:   Fevers: No  Numbness/tingling/weakness: YES- weakness, shooting pains   History  Trauma to the area: YES- 6/29/23 while at work was re positioning a patient in a wheelchair ,  back started hurting that day , knee the next day   Recent illness:  No  Previous similar problem: No  Previous evaluation:  yes in urgent care - [facility]  , was told fluid on knee   Precipitating or alleviating factors:  Aggravating factors include: sitting, standing, walking, climbing stairs, lifting, exercise, and overuse  Therapies tried and outcome: ice and rest helps       Patient saw a different provider at this clinic on July 19.  She was cleared to go back to work today.  However patient states that she still cannot walk without pain.  She does not feel she can go back to work and wants a new letter for her employer.      Review of Systems   Constitutional, HEENT, cardiovascular, pulmonary, gi and gu systems are negative, except as otherwise noted.          Objective    Vitals - Patient Reported  Pain Score: No Pain (0)        Physical Exam   GENERAL: Healthy, alert and no distress  EYES: Eyes grossly normal to inspection.  No discharge or erythema, or obvious scleral/conjunctival abnormalities.  RESP: No audible wheeze, cough, or visible cyanosis.  No visible retractions or increased work of breathing.    SKIN: Visible skin clear. No significant rash, abnormal pigmentation or lesions.  NEURO: Cranial nerves grossly intact.  Mentation and speech appropriate for age.  PSYCH: Mentation appears normal, affect normal/bright, judgement and insight intact, normal speech and appearance well-groomed.                Video-Visit Details    Type of service:  Video Visit   Video Start Time: 11:31 AM  Video End Time:11:44 AM    Originating Location (pt. Location): Home    Distant Location (provider location):  On-site  Platform used  for Video Visit: Luba

## 2023-07-24 NOTE — LETTER
July 24, 2023      RE: Holly Tolliver  [address] [address]        To Whom It May Concern:    Holly Tolliver was seen in our clinic. She may return to work on July 31th with restrictions: no bending, twisting, lifting over 10 lbs for 2 weeks.       Sincerely,        WOLFGANG Leonard CNP

## 2023-08-07 ENCOUNTER — VIRTUAL VISIT (OUTPATIENT)
Dept: FAMILY MEDICINE | Facility: CLINIC | Age: 38
End: 2023-08-07
Payer: COMMERCIAL

## 2023-08-07 DIAGNOSIS — J02.9 SORE THROAT: Primary | ICD-10-CM

## 2023-08-07 PROCEDURE — 99213 OFFICE O/P EST LOW 20 MIN: CPT | Mod: VID | Performed by: FAMILY MEDICINE

## 2023-08-07 NOTE — PROGRESS NOTES
Ana is a 38 year old who is being evaluated via a billable video visit.      How would you like to obtain your AVS? MyChart  If the video visit is dropped, the invitation should be resent by: Text to cell phone: [phone]  Will anyone else be joining your video visit? No          Assessment & Plan   Patient is a 38 yr old female here for sore throat of one day duration, she has been having some chills according to her. She denies any difficulty with swallowing. She reports no cough or URI symptoms. She has been more tired. Denies any contact with persons with similar symptoms.she is open to checking for strep. Strep test order placed.   Sore throat  - Streptococcus A Rapid Screen w/Reflex to PCR - Clinic Collect; Future         BMI:   Estimated body mass index is 28.37 kg/m  as calculated from the following:    Height as of 4/7/23: 1.829 m (6').    Weight as of 7/19/23: 94.9 kg (209 lb 3.2 oz).       FUTURE APPOINTMENTS:       - Follow-up visit in one week or sooner as needed.    Nikki Morin MD  [facility]    Subjective   Ana is a 38 year old, presenting for the following health issues:  Pharyngitis      8/7/2023     1:34 PM   Additional Questions   Roomed by Franca HYMAN   Accompanied by self         8/7/2023     1:34 PM   Patient Reported Additional Medications   Patient reports taking the following new medications [place] calm       HPI     Acute Illness  Acute illness concerns: sore throat  Onset/Duration: 1 day  Symptoms:  Fever: No  Chills/Sweats: YES  Headache (location?): No  Sinus Pressure: No  Conjunctivitis:  No  Ear Pain: no  Rhinorrhea: No  Congestion: YES  Sore Throat: YES  Cough: YES  Wheeze: No  Decreased Appetite: No  Nausea: YES  Vomiting: No  Diarrhea: No  Dysuria/Freq.: No  Dysuria or Hematuria: No  Fatigue/Achiness: YES  Sick/Strep Exposure: No  Therapies tried and outcome: rest helps        Review of Systems   Constitutional, HEENT, cardiovascular, pulmonary,  gi and gu systems are negative, except as otherwise noted.      Objective           Vitals:  No vitals were obtained today due to virtual visit.    Physical Exam   GENERAL: Healthy, alert and no distress  EYES: Eyes grossly normal to inspection.  No discharge or erythema, or obvious scleral/conjunctival abnormalities.  RESP: No audible wheeze, cough, or visible cyanosis.  No visible retractions or increased work of breathing.    SKIN: Visible skin clear. No significant rash, abnormal pigmentation or lesions.  PSYCH: Mentation appears normal, affect normal/bright, judgement and insight intact, normal speech and appearance well-groomed.            Video-Visit Details    Type of service:  Video Visit   Video Start Time: 5:26 PM  Video End Time:5:29 PM    Originating Location (pt. Location): Home    Distant Location (provider location):  On-site  Platform used for Video Visit: Luba

## 2023-08-08 ENCOUNTER — OFFICE VISIT (OUTPATIENT)
Dept: URGENT CARE | Facility: URGENT CARE | Age: 38
End: 2023-08-08
Payer: COMMERCIAL

## 2023-08-08 VITALS — RESPIRATION RATE: 18 BRPM | OXYGEN SATURATION: 97 % | HEART RATE: 63 BPM

## 2023-08-08 DIAGNOSIS — R07.0 THROAT PAIN: ICD-10-CM

## 2023-08-08 DIAGNOSIS — Z53.20 PATIENT LEFT BEFORE TREATMENT COMPLETED: Primary | ICD-10-CM

## 2023-08-08 PROCEDURE — 99214 OFFICE O/P EST MOD 30 MIN: CPT | Performed by: PHYSICIAN ASSISTANT

## 2023-08-08 NOTE — PROGRESS NOTES
Officer Tuan from the [facility]'s department called and inquired about patient, stated they are going to do a well check on the patient.    Ana Erazo LPN on 8/8/2023 at 6:19 PM
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
## 2023-08-12 ENCOUNTER — HOSPITAL ENCOUNTER (EMERGENCY)
Facility: CLINIC | Age: 38
Discharge: HOME | End: 2023-08-12
Attending: EMERGENCY MEDICINE | Admitting: EMERGENCY MEDICINE
Payer: COMMERCIAL

## 2023-08-12 VITALS
WEIGHT: 217 LBS | SYSTOLIC BLOOD PRESSURE: 142 MMHG | DIASTOLIC BLOOD PRESSURE: 82 MMHG | OXYGEN SATURATION: 99 % | RESPIRATION RATE: 16 BRPM | TEMPERATURE: 98.4 F | HEIGHT: 71 IN | BODY MASS INDEX: 30.38 KG/M2 | HEART RATE: 85 BPM

## 2023-08-12 DIAGNOSIS — F41.9 ANXIETY: ICD-10-CM

## 2023-08-12 DIAGNOSIS — F43.0 ACUTE REACTION TO STRESS: ICD-10-CM

## 2023-08-12 DIAGNOSIS — F32.A DEPRESSION, UNSPECIFIED DEPRESSION TYPE: ICD-10-CM

## 2023-08-12 PROBLEM — F33.9 MAJOR DEPRESSIVE DISORDER, RECURRENT, UNSPECIFIED (H): Status: ACTIVE | Noted: 2023-08-12

## 2023-08-12 PROCEDURE — 99283 EMERGENCY DEPT VISIT LOW MDM: CPT | Mod: 25

## 2023-08-12 PROCEDURE — 99284 EMERGENCY DEPT VISIT MOD MDM: CPT | Performed by: EMERGENCY MEDICINE

## 2023-08-12 PROCEDURE — 90791 PSYCH DIAGNOSTIC EVALUATION: CPT

## 2023-08-12 ASSESSMENT — ACTIVITIES OF DAILY LIVING (ADL)
ADLS_ACUITY_SCORE: 35
ADLS_ACUITY_SCORE: 35

## 2023-08-13 NOTE — ED TRIAGE NOTES
Patient is currently living at a transitional house. Is sober 3 months from meth. Patient reports she has had a hard time sleeping, having nightmares. Patient also reports she has been diagnosed with bipolar and states she's been having highs and lows. Is not currently on medication for this.     Triage Assessment       Row Name 08/12/23 2239       Triage Assessment (Adult)    Airway WDL WDL       Respiratory WDL    Respiratory WDL WDL       Skin Circulation/Temperature WDL    Skin Circulation/Temperature WDL WDL       Cardiac WDL    Cardiac WDL WDL       Peripheral/Neurovascular WDL    Peripheral Neurovascular WDL WDL       Cognitive/Neuro/Behavioral WDL    Cognitive/Neuro/Behavioral WDL WDL

## 2023-08-13 NOTE — PROGRESS NOTES
"Aftercare Plan  If I am feeling unsafe or I am in a crisis, I will:   Contact my established care providers   Call the National Suicide Prevention Lifeline: 988  Go to the nearest emergency room   Call 911     Warning signs that I or other people might notice when a crisis is developing for me: Physical aggression; throwing objects; feeling trapped; accusations people are moving belongings; and asking for cameras (paranoia)    Things I am able to do on my own to cope or help me feel better: Music, going for walks, dances, 5 minute breaks, and reading.     Things that I am able to do with others to cope or help me better: Attend [employer]; attend support meetings     Things I can use or do for distraction: On-call therapeutic support/crisis line.    Changes I can make to support my mental health and wellness: Resume medication management services.     People in my life that I can ask for help: , Blessing Bender [phone].  - Bertha Covington [phone]    Your [facility] has a mental health crisis team you can call 24/7: Crisis Services 24 hour hotline [phone], [facility] [phone] [facility]    Other things that are important when I'm in crisis: Privacy. Ability to sit alone and regroup in her car.       Crisis Lines  Crisis Text Line  Text 253310  You will be connected with a trained live crisis counselor to provide support.    Por espanol, texto  GUI a 748717 o texto a 442-AYUDAME en WhatsApp    [employer] (LGBTQ Youth Crisis Line)  7.124.833.8128  text START to 732-567      Community Resources  Fast Tracker  Linking people to mental health and substance use disorder resources  fasttrackermn.org     [place] Mental Health Warm Line  Peer to peer support  Monday thru Saturday, 12 pm to 10 pm  981.376.8780 or 1.920.744.1188  Text \"Support\" to 53920    National [place] on Mental Illness (MARCIO)  278.175.6045 or " 1.888.MARCIO.HELPS      Mental Health Apps  My3  https://myMedigopp.org/    VirtualHopeBox  https://[employer]/apps/virtual-hope-box/      Additional Information  Today you were seen by a licensed mental health professional through Triage and Transition services, [facility] (P)  for a crisis assessment in the Emergency Department at [facility].  It is recommended that you follow up with your established providers (psychiatrist, mental health therapist, and/or primary care doctor - as relevant) as soon as possible. Coordinators from [facility] will be calling you in the next 24-48 hours to ensure that you have the resources you need.  You can also contact [facility] coordinators directly at [phone]. You may have been scheduled for or offered an appointment with a mental health provider. [facility] maintains an extensive network of licensed behavioral health providers to connect patients with the services they need.  We do not charge providers a fee to participate in our referral network.  We match patients with providers based on a patient's specific needs, insurance coverage, and location.  Our first effort will be to refer you to a provider within your care system, and will utilize providers outside your care system as needed.

## 2023-08-13 NOTE — DISCHARGE INSTRUCTIONS
"Aftercare Plan  If I am feeling unsafe or I am in a crisis, I will:   Contact my established care providers   Call the National Suicide Prevention Lifeline: 988  Go to the nearest emergency room   Call 911     Warning signs that I or other people might notice when a crisis is developing for me: Physical aggression; throwing objects; feeling trapped; accusations people are moving belongings; and asking for cameras (paranoia)    Things I am able to do on my own to cope or help me feel better: Music, going for walks, dances, 5 minute breaks, and reading.     Things that I am able to do with others to cope or help me better: Attend Synagogue; attend support meetings     Things I can use or do for distraction: On-call therapeutic support/crisis line.    Changes I can make to support my mental health and wellness: Resume medication management services.     People in my life that I can ask for help: , [facility] [phone].  - Bertha Covington [phone]    Your [facility] has a mental health crisis team you can call 24/7: Crisis Services 24 hour hotline [phone], [facility] [phone] [facility]    Other things that are important when I'm in crisis: Privacy. Ability to sit alone and regroup in her car.       Crisis Lines  Crisis Text Line  Text 023918  You will be connected with a trained live crisis counselor to provide support.    Por espanol, texto  GUI a 825576 o texto a 442-AYUDAME en WhatsApp    [employer] (LGBTQ Youth Crisis Line)  5.897.239.0397  text START to 293-356      Community Resources  Fast Tracker  Linking people to mental health and substance use disorder resources  fasttrackermn.org     [place] Mental Health Warm Line  Peer to peer support  Monday thru Saturday, 12 pm to 10 pm  713.832.5396 or 1.857.876.9758  Text \"Support\" to 52293    National Olive on Mental Illness (MARCIO)  939.247.8345 or " 1.888.MARCIO.HELPS      Mental Health Apps  My3  https://myFreightospp.org/    VirtualHopeBox  https://Gymbox/apps/virtual-hope-box/      Additional Information  Today you were seen by a licensed mental health professional through Triage and Transition services, [facility] (P)  for a crisis assessment in the Emergency Department at [facility].  It is recommended that you follow up with your established providers (psychiatrist, mental health therapist, and/or primary care doctor - as relevant) as soon as possible. Coordinators from [facility] will be calling you in the next 24-48 hours to ensure that you have the resources you need.  You can also contact [facility] coordinators directly at [phone]. You may have been scheduled for or offered an appointment with a mental health provider. [facility] maintains an extensive network of licensed behavioral health providers to connect patients with the services they need.  We do not charge providers a fee to participate in our referral network.  We match patients with providers based on a patient's specific needs, insurance coverage, and location.  Our first effort will be to refer you to a provider within your care system, and will utilize providers outside your care system as needed.

## 2023-08-13 NOTE — CONSULTS
Diagnostic Evaluation Consultation  Crisis Assessment    Patient Name: Holly Tolliver  Age:  38 year old  Legal Sex: female  Gender Identity: female  Pronouns:   Race: White  Ethnicity: Not  or   Language: English      Patient was assessed: Virtual: iPad Telemedicine Start Time: 2143 Telemedicine Stop Time: 2233  Patient location: [facility] EMERGENCY DEPT                                 Referral Data and Chief Complaint  Holly Tolliver presents to the ED with family/friends. Patient is presenting to the ED for the following concerns: Worsening psychosocial stress, Depression, Other (see comment) (Sleep disturbances, feeling things are crawling on her).   Factors that make the mental health crisis life threatening or complex are:  Depressed mood, feels targeted at transitional living home, misses family, recent job loss from esteemed position..      Informed Consent and Assessment Methods  Explained the crisis assessment process, including applicable information disclosures and limits to confidentiality, assessed understanding of the process, and obtained consent to proceed with the assessment.  Assessment methods included conducting a formal interview with patient, review of medical records, collaboration with medical staff, and obtaining relevant collateral information from family and community providers when available.  : done     Patient response to interventions: eager to participate, acceptance expressed, verbalizes understanding  Coping skills were attempted to reduce the crisis:  Listening to music and support groups     History of the Crisis   anniversary of permanent loss of legal and physical custody; children adopted    Brief Psychosocial History  Family:  Single, Children yes (Age 2 and 6; parental rights terminated. Children adopted.)  Support System:  Other (specify) ( and House Mom for [facility].)  Employment Status:  employment seeking (Resigned from job due to harassment last week, 8/3/23. Expecting workers comp settlement. Desire to work. Was a  for vulnerable adults, those with mental health needs, and emotional disturbances.)  Source of Income:  none  Financial Environmental Concerns:  unemployed  Current Hobbies:  group/social activities, music, outdoor activities, arts/crafts  Barriers in Personal Life:  mental health concerns    Significant Clinical History  Current Anxiety Symptoms:  anxious  Current Depression/Trauma:  sadness, crying or feels like crying  Current Somatic Symptoms:     Current Psychosis/Thought Disturbance:  tactile hallucinations, visual hallucinations (Bruising on arms.)  Current Eating Symptoms:     Chemical Use History:  Alcohol: (Medical chart lists alcohol abuse in remission)  Benzodiazepines: None  Opiates: None  Cocaine: None  Marijuana: None  Other Use: Methamphetamines (Previous history per medical chart)   Past diagnosis:  ADHD, Other, Anxiety Disorder, Bipolar Disorder, Depression (Borderline Personality Disorder)  Family history:  Depression, Anxiety Disorder, Bipolar Disorder, ADHD, Schizophrenia (Borderline Personality Disorder)  Past treatment:  Individual therapy, Family therapy, Case management, Primary Care  Details of most recent treatment:  [facility]  Other relevant history:          Collateral Information  Is there collateral information: Yes     Collateral information name, relationship, phone number:  [facility],  [phone]    What happened today: Patient felt targeted and disliked by house mates. Patient was not sleeping well and felt things crawling on her.     What is different about patient's functioning: Lack of sleep and anniversary of loss increased negative emotions.     Concern about alcohol/drug use:      What do you think the patient needs:      Has patient made comments about  "wanting to kill themselves/others: no    If d/c is recommended, can they take part in safety/aftercare planning:  yes    Additional collateral information:        Risk Assessment  [place] Suicide Severity Rating Scale Full Clinical Version:  Suicidal Ideation  Q1 Wish to be Dead (Lifetime): (P) No  Q2 Non-Specific Active Suicidal Thoughts (Lifetime): (P) No     Suicidal Behavior (Lifetime)  Actual Attempt (Lifetime): (P) Yes  Total Number of Actual Attempts (Lifetime): (P) 1  Actual Attempt Description (Lifetime): (P) 11 years ago; threatened to jump off bridge  Has subject engaged in non-suicidal self-injurious behavior? (Lifetime): (P) No  Interrupted Attempts (Lifetime): (P) No  Aborted or Self-Interrupted Attempt (Lifetime): (P) No  Preparatory Acts or Behavior (Lifetime): (P) No    [place] Suicide Severity Rating Scale Recent:              Environmental or Psychosocial Events: loss of status/respect/rank, unemployment/underemployment, other life stressors, legal issues such as DWI, DUI, lawsuit, CPS involvement, etc. (\"The police are being called because I have a hard time trusting people. Sometime I don't need to call the police. It's my all or nothing thinking.\" Criminal - Pending 5th degree assault.)  Protective Factors: Protective Factors: help seeking, able to access care without barriers, sense of self-efficacy and/or positive self-esteem, optimistic outlook - identification of future goals, reality testing ability, other (see comment)    Does the patient have thoughts of harming others? Feels Like Hurting Others: no  Previous Attempt to Hurt Others: no  Is the patient engaging in sexually inappropriate behavior?: yes    Is the patient engaging in sexually inappropriate behavior?  yes        Mental Status Exam   Affect: Labile  Appearance: Disheveled  Attention Span/Concentration: Attentive  Eye Contact: Engaged    Fund of Knowledge: Delayed   Language /Speech Content: Fluent  Language /Speech Volume: " Normal  Language /Speech Rate/Productions: Normal  Recent Memory: Intact  Remote Memory: Intact  Mood: Anxious, Depressed, Sad  Orientation to Person: Yes   Orientation to Place: Yes  Orientation to Time of Day: Yes  Orientation to Date: Yes     Situation (Do they understand why they are here?): Yes  Psychomotor Behavior: Normal  Thought Content: Clear (Endorses tactile and visual at home)  Thought Form: Intact        Medication  Psychotropic medications:   Medication Orders - Psychiatric (From admission, onward)      None             Current Care Team  Patient Care Team:  [facility] - [facility] as PCP - General  GOKUL Joseph, [facility]  as Psychologist  Layton Knutson DO as MD (Neurology)  Layton Llanos MD as MD (Emergency Medicine)  Yoel Walker MD as Assigned Surgical Provider  Ariana Reynoso, NP as Assigned Behavioral Health Provider  Celestina Loomis, [facility] as Assigned MTM Pharmacist  Ana Maria Hodge APRN CNP as Assigned PCP    Diagnosis  Patient Active Problem List   Diagnosis Code    Tobacco use disorder F17.200    Mild intermittent asthma J45.20    CTS (carpal tunnel syndrome) G56.00    CARDIOVASCULAR SCREENING; LDL GOAL LESS THAN 160 Z13.6    Major depressive disorder, single episode, moderate (H) F32.1    Generalized anxiety disorder F41.1    GERD (gastroesophageal reflux disease) K21.9    Health Care Home Z76.89    Prenatal care, subsequent pregnancy in third trimester Z34.83    Prenatal care, subsequent pregnancy Z34.80    Single liveborn, born in hospital, delivered Z38.00    Placental abruption in third trimester O45.93    Placental abruption O45.90     delivery delivered O82    Routine postpartum follow-up Z39.2    Mirena IUD Z30.430    Methamphetamine abuse (H) F15.10    Alcohol dependence in remission (H) F10.21    Major depressive disorder, recurrent, unspecified (H) F33.9       Primary Problem This  Admission  Active Hospital Problems    F33.9 Major depressive disorder, recurrent, unspecified (H)        Clinical Summary and Substantiation of Recommendations   Holly is a 38 year old female with a history of ADHD, Bipolar, Major Depression, and Generalized Anxiety. Patient had one suicide attempt 11 years ago. Patient denies SI and SIB. Patient presents with sadness, depressed, mood, and tearfulness. It is the anniversary of her permanent loss of legal and physical custody for her 2 and 6 year olds to child protection. Patient was not sleeping soundly and also felt something crawling her (tactile hallucinations). Patient reports medication non-adherenece due to side effects. Patient endorses feeling safe and lives with others. Her  is present and confirms 3 months sobriety. Patient denies inpatient, day treatment, or partial hospitalization history. Patient is low risk for suicide. Patient is open to resume medication management services. Her  and Peer Specialist will manage the appointments. A plan was also established to increase patient safety in the transitional home. Acute symptoms can be managed at a lower level of care with community providers. Patient will continue individual therapy with [facility].  and patient support discharge.      Patient coping skills attempted to reduce the crisis:  Listening to music and support groups    Disposition  Recommended disposition: Medication Management, Individual Therapy        Reviewed case and recommendations with attending provider. Attending Name: Dr. Bossman Rodriguez MD       Attending concurs with disposition: yes       Patient and/or validated legal guardian concurs with disposition:   yes       Final disposition:  discharge    Legal status on admission: Voluntary/Patient has signed consent for treatment    Assessment Details   Total duration spent on the patient case in minutes: 50 min     CPT code(s) utilized:  10138 - Psychotherapy for Crisis - 60 (30-74*) min    Deborah Parnell Psychotherapist  [facility] - Triage & Transition Services  Callback: [phone]

## 2023-08-13 NOTE — ED PROVIDER NOTES
History     Chief Complaint   Patient presents with    Psychiatric Evaluation     HPI  Holly Tolliver is a 38 year old female who has medical history significant for multiple medical issues including history of polysubstance abuse, currently residing in a residential facility, presenting the emergency department with her staff member for concerns regarding mood related changes.  Patient also states that she is not safe at her facility.  Concerned about bruising which she has noticed on occasion when waking up in the morning.  Denies any focal areas of pain.  Denies any recent changes in health, or medications.    Patient's last psychiatry visit that I am able to visualize in medical record is from 2022.  She had been seen for depression and anxiety at that point.  She had been clean from alcohol and methamphetamine at that point, however subsequently relapsed.  Patient now has been sober for the past 3 months.  Notably, patient with difficulty secondary to children being adopted away from her.  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    Allergies:  Allergies   Allergen Reactions    Amoxicillin Rash       Problem List:    Patient Active Problem List    Diagnosis Date Noted    Alcohol dependence in remission (H) 2023     Priority: Medium    Methamphetamine abuse (H) 2023     Priority: Medium    Routine postpartum follow-up 2016     Priority: Medium    Mirena IUD 2016     Priority: Medium     Lot: [id number]  Exp:       Single liveborn, born in hospital, delivered 2016     Priority: Medium    Placental abruption in third trimester 2016     Priority: Medium    Placental abruption 2016     Priority: Medium     delivery delivered 2016     Priority: Medium    Prenatal care, subsequent pregnancy 2016     Priority: Medium    Prenatal care, subsequent pregnancy in third trimester 2016     Priority: Medium    Health Care  Home 2015     Priority: Medium     Care Coordinator: Kelly DANIEL, MSW  See letters for Health Care home care plan    SW covering in [place], [place] SW absence    FREDY Luz, MSW   [phone]  3/24/2015 12:24 PM      Generalized anxiety disorder 2013     Priority: Medium     Diagnosis updated by automated process. Provider to review and confirm.      GERD (gastroesophageal reflux disease) 2013     Priority: Medium    Major depressive disorder, single episode, moderate (H) 2013     Priority: Medium    CARDIOVASCULAR SCREENING; LDL GOAL LESS THAN 160 10/23/2012     Priority: Medium    CTS (carpal tunnel syndrome) 10/14/2011     Priority: Medium    Tobacco use disorder 2008     Priority: Medium     Has decreased from 1 ppd to 1 cigarette per day since pregnancy.      Mild intermittent asthma 2008     Priority: Medium        Past Medical History:    Past Medical History:   Diagnosis Date    ALCOH DEP NEC/NOS-UNSPEC 2007    Asthma     Chickenpox     Postpartum depression     Pregnancy induced hypertension     Urinary tract infections        Past Surgical History:    Past Surgical History:   Procedure Laterality Date     SECTION Bilateral 2016    Procedure:  SECTION;  Surgeon: Beau Cardoso MD;  Location: [place] OR    PE TUBES  age 3       Family History:    Family History   Problem Relation Age of Onset    Alcohol/Drug Mother         alcohol- recovered    Hypertension Mother     Depression Mother         also anxiety    Other - See Comments Mother         ankylosing spondylosis    Anxiety Disorder Mother     Alcohol/Drug Father         alcohol- recovered    Alcohol/Drug Sister         A&D    Cancer Maternal Grandmother         lung    Depression Maternal Grandmother         also anxiety    Mental Illness Paternal Grandmother     Alcohol/Drug Brother         alcohol       Social History:  Marital Status:  Single [1]  Social History  "    Tobacco Use    Smoking status: Former     Packs/day: 0.00     Years: 12.00     Pack years: 0.00     Types: Cigarettes     Passive exposure: Never    Smokeless tobacco: Never    Tobacco comments:     Never   Vaping Use    Vaping Use: Former    Substances: Nicotine   Substance Use Topics    Alcohol use: Not Currently     Alcohol/week: 0.0 standard drinks of alcohol     Comment: attempting sobriety    Drug use: Not Currently     Types: Methamphetamines, Marijuana     Comment: relapse, attempting sobriety        Medications:    albuterol (PROAIR HFA/PROVENTIL HFA/VENTOLIN HFA) 108 (90 Base) MCG/ACT inhaler  ketorolac (TORADOL) 10 MG tablet  Lidocaine (LIDOCARE) 4 % Patch          Review of Systems  See HPI  Physical Exam   BP: (!) 142/82  Pulse: 85  Temp: 98.4  F (36.9  C)  Resp: 16  Height: 180.3 cm (5' 11\")  Weight: 98.4 kg (217 lb)  SpO2: 99 %      Physical Exam  BP (!) 142/82   Pulse 85   Temp 98.4  F (36.9  C) (Oral)   Resp 16   Ht 1.803 m (5' 11\")   Wt 98.4 kg (217 lb)   LMP 07/31/2023 (Exact Date)   SpO2 99%   BMI 30.27 kg/m    General: alert and in no acute distress  Head: atraumatic, normocephalic  Abd: nondistended  Musculoskel/Extremities: normal extremities, no apparent edema, and full AROM of major joints  Skin: no rashes, no diaphoresis and skin color normal  Neuro: Patient awake, alert, oriented, speech is fluent, gait is normal  Psychiatric: Denies suicidal or homicidal ideation    ED Course                 Procedures              Critical Care time:  none               No results found for this or any previous visit (from the past 24 hour(s)).    Medications - No data to display    Assessments & Plan (with Medical Decision Making)  38 year old female senting the emergency department with her staff member for concerns regarding worsening depression, with some paranoia which is present, thinking that others are out to get her.    Patient made for DEC.   Had conversation with the patient, " and plan is for outpatient treatment.  Patient without any physical complaints, and she is medically cleared.  No indication for blood testing at this point.  It appears that patient's trigger was the anniversary date of her children being taken away from her.  Patient is not suicidal or homicidal.  Medication referral to psychiatrist placed.     I have reviewed the nursing notes.    I have reviewed the findings, diagnosis, plan and need for follow up with the patient.               Discharge Medication List as of 8/12/2023 11:09 PM          Final diagnoses:   Depression, unspecified depression type   Anxiety   Acute reaction to stress       8/12/2023   [facility] EMERGENCY DEPT       MichaelBossman holcomb MD  08/12/23 0792

## 2023-08-31 ENCOUNTER — TELEPHONE (OUTPATIENT)
Dept: FAMILY MEDICINE | Facility: CLINIC | Age: 38
End: 2023-08-31
Payer: COMMERCIAL

## 2023-08-31 NOTE — TELEPHONE ENCOUNTER
Left message for patient. [place] Department of Human Services Request for Medical Opinion forms Attn to Dr. Morin, She saw her once for sore throat.   She does not have a PCP in our clinic nor has she seen the same doctor. Left patient a message that she needs to schedule a in person appointment to have these Medical Opinion forms filled out.     Placed forms in awaiting response in [place] area.    Ladan Chávez PSC on 8/31/2023 at 6:50 PM

## 2023-09-02 ENCOUNTER — HOSPITAL ENCOUNTER (EMERGENCY)
Facility: CLINIC | Age: 38
Discharge: HOME | End: 2023-09-02
Attending: FAMILY MEDICINE | Admitting: FAMILY MEDICINE
Payer: COMMERCIAL

## 2023-09-02 VITALS
DIASTOLIC BLOOD PRESSURE: 79 MMHG | RESPIRATION RATE: 18 BRPM | WEIGHT: 220 LBS | BODY MASS INDEX: 30.8 KG/M2 | SYSTOLIC BLOOD PRESSURE: 128 MMHG | HEIGHT: 71 IN | HEART RATE: 77 BPM | OXYGEN SATURATION: 98 % | TEMPERATURE: 97.5 F

## 2023-09-02 DIAGNOSIS — M25.561 ACUTE PAIN OF RIGHT KNEE: ICD-10-CM

## 2023-09-02 DIAGNOSIS — D17.1 LIPOMA OF SKIN AND SUBCUTANEOUS TISSUE OF TRUNK: ICD-10-CM

## 2023-09-02 DIAGNOSIS — F15.10 METHAMPHETAMINE ABUSE (H): ICD-10-CM

## 2023-09-02 DIAGNOSIS — M25.561 CHRONIC PAIN OF RIGHT KNEE: ICD-10-CM

## 2023-09-02 DIAGNOSIS — G89.29 CHRONIC PAIN OF RIGHT KNEE: ICD-10-CM

## 2023-09-02 LAB — HCG UR QL: NEGATIVE

## 2023-09-02 PROCEDURE — 81025 URINE PREGNANCY TEST: CPT | Performed by: FAMILY MEDICINE

## 2023-09-02 PROCEDURE — 99283 EMERGENCY DEPT VISIT LOW MDM: CPT | Performed by: FAMILY MEDICINE

## 2023-09-02 PROCEDURE — 99283 EMERGENCY DEPT VISIT LOW MDM: CPT

## 2023-09-02 RX ORDER — KETOROLAC TROMETHAMINE 10 MG/1
10 TABLET, FILM COATED ORAL EVERY 6 HOURS PRN
Qty: 20 TABLET | Refills: 0 | Status: SHIPPED | OUTPATIENT
Start: 2023-09-02 | End: 2023-10-04

## 2023-09-02 NOTE — ED TRIAGE NOTES
Triage Assessment       Row Name 09/02/23 0474       Triage Assessment (Adult)    Airway WDL WDL       Respiratory WDL    Respiratory WDL WDL       Skin Circulation/Temperature WDL    Skin Circulation/Temperature WDL WDL       Cardiac WDL    Cardiac WDL WDL       Peripheral/Neurovascular WDL    Peripheral Neurovascular WDL WDL       Cognitive/Neuro/Behavioral WDL    Cognitive/Neuro/Behavioral WDL WDL

## 2023-09-03 NOTE — ED PROVIDER NOTES
"  History     Chief Complaint   Patient presents with    Knee Pain     Right knee pain that she has had for several months she admits to not following through with all recommended orders she hurt her knee repositioning a client at work     HPI    Holly Tolliver is a 38 year old female who comes in with a number of concerns.  She states that she is been having chronic knee pain for several months since an injury at work.  She supposed to follow-up in orthopedics but says she cannot access my chart to see when her appointment is and also does not have a phone.  She also says that she \"may be pregnant.\"  She also states that she is homeless and that she does not have any money for prescriptions.  She would like a refill of her ketorolac 10 mg she was taking for her knee pain.  She has a history of polysubstance abuse.  Her children were taken away from her.  She started working as a PCA.  She has not worked for 2 weeks.  She would like a refill of the ketorolac but says she does not have any money to pay for it.  X-ray of the knee in July was normal.    Allergies:  Allergies   Allergen Reactions    Amoxicillin Rash       Problem List:    Patient Active Problem List    Diagnosis Date Noted    Major depressive disorder, recurrent, unspecified (H) 2023     Priority: Medium    Alcohol dependence in remission (H) 2023     Priority: Medium    Methamphetamine abuse (H) 2023     Priority: Medium    Routine postpartum follow-up 2016     Priority: Medium    Mirena IUD 2016     Priority: Medium     Lot: [id number]  Exp:       Single liveborn, born in hospital, delivered 2016     Priority: Medium    Placental abruption in third trimester 2016     Priority: Medium    Placental abruption 2016     Priority: Medium     delivery delivered 2016     Priority: Medium    Prenatal care, subsequent pregnancy 2016     Priority: Medium    Prenatal care, subsequent " pregnancy in third trimester 2016     Priority: Medium    Health Care Home 2015     Priority: Medium     Care Coordinator: Kelly DANIEL, MSW  See letters for Health Care home care plan    SW covering in [facility] absence    FREDY Luz, MSW   [phone]  3/24/2015 12:24 PM      Generalized anxiety disorder 2013     Priority: Medium     Diagnosis updated by automated process. Provider to review and confirm.      GERD (gastroesophageal reflux disease) 2013     Priority: Medium    Major depressive disorder, single episode, moderate (H) 2013     Priority: Medium    CARDIOVASCULAR SCREENING; LDL GOAL LESS THAN 160 10/23/2012     Priority: Medium    CTS (carpal tunnel syndrome) 10/14/2011     Priority: Medium    Tobacco use disorder 2008     Priority: Medium     Has decreased from 1 ppd to 1 cigarette per day since pregnancy.      Mild intermittent asthma 2008     Priority: Medium        Past Medical History:    Past Medical History:   Diagnosis Date    ALCOH DEP NEC/NOS-UNSPEC 2007    Asthma     Chickenpox     Postpartum depression     Pregnancy induced hypertension     Urinary tract infections        Past Surgical History:    Past Surgical History:   Procedure Laterality Date     SECTION Bilateral 2016    Procedure:  SECTION;  Surgeon: Beau Cardoso MD;  Location: [facility] OR    PE TUBES  age 3       Family History:    Family History   Problem Relation Age of Onset    Alcohol/Drug Mother         alcohol- recovered    Hypertension Mother     Depression Mother         also anxiety    Other - See Comments Mother         ankylosing spondylosis    Anxiety Disorder Mother     Alcohol/Drug Father         alcohol- recovered    Alcohol/Drug Sister         A&D    Cancer Maternal Grandmother         lung    Depression Maternal Grandmother         also anxiety    Mental Illness Paternal Grandmother     Alcohol/Drug Brother          "alcohol       Social History:  Marital Status:  Single [1]  Social History     Tobacco Use    Smoking status: Former     Packs/day: 0.00     Years: 12.00     Pack years: 0.00     Types: Cigarettes     Passive exposure: Never    Smokeless tobacco: Never    Tobacco comments:     Never   Vaping Use    Vaping Use: Former    Substances: Nicotine   Substance Use Topics    Alcohol use: Not Currently     Alcohol/week: 0.0 standard drinks of alcohol     Comment: attempting sobriety    Drug use: Not Currently     Types: Methamphetamines, Marijuana     Comment: relapse, attempting sobriety        Medications:    ketorolac (TORADOL) 10 MG tablet  albuterol (PROAIR HFA/PROVENTIL HFA/VENTOLIN HFA) 108 (90 Base) MCG/ACT inhaler  Lidocaine (LIDOCARE) 4 % Patch          Review of Systems  All other systems are reviewed and are negative    Physical Exam   BP: 137/86  Pulse: 84  Temp: 97.5  F (36.4  C)  Resp: 20  Height: 180.3 cm (5' 11\")  Weight: 99.8 kg (220 lb)  SpO2: 97 %      Physical Exam    Nursing note and vitals were reviewed.  Constitutional: Awake and alert, adequately nourished and developed appearing 38-year-old in no apparent discomfort, who does not appear acutely ill, and who answers questions appropriately but is quite irritable  HEENT: Voice quality is normal.  PERRL EOMI.   Neck: Freely mobile.  Pulmonary/Chest: Breathing is unlabored.    Musculoskeletal: Extremities are warm and well-perfused and without edema, examination of the right knee reveals no areas of swelling, no effusion, no ligamentous instability on varus or valgus stress.  Full range of motion with discomfort that she feels in the medial aspect of the knee below the joint line with no erythema swelling or warmth in that area.  Skin: Warm, dry, no rashes in the area of pain  Psychiatric: Affect irritable but not agitated and able to focus her attention.    ED Course             Procedures              Critical Care time:  none               Results for " orders placed or performed during the hospital encounter of 09/02/23 (from the past 24 hour(s))   HCG qualitative urine   Result Value Ref Range    hCG Urine Qualitative Negative Negative       Medications - No data to display    Assessments & Plan (with Medical Decision Making)     38-year-old female scented with multiple complaints in the context of history of polysubstance abuse.  She is anxious and appears somewhat intoxicated and hypersensitive to stimuli.  However she is able to answer questions and cooperate with examination and her decisional capacity appears to be intact.  She has had chronic pain of her right knee but has not followed up with recommended appointments due to social stressors including homelessness and natural problems.  I suspect there is ongoing substance abuse contributing.  She does not currently have a primary care physician.  I placed a referral for this.  I placed a new referral for orthopedics so she can have follow-up there.  I have refilled her ketorolac prescription.  At discharge she said she wanted to talk about problem on her back.  She said she has had a lump on it for many years.  She was told that it was a lipoma.  She is worried that it will have complications.  I examined her back and she does not even have a subcutaneous mass 2 cm in size consistent with a lipoma.  Given the stability over several years I have low concern for this being something more worrisome.  I advised her that if it is bothersome to her she can consult general surgery and have it removed and get a definitive diagnosis that way.    I have reviewed the nursing notes.    I have reviewed the findings, diagnosis, plan and need for follow up with the patient.         New Prescriptions    No medications on file       Final diagnoses:   Chronic pain of right knee   Lipoma of skin and subcutaneous tissue of trunk   Methamphetamine abuse (H)       9/2/2023   [facility] EMERGENCY DEPT        Stewart Nieto MD  09/02/23 2033

## 2023-09-03 NOTE — ED NOTES
"Went into pt room to discharge and pt stated \"I did not talk to my doctor about my back pain.  I'm not leaving until I see him.\"  "

## 2023-09-03 NOTE — DISCHARGE INSTRUCTIONS
Your pregnancy test is negative.    I placed a new referral order to orthopedics for follow-up of your knee pain.  You may take ketorolac 10 mg 4 times per day if needed for 1 week and then you should switch to take ibuprofen 400 mg 4 times per day if needed.  Add acetaminophen 1000 mg 4 times per day if needed.

## 2023-09-05 ENCOUNTER — OFFICE VISIT (OUTPATIENT)
Dept: URGENT CARE | Facility: URGENT CARE | Age: 38
End: 2023-09-05
Payer: COMMERCIAL

## 2023-09-05 VITALS
BODY MASS INDEX: 31.92 KG/M2 | WEIGHT: 228 LBS | DIASTOLIC BLOOD PRESSURE: 76 MMHG | HEIGHT: 71 IN | HEART RATE: 87 BPM | SYSTOLIC BLOOD PRESSURE: 125 MMHG | OXYGEN SATURATION: 98 % | RESPIRATION RATE: 16 BRPM

## 2023-09-05 DIAGNOSIS — R21 RASH: Primary | ICD-10-CM

## 2023-09-05 PROCEDURE — 99213 OFFICE O/P EST LOW 20 MIN: CPT | Performed by: PHYSICIAN ASSISTANT

## 2023-09-05 RX ORDER — TRIAMCINOLONE ACETONIDE 1 MG/G
CREAM TOPICAL 2 TIMES DAILY
Qty: 30 G | Refills: 0 | Status: SHIPPED | OUTPATIENT
Start: 2023-09-05 | End: 2023-10-04

## 2023-09-05 NOTE — PROGRESS NOTES
SUBJECTIVE:   Holly Tolliver is a 38 year old female presenting with a chief complaint of   Chief Complaint   Patient presents with    Derm Problem     Right lower leg/rash/irriated-Patient states starting Ketorolac x3 days ago and is wanting to discus if she's reacting to mediation       She is an established patient of [facility].  Patient presents with lower leg rash that itches x 2 hours.  States she was walking through some weeds.  No SOB or lip or tongue involvement.    Treatment:  none        Review of Systems   Skin:  Positive for rash.   All other systems reviewed and are negative.      Past Medical History:   Diagnosis Date    ALCOH DEP NEC/NOS-UNSPEC 12/31/2007    Asthma     exercise induced    Chickenpox     Postpartum depression 2011    Pregnancy induced hypertension     Urinary tract infections      Family History   Problem Relation Age of Onset    Alcohol/Drug Mother         alcohol- recovered    Hypertension Mother     Depression Mother         also anxiety    Other - See Comments Mother         ankylosing spondylosis    Anxiety Disorder Mother     Alcohol/Drug Father         alcohol- recovered    Alcohol/Drug Sister         A&D    Cancer Maternal Grandmother         lung    Depression Maternal Grandmother         also anxiety    Mental Illness Paternal Grandmother     Alcohol/Drug Brother         alcohol     Current Outpatient Medications   Medication Sig Dispense Refill    ketorolac (TORADOL) 10 MG tablet Take 1 tablet (10 mg) by mouth every 6 hours as needed for moderate pain 20 tablet 0    triamcinolone (KENALOG) 0.1 % external cream Apply topically 2 times daily 30 g 0    albuterol (PROAIR HFA/PROVENTIL HFA/VENTOLIN HFA) 108 (90 Base) MCG/ACT inhaler Inhale 2 puffs into the lungs every 6 hours as needed for shortness of breath or wheezing (Patient not taking: Reported on 8/7/2023) 8 g 4    Lidocaine (LIDOCARE) 4 % Patch Place 1 patch onto the skin every 24 hours To prevent lidocaine toxicity,  "patient should be patch free for 12 hrs daily. (Patient not taking: Reported on 8/7/2023) 5 patch 0     Social History     Tobacco Use    Smoking status: Former     Packs/day: 0.00     Years: 12.00     Pack years: 0.00     Types: Cigarettes     Passive exposure: Never    Smokeless tobacco: Never    Tobacco comments:     Never   Substance Use Topics    Alcohol use: Not Currently     Alcohol/week: 0.0 standard drinks of alcohol     Comment: attempting sobriety       OBJECTIVE  /76   Pulse 87   Resp 16   Ht 1.803 m (5' 10.98\")   Wt 103.4 kg (228 lb)   LMP 07/31/2023 (Exact Date)   SpO2 98%   BMI 31.81 kg/m      Physical Exam  Vitals reviewed.   Constitutional:       Appearance: Normal appearance. She is obese.   Eyes:      Extraocular Movements: Extraocular movements intact.      Conjunctiva/sclera: Conjunctivae normal.   Cardiovascular:      Rate and Rhythm: Normal rate.   Skin:     Comments: Lower legs bilaterally with multiple papules.     Neurological:      Mental Status: She is alert.         Labs:  No results found for this or any previous visit (from the past 24 hour(s)).    X-Ray was not done.    ASSESSMENT:      ICD-10-CM    1. Rash  R21 triamcinolone (KENALOG) 0.1 % external cream           Medical Decision Making:    Differential Diagnosis:  Contact derm    Serious Comorbid Conditions:  Adult:   reviewed    PLAN:    Rx for triamcinolone cream.  Discussed reasons to seek immediate medical attention.  Additionally if no improvement or worsening in one week, may follow up with PCP and/or UC.        Followup:    If not improving or if condition worsens, follow up with your Primary Care Provider, If not improving or if conditions worsens over the next 12-24 hours, go to the Emergency Department    There are no Patient Instructions on file for this visit.      "

## 2023-09-06 ENCOUNTER — HOSPITAL ENCOUNTER (EMERGENCY)
Facility: CLINIC | Age: 38
Discharge: HOME | End: 2023-09-06
Payer: COMMERCIAL

## 2023-09-06 ASSESSMENT — ACTIVITIES OF DAILY LIVING (ADL): ADLS_ACUITY_SCORE: 33

## 2023-10-01 ENCOUNTER — HOSPITAL ENCOUNTER (EMERGENCY)
Facility: CLINIC | Age: 38
End: 2023-10-01
Payer: COMMERCIAL

## 2023-10-04 ENCOUNTER — OFFICE VISIT (OUTPATIENT)
Dept: URGENT CARE | Facility: URGENT CARE | Age: 38
End: 2023-10-04
Payer: COMMERCIAL

## 2023-10-04 VITALS
BODY MASS INDEX: 31.26 KG/M2 | OXYGEN SATURATION: 99 % | DIASTOLIC BLOOD PRESSURE: 84 MMHG | HEART RATE: 82 BPM | RESPIRATION RATE: 18 BRPM | WEIGHT: 224 LBS | TEMPERATURE: 98.7 F | SYSTOLIC BLOOD PRESSURE: 151 MMHG

## 2023-10-04 DIAGNOSIS — B86 SCABIES: ICD-10-CM

## 2023-10-04 DIAGNOSIS — L84 CORN OR CALLUS: ICD-10-CM

## 2023-10-04 DIAGNOSIS — R05.1 ACUTE COUGH: ICD-10-CM

## 2023-10-04 DIAGNOSIS — L03.115 CELLULITIS OF RIGHT LOWER EXTREMITY: Primary | ICD-10-CM

## 2023-10-04 PROBLEM — K04.7 TOOTH INFECTION: Status: ACTIVE | Noted: 2022-02-20

## 2023-10-04 PROCEDURE — 99214 OFFICE O/P EST MOD 30 MIN: CPT | Performed by: NURSE PRACTITIONER

## 2023-10-04 RX ORDER — DEXTROMETHORPHAN POLISTIREX 30 MG/5ML
60 SUSPENSION ORAL 2 TIMES DAILY
Qty: 100 ML | Refills: 0 | Status: SHIPPED | OUTPATIENT
Start: 2023-10-04 | End: 2023-10-09

## 2023-10-04 RX ORDER — DIPHENHYDRAMINE HCL 25 MG
25 TABLET ORAL EVERY 6 HOURS PRN
COMMUNITY
End: 2024-03-05

## 2023-10-04 RX ORDER — DOXYCYCLINE HYCLATE 100 MG
100 TABLET ORAL 2 TIMES DAILY
Qty: 14 TABLET | Refills: 0 | Status: SHIPPED | OUTPATIENT
Start: 2023-10-04 | End: 2023-10-11

## 2023-10-04 RX ORDER — PERMETHRIN 50 MG/G
CREAM TOPICAL
Qty: 60 G | Refills: 1 | Status: SHIPPED | OUTPATIENT
Start: 2023-10-04 | End: 2024-03-05

## 2023-10-04 RX ORDER — ACETAMINOPHEN 325 MG/1
325-650 TABLET ORAL EVERY 6 HOURS PRN
COMMUNITY
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
## 2023-10-09 ENCOUNTER — PATIENT OUTREACH (OUTPATIENT)
Dept: CARE COORDINATION | Facility: CLINIC | Age: 38
End: 2023-10-09
Payer: COMMERCIAL

## 2023-10-09 NOTE — LETTER
[facility] CARE COORDINATION      October 10, 2023    Holly Tolliver  [address] [address]      Dear Holly,    I am a  clinic community health worker who works with [facility] with the [facility]. I wanted to introduce myself and provide you with my contact information to be able to call me with any resource or support needs.  Below is a description of clinic care coordination and how we can further assist you.       The clinic care coordination team is made up of a registered nurse, , financial resource worker and community health worker who understand the health care system. The goal of clinic care coordination is to help you manage your health and improve access to the health care system. Our team works alongside your provider to assist you in determining your health and social needs. We can help you obtain health care and community resources, providing you with necessary information and education. We can work with you through any barriers and develop a care plan that helps coordinate and strengthen the communication between you and your care team.  Our services are voluntary and are offered without charge to you personally.    Please feel free to contact me regarding care coordination and what we can offer.      We are focused on providing you with the highest-quality healthcare experience possible.    Sincerely,         Elidia MORLEY  Community Health Worker  [facility]  Clinic Care Coordination  [facility]  [email or web address]   Office: [phone]

## 2023-10-09 NOTE — PROGRESS NOTES
Clinic Care Coordination Contact  [facility]/Voicemail    Reason for Referral: Financial Support    Mental Wellness (Health) (Mental Illness/Chemical Dependency)   Financial Support: Housing    Medication Affordability   Mental Wellness: Resources of Behavioral Health Services       Clinical Data: Care Coordination Outreach  Outreach attempted x 1.  Left message on patient's voicemail with call back information and requested return call.  Plan: CHW will try to reach patient again in 1-2 business days.    Elidia MORLEY  Community Health Worker  [facility]  Clinic Care Coordination  [facility]  [email or web address]   Office: [phone]

## 2023-10-10 NOTE — PROGRESS NOTES
Clinic Care Coordination Contact  [facility]/Voicemail     Clinical Data: Care Coordinator Outreach  Outreach attempted x 2.  Left message on patient's voicemail with call back information and requested return call.  Plan: CHW will send care coordination introduction letter with care coordinator contact information and explanation of care coordination services via [employer]. CHW will do no further outreaches at this time.    Elidia MORLEY  Community Health Worker  [facility] Care Coordination  [facility]  [email or web address]   Office: [phone]

## 2023-10-25 ENCOUNTER — OFFICE VISIT (OUTPATIENT)
Dept: FAMILY MEDICINE | Facility: CLINIC | Age: 38
End: 2023-10-25
Payer: OTHER MISCELLANEOUS

## 2023-10-25 VITALS
HEART RATE: 66 BPM | OXYGEN SATURATION: 98 % | WEIGHT: 237 LBS | BODY MASS INDEX: 32.1 KG/M2 | TEMPERATURE: 97.1 F | SYSTOLIC BLOOD PRESSURE: 123 MMHG | RESPIRATION RATE: 16 BRPM | HEIGHT: 72 IN | DIASTOLIC BLOOD PRESSURE: 79 MMHG

## 2023-10-25 DIAGNOSIS — M25.561 RIGHT KNEE PAIN, UNSPECIFIED CHRONICITY: Primary | ICD-10-CM

## 2023-10-25 DIAGNOSIS — D17.1 LIPOMA OF LOWER BACK: ICD-10-CM

## 2023-10-25 DIAGNOSIS — Z91.89: ICD-10-CM

## 2023-10-25 PROCEDURE — 99214 OFFICE O/P EST MOD 30 MIN: CPT | Performed by: FAMILY MEDICINE

## 2023-10-25 ASSESSMENT — ASTHMA QUESTIONNAIRES: ACT_TOTALSCORE: 13
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
## 2023-10-25 NOTE — COMMUNITY RESOURCES LIST (ENGLISH)
10/25/2023   [facility] [employer]  N/A  For questions about this resource list or additional care needs, please contact your primary care clinic or care manager.  Phone: [phone]   Email: N/A   Address: [address] [place] [address]   Hours: N/A        Financial Stability       Rent and mortgage payment assistance  1  [employer] Distance: 16.27 miles      In-Person, Phone/Virtual   [address] [place] [address]  Language: English  Hours: Mon - Sun Appt. Only  Fees: Free   Phone: (928) 626-8771 Email: donald@[employer] Website: http://www.[facility].org          Food and Nutrition       Food pantry  2  [facility] PrePay [facility] Distance: 0.69 miles      Pickup   [address] [place] [address]  Language: English  Hours: Mon 9:00 AM - 5:00 PM , Wed 9:00 AM - 5:00 PM , Fri 9:00 AM - 5:00 PM  Fees: Free   Phone: (621) 698-9853 Email: mail@[employer] Website: https://www.[employer].org/our-work/food-access-and-equity/     3  [facility] Distance: 1.17 miles      Delivery, Pickup   [address] [place] [address]  Language: English  Hours: Mon - Thu 8:00 AM - 3:00 PM  Fees: Free   Phone: (460) 222-4149 Website: http://www.[employer] [facility].org/     SNAP application assistance  4  [facility] - [place] Office Distance: 0.79 miles      In-Person, Phone/Virtual   [address] [place] [address]  Language: English  Hours: Mon - Fri 8:00 AM - 4:30 PM  Fees: Free   Phone: (234) 211-1597 Email: john@[facility] Hospital[facility] [employer] Website: https://www.appssavvy.org/agency-information     5  [facility] [place] Family Investment Program (MFIP) - [place] Family Investment Program (MFIP) - SNAP application assistance Distance: 11.27 miles      In-Person, Phone/Virtual   [address] [facility] [address] [place] [address]   Language: English  Hours: Mon - Fri 8:00 AM - 4:30 PM  Fees: Free   Phone: (395) 262-9619 Email: imgeneralquestions@[facility] Website: https://www.[facility]./499/MFIP-Biennial-Service-Agreement-VCA-Plan          Housing       Shelter for families  6  [facility] Distance: 24.35 miles      In-Person   [address] [place] [address]  Language: English  Hours: Mon - Fri 3:00 PM - 9:00 AM , Sat - Sun Open 24 Hours  Fees: Free   Phone: (789) 873-1392 Ext.1 Website: https://www.[employer]rews.[employer]/2020/07/03/emergency-family-shelter/     Shelter for individuals  7  [facility] - Emergency Housing Assistance Distance: 0.79 miles      In-Person   [address] [place] [address]  Language: English  Hours: Mon - Fri 8:00 AM - 4:30 PM  Fees: Free   Phone: (696) 396-2013 Ext.4 Email: john@[facility].[employer] Website: https://www.[facility].org/agency-information     8  [employer] [facility] [employer] Distance: 21.89 miles      In-Person   [address] [place] [address]  Language: English  Hours: Mon - Sun Open 24 Hours  Fees: Sliding Fee   Phone: (908) 621-4185 Email: info@[employer] Website: http://www.[employer].org          Important Numbers & Websites       Emergency Services   911  City Services   311  Poison Control   (177) 579-9840  Suicide Prevention Lifeline   (290) 501-3660 (TALK)  Child Abuse Hotline   (346) 609-6481 (4-A-Child)  Sexual Assault Hotline   (872) 373-7767 (HOPE)  National Runaway Safeline   (844) 805-9269 (RUNAWAY)  All-Options Talkline   (611) 269-7381  Substance Abuse Referral   (924) 124-1873 (HELP)

## 2023-10-25 NOTE — COMMUNITY RESOURCES LIST (ENGLISH)
10/25/2023   [facility] [employer]  N/A  For questions about this resource list or additional care needs, please contact your primary care clinic or care manager.  Phone: [phone]   Email: N/A   Address: [address] [place] [address]   Hours: N/A        Financial Stability       Rent and mortgage payment assistance  1  [employer] Distance: 16.27 miles      In-Person, Phone/Virtual   [address] [place] [address]  Language: English  Hours: Mon - Sun Appt. Only  Fees: Free   Phone: (615) 658-6171 Email: donald@[employer] Website: http://www.[facility].org          Food and Nutrition       Food pantry  2  [facility] [employer] [facility] Distance: 0.69 miles      Pickup   [address] [place] [address]  Language: English  Hours: Mon 9:00 AM - 5:00 PM , Wed 9:00 AM - 5:00 PM , Fri 9:00 AM - 5:00 PM  Fees: Free   Phone: (750) 145-4072 Email: mail@[employer] Website: https://www.[employer].org/our-work/food-access-and-equity/     3  [facility] Distance: 1.17 miles      Delivery, Pickup   [address] [place] [address]  Language: English  Hours: Mon - Thu 8:00 AM - 3:00 PM  Fees: Free   Phone: (564) 953-1895 Website: http://www.[facility].org/     SNAP application assistance  4  [facility] ([facility] - [place] [facility] Distance: 0.79 miles      In-Person, Phone/Virtual   [address] [place] [address]  Language: English  Hours: Mon - Fri 8:00 AM - 4:30 PM  Fees: Free   Phone: (823) 886-2097 Email: john@[facility] Center[facility] Website: https://www.[employer].org/agency-information     5  [facility] [place] Family Investment Program (MFIP) - [place] Family Investment Program (MFIP) - SNAP application assistance Distance: 11.27 miles      In-Person, Phone/Virtual   [address] [facility] [address] [place] [address]   Language: English  Hours: Mon - Fri 8:00 AM - 4:30 PM  Fees: Free   Phone: (831) 187-9067 Email: imgeneralquestions@[facility] Website: https://www.[facility]./499/MFIP-Biennial-Service-Agreement-VCA-Plan          Housing       Shelter for families  6  [facility] Distance: 24.35 miles      In-Person   [address] [place] [address]  Language: English  Hours: Mon - Fri 3:00 PM - 9:00 AM , Sat - Sun Open 24 Hours  Fees: Free   Phone: (806) 480-1188 Ext.1 Website: https://www.saintandrews.[employer]/2020/07/03/emergency-family-shelter/     Shelter for individuals  7  [facility] ([facility] Office - Emergency Housing Assistance Distance: 0.79 miles      In-Person   [address] [place] [address]  Language: English  Hours: Mon - Fri 8:00 AM - 4:30 PM  Fees: Free   Phone: (919) 335-7488 Ext.4 Email: john@[facility].[employer] Website: https://www.[facility].org/agency-information     8  [employer] [facility] [employer] Distance: 21.89 miles      In-Person   [address] [place] [address]  Language: English  Hours: Mon - Sun Open 24 Hours  Fees: Sliding Fee   Phone: (610) 572-2318 Email: info@[employer] Website: http://www.[employer].org          Important Numbers & Websites       Emergency Services   911  City Services   311  Poison Control   (429) 943-3457  Suicide Prevention Lifeline   (991) 134-8612 (TALK)  Child Abuse Hotline   (683) 315-7896 (4-A-Child)  Sexual Assault Hotline   (303) 374-6212 (HOPE)  National Runaway Safeline   (490) 975-7229 (RUNAWAY)  All-Options Talkline   (995) 469-8265  Substance Abuse Referral   (589) 361-7442 (HELP)

## 2023-10-25 NOTE — PROGRESS NOTES
"  Assessment & Plan     Right knee pain, unspecified chronicity  Management discussed with patient, suspecting right medial meniscus injury, refer back to Ortho for long-term management.  - Orthopedic  Referral; Future  - Knee Supplies Order Knee Sleeve/Brace; Right; Open    Lipoma of lower back  Causing back pain, discussed management with patient, referral was placed to general surgery for excision.  - Adult General Surg Referral; Future    At risk for intimate partner abuse    - Primary Care - Care Coordination Referral; Future    Review of external notes as documented elsewhere in note  30 minutes spent by me on the date of the encounter doing chart review, review of outside records, review of test results, interpretation of tests, patient visit, and documentation        BMI:   Estimated body mass index is 32.45 kg/m  as calculated from the following:    Height as of this encounter: 1.82 m (5' 11.65\").    Weight as of this encounter: 107.5 kg (237 lb).   Weight management plan: Discussed healthy diet and exercise guidelines    Work on weight loss  Regular exercise    Jose Zapata MD  [facility]    Errol Perez is a 38 year old, presenting for the following health issues:  Forms (Medical opinions,  got hurt at work. ) and Mental Health Problem      10/25/2023     2:34 PM   Additional Questions   Roomed by hser   Accompanied by self       Mental Health Problem    History of Present Illness       Reason for visit:  Medical [facility] mental health med manangement  Symptom onset:  More than a month  Symptoms include:  Back pain headace knee pain tooth pain  Symptom intensity:  Severe  Symptom progression:  Worsening  Had these symptoms before:  Yes  Has tried/received treatment for these symptoms:  Yes  Previous treatment was successful:  Yes  Prior treatment description:  Medication  What makes it worse:  Getting in and out of car walkig up hills steps  What makes it " "better:  Rest taking a break iprofin    She eats 0-1 servings of fruits and vegetables daily.She consumes 2 sweetened beverage(s) daily.She exercises with enough effort to increase her heart rate 60 or more minutes per day.  She exercises with enough effort to increase her heart rate 7 days per week.   She is taking medications regularly.         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  Did not see a provider at that time.  Pain has been off and on since then with limited range of motion right knee,She had an imaging of her right knee at a different location last July, with no acute finding.she was referred to Ortho but did not follow through.  She has a well-known aneurysm about 3 mm on MRI done last January, she has been followed by neurologist, no worsening headaches.  She also mentions some back pain, she has what seems to be a lipoma in the back, has been there for quite some time she also has one of the right forearm area.    Review of Systems   Constitutional, HEENT, cardiovascular, pulmonary, gi and gu systems are negative, except as otherwise noted.      Objective    /79 (BP Location: Left arm, Patient Position: Sitting, Cuff Size: Adult Large)   Pulse 66   Temp 97.1  F (36.2  C) (Temporal)   Resp 16   Ht 1.82 m (5' 11.65\")   Wt 107.5 kg (237 lb)   LMP 10/02/2023 (Approximate)   SpO2 98%   BMI 32.45 kg/m    Body mass index is 32.45 kg/m .  Physical Exam   GENERAL: healthy, alert and no distress  NECK: no adenopathy, no asymmetry, masses, or scars and thyroid normal to palpation  RESP: lungs clear to auscultation - no rales, rhonchi or wheezes  CV: regular rate and rhythm, normal S1 S2, no S3 or S4, no murmur, click or rub, no peripheral edema and peripheral pulses strong  ABDOMEN: soft, nontender, no hepatosplenomegaly, no masses " and bowel sounds normal  MS: Mild swelling right knee with limited range of motion, mild tenderness medial aspect right knee.  Skin: about 3 to 4 cm soft mass lower back area, minimally tender.  She had a similar mass but smaller size at the right forearm area.      This note was completed in part using a voice recognition software, any grammatical or context distortion are unintentional and inherent to the software.          Prior to immunization administration, verified patients identity using patient s name and date of birth. Please see Immunization Activity for additional information.     Screening Questionnaire for Adult Immunization    Are you sick today?   No   Do you have allergies to medications, food, a vaccine component or latex?   Yes   Have you ever had a serious reaction after receiving a vaccination?   No   Do you have a long-term health problem with heart, lung, kidney, or metabolic disease (e.g., diabetes), asthma, a blood disorder, no spleen, complement component deficiency, a cochlear implant, or a spinal fluid leak?  Are you on long-term aspirin therapy?   Yes   Do you have cancer, leukemia, HIV/AIDS, or any other immune system problem?   No   Do you have a parent, brother, or sister with an immune system problem?   Yes   In the past 3 months, have you taken medications that affect  your immune system, such as prednisone, other steroids, or anticancer drugs; drugs for the treatment of rheumatoid arthritis, Crohn s disease, or psoriasis; or have you had radiation treatments?   No   Have you had a seizure, or a brain or other nervous system problem?   No   During the past year, have you received a transfusion of blood or blood    products, or been given immune (gamma) globulin or antiviral drug?   No   For women: Are you pregnant or is there a chance you could become       pregnant during the next month?   Yes   Have you received any vaccinations in the past 4 weeks?   No     Immunization  questionnaire was positive for at least one answer.  Notified provider.      Patient instructed to remain in clinic for 15 minutes afterwards, and to report any adverse reactions.     Screening performed by Bishop Recio MA on 10/25/2023 at 2:38 PM.

## 2023-10-30 ENCOUNTER — PATIENT OUTREACH (OUTPATIENT)
Dept: CARE COORDINATION | Facility: CLINIC | Age: 38
End: 2023-10-30
Payer: COMMERCIAL

## 2023-10-30 NOTE — LETTER
[facility] CARE COORDINATION  No primary provider on file.    October 30, 2023    Holly Tolliver  [address] [address] [place] [id number]      Dear Holly,    I am a clinic care coordinator who works with No primary care provider on file. with the [facility]. I recently tried to call and was unable to reach you. Below is a description of clinic care coordination and how I can further assist you.       The clinic care coordination team is made up of a registered nurse, , financial resource worker and community health worker who understand the health care system. The goal of clinic care coordination is to help you manage your health and improve access to the health care system. Our team works alongside your provider to assist you in determining your health and social needs. We can help you obtain health care and community resources, providing you with necessary information and education. We can work with you through any barriers and develop a care plan that helps coordinate and strengthen the communication between you and your care team.  Our services are voluntary and are offered without charge to you personally.    Please feel free to contact me with any questions or concerns regarding care coordination and what we can offer.      We are focused on providing you with the highest-quality healthcare experience possible.    Sincerely,     Deedee Estrada, FREDY   Social Work Care Coordinator   [facility]    [phone]

## 2023-10-30 NOTE — PROGRESS NOTES
Clinic Care Coordination Contact  [facility]/Voicemail    Clinical Data: Care Coordinator Outreach    Outreach Documentation Number of Outreach Attempt   10/30/2023   1:31 PM 1       Left message on patient's voicemail with call back information and requested return call.    Plan: Care Coordinator will send unable to contact letter with care coordinator contact information via [employer]. Care Coordinator will try to reach patient again in 1-2 business days.    FREDY Pisano   Social Work Care Coordinator   [facility]    [phone]

## 2023-11-02 ENCOUNTER — PATIENT OUTREACH (OUTPATIENT)
Dept: CARE COORDINATION | Facility: CLINIC | Age: 38
End: 2023-11-02
Payer: COMMERCIAL

## 2023-11-02 NOTE — PROGRESS NOTES
Clinic Care Coordination Contact  [facility]/Voicemail    Clinical Data: Care Coordinator Outreach    Outreach Documentation Number of Outreach Attempt   10/30/2023   1:31 PM 1   11/2/2023   8:59 AM 2       Left message on patient's voicemail with call back information and requested return call.    Plan: Care Coordinator will send unable to contact letter with care coordinator contact information via mail. Care Coordinator will do no further outreaches at this time.    FREDY Pisano   Social Work Care Coordinator   [facility]    [phone]

## 2024-01-02 ENCOUNTER — TELEPHONE (OUTPATIENT)
Dept: NEUROSURGERY | Facility: CLINIC | Age: 39
End: 2024-01-02
Payer: COMMERCIAL

## 2024-01-23 ENCOUNTER — TELEPHONE (OUTPATIENT)
Dept: FAMILY MEDICINE | Facility: CLINIC | Age: 39
End: 2024-01-23
Payer: COMMERCIAL

## 2024-01-23 NOTE — LETTER
January 30, 2024      Holly Tolliver  [address] [address] [place] [id number]        Dear Holly,     Your healthcare team cares about your health. To provide you with the best care, we have reviewed your chart and based on our findings, we see that you are due for:   An Asthma Control Test (ACT) that our clinic uses to monitor and manage your asthma. This test is an assessment tool that we use to determine how well your asthma is controlled.  Please complete the enclosed form and return in the provided envelope.  Thank you for choosing [facility] for your healthcare needs. For any questions, concerns, or to schedule an appointment please contact the clinic.   Healthy Regards,    Your [facility] Care Team

## 2024-01-23 NOTE — TELEPHONE ENCOUNTER
Patient Quality Outreach    Patient is due for the following:   Asthma  -  ACT needed    Next Steps:   Patient was assigned appropriate questionnaire to complete    Type of outreach:    Sent [employer] message.  Will postpone x 1 week, if not viewed or completed please mail ACT.         Questions for provider review:    None           Cecille Pete, CMA

## 2024-01-30 NOTE — TELEPHONE ENCOUNTER
Patient Quality Outreach    Patient is due for the following:   Asthma  -  ACT needed    Next Steps:   Patient was assigned appropriate questionnaire to complete    Type of outreach:    [employer] message has not been viewed, letter mailed with ACT to complete      Questions for provider review:    None           Cecille Pete, CMA

## 2024-02-03 ENCOUNTER — TELEPHONE (OUTPATIENT)
Dept: NEUROSURGERY | Facility: CLINIC | Age: 39
End: 2024-02-03
Payer: COMMERCIAL

## 2024-02-05 NOTE — PROGRESS NOTES
ASSESSMENT & PLAN    Ana was seen today for injury.    Diagnoses and all orders for this visit:    Right knee pain, unspecified chronicity  -     Orthopedic  Referral      This issue is chronic and Unchanged.        ICD-10-CM    1. Right knee pain, unspecified chronicity  M25.561 Orthopedic  Referral        Patient Instructions   We discussed these other possible diagnosis: Right knee pain, concern for meniscal tear v. Patellar maltracking.    Plan:  - Today's Plan of Care:  Discussed activity considerations and other supportive care including Ice/Heat, OTC and other topical medications as needed.    Discussed bracing options - patellar stabilizing brace    Home Exercise Program    -We also discussed other future treatment options:  MRI Right Knee  Referral to Physical Therapy    Follow Up: 6 - 8 weeks or after MRI    Concerning signs and symptoms were reviewed and all questions were answered at this time.    Thanks for the opportunity to participate in the care of this patient, I will keep you updated on their progress.    CC: Jose Quach MD [facility]  Sports Medicine Physician  [facility]    -----  Chief Complaint   Patient presents with    Right Knee - Injury       JESSIKA Tolliver is a/an 38 year old female who is seen in consultation at the request of  Jose Zapata M.D. for evaluation of right knee pain.     The patient is seen by themselves.    Onset: 6/29/23. 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   General: healthy, alert and in no distress  Skin: no suspicious lesions or rash.  CV: distal perfusion intact  Resp: normal respiratory effort without conversational dyspnea   Psych: normal mood and affect  Gait: NORMAL  Neuro: Normal light sensory exam of lower extremity    Right Knee exam  Inspection:      no effusion or swelling right    Patella:      Mobility -       hypomobile right    Tender:      medial patellar border right       medial joint line right    Non Tender:      remainder of knee area right    Knee ROM:      Range of motion limited in full flexion and extension right    Strength:      5-/5 with knee extension right    Special Tests:     positive (+) Giacomo right       neg (-) anterior drawer right       neg (-) posterior drawer right       neg (-) varus at 0 deg and 30 deg right       neg (-) valgus at 0 deg and 30 deg right    Gait:      normal    Neurovascular:      2+ peripheral pulses bilaterally and brisk capillary refill       sensation grossly intact      RADIOLOGY:  Final results and radiologist's interpretation, available in the [facility] health record.  Images were reviewed with the patient in the office today.  My personal interpretation of the performed imaging:    Reviewed right knee XR 7/10/2023 - no acute bony abnormality, no significant degenerative change    Results for orders placed or performed in visit on 07/10/23   XR Knee Right 3 Views    Narrative    EXAM: XR KNEE RIGHT 3 VIEWS  LOCATION: [facility]  DATE: 7/10/2023    INDICATION:  Acute pain of right knee  COMPARISON: None.      Impression    IMPRESSION: Normal joint spaces and alignment. No  fracture or joint effusion.     Reviewed ED and PCP notes  Review of the result(s) of each unique test - XR

## 2024-02-06 ENCOUNTER — TELEPHONE (OUTPATIENT)
Dept: NEUROSURGERY | Facility: CLINIC | Age: 39
End: 2024-02-06

## 2024-02-07 ENCOUNTER — OFFICE VISIT (OUTPATIENT)
Dept: ORTHOPEDICS | Facility: CLINIC | Age: 39
End: 2024-02-07
Attending: FAMILY MEDICINE
Payer: COMMERCIAL

## 2024-02-07 DIAGNOSIS — M25.561 RIGHT KNEE PAIN, UNSPECIFIED CHRONICITY: ICD-10-CM

## 2024-02-07 PROCEDURE — 99204 OFFICE O/P NEW MOD 45 MIN: CPT | Performed by: PEDIATRICS

## 2024-02-07 NOTE — LETTER
2/7/2024         RE: Holly Tolliver  [address] [facility] [address]        Dear Colleague,    Thank you for referring your patient, Holly Tolliver, to the [facility] [place]. Please see a copy of my visit note below.    ASSESSMENT & PLAN    Ana was seen today for injury.    Diagnoses and all orders for this visit:    Right knee pain, unspecified chronicity  -     Orthopedic  Referral      This issue is chronic and Unchanged.        ICD-10-CM    1. Right knee pain, unspecified chronicity  M25.561 Orthopedic  Referral        Patient Instructions   We discussed these other possible diagnosis: Right knee pain, concern for meniscal tear v. Patellar maltracking.    Plan:  - Today's Plan of Care:  Discussed activity considerations and other supportive care including Ice/Heat, OTC and other topical medications as needed.    Discussed bracing options - patellar stabilizing brace    Home Exercise Program    -We also discussed other future treatment options:  MRI Right Knee  Referral to Physical Therapy    Follow Up: 6 - 8 weeks or after MRI    Concerning signs and symptoms were reviewed and all questions were answered at this time.    Thanks for the opportunity to participate in the care of this patient, I will keep you updated on their progress.    CC: Jose Quach MD [facility]  Sports Medicine Physician  [facility]    -----  Chief Complaint   Patient presents with     Right Knee - Injury       SUBJECTIVE  Holly Tolliver is a/an 38 year old female who is seen in consultation at the request of  Jose Zapata M.D. for evaluation of right knee pain.     The patient is seen by themselves.    Onset: 6/29/23. 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   General: healthy, alert and in no distress  Skin: no suspicious lesions or rash.  CV: distal perfusion intact  Resp: normal respiratory effort without conversational dyspnea   Psych: normal mood and affect  Gait: NORMAL  Neuro: Normal light sensory exam of lower extremity    Right Knee exam  Inspection:      no effusion or swelling right    Patella:      Mobility -       hypomobile right    Tender:      medial patellar border right       medial joint line right    Non Tender:      remainder of knee area right    Knee ROM:      Range of motion limited in full flexion and extension right    Strength:      5-/5 with knee extension right    Special Tests:     positive (+) Giacomo right       neg (-) anterior drawer right       neg (-) posterior drawer right       neg (-) varus at 0 deg and 30 deg right       neg (-) valgus at 0 deg and 30 deg right    Gait:      normal    Neurovascular:      2+ peripheral pulses bilaterally and brisk capillary refill       sensation grossly intact      RADIOLOGY:  Final results and radiologist's interpretation, available in the [facility] health record.  Images were reviewed with the patient in the office today.  My personal interpretation of the performed imaging:    Reviewed right knee XR 7/10/2023 - no acute bony abnormality, no significant degenerative change    Results for orders placed or performed in visit on  07/10/23   XR Knee Right 3 Views    Narrative    EXAM: XR KNEE RIGHT 3 VIEWS  LOCATION: [facility]  DATE: 7/10/2023    INDICATION:  Acute pain of right knee  COMPARISON: None.      Impression    IMPRESSION: Normal joint spaces and alignment. No fracture or joint effusion.     Reviewed ED and PCP notes  Review of the result(s) of each unique test - XR             Again, thank you for allowing me to participate in the care of your patient.        Sincerely,        Paula Quach MD

## 2024-02-07 NOTE — PATIENT INSTRUCTIONS
We discussed these other possible diagnosis: Right knee pain, concern for meniscal tear v. Patellar maltracking.    Plan:  - Today's Plan of Care:  Discussed activity considerations and other supportive care including Ice/Heat, OTC and other topical medications as needed.    Discussed bracing options - patellar stabilizing brace    Home Exercise Program    -We also discussed other future treatment options:  MRI Right Knee  Referral to Physical Therapy    Follow Up: 6 - 8 weeks or after MRI    If you have any further questions for your physician or physician s care team you can call [phone] and use option 3 to leave a voice message.

## 2024-02-19 ENCOUNTER — OFFICE VISIT (OUTPATIENT)
Dept: FAMILY MEDICINE | Facility: CLINIC | Age: 39
End: 2024-02-19
Payer: COMMERCIAL

## 2024-02-19 VITALS
HEIGHT: 72 IN | TEMPERATURE: 98.1 F | WEIGHT: 238 LBS | OXYGEN SATURATION: 98 % | DIASTOLIC BLOOD PRESSURE: 74 MMHG | SYSTOLIC BLOOD PRESSURE: 118 MMHG | RESPIRATION RATE: 20 BRPM | HEART RATE: 73 BPM | BODY MASS INDEX: 32.23 KG/M2

## 2024-02-19 DIAGNOSIS — Z32.00 ENCOUNTER FOR PREGNANCY TEST, RESULT UNKNOWN: Primary | ICD-10-CM

## 2024-02-19 DIAGNOSIS — Z32.01 PREGNANCY TEST POSITIVE: ICD-10-CM

## 2024-02-19 LAB — HCG UR QL: POSITIVE

## 2024-02-19 PROCEDURE — 99213 OFFICE O/P EST LOW 20 MIN: CPT | Performed by: FAMILY MEDICINE

## 2024-02-19 PROCEDURE — 81025 URINE PREGNANCY TEST: CPT | Performed by: FAMILY MEDICINE

## 2024-02-19 ASSESSMENT — PAIN SCALES - GENERAL: PAINLEVEL: NO PAIN (0)

## 2024-02-19 ASSESSMENT — ASTHMA QUESTIONNAIRES
ACT_TOTALSCORE: 21
QUESTION_4 LAST FOUR WEEKS HOW OFTEN HAVE YOU USED YOUR RESCUE INHALER OR NEBULIZER MEDICATION (SUCH AS ALBUTEROL): NOT AT ALL
QUESTION_5 LAST FOUR WEEKS HOW WOULD YOU RATE YOUR ASTHMA CONTROL: WELL CONTROLLED
QUESTION_1 LAST FOUR WEEKS HOW MUCH OF THE TIME DID YOUR ASTHMA KEEP YOU FROM GETTING AS MUCH DONE AT WORK, SCHOOL OR AT HOME: NONE OF THE TIME
QUESTION_2 LAST FOUR WEEKS HOW OFTEN HAVE YOU HAD SHORTNESS OF BREATH: ONCE A DAY
QUESTION_3 LAST FOUR WEEKS HOW OFTEN DID YOUR ASTHMA SYMPTOMS (WHEEZING, COUGHING, SHORTNESS OF BREATH, CHEST TIGHTNESS OR PAIN) WAKE YOU UP AT NIGHT OR EARLIER THAN USUAL IN THE MORNING: NOT AT ALL
ACT_TOTALSCORE: 21

## 2024-02-19 ASSESSMENT — PATIENT HEALTH QUESTIONNAIRE - PHQ9
SUM OF ALL RESPONSES TO PHQ QUESTIONS 1-9: 10
SUM OF ALL RESPONSES TO PHQ QUESTIONS 1-9: 10
10. IF YOU CHECKED OFF ANY PROBLEMS, HOW DIFFICULT HAVE THESE PROBLEMS MADE IT FOR YOU TO DO YOUR WORK, TAKE CARE OF THINGS AT HOME, OR GET ALONG WITH OTHER PEOPLE: EXTREMELY DIFFICULT

## 2024-02-19 NOTE — PROGRESS NOTES
"S :Holly Tolliver is a 38 year old female with positive preg test.  Here with BF.    Is planning on delivering at [facility].  Liked her previous OB there    Is going to start prenatal vitamin.  No meds of concern      O:/74   Pulse 73   Temp 98.1  F (36.7  C) (Tympanic)   Resp 20   Ht 1.82 m (5' 11.65\")   Wt 108 kg (238 lb)   LMP 01/01/2024   SpO2 98%   BMI 32.59 kg/m    GEN: Alert and oriented, in no acute distress    A; pregnancy positive    P: gave her octavia gallagher's number.  Start prenatal.  Follow-up with OB  "

## 2024-03-05 ENCOUNTER — VIRTUAL VISIT (OUTPATIENT)
Dept: OBGYN | Facility: CLINIC | Age: 39
End: 2024-03-05
Payer: COMMERCIAL

## 2024-03-05 DIAGNOSIS — Z34.80 PRENATAL CARE OF MULTIGRAVIDA, ANTEPARTUM: Primary | ICD-10-CM

## 2024-03-05 PROCEDURE — 99207 PR NO CHARGE NURSE ONLY: CPT | Mod: 93

## 2024-03-05 NOTE — PATIENT INSTRUCTIONS
Learning About Pregnancy  Your Care Instructions     Your health in the early weeks of your pregnancy is particularly important for your baby's health. Take good care of yourself. Anything you do that harms your body can also harm your baby.  Make sure to go to all of your doctor appointments. Regular checkups will help keep you and your baby healthy.  How can you care for yourself at home?  Diet    Eat a balanced diet. Make sure your diet includes plenty of beans, peas, and leafy green vegetables.     Do not skip meals or go for many hours without eating. If you are nauseated, try to eat a small, healthy snack every 2 to 3 hours.     Do not eat fish that has a high level of mercury, such as shark, swordfish, or mackerel. Do not eat more than one can of tuna each week.     Drink plenty of fluids. If you have kidney, heart, or liver disease and have to limit fluids, talk with your doctor before you increase the amount of fluids you drink.     Cut down on caffeine, such as coffee, tea, and cola.     Do not drink alcohol, such as beer, wine, or hard liquor.     Take a multivitamin that contains at least 400 micrograms (mcg) of folic acid to help prevent birth defects. Fortified cereal and whole wheat bread are good additional sources of folic acid.     Increase the calcium in your diet. Try to drink a quart of skim milk each day. You may also take calcium supplements and choose foods such as cheese and yogurt.   Lifestyle    Make sure you go to your follow-up appointments.     Get plenty of rest. You may be unusually tired while you are pregnant.     Get at least 30 minutes of exercise on most days of the week. Walking is a good choice. If you have not exercised in the past, start out slowly. Take several short walks each day.     Do not smoke. If you need help quitting, talk to your doctor about stop-smoking programs. These can increase your chances of quitting for good.     Do not touch cat feces or litter boxes.  Also, wash your hands after you handle raw meat, and fully cook all meat before you eat it. Wear gloves when you work in the yard or garden, and wash your hands well when you are done. Cat feces, raw or undercooked meat, and contaminated dirt can cause an infection that may harm your baby or lead to a miscarriage.     Avoid things that can make your body too hot and may be harmful to your baby, such as a hot tub or sauna. Or talk with your doctor before doing anything that raises your body temperature. Your doctor can tell you if it's safe.     Avoid chemical fumes, paint fumes, or poisons.     Do not use illegal drugs, marijuana, or alcohol.   Medicines    Review all of your medicines with your doctor. Some of your routine medicines may need to be changed to protect your baby.     Use acetaminophen (Tylenol) to relieve minor problems, such as a mild headache or backache or a mild fever with cold symptoms. Do not use nonsteroidal anti-inflammatory drugs (NSAIDs), such as ibuprofen (Advil, Motrin) or naproxen (Aleve), unless your doctor says it is okay.     Do not take two or more pain medicines at the same time unless the doctor told you to. Many pain medicines have acetaminophen, which is Tylenol. Too much acetaminophen (Tylenol) can be harmful.     Take your medicines exactly as prescribed. Call your doctor if you think you are having a problem with your medicine.   To manage morning sickness    If you feel sick when you first wake up, try eating a small snack (such as crackers) before you get out of bed. Allow some time to digest the snack, and then get out of bed slowly.     Do not skip meals or go for long periods without eating. An empty stomach can make nausea worse.     Eat small, frequent meals instead of three large meals each day.     Drink plenty of fluids.     Eat foods that are high in protein but low in fat.     If you are taking iron supplements, ask your doctor if they are necessary. Iron can make  "nausea worse.     Avoid any smells, such as coffee, that make you feel sick.     Get lots of rest. Morning sickness may be worse when you are tired.   Follow-up care is a key part of your treatment and safety. Be sure to make and go to all appointments, and call your doctor if you are having problems. It's also a good idea to know your test results and keep a list of the medicines you take.  Where can you learn more?  Go to https://www.[employer] [email or web address].net/patiented  Enter E868 in the search box to learn more about \"Learning About Pregnancy.\"  Current as of: July 11, 2023               Content Version: 13.8    8941-9890 [employer].   Care instructions adapted under license by your healthcare professional. If you have questions about a medical condition or this instruction, always ask your healthcare professional. [employer] disclaims any warranty or liability for your use of this information.      Weeks 6 to 10 of Your Pregnancy: Care Instructions  During these weeks of pregnancy, your body goes through many changes. You may start to feel different, both in your body and your emotions. Each pregnancy is different, so there's no \"right\" way to feel. These early weeks are a time to make healthy choices for you and your pregnancy.    Take a daily prenatal vitamin. Choose one with folic acid in it.   Avoid alcohol, tobacco, and drugs (including marijuana). If you need help quitting, talk to your doctor.     Drink plenty of liquids.  Be sure to drink enough water. And limit sodas, other sweetened drinks, and caffeine.     Choose foods that are good sources of calcium, iron, and folate.  You can try dairy products, dark leafy greens, fortified orange juice and cereals, almonds, broccoli, dried fruit, and beans.     Avoid foods that may be harmful.  Don't eat raw meat, deli meat, raw seafood, or raw eggs. Avoid soft cheese and unpasteurized dairy, like Brie and blue cheese. And don't eat fish that " "contains a lot of mercury, like shark and swordfish.     Don't touch jimi litter or cat poop.  They can cause an infection that could be harmful during pregnancy.     Avoid things that can make your body too hot.  For example, avoid hot tubs and saunas.     Soothe morning sickness.  Try eating 5 or 6 small meals a day, getting some fresh air, or using warren to control symptoms.     Ask your doctor about flu and COVID-19 shots.  Getting them can help protect against infection.   Follow-up care is a key part of your treatment and safety. Be sure to make and go to all appointments, and call your doctor if you are having problems. It's also a good idea to know your test results and keep a list of the medicines you take.  Where can you learn more?  Go to https://www.Diagnovus.ATG Access/patiented  Enter G112 in the search box to learn more about \"Weeks 6 to 10 of Your Pregnancy: Care Instructions.\"  Current as of: July 11, 2023               Content Version: 13.8 2006-2023 [employer].   Care instructions adapted under license by your healthcare professional. If you have questions about a medical condition or this instruction, always ask your healthcare professional. [employer] disclaims any warranty or liability for your use of this information.         Managing Morning Sickness (01:55)  Your health professional recommends that you watch this short online health video.  Learn tips for dealing with morning sickness, no matter what time of day you have it.  Purpose:  Gives tips for managing morning sickness, including eating small low-fat meals and avoiding caffeine and spicy food.  Goal:  The user will learn tips for dealing with morning sickness during pregnancy.     How to watch the video    Scan the QR code   OR Visit the website    [email or web address] [email or web address]   Current as of: July 11, 2023               Content Version: 13.8 2006-2023 [employer].   Care " instructions adapted under license by your healthcare professional. If you have questions about a medical condition or this instruction, always ask your healthcare professional. [employer] disclaims any warranty or liability for your use of this information.      Pregnancy and Heartburn: Care Instructions  Overview     Heartburn is a common problem during pregnancy.  Heartburn happens when stomach acid backs up into the tube that carries food to the stomach. This tube is called the esophagus. Early in pregnancy, heartburn is caused by hormone changes that slow down digestion. Later on, it's also caused by the large uterus pushing up on the stomach.  Even though you can't fix the cause, there are things you can do to get relief. Treating heartburn during pregnancy focuses first on making lifestyle changes, like changing what and how you eat, and on taking medicines.  Heartburn usually improves or goes away after childbirth.  Follow-up care is a key part of your treatment and safety. Be sure to make and go to all appointments, and call your doctor if you are having problems. It's also a good idea to know your test results and keep a list of the medicines you take.  How can you care for yourself at home?  Eat small, frequent meals.  Avoid foods that make your symptoms worse, such as chocolate, peppermint, and spicy foods. Avoid drinks with caffeine, such as coffee, tea, and sodas.  Avoid bending over or lying down after meals.  Take a short walk after you eat.  If heartburn is a problem at night, do not eat for 2 hours before bedtime.  Take antacids like Mylanta, Maalox, Rolaids, or Tums. Do not take antacids that have sodium bicarbonate, magnesium trisilicate, or aspirin. Be careful when you take over-the-counter antacid medicines. Many of these medicines have aspirin in them. While you are pregnant, do not take aspirin or medicines that contain aspirin unless your doctor says it is okay.  If you're not  "getting relief, talk to your doctor. You may be able to take a stronger acid-reducing medicine.  When should you call for help?   Call your doctor now or seek immediate medical care if:    You have new or worse belly pain.     You are vomiting.   Watch closely for changes in your health, and be sure to contact your doctor if:    You have new or worse symptoms of reflux.     You are losing weight.     You have trouble or pain swallowing.     You do not get better as expected.   Where can you learn more?  Go to https://www.[employer].net/patiented  Enter U946 in the search box to learn more about \"Pregnancy and Heartburn: Care Instructions.\"  Current as of: July 11, 2023               Content Version: 13.8    0284-5581 [employer].   Care instructions adapted under license by your healthcare professional. If you have questions about a medical condition or this instruction, always ask your healthcare professional. [employer] disclaims any warranty or liability for your use of this information.      Constipation: Care Instructions  Overview     Constipation means that you have a hard time passing stools (bowel movements). People pass stools from 3 times a day to once every 3 days. What is normal for you may be different. Constipation may occur with pain in the rectum and cramping. The pain may get worse when you try to pass stools. Sometimes there are small amounts of bright red blood on toilet paper or the surface of stools. This is because of enlarged veins near the rectum (hemorrhoids).  A few changes in your diet and lifestyle may help you avoid ongoing constipation. Your doctor may also prescribe medicine to help loosen your stool.  Some medicines can cause constipation. These include pain medicines and antidepressants. Tell your doctor about all the medicines you take. Your doctor may want to make a medicine change to ease your symptoms.  Follow-up care is a key part of your treatment " "and safety. Be sure to make and go to all appointments, and call your doctor if you are having problems. It's also a good idea to know your test results and keep a list of the medicines you take.  How can you care for yourself at home?  Drink plenty of fluids. If you have kidney, heart, or liver disease and have to limit fluids, talk with your doctor before you increase the amount of fluids you drink.  Include high-fiber foods in your diet each day. These include fruits, vegetables, beans, and whole grains.  Get at least 30 minutes of exercise on most days of the week. Walking is a good choice. You also may want to do other activities, such as running, swimming, cycling, or playing tennis or team sports.  Take a fiber supplement, such as Citrucel or Metamucil, every day. Read and follow all instructions on the label.  Schedule time each day for a bowel movement. A daily routine may help. Take your time having a bowel movement, but don't sit for more than 10 minutes at a time. And don't strain too much.  Support your feet with a small step stool when you sit on the toilet. This helps flex your hips and places your pelvis in a squatting position.  Your doctor may recommend an over-the-counter laxative to relieve your constipation. Examples are Milk of Magnesia and MiraLax. Read and follow all instructions on the label. Do not use laxatives on a long-term basis.  When should you call for help?   Call your doctor now or seek immediate medical care if:    You have new or worse belly pain.     You have new or worse nausea or vomiting.     You have blood in your stools.   Watch closely for changes in your health, and be sure to contact your doctor if:    Your constipation is getting worse.     You do not get better as expected.   Where can you learn more?  Go to https://www.healthwise.net/patiented  Enter P343 in the search box to learn more about \"Constipation: Care Instructions.\"  Current as of: March 21, " 2023               Content Version: 13.8    3387-6569 [employer].   Care instructions adapted under license by your healthcare professional. If you have questions about a medical condition or this instruction, always ask your healthcare professional. [employer] disclaims any warranty or liability for your use of this information.      Vaginal Bleeding During Pregnancy: Care Instructions  Overview     It's common to have some vaginal spotting when you are pregnant. In some cases, the bleeding isn't serious. And there aren't any more problems with the pregnancy.  But sometimes bleeding is a sign of a more serious problem. This is more common if the bleeding is heavy or painful. Examples of more serious problems include miscarriage, an ectopic pregnancy, and a problem with the placenta.  You may have to see your doctor again to be sure everything is okay. You may also need more tests to find the cause of the bleeding.  Home treatment may be all you need. But it depends on what is causing the bleeding. Be sure to tell your doctor if you have any new symptoms or if your symptoms get worse.  The doctor has checked you carefully, but problems can develop later. If you notice any problems or new symptoms, get medical treatment right away.  Follow-up care is a key part of your treatment and safety. Be sure to make and go to all appointments, and call your doctor if you are having problems. It's also a good idea to know your test results and keep a list of the medicines you take.  How can you care for yourself at home?  If your doctor prescribed medicines, take them exactly as directed. Call your doctor if you think you are having a problem with your medicine.  Do not have vaginal sex until your doctor says it's okay.  Do not put anything in your vagina until your doctor says it's okay.  Ask your doctor about other activities you can or can't do.  Get a lot of rest. Being pregnant can make you  "tired.  Do not use nonsteroidal anti-inflammatory drugs (NSAIDs), such as ibuprofen (Advil, Motrin), naproxen (Aleve), or aspirin, unless your doctor says it is okay.  When should you call for help?   Call 911 anytime you think you may need emergency care. For example, call if:    You passed out (lost consciousness).     You have severe vaginal bleeding. This means you are soaking through a pad each hour for 2 or more hours.     You have sudden, severe pain in your belly or pelvis.   Call your doctor now or seek immediate medical care if:    You have new or worse vaginal bleeding.     You are dizzy or lightheaded, or you feel like you may faint.     You have pain in your belly, pelvis, or lower back.     You think that you are in labor.     You have a sudden release of fluid from your vagina.     You've been having regular contractions for an hour. This means that you've had at least 8 contractions within 1 hour or at least 4 contractions within 20 minutes, even after you change your position and drink fluids.     You notice that your baby has stopped moving or is moving much less than normal.   Watch closely for changes in your health, and be sure to contact your doctor if you have any problems.  Where can you learn more?  Go to https://www.[employer].net/patiented  Enter N829 in the search box to learn more about \"Vaginal Bleeding During Pregnancy: Care Instructions.\"  Current as of: July 11, 2023               Content Version: 13.8    1331-3638 [employer].   Care instructions adapted under license by your healthcare professional. If you have questions about a medical condition or this instruction, always ask your healthcare professional. [employer] disclaims any warranty or liability for your use of this information.      Nutrition During Pregnancy: Care Instructions  Overview     Healthy eating when you are pregnant is important for you and your baby. It can help you feel well and " have a successful pregnancy and delivery. During pregnancy your nutrition needs increase. Even if you have excellent eating habits, your doctor may recommend a multivitamin to make sure you get enough iron and folic acid.  You may wonder how much weight you should gain. In general, if you were at a healthy weight before you became pregnant, then you should gain between 25 and 35 pounds. If you were overweight before pregnancy, then you'll likely be advised to gain 15 to 25 pounds. If you were underweight before pregnancy, then you'll probably be advised to gain 28 to 40 pounds. Your doctor will work with you to set a weight goal that is right for you. Gaining a healthy amount of weight helps you have a healthy baby.  Follow-up care is a key part of your treatment and safety. Be sure to make and go to all appointments, and call your doctor if you are having problems. It's also a good idea to know your test results and keep a list of the medicines you take.  How can you care for yourself at home?  Eat plenty of fruits and vegetables. Include a variety of orange, yellow, and leafy dark-green vegetables every day.  Choose whole-grain bread, cereal, and pasta. Good choices include whole wheat bread, whole wheat pasta, brown rice, and oatmeal.  Get 4 or more servings of milk and milk products each day. Good choices include nonfat or low-fat milk, yogurt, and cheese. If you cannot eat milk products, you can get calcium from calcium-fortified products such as orange juice, soy milk, and tofu. Other non-milk sources of calcium include leafy green vegetables, such as broccoli, kale, mustard greens, turnip greens, bok jeannette, and brussels sprouts.  If you eat meat, pick lower-fat types. Good choices include lean cuts of meat and chicken or turkey without the skin.  Do not eat shark, swordfish, binu mackerel, or tilefish. They have high levels of mercury, which is dangerous to your baby. You can eat up to 12 ounces a week of fish  "or shellfish that have low mercury levels. Good choices include shrimp, wild salmon, pollock, and catfish. Limit some other types of fish, such as white (albacore) tuna, to 4 oz (0.1 kg) a week.  Heat lunch meats (such as turkey, ham, or bologna) to 165 F before you eat them. This reduces your risk of getting sick from a kind of bacteria that can be found in lunch meats.  Do not eat unpasteurized soft cheeses, such as brie, feta, fresh mozzarella, and blue cheese. They have a bacteria that could harm your baby.  Limit caffeine. If you drink coffee or tea, have no more than 1 cup a day. Caffeine is also found in conrado.  Do not drink any alcohol. No amount of alcohol has been found to be safe during pregnancy.  Do not diet or try to lose weight. For example, do not follow a low-carbohydrate diet. If you are overweight at the start of your pregnancy, your doctor will work with you to manage your weight gain.  Tell your doctor about all vitamins and supplements you take.  When should you call for help?  Watch closely for changes in your health, and be sure to contact your doctor if you have any problems.  Where can you learn more?  Go to https://www.[employer].net/patiented  Enter Y785 in the search box to learn more about \"Nutrition During Pregnancy: Care Instructions.\"  Current as of: February 28, 2023               Content Version: 13.8    3062-2153 [employer].   Care instructions adapted under license by your healthcare professional. If you have questions about a medical condition or this instruction, always ask your healthcare professional. [employer] disclaims any warranty or liability for your use of this information.      Exercise During Pregnancy: Care Instructions  Overview     Exercise is good for you during a healthy pregnancy. It can relieve back pain, swelling, and other discomforts. It also prepares your muscles for childbirth. And exercise can improve your energy level and " help you sleep better.  If your doctor advises it, get more exercise. For example, walking is a good choice. Bit by bit, increase the amount you walk every day. Try for at least 30 minutes on most days of the week. You could also try a prenatal exercise class. But if you do not already exercise, be sure to talk with your doctor before you start a new exercise program. Doctors do not recommend contact sports during pregnancy.  Follow-up care is a key part of your treatment and safety. Be sure to make and go to all appointments, and call your doctor if you are having problems. It's also a good idea to know your test results and keep a list of the medicines you take.  How can you care for yourself at home?  Talk with your doctor about the right kind of exercise for each stage of pregnancy.  Listen to your body to know if your exercise is at a safe level.  Do not become overheated while you exercise. High body temperature can be harmful. Avoid activities that can make your body too hot.  If you feel tired, take it easy. You might walk instead of run.  If you are used to strenuous exercise, ask your doctor how to know when it's time to slow down.  If you exercised before getting pregnant, you should be able to keep up your routine early in your pregnancy. Later in your pregnancy, you may want to switch to more gentle activities.  Drink plenty of fluids before, during, and after exercise.  Avoid contact sports, such as soccer and basketball. Also avoid risky activities. These include scuba diving, horseback riding, and exercising at a high altitude (above 6,000 feet). If you live in a place with a high altitude, talk to your doctor about how you can exercise safely.  Do not get overtired while you exercise. You should be able to talk while you work out.  After your fourth month of pregnancy, avoid exercises that require you to lie flat on your back on a hard surface. These include sit-ups and some yoga poses.  Get plenty  "of rest. You may be very tired while you are pregnant.  Where can you learn more?  Go to https://www.Perfect.net/patiented  Enter S801 in the search box to learn more about \"Exercise During Pregnancy: Care Instructions.\"  Current as of: July 11, 2023               Content Version: 13.8    5889-0045 [employer].   Care instructions adapted under license by your healthcare professional. If you have questions about a medical condition or this instruction, always ask your healthcare professional. [employer], [employer] disclaims any warranty or liability for your use of this information.      "

## 2024-03-05 NOTE — PROGRESS NOTES
Lifestyle and nutrition teaching completed   [facility] OB Intake Nurse    Patient supplied answers from flow sheet for:  Prenatal OB Questionnaire.  Past Medical History  Have you ever recieved care for your mental health? : (!) Yes  Have you ever been in a major accident or suffered serious trauma?: No  Within the last year, has anyone hit, slapped, kicked or otherwise hurt you?: No  In the last year, has anyone forced you to have sex when you didn't want to?: No    Past Medical History 2   Have you ever received a blood transfusion?: No  Would you accept a blood transfusion if was medically recommended?: Yes  Does anyone in your home smoke?: (!) Yes (roommate and FOB)   Is your blood type Rh negative?: No  Have you ever ?: (!) Yes  Have you been hospitalized for a nonsurgical reason excluding normal delivery?: No  Have you ever had an abnormal pap smear?: (!) Yes    Past Medical History (Continued)  Do you have a history of abnormalities of the uterus?: No  Did your mother take FUAD or any other hormones when she was pregnant with you?: Unknown  Do you have any other problems we have not asked about which you feel may be important to this pregnancy?: No

## 2024-03-14 LAB
ABO/RH(D): NORMAL
ANTIBODY SCREEN: NEGATIVE
SPECIMEN EXPIRATION DATE: NORMAL

## 2024-03-15 ENCOUNTER — PRENATAL OFFICE VISIT (OUTPATIENT)
Dept: OBGYN | Facility: CLINIC | Age: 39
End: 2024-03-15
Payer: COMMERCIAL

## 2024-03-15 VITALS
DIASTOLIC BLOOD PRESSURE: 85 MMHG | TEMPERATURE: 98.8 F | SYSTOLIC BLOOD PRESSURE: 126 MMHG | HEIGHT: 72 IN | BODY MASS INDEX: 32.64 KG/M2 | HEART RATE: 84 BPM | RESPIRATION RATE: 18 BRPM | WEIGHT: 241 LBS

## 2024-03-15 DIAGNOSIS — R11.0 NAUSEA: ICD-10-CM

## 2024-03-15 DIAGNOSIS — J45.20 MILD INTERMITTENT ASTHMA, UNSPECIFIED WHETHER COMPLICATED: ICD-10-CM

## 2024-03-15 DIAGNOSIS — Z34.80 PRENATAL CARE OF MULTIGRAVIDA, ANTEPARTUM: Primary | ICD-10-CM

## 2024-03-15 DIAGNOSIS — F41.9 ANXIETY: ICD-10-CM

## 2024-03-15 DIAGNOSIS — Z87.59 HISTORY OF GESTATIONAL HYPERTENSION: ICD-10-CM

## 2024-03-15 LAB
ALBUMIN MFR UR ELPH: 8.8 MG/DL
ALBUMIN SERPL BCG-MCNC: 4.3 G/DL (ref 3.5–5.2)
ALBUMIN UR-MCNC: NEGATIVE MG/DL
ALP SERPL-CCNC: 50 U/L (ref 40–150)
ALT SERPL W P-5'-P-CCNC: 13 U/L (ref 0–50)
ANION GAP SERPL CALCULATED.3IONS-SCNC: 11 MMOL/L (ref 7–15)
APPEARANCE UR: CLEAR
AST SERPL W P-5'-P-CCNC: 18 U/L (ref 0–45)
BACTERIA #/AREA URNS HPF: ABNORMAL /HPF
BILIRUB SERPL-MCNC: 0.4 MG/DL
BILIRUB UR QL STRIP: NEGATIVE
BUN SERPL-MCNC: 10.2 MG/DL (ref 6–20)
CALCIUM SERPL-MCNC: 9.5 MG/DL (ref 8.6–10)
CHLORIDE SERPL-SCNC: 100 MMOL/L (ref 98–107)
COLOR UR AUTO: YELLOW
CREAT SERPL-MCNC: 0.74 MG/DL (ref 0.51–0.95)
CREAT UR-MCNC: 151.5 MG/DL
DEPRECATED HCO3 PLAS-SCNC: 24 MMOL/L (ref 22–29)
EGFRCR SERPLBLD CKD-EPI 2021: >90 ML/MIN/1.73M2
ERYTHROCYTE [DISTWIDTH] IN BLOOD BY AUTOMATED COUNT: 12.9 % (ref 10–15)
GLUCOSE SERPL-MCNC: 88 MG/DL (ref 70–99)
GLUCOSE UR STRIP-MCNC: NEGATIVE MG/DL
HBA1C MFR BLD: 5 % (ref 0–5.6)
HCT VFR BLD AUTO: 39.9 % (ref 35–47)
HGB BLD-MCNC: 13.5 G/DL (ref 11.7–15.7)
HGB UR QL STRIP: NEGATIVE
KETONES UR STRIP-MCNC: NEGATIVE MG/DL
LEUKOCYTE ESTERASE UR QL STRIP: NEGATIVE
MCH RBC QN AUTO: 27.7 PG (ref 26.5–33)
MCHC RBC AUTO-ENTMCNC: 33.8 G/DL (ref 31.5–36.5)
MCV RBC AUTO: 82 FL (ref 78–100)
MUCOUS THREADS #/AREA URNS LPF: PRESENT /LPF
NITRATE UR QL: NEGATIVE
PH UR STRIP: 6 [PH] (ref 5–7)
PLATELET # BLD AUTO: 224 10E3/UL (ref 150–450)
POTASSIUM SERPL-SCNC: 3.8 MMOL/L (ref 3.4–5.3)
PROT SERPL-MCNC: 7.5 G/DL (ref 6.4–8.3)
PROT/CREAT 24H UR: 0.06 MG/MG CR (ref 0–0.2)
RBC # BLD AUTO: 4.87 10E6/UL (ref 3.8–5.2)
RBC #/AREA URNS AUTO: ABNORMAL /HPF
SODIUM SERPL-SCNC: 135 MMOL/L (ref 135–145)
SP GR UR STRIP: 1.01 (ref 1–1.03)
SQUAMOUS #/AREA URNS AUTO: ABNORMAL /LPF
UROBILINOGEN UR STRIP-ACNC: 0.2 E.U./DL
WBC # BLD AUTO: 7.2 10E3/UL (ref 4–11)
WBC #/AREA URNS AUTO: ABNORMAL /HPF

## 2024-03-15 PROCEDURE — 87340 HEPATITIS B SURFACE AG IA: CPT | Performed by: STUDENT IN AN ORGANIZED HEALTH CARE EDUCATION/TRAINING PROGRAM

## 2024-03-15 PROCEDURE — 76817 TRANSVAGINAL US OBSTETRIC: CPT | Performed by: STUDENT IN AN ORGANIZED HEALTH CARE EDUCATION/TRAINING PROGRAM

## 2024-03-15 PROCEDURE — 86900 BLOOD TYPING SEROLOGIC ABO: CPT | Performed by: STUDENT IN AN ORGANIZED HEALTH CARE EDUCATION/TRAINING PROGRAM

## 2024-03-15 PROCEDURE — 86706 HEP B SURFACE ANTIBODY: CPT | Performed by: STUDENT IN AN ORGANIZED HEALTH CARE EDUCATION/TRAINING PROGRAM

## 2024-03-15 PROCEDURE — 87389 HIV-1 AG W/HIV-1&-2 AB AG IA: CPT | Performed by: STUDENT IN AN ORGANIZED HEALTH CARE EDUCATION/TRAINING PROGRAM

## 2024-03-15 PROCEDURE — 87086 URINE CULTURE/COLONY COUNT: CPT | Performed by: STUDENT IN AN ORGANIZED HEALTH CARE EDUCATION/TRAINING PROGRAM

## 2024-03-15 PROCEDURE — 86803 HEPATITIS C AB TEST: CPT | Performed by: STUDENT IN AN ORGANIZED HEALTH CARE EDUCATION/TRAINING PROGRAM

## 2024-03-15 PROCEDURE — 86780 TREPONEMA PALLIDUM: CPT | Performed by: STUDENT IN AN ORGANIZED HEALTH CARE EDUCATION/TRAINING PROGRAM

## 2024-03-15 PROCEDURE — 86901 BLOOD TYPING SEROLOGIC RH(D): CPT | Performed by: STUDENT IN AN ORGANIZED HEALTH CARE EDUCATION/TRAINING PROGRAM

## 2024-03-15 PROCEDURE — 85027 COMPLETE CBC AUTOMATED: CPT | Performed by: STUDENT IN AN ORGANIZED HEALTH CARE EDUCATION/TRAINING PROGRAM

## 2024-03-15 PROCEDURE — 86704 HEP B CORE ANTIBODY TOTAL: CPT | Performed by: STUDENT IN AN ORGANIZED HEALTH CARE EDUCATION/TRAINING PROGRAM

## 2024-03-15 PROCEDURE — 36415 COLL VENOUS BLD VENIPUNCTURE: CPT | Performed by: STUDENT IN AN ORGANIZED HEALTH CARE EDUCATION/TRAINING PROGRAM

## 2024-03-15 PROCEDURE — 87591 N.GONORRHOEAE DNA AMP PROB: CPT | Performed by: STUDENT IN AN ORGANIZED HEALTH CARE EDUCATION/TRAINING PROGRAM

## 2024-03-15 PROCEDURE — 86762 RUBELLA ANTIBODY: CPT | Performed by: STUDENT IN AN ORGANIZED HEALTH CARE EDUCATION/TRAINING PROGRAM

## 2024-03-15 PROCEDURE — 80053 COMPREHEN METABOLIC PANEL: CPT | Performed by: STUDENT IN AN ORGANIZED HEALTH CARE EDUCATION/TRAINING PROGRAM

## 2024-03-15 PROCEDURE — 84156 ASSAY OF PROTEIN URINE: CPT | Performed by: STUDENT IN AN ORGANIZED HEALTH CARE EDUCATION/TRAINING PROGRAM

## 2024-03-15 PROCEDURE — 87491 CHLMYD TRACH DNA AMP PROBE: CPT | Performed by: STUDENT IN AN ORGANIZED HEALTH CARE EDUCATION/TRAINING PROGRAM

## 2024-03-15 PROCEDURE — 99203 OFFICE O/P NEW LOW 30 MIN: CPT | Mod: 25 | Performed by: STUDENT IN AN ORGANIZED HEALTH CARE EDUCATION/TRAINING PROGRAM

## 2024-03-15 PROCEDURE — 86850 RBC ANTIBODY SCREEN: CPT | Performed by: STUDENT IN AN ORGANIZED HEALTH CARE EDUCATION/TRAINING PROGRAM

## 2024-03-15 PROCEDURE — 81001 URINALYSIS AUTO W/SCOPE: CPT | Performed by: STUDENT IN AN ORGANIZED HEALTH CARE EDUCATION/TRAINING PROGRAM

## 2024-03-15 PROCEDURE — 83036 HEMOGLOBIN GLYCOSYLATED A1C: CPT | Performed by: STUDENT IN AN ORGANIZED HEALTH CARE EDUCATION/TRAINING PROGRAM

## 2024-03-15 RX ORDER — ONDANSETRON 4 MG/1
4 TABLET, ORALLY DISINTEGRATING ORAL EVERY 8 HOURS PRN
Qty: 30 TABLET | Refills: 0 | Status: ON HOLD | OUTPATIENT
Start: 2024-03-15 | End: 2024-09-17

## 2024-03-15 RX ORDER — MULTIVITAMIN WITH IRON
100 TABLET ORAL DAILY
Qty: 90 TABLET | Refills: 3 | Status: SHIPPED | OUTPATIENT
Start: 2024-03-15

## 2024-03-15 NOTE — PATIENT INSTRUCTIONS
"Learning About High-Iron Foods  What foods are high in iron?     The foods you eat contain nutrients, such as vitamins and minerals. Iron is a nutrient. Your body needs the right amount to stay healthy and work as it should. You can use the list below to help you make choices about which foods to eat.  Here are some foods that contain iron. They have 1 to 2 milligrams of iron per serving.  Fruits  Figs (dried), 5 figs  Vegetables  Asparagus (canned), 6 parks  [place], beet, Swiss chard, or turnip greens, 1 cup  Dried peas, cooked,   cup  Seaweed, spirulina (dried),   cup  Spinach, (cooked)   cup or (raw) 1 cup  Grains  Cereals, fortified with iron, 1 cup  Grits (instant, cooked), fortified with iron,   cup  Meats and other protein foods  Beans (kidney, lima, navy, white), canned or cooked,   cup  Beef or lamb, 3 oz  Chicken giblets, 3 oz  Chickpeas (garbanzo beans),   cup  Liver of beef, lamb, or pork, 3 oz  Oysters (cooked), 3 oz  Sardines (canned), 3 oz  Soybeans (boiled),   cup  Tofu (firm),   cup  Work with your doctor to find out how much of this nutrient you need. Depending on your health, you may need more or less of it in your diet.  Where can you learn more?  Go to https://www.Logicalware.net/patiented  Enter R005 in the search box to learn more about \"Learning About High-Iron Foods.\"  Current as of: September 20, 2023               Content Version: 14.0    7628-0334 [employer].   Care instructions adapted under license by your healthcare professional. If you have questions about a medical condition or this instruction, always ask your healthcare professional. [employer] disclaims any warranty or liability for your use of this information.      Rh Antibodies Screening During Pregnancy: About This Test  What is it?     The Rh antibodies screening test is a blood test. It checks your blood for Rh antibodies. If you have Rh-negative blood and have been exposed to Rh-positive blood, " "your immune system may make antibodies to attack the Rh-positive blood. When a pregnant woman has these antibodies, it is called Rh sensitization.  Why is this test done?  The Rh antibodies screening test is done during pregnancy to find out if your baby is at risk for Rh disease. This can happen if you have Rh-negative blood and your baby has Rh-positive blood. If your Rh-negative blood mixes with Rh-positive blood, your immune system will make antibodies to attack the Rh-positive blood.  During pregnancy, these antibodies could attach to the baby's red blood cells. This can cause your baby to have serious health problems. The results of this test will help your doctor know how to best care for you and your baby during your pregnancy.  How do you prepare for the test?  In general, there's nothing you have to do before this test, unless your doctor tells you to.  How is the test done?  A health professional uses a needle to take a blood sample, usually from the arm.  What happens after the test?  You will probably be able to go home right away. It depends on the reason for the test.  You can go back to your usual activities right away.  Follow-up care is a key part of your treatment and safety. Be sure to make and go to all appointments, and call your doctor if you are having problems. It's also a good idea to keep a list of the medicines you take. Ask your doctor when you can expect to have your test results.  Where can you learn more?  Go to https://www.12Society.net/patiented  Enter P722 in the search box to learn more about \"Rh Antibodies Screening During Pregnancy: About This Test.\"  Current as of: July 10, 2023               Content Version: 14.0    8765-8308 [employer].   Care instructions adapted under license by your healthcare professional. If you have questions about a medical condition or this instruction, always ask your healthcare professional. [employer] disclaims any " warranty or liability for your use of this information.      Learning About Preventing Rh Disease  What is Rh disease?     Rh disease can be a serious problem in pregnancy. It happens when substances called antibodies in the mother's blood cause red blood cells in her baby's blood to be destroyed. This can occur when the blood types of a mother and her baby do not match.  All blood has an Rh factor. This is what makes a blood type positive or negative. When you are Rh-negative, your baby may be Rh-negative or Rh-positive. If your baby has Rh-positive blood and it mixes with yours, your body will make antibodies. This is called Rh sensitization.  Most of the time, this is not a problem in a first pregnancy. But in future pregnancies, it could cause Rh disease.  A  with Rh disease has mild anemia and may have jaundice. In severe cases, anemia, jaundice, and swelling can be very dangerous or fatal. Some babies need to be delivered early. Some need special care in the NICU. A very sick baby will need a blood transfusion before or after birth.  Fortunately, Rh sensitization is usually easy to prevent.  That's why it's important to get your Rh status checked in your first trimester. It doesn't cause any warning signs. A blood test is the only way to know if you are Rh-sensitive or are at risk for it.  How can you prevent Rh disease?  If you are Rh-negative, your doctor gives you an Rh immune globulin shot (such as RhoGAM). It helps prevent your body from making the antibodies that attack your baby's red blood cells.  Timing is important. You need the shot at certain times during your pregnancy. And you need one anytime there is a chance that your baby's blood might mix with yours. That can happen with certain prenatal tests or when you have pregnancy bleeding, such as:  Right after any pregnancy loss, amniocentesis, or CVS testing.  After turning of a breech baby.  Before and maybe after childbirth. Your doctor  "gives you a shot around week 28. If your  is Rh-positive, you will have another shot.  Follow-up care is a key part of your treatment and safety. Be sure to make and go to all appointments, and call your doctor if you are having problems. It's also a good idea to know your test results and keep a list of the medicines you take.  Where can you learn more?  Go to https://www.Cadee.net/patiented  Enter W177 in the search box to learn more about \"Learning About Preventing Rh Disease.\"  Current as of: July 10, 2023               Content Version: 14.0    9386-6121 [employer].   Care instructions adapted under license by your healthcare professional. If you have questions about a medical condition or this instruction, always ask your healthcare professional. [employer] disclaims any warranty or liability for your use of this information.      Learning About Rh Immunoglobulin Shots  Introduction     An Rh immunoglobulin shot is given to pregnant women who have Rh-negative blood.  You may have Rh-negative blood, and your baby may have Rh-positive blood. If the two types of blood mix, your body will make antibodies. This is called Rh sensitization. Most of the time, this is not a problem the first time you're pregnant. But it could cause problems in future pregnancies.  This shot keeps your body from making the antibodies. You get the shot around 28 weeks of pregnancy. After the birth, your baby's blood is tested. If the blood is Rh positive, you will get another shot. You may also get the shot if you have vaginal bleeding while you are pregnant or if you have a miscarriage. These shots protect future pregnancies.  Women with Rh negative blood will need this shot each time they get pregnant.  Example  Rh immunoglobulin (HypRho-D, MICRhoGAM, and RhoGAM)  Possible side effects  Rare side effects may include:  Some mild pain where you got the shot.  A slight fever.  An allergic " "reaction.  You may have other side effects not listed here. Check the information that comes with your medicine.  What to know about taking this medicine  You may need more than one shot. You may need the shot again:  After amniocentesis, fetal blood sampling, or chorionic villus sampling tests.  If you have bleeding in your second or third trimester.  After turning of a breech baby.  After an injury to the belly while you are pregnant.  After a miscarriage or an .  Before or right after treatment for an ectopic or a partial molar pregnancy.  Tell your doctor if you have any allergies or have had a bad response to medicines in the past.  If you get this shot within 3 months of getting a live-virus vaccine, the vaccine may not work. Your doctor will tell you if you need more vaccine.  Check with your doctor or pharmacist before you use any other medicines. This includes over-the-counter medicines. Make sure your doctor knows all of the medicines, vitamins, herbs, and supplements you take. Taking some medicines at the same time can cause problems.  Where can you learn more?  Go to https://www.[email or web address].net/patiented  Enter V615 in the search box to learn more about \"Learning About Rh Immunoglobulin Shots.\"  Current as of: July 10, 2023               Content Version: 14.0    1165-9834 [employer].   Care instructions adapted under license by your healthcare professional. If you have questions about a medical condition or this instruction, always ask your healthcare professional. [employer] disclaims any warranty or liability for your use of this information.      Rubella (Turkish Measles): Care Instructions  Overview  Rubella, also called Turkish measles or 3-day measles, is a disease caused by a virus. It spreads by coughs, sneezes, and close contact. Rubella usually is mild and does not cause long-term problems. But if you are pregnant and get it, you can give the disease to your " unborn baby. This can cause serious birth defects.  While you have rubella, you may get a rash and a mild fever, and the lymph glands in your neck may swell. Older children often have a fever, eye pain, a sore throat, and body aches. You can relieve most symptoms with care at home. Avoid being around others, especially pregnant people, until your rash has been gone for at least 4 days. People who have not had this disease before or have not had the vaccine have the greatest chance of getting the virus.  Follow-up care is a key part of your treatment and safety. Be sure to make and go to all appointments, and call your doctor if you are having problems. It's also a good idea to know your test results and keep a list of the medicines you take.  How can you care for yourself at home?  Drink plenty of fluids. If you have kidney, heart, or liver disease and have to limit fluids, talk with your doctor before you increase the amount of fluids you drink.  Get plenty of rest to help your body heal.  Take an over-the-counter pain medicine, such as acetaminophen (Tylenol), ibuprofen (Advil, Motrin), or naproxen (Aleve), to reduce fever and discomfort. Read and follow all instructions on the label. Do not give aspirin to anyone younger than 20. It has been linked to Reye syndrome, a serious illness.  Do not take two or more pain medicines at the same time unless the doctor told you to. Many pain medicines have acetaminophen, which is Tylenol. Too much acetaminophen (Tylenol) can be harmful.  Try not to scratch the rash. Put cold, wet cloths on the rash to reduce itching.  Do not smoke. Smoking can make your symptoms worse. If you need help quitting, talk to your doctor about stop-smoking programs and medicines. These can increase your chances of quitting for good.  Avoid contact with people who have never had rubella and who have not been immunized.  When should you call for help?   Call your doctor now or seek immediate  "medical care if:    You have a fever with a stiff neck or a severe headache.     You are sensitive to light or feel very sleepy or confused.   Watch closely for changes in your health, and be sure to contact your doctor if:    You do not get better as expected.   Where can you learn more?  Go to https://www.Crocus Technology.net/patiented  Enter B812 in the search box to learn more about \"Rubella (Slovak Measles): Care Instructions.\"  Current as of: 2023               Content Version: 14.0    8740-3896 [employer].   Care instructions adapted under license by your healthcare professional. If you have questions about a medical condition or this instruction, always ask your healthcare professional. [employer] disclaims any warranty or liability for your use of this information.      Gonorrhea and Chlamydia: About These Tests  What is it?  These tests use a sample of urine or other body fluid to look for the bacteria that cause these sexually transmitted infections (STIs). The fluid sample can come from the cervix, vagina, rectum, throat, or eyes.  Why is this test done?  These tests may be done to:  Find out if symptoms are caused by gonorrhea or chlamydia.  Check people who are at high risk of being infected with gonorrhea or chlamydia.  Retest people several months after they have been treated for gonorrhea or chlamydia.  Check for infection in your  if you had a gonorrhea or chlamydia infection at the time of delivery.  How can you prepare for the test?  If you are going to have a urine test, do not urinate for at least 1 hour before the test.  If you think you may have chlamydia or gonorrhea, don't have sexual intercourse until you get your test results. And you may want to have tests for other STIs, such as HIV.  How is the test done?  For a direct sample, a swab is used to collect body fluid from the cervix, vagina, rectum, throat, or eyes. Your doctor may collect the sample. " "Or you may be given instructions on how to collect your own sample.  For a urine sample, you will collect the urine that comes out when you first start to urinate. Don't wipe the genital area clean before you urinate.  How long does the test take?  The test will take a few minutes.  What happens after the test?  You will be able to go home right away.  You can go back to your usual activities right away.  If you do have an infection, don't have sexual intercourse for 7 days after you start treatment. And your sex partner(s) should also be treated.  Follow-up care is a key part of your treatment and safety. Be sure to make and go to all appointments, and call your doctor if you are having problems. It's also a good idea to keep a list of the medicines you take. Ask your doctor when you can expect to have your test results.  Where can you learn more?  Go to [email or web address].Nine [email or web address] Fixup/patiented  Enter K976 in the search box to learn more about \"Gonorrhea and Chlamydia: About These Tests.\"  Current as of: November 27, 2023               Content Version: 14.0    2347-1135 [employer].   Care instructions adapted under license by your healthcare professional. If you have questions about a medical condition or this instruction, always ask your healthcare professional. [employer] disclaims any warranty or liability for your use of this information.      Trichomoniasis: About This Test  What is it?     This test uses a sample of urine or other body fluid to look for the tiny parasite that causes trichomoniasis (also called trich). The fluid sample can come from the vagina, cervix, or urethra. Your doctor may choose to use one or more of many available tests.  Why is it done?  A trich test may be done to:  Find out if symptoms are caused by trich.  Check people who are at high risk for being infected with trich.  Check after treatment to make sure that the infection is gone.  How do you prepare for " the test?  If you are going to have a urine test, do not urinate for at least 1 hour before the test.  How is the test done?  For a direct sample, a swab is used to collect body fluid from the cervix, vagina, or urethra. Your doctor may collect the sample. Or you may be given instructions on how to collect your own sample.  For a urine sample, you will collect the urine that comes out when you first start to urinate. Don't wipe the area clean before you urinate.  How long does the test take?  It will take a few minutes to collect a sample.  What happens after the test?  You can go home right away.  You can go back to your usual activities right away.  You may get the test results the same day or several days later. It depends on the test used.  If you do have an infection, don't have sexual intercourse for 7 days after you start treatment. Your sex partner or partners should also be treated.  Follow-up care is a key part of your treatment and safety. Be sure to make and go to all appointments, and call your doctor if you are having problems. Ask your doctor when you can expect to have your test results.  Current as of: November 27, 2023               Content Version: 14.0    8755-1826 [employer].   Care instructions adapted under license by your healthcare professional. If you have questions about a medical condition or this instruction, always ask your healthcare professional. [employer] disclaims any warranty or liability for your use of this information.      HIV Testing: Care Instructions  Overview  You can get tested for the human immunodeficiency virus (HIV). Most doctors use a blood test to check for HIV antibodies and antigens in your blood. It may also check for the genetic material (RNA) of HIV. Some tests use saliva to check for HIV antibodies. But these aren't as accurate. For example, they may give a false result if you've just been infected.  What do the results mean?     "Normal (negative)    No HIV antibodies, antigens, or RNA were found.  You may need more testing. It can make sure your test results are correct.    Uncertain (indeterminate)    Test results didn't clearly show if you have an HIV infection.  HIV antibodies or antigens may not have formed yet.  Some other type of antibody or antigen may have affected the results.  You will need another test to be sure.    Abnormal (positive)    HIV antibodies, antigens, or RNA were found.  If you haven't had an RNA test yet, one will be done. If it's positive, you have HIV.  If your test result is positive, your doctor will talk to you. You will discuss starting treatment.  Follow-up care is a key part of your treatment and safety. Be sure to make and go to all appointments, and call your doctor if you are having problems. It's also a good idea to know your test results and keep a list of the medicines you take.  Where can you learn more?  Go to https://www.GenerationOne.net/patiented  Enter T792 in the search box to learn more about \"HIV Testing: Care Instructions.\"  Current as of: June 12, 2023               Content Version: 14.0    9742-6773 [employer].   Care instructions adapted under license by your healthcare professional. If you have questions about a medical condition or this instruction, always ask your healthcare professional. [employer] disclaims any warranty or liability for your use of this information.      Hepatitis C Virus Tests: About These Tests  What are they?     Hepatitis C virus tests are blood tests that check for substances in the blood that show whether you have hepatitis C now or had it in the past. The tests can also tell you what type of hepatitis C you have and how severe the disease is. This can help your doctor with treatment.  If the tests show that you have long-term hepatitis C, you need to take steps to prevent spreading the disease.  Why are these tests done?  You may need " "these tests if:  You have symptoms of hepatitis.  You may have been exposed to the virus. You are more likely to have been exposed to the virus if you inject drugs or are exposed to body fluids (such as if you are a health care worker).  You've had other tests that show you have liver problems.  You are 18 to 79 years old.  You have an HIV infection.  The tests also are done to help your doctor decide about your treatment and see how well it works.  How do you prepare for the test?  In general, there's nothing you have to do before this test, unless your doctor tells you to.  How is the test done?  A health professional uses a needle to take a blood sample, usually from the arm.  What happens after these tests?  You will probably be able to go home right away.  You can go back to your usual activities right away.  Follow-up care is a key part of your treatment and safety. Be sure to make and go to all appointments, and call your doctor if you are having problems. It's also a good idea to keep a list of the medicines you take. Ask your doctor when you can expect to have your test results.  Where can you learn more?  Go to https://www.Squid Facil.net/patiented  Enter W551 in the search box to learn more about \"Hepatitis C Virus Tests: About These Tests.\"  Current as of: June 12, 2023               Content Version: 14.0    7770-8939 [employer].   Care instructions adapted under license by your healthcare professional. If you have questions about a medical condition or this instruction, always ask your healthcare professional. [employer] disclaims any warranty or liability for your use of this information.      Learning About Fetal Ultrasound Results  What is a fetal ultrasound?     Fetal ultrasound is a test that lets your doctor see an image of your baby. Your doctor learns information about your baby from this picture. You may find out, for example, if you are having a boy or a girl. But " the main reason you have this test is to get information about your baby's health.  (You may hear your baby called a fetus. This is a common medical term for a baby that's growing in the mother's uterus.)  What kind of information can you learn from this test?  The findings of an ultrasound fall into two categories, normal and abnormal.  Normal  The fetus is the right size for its age.  The placenta is the expected size and does not cover the cervix.  There is enough amniotic fluid in the uterus.  No birth defects can be seen.  Abnormal  The fetus is small or large for its age.  The placenta covers the cervix.  There is too much or too little amniotic fluid in the uterus.  The fetus may have a birth defect.  What does an abnormal result mean?  Abnormal seems to imply that something is wrong with your baby. But what it means is that the test has shown something the doctor wants to take a closer look at.  And that's what happens next. Your doctor will talk to you about what further test or tests you may need.  What do the results mean?  Some of the things your doctor may see on an abnormal ultrasound include:  Echogenic bowel.  The bowel looks very bright on the screen. This could mean that there's blood in the bowel. Or it could mean that something is blocking the small bowel.  Increased nuchal translucency.  The ultrasound measures the thickness at the back of the baby's neck. An increase in thickness is sometimes an early sign of Down syndrome.  Increased or decreased amniotic fluid.  The doctor will look for a reason for the level of amniotic fluid and will watch the pregnancy closely as it progresses.  Large ventricles.  Ventricles in the brain look larger than they should. Your doctor may take a closer look at the brain.  Renal pyelectasis/hydronephrosis.  The ultrasound measures the fluid around the kidney. If there is more fluid than expected, there is a chance of urinary tract or kidney problems.  Short long  "bones.  The ultrasound measures certain arm and leg bones. A long bone (humerus or femur) that is shorter than average could be a sign of Down syndrome.  Subchorionic hemorrhage.  An ultrasound can show bleeding under one of the membranes that surrounds the fetus. Some women don't have symptoms of bleeding. The ultrasound can find this problem when women are not bleeding from their vagina. Women who have this condition have a slightly higher chance of miscarriage.  What do you do now?  Take a deep breath, and let it out. Keep in mind that an abnormal finding on an ultrasound, after it's coupled with more information, may:  Turn out to be nothing.  Turn out to be something mild that won't affect the baby.  Turn out to be something more serious. But if this happens, early diagnosis helps you and your doctor plan treatment options sooner rather than later.  Your medical team is there for you. So are your family and friends. Ask questions, and get the help and support you need.  Follow-up care is a key part of your treatment and safety. Be sure to make and go to all appointments, and call your doctor if you are having problems. It's also a good idea to know your test results and keep a list of the medicines you take.  Where can you learn more?  Go to https://www.[employer].net/patiented  Enter K451 in the search box to learn more about \"Learning About Fetal Ultrasound Results.\"  Current as of: July 10, 2023               Content Version: 14.0    8876-0331 [employer].   Care instructions adapted under license by your healthcare professional. If you have questions about a medical condition or this instruction, always ask your healthcare professional. [employer] disclaims any warranty or liability for your use of this information.      Learning About Prenatal Visits  Overview     Regular prenatal visits are very important during any pregnancy. These quick office visits may seem simple and " routine. But they can help you have a safe and healthy pregnancy. Your doctor is watching for problems that can only be found through regular checkups. The visits also give you and your doctor time to build a good relationship.  After your first visit, you will most likely start on a schedule of monthly visits. In your third trimester, the visits will get more frequent. Based on your health, your age, and if you've had a normal, full-term pregnancy before, your doctor may want to see you more or less often.  At different times in your pregnancy, you will have exams and tests. Some are routine. Others are done only when there is a chance of a problem. Everything healthy you do for your body helps you have a healthy pregnancy. Rest when you need it. Eat well, drink plenty of water, and exercise regularly.  What happens during a prenatal visit?  You will have blood pressure checks, along with urine tests. You also may have blood tests. If you need to go to the bathroom while waiting for the doctor, tell the nurse. You will be given a sample cup so your urine can be tested.  You will be weighed and have your belly measured.  Your doctor may listen to the fetal heartbeat with a special device.  At about 24 weeks, and possibly earlier in your pregnancy, your doctor will check your blood sugar (glucose tolerance test) for diabetes that can occur during pregnancy. This is gestational diabetes, which can be harmful.  You will have tests to check for infections that could harm your . These include group B streptococcus and hepatitis B.  Your doctor may do ultrasounds to check for problems. This also checks the position of the fetus. An ultrasound uses sound waves to produce a picture of the fetus.  You may get your vaccines updated.  Your doctor may ask you questions to check for signs of anxiety or depression. Tell your doctor if you feel sad, anxious, or hopeless for more than a few days.  You may have other tests at  "any time during your pregnancy.  Use your visits to discuss with your doctor any concerns you have.  How can you care for yourself at home?  Get plenty of rest.  Try to exercise every day, if your doctor says it is okay. If you have not exercised in the past, start out slowly. For example, you can take short walks each day.  Choose healthy foods, such as fruits, vegetables, whole grains, lean proteins, low-fat dairy, and healthy fats.  Drink plenty of fluids. Cut down on drinks with caffeine, such as coffee, tea, and cola. If you have kidney, heart, or liver disease and have to limit fluids, talk with your doctor before you increase the amount of fluids you drink.  Try to avoid chemical fumes, paint fumes, and poisons.  If you smoke, vape, or use alcohol, marijuana, or other drugs, quit or cut back as much as you can. Talk to your doctor if you need help quitting.  Review all of your medicines, including over-the-counter medicines and supplements, with your doctor. Some of your routine medicines may need to be changed. Do not stop or start taking any medicines without talking to your doctor first.  Follow-up care is a key part of your treatment and safety. Be sure to make and go to all appointments, and call your doctor if you are having problems. It's also a good idea to know your test results and keep a list of the medicines you take.  Where can you learn more?  Go to https://www.[employer].net/patiented  Enter J502 in the search box to learn more about \"Learning About Prenatal Visits.\"  Current as of: July 10, 2023               Content Version: 14.0    0103-3754 [employer].   Care instructions adapted under license by your healthcare professional. If you have questions about a medical condition or this instruction, always ask your healthcare professional. [employer] disclaims any warranty or liability for your use of this information.      Intimate Partner Violence: Care " Instructions  Overview     If you want to save this information but don't think it is safe to take it home, see if a trusted friend can keep it for you. Plan ahead. Know who you can call for help, and memorize the phone number.   Be careful online too. Your online activity may be seen by others. Do not use your personal computer or device to read about this topic. Use a safe computer, such as one at work, a friend's home, or a library.    Intimate partner violence--a type of domestic abuse--is different from an argument now and then. It is a pattern of abuse that one person may use to control another person's behavior. It may start with threats and name-calling. Then, it may lead to more serious acts, like pushing and slapping. The abuse also may occur in other areas. For example, the abuser may withhold money or spend a partner's money without their knowledge.  Abuse can cause serious harm. You are more likely to have a long-term health problem from the injuries and stress of living in a violent relationship. People who are sexually abused by their partners have more sexually transmitted infections and unplanned pregnancies. Anyone who is abused also faces emotional pain. Anyone can be abused in relationships. In some relationships, both people use abusive behavior.  If you are pregnant, abuse can cause problems such as poor weight gain, infections, and bleeding. Abuse during this time may increase your baby's risk of low birth weight, premature birth, and death.  Follow-up care is a key part of your treatment and safety. Be sure to make and go to all appointments, and call your doctor if you are having problems. It's also a good idea to know your test results and keep a list of the medicines you take.  How can you care for yourself at home?  If you do not have a safe place to stay, discuss this with your doctor before you leave.  Have a plan for where to go, how to leave your home, and where to stay in case of an  "emergency. Do not tell your partner about your plan. Contact:  The National Domestic Violence Hotline toll-free at 1-553.721.8976. They can help you find resources in your area.  Your local police department, hospital, or clinic for information about shelters and safe homes near you.  Talk to a trusted friend or neighbor, a counselor, or a hollis leader. Do not feel that you have to hide what happened.  Teach your children how to call for help in an emergency.  Be alert to warning signs, such as threats, heavy alcohol use, or drug use. This can help you avoid danger.  If you can, make sure that there are no guns or other weapons in your home.  When should you call for help?   Call 911 anytime you think you may need emergency care. For example, call if:    You or someone else has just been abused.     You think you or someone else is in danger of being abused.   Watch closely for changes in your health, and be sure to contact your doctor if you have any problems.  Where can you learn more?  Go to https://www.[employer]net/patiented  Enter G282 in the search box to learn more about \"Intimate Partner Violence: Care Instructions.\"  Current as of: June 24, 2023               Content Version: 14.0    0361-7757 [employer].   Care instructions adapted under license by your healthcare professional. If you have questions about a medical condition or this instruction, always ask your healthcare professional. [employer] disclaims any warranty or liability for your use of this information.      Intimate Partner Violence Safety Instructions: Care Instructions  Overview     If you want to save this information but don't think it is safe to take it home, see if a trusted friend can keep it for you. Plan ahead. Know who you can call for help, and memorize the phone number.   Be careful online too. Your online activity may be seen by others. Do not use your personal computer or device to read about this " topic. Use a safe computer, such as one at work, a friend's home, or a library.    When you are abused by a spouse or partner, you can take actions to protect yourself and your children.  You can increase your safety whether you decide to stay with your spouse or partner or you decide to leave. You may want to make a safety plan and pack a bag ahead of time. This will help you leave quickly when you decide to. Remember, you cannot change your partner's actions, but you can find help for you and your children. No one deserves to be abused.  Follow-up care is a key part of your treatment and safety. Be sure to make and go to all appointments, and call your doctor if you are having problems. It's also a good idea to know your test results and keep a list of the medicines you take.  How can you care for yourself at home?  Make a plan for your safety   If you decide to stay with your abusive spouse or partner, you can do the following to increase your safety:  Decide what works best to keep you safe in an emergency.  Know who you can call to help you in an emergency.  Decide if you will call the police if you get hurt again. If you can, agree on a signal with your children or neighbor to call the police for you if you need help. You can flash lights or hang something out of a window.  Choose a safe place to go for a short time if you need to leave home. Memorize the address and phone number.  Learn escape routes out of your home in case you need to leave in a hurry. Teach your children different ways to get out of your home quickly if they need to.  If you can, hide or lock up things that can be used as weapons (guns, knives, hammers).  Learn the number of a domestic violence shelter. Talk to the people there about how they can help.  Find out about other community resources that can help you.  Take pictures of bruises or other injuries if you can. You can also take pictures of things your abuser has broken.  Teach your  children that violence is never okay. Tell them that they do not deserve to be hurt.  Pack a bag   Prepare a bag with things you will need if you leave suddenly. Leave it with a friend, a relative, or someone else you trust. You could include the following items in the bag:  A set of keys to your home and car.  Emergency phone numbers and addresses.  Money such as cash or checks. You can also ask a friend, a relative, or someone else you trust to hold money for you.  Copies of legal documents such as house and car titles or rent receipts, birth certificates, Social Security card, voter registration, marriage and 's licenses, and your children's health records.  Personal items you would need for a few days, such as clothes, a toothbrush, toothpaste, and any medicines you or your children need.  A favorite toy or book for your child or children.  Diapers and bottles, if you have very young children.  Pictures that show signs of abuse and violence. You may also add pictures of your abuser.  If you leave   If you decide to leave, you can take the following steps:  Go to the emergency room at a hospital if you have been hurt.  Think about asking the police to be with you as you leave. They can protect you as you leave your home.  If you decide to leave secretly, remember that activities can be tracked. Your abuser may still have access to your cell phone, email, and credit cards. It may be possible for these to be traced. Always be aware of your surroundings.  If this is an emergency, do not worry about gathering up anything. Just leave--your safety is most important.  If your abuser moves out, change the locks on the doors. If you have a security system, change the access code.  When should you call for help?   Call 911 anytime you think you may need emergency care. For example, call if:    You or someone else has just been abused.     You think you or someone else is in danger of being abused.   Watch closely for  "changes in your health, and be sure to contact your doctor if you have any problems.  Where can you learn more?  Go to https://www.healthTech.eu.net/patiented  Enter A752 in the search box to learn more about \"Intimate Partner Violence Safety Instructions: Care Instructions.\"  Current as of: June 24, 2023               Content Version: 14.0    7064-3992 [employer].   Care instructions adapted under license by your healthcare professional. If you have questions about a medical condition or this instruction, always ask your healthcare professional. [employer] disclaims any warranty or liability for your use of this information.      Learning About Intimate Partner Violence  What is intimate partner violence?  Intimate partner violence is a type of domestic abuse. It's threatening, emotionally harmful, or violent behavior in a personal relationship. It can happen between past or current partners or spouses. In some relationships both people abuse each other. One partner may be more abusive. Or the abuse may be equal.  Abuse can affect people of any ethnic group, race, or Anglican. It can affect teens, adults, or the elderly. And it can happen to people of any sexual orientation, gender, or social status.  Abusers use fear, bullying, and threats to control their partners. They may control what their partners do. They may control where their partners go or who they see. They may act jealous, controlling, or possessive. These early signs of abuse may happen soon after the start of the relationship. Sometimes it can be hard to notice abuse at first. But after the relationship becomes more serious, the abuse may get worse.  If you are being abused in your relationship, it's important to get help. The abuse is not your fault. You don't have to face it alone.  Be careful  It may not be safe to take home domestic abuse information like this handout. Some people ask a trusted friend to keep it for them. " It's also important to plan ahead and to memorize the phone number of places you can go for help. If you are concerned about your safety, do not use your computer, smartphone, or tablet to read about domestic abuse.   What are the types of intimate partner violence?  Abuse can happen in different ways. Each type can happen on its own or in combination with others.  Emotional abuse  Emotional abuse is a pattern of threats, insults, or controlling behavior. It includes verbal abuse. It goes beyond healthy disagreements in a relationship. It's a sign of an unhealthy relationship.  Do you feel threatened, intimidated, or controlled?  Does your partner:  Threaten your children, other family members, or pets?  Use jokes meant to embarrass or shame you?  Call you names?  Tell you that you are a bad parent?  Threaten to take away your children?  Threaten to have you or your family members deported?  Control your access to money or other basic needs?  Control what you do, who you see or talk to, or where you go?  Another form of emotional abuse is denying that it is happening. Or the abuser may act like the abuse is no big deal or is your fault.  Sexual abuse  With sexual abuse, abusers may try to convince or force you to have sex. They may force you into sex acts you're not comfortable with. Or they may sexually assault you. Sexual abuse can happen even if you are in a committed relationship.  Physical abuse  Physical abuse means that a partner hits, kicks, or does something else to physically hurt you. Physical abuse that starts with a slap might lead to kicking, shoving, and choking over time. The abuser may also threaten to hurt or kill you.  Stalking  Stalking means that an abuser gives you attention that you do not want and that causes you fear. Examples of stalking include:  Following you.  Showing up at places where the abuser isn't invited, such as at your work or school.  Constantly calling or texting you.  What  problems can  to?  Intimate partner violence can be very dangerous. It can cause serious, repeated injury. It can even lead to death.  All forms of abuse can cause long-term health problems from the stress of a violent relationship. Verbal abuse can lead to sexual and physical abuse.  Abuse causes:  Emotional pain.  Depression.  Anxiety.  Post-traumatic stress.  Sexual abuse can lead to sexually transmitted infections (such as HIV/AIDS) and unplanned pregnancy.  Pregnancy can be a very dangerous time for people in abusive relationships. Abuse can cause or increase the risk of problems during pregnancy. These include low weight gain, anemia, infections, and bleeding. Abuse may also increase your baby's risk of low birth weight, premature birth, and death.  It can be hard for some victims of abuse to ask for help or to leave their relationship. You may feel scared, stuck, or not sure what steps to take. But it's important not to ignore abuse. Talking to someone you trust could be the first step to ending the abuse and taking care of your own health and happiness again. There are resources available that can help keep you safe.  Where can you get help?  Talk to a trusted friend. Find a local advocacy group, or talk to your doctor about the abuse.  Contact the National Domestic Violence Hotline at 6-174-266-QKMH (1-511.150.2597) for more safety tips. They can guide you to groups in your area that can help. Or go to the National Coalition Against Domestic Violence website at www.thehotlZase.org to learn more.  Domestic violence groups or a counselor in your area can help you make a safety plan for yourself and your children.  When to call for help  Call 911 anytime you think you may need emergency care. For example, call if:  You think that you or someone you know is in danger of being abused.  You have been hurt and can't have someone safely take you to emergency care.  You have just been abused.  A family member  "has just been abused.  Where can you learn more?  Go to https://www.[employer].net/patiented  Enter S665 in the search box to learn more about \"Learning About Intimate Partner Violence.\"  Current as of: June 24, 2023               Content Version: 14.0    2582-3493 [employer].   Care instructions adapted under license by your healthcare professional. If you have questions about a medical condition or this instruction, always ask your healthcare professional. [employer] disclaims any warranty or liability for your use of this information.      Vaginal Bleeding During Pregnancy: Care Instructions  Overview     It's common to have some vaginal spotting when you are pregnant. In some cases, the bleeding isn't serious. And there aren't any more problems with the pregnancy.  But sometimes bleeding is a sign of a more serious problem. This is more common if the bleeding is heavy or painful. Examples of more serious problems include miscarriage, an ectopic pregnancy, and a problem with the placenta.  You may have to see your doctor again to be sure everything is okay. You may also need more tests to find the cause of the bleeding.  Home treatment may be all you need. But it depends on what is causing the bleeding. Be sure to tell your doctor if you have any new symptoms or if your symptoms get worse.  The doctor has checked you carefully, but problems can develop later. If you notice any problems or new symptoms, get medical treatment right away.  Follow-up care is a key part of your treatment and safety. Be sure to make and go to all appointments, and call your doctor if you are having problems. It's also a good idea to know your test results and keep a list of the medicines you take.  How can you care for yourself at home?  If your doctor prescribed medicines, take them exactly as directed. Call your doctor if you think you are having a problem with your medicine.  Do not have vaginal sex until " "your doctor says it's okay.  Do not put anything in your vagina until your doctor says it's okay.  Ask your doctor about other activities you can or can't do.  Get a lot of rest. Being pregnant can make you tired.  Do not use nonsteroidal anti-inflammatory drugs (NSAIDs), such as ibuprofen (Advil, Motrin), naproxen (Aleve), or aspirin, unless your doctor says it is okay.  When should you call for help?   Call 911 anytime you think you may need emergency care. For example, call if:    You passed out (lost consciousness).     You have severe vaginal bleeding. This means you are soaking through a pad each hour for 2 or more hours.     You have sudden, severe pain in your belly or pelvis.   Call your doctor now or seek immediate medical care if:    You have new or worse vaginal bleeding.     You are dizzy or lightheaded, or you feel like you may faint.     You have pain in your belly, pelvis, or lower back.     You think that you are in labor.     You have a sudden release of fluid from your vagina.     You've been having regular contractions for an hour. This means that you've had at least 8 contractions within 1 hour or at least 4 contractions within 20 minutes, even after you change your position and drink fluids.     You notice that your baby has stopped moving or is moving much less than normal.   Watch closely for changes in your health, and be sure to contact your doctor if you have any problems.  Where can you learn more?  Go to https://www."[employer].net/patiented  Enter N829 in the search box to learn more about \"Vaginal Bleeding During Pregnancy: Care Instructions.\"  Current as of: July 10, 2023               Content Version: 14.0    1575-9779 [employer]   Care instructions adapted under license by your healthcare professional. If you have questions about a medical condition or this instruction, always ask your healthcare professional. [employer] disclaims any warranty or " liability for your use of this information.      Weeks 10 to 14 of Your Pregnancy: Care Instructions  It's now possible to hear the fetus's heartbeat with a special ultrasound device. And the fetus's organs are developing.    Decide about tests to check for birth defects. Think about your age, your chance of passing on a family disease, your need to know about any problems, and what you might do after you have the test results.    It's okay to exercise. Try activities such as walking or swimming. Check with your doctor before starting a new program.    You may feel more tired than usual.  Taking naps during the day may help.     You may feel emotional.  It might help to talk to someone.     You may have headaches.  Try lying down and putting a cool cloth over your forehead.     You can use acetaminophen (Tylenol) for pain relief.  Don't take any anti-inflammatory medicines (such as Advil, Motrin, Aleve), unless your doctor says it's okay.     You may feel a fullness or aching in your lower belly.  This can feel like the kind of cramps you might get before a period. A back rub may help.     You may need to urinate more.  Your growing uterus and changing hormones can affect your bladder.     You may feel sick to your stomach (morning sickness).  Try avoiding food and smells that make you feel sick.     Your breasts may feel different.  They may feel tender or get bigger. Your nipples may get darker. Try a bra that gives you good support.     Avoid alcohol, tobacco, and drugs (including marijuana).  If you need help quitting, talk to your doctor.     Take a daily prenatal vitamin.  Choose one with folic acid.   Follow-up care is a key part of your treatment and safety. Be sure to make and go to all appointments, and call your doctor if you are having problems. It's also a good idea to know your test results and keep a list of the medicines you take.  Where can you learn more?  Go to  "https://www.[employer].net/patiented  Enter E090 in the search box to learn more about \"Weeks 10 to 14 of Your Pregnancy: Care Instructions.\"  Current as of: July 10, 2023               Content Version: 14.0    6920-3946 [employer].   Care instructions adapted under license by your healthcare professional. If you have questions about a medical condition or this instruction, always ask your healthcare professional. [employer] disclaims any warranty or liability for your use of this information.        Nutrition During Pregnancy: Care Instructions  Overview     Healthy eating when you are pregnant is important for you and your baby. It can help you feel well and have a successful pregnancy and delivery. During pregnancy your nutrition needs increase. Even if you have excellent eating habits, your doctor may recommend a multivitamin to make sure you get enough iron and folic acid.  You may wonder how much weight you should gain. In general, if you were at a healthy weight before you became pregnant, then you should gain between 25 and 35 pounds. If you were overweight before pregnancy, then you'll likely be advised to gain 15 to 25 pounds. If you were underweight before pregnancy, then you'll probably be advised to gain 28 to 40 pounds. Your doctor will work with you to set a weight goal that is right for you. Gaining a healthy amount of weight helps you have a healthy baby.  Follow-up care is a key part of your treatment and safety. Be sure to make and go to all appointments, and call your doctor if you are having problems. It's also a good idea to know your test results and keep a list of the medicines you take.  How can you care for yourself at home?  Eat plenty of fruits and vegetables. Include a variety of orange, yellow, and leafy dark-green vegetables every day.  Choose whole-grain bread, cereal, and pasta. Good choices include whole wheat bread, whole wheat pasta, brown rice, and " oatmeal.  Get 4 or more servings of milk and milk products each day. Good choices include nonfat or low-fat milk, yogurt, and cheese. If you cannot eat milk products, you can get calcium from calcium-fortified products such as orange juice, soy milk, and tofu. Other non-milk sources of calcium include leafy green vegetables, such as broccoli, kale, mustard greens, turnip greens, bok jeannette, and brussels sprouts.  If you eat meat, pick lower-fat types. Good choices include lean cuts of meat and chicken or turkey without the skin.  Avoid fish that are high in mercury. These include shark, swordfish, binu mackerel, marlin, orange roughy, and bigeye tuna, as well as tilefish from the [facility].  It's okay to eat up to 8 to 12 ounces a week of fish that are low in mercury or up to 4 ounces a week of fish that have medium levels of mercury. Some fish that are low in mercury are salmon, shrimp, canned light tuna, cod, and tilapia. Some fish that have medium levels of mercury are halibut and white albacore tuna.  For more advice about eating fish, you can visit the U.S. Food and Drug Administration (FDA) or U.S. Environmental Protection Agency (EPA) website.  Heat lunch meats (such as turkey, ham, or bologna) to 165 F before you eat them. This reduces your risk of getting sick from a kind of bacteria that can be found in lunch meats.  Do not eat unpasteurized soft cheeses, such as brie, feta, fresh mozzarella, and blue cheese. They have a bacteria that could harm your baby.  Limit caffeine to about 200 to 300 mg per day. On average, a cup of brewed coffee has around 80 to 100 mg of caffeine.  Do not drink any alcohol. No amount of alcohol has been found to be safe during pregnancy.  Do not diet or try to lose weight. For example, do not follow a low-carbohydrate diet. If you are overweight at the start of your pregnancy, your doctor will work with you to manage your weight gain.  Tell your doctor about all vitamins and  "supplements you take.  When should you call for help?  Watch closely for changes in your health, and be sure to contact your doctor if you have any problems.  Where can you learn more?  Go to https://www.[employer].net/patiented  Enter Y785 in the search box to learn more about \"Nutrition During Pregnancy: Care Instructions.\"  Current as of: September 20, 2023               Content Version: 14.0    6115-2096 [employer].   Care instructions adapted under license by your healthcare professional. If you have questions about a medical condition or this instruction, always ask your healthcare professional. [employer] disclaims any warranty or liability for your use of this information.      Exercise During Pregnancy: Care Instructions  Overview     Exercise is good for you during a healthy pregnancy. It can relieve back pain, swelling, and other discomforts. It also prepares your muscles for childbirth. And exercise can improve your energy level and help you sleep better.  If your doctor advises it, get more exercise. For example, walking is a good choice. Bit by bit, increase the amount you walk every day. Try for at least 30 minutes on most days of the week. You could also try a prenatal exercise class. But if you do not already exercise, be sure to talk with your doctor before you start a new exercise program. Doctors do not recommend contact sports during pregnancy.  Follow-up care is a key part of your treatment and safety. Be sure to make and go to all appointments, and call your doctor if you are having problems. It's also a good idea to know your test results and keep a list of the medicines you take.  How can you care for yourself at home?  Talk with your doctor about the right kind of exercise for each stage of pregnancy.  Listen to your body to know if your exercise is at a safe level.  Do not become overheated while you exercise. High body temperature can be harmful. Avoid " "activities that can make your body too hot.  If you feel tired, take it easy. You might walk instead of run.  If you are used to strenuous exercise, ask your doctor how to know when it's time to slow down.  If you exercised before getting pregnant, you should be able to keep up your routine early in your pregnancy. Later in your pregnancy, you may want to switch to more gentle activities.  Drink plenty of fluids before, during, and after exercise.  Avoid contact sports, such as soccer and basketball. Also avoid risky activities. These include scuba diving, horseback riding, and exercising at a high altitude (above 6,000 feet). If you live in a place with a high altitude, talk to your doctor about how you can exercise safely.  Do not get overtired while you exercise. You should be able to talk while you work out.  After your fourth month of pregnancy, avoid exercises that require you to lie flat on your back on a hard surface. These include sit-ups and some yoga poses.  Get plenty of rest. You may be very tired while you are pregnant.  Where can you learn more?  Go to https://www.[employer].net/patiented  Enter S801 in the search box to learn more about \"Exercise During Pregnancy: Care Instructions.\"  Current as of: July 10, 2023               Content Version: 14.0    4340-4882 [employer].   Care instructions adapted under license by your healthcare professional. If you have questions about a medical condition or this instruction, always ask your healthcare professional. [employer] disclaims any warranty or liability for your use of this information.      Learning About Pregnancy and Obesity  How does your weight affect your pregnancy?     The basics of prenatal care are the same for everyone, regardless of size. You'll get what you need to have a healthy baby.  But your size can make a difference in a few things. You and your doctor will have to watch your pregnancy weight. Your weight " "may affect your labor and delivery.  You may have some extra doctor visits and tests. And you may have some tests earlier in your pregnancy. You'll need to pay close attention to things like blood pressure and the chance of getting gestational diabetes. (This is a type of diabetes that sometimes happens during pregnancy.) And close attention will be given to your developing baby.  Work with your doctor to get the care you need. Go to all your doctor visits, and follow your doctor's advice about what to do and what to avoid during pregnancy.  How much weight gain is healthy?  If you are very overweight (obese), experts recommend that you gain between 11 and 20 pounds. Your doctor will work with you to set a weight goal that's right for you. In some cases, your doctor may recommend that you not gain any weight.  How much extra food do you need to eat?  Although you may joke that you're \"eating for two\" during pregnancy, you don't need to eat twice as much food. How much you can eat depends on:  Your height.  How much you weigh when you get pregnant.  How active you are.  If you're carrying more than one fetus (multiple pregnancy).  In the first trimester, you'll probably need the same amount of calories as you did before you were pregnant. In general, in your second trimester, you need to eat about 340 extra calories a day. In your third trimester, you need to eat about 450 extra calories a day.  What can you do to have a healthy pregnancy?  The best things you can do for you and your baby are to eat healthy foods, get regular exercise, avoid alcohol and smoking, and go to your doctor visits.  Eat a variety of foods from all the food groups. Make sure to get enough calcium and folic acid.  You may want to work with a dietitian to help you plan healthy meals to get the right amount of calories for you.  If you didn't exercise much before you got pregnant, talk to your doctor about how you can slowly get more active. " "Your doctor may want to set up an exercise program with you.  Where can you learn more?  Go to https://www.[employer]net/patiented  Enter B644 in the search box to learn more about \"Learning About Pregnancy and Obesity.\"  Current as of: July 10, 2023               Content Version: 14.0    3232-8964 [employer].   Care instructions adapted under license by your healthcare professional. If you have questions about a medical condition or this instruction, always ask your healthcare professional. [employer] disclaims any warranty or liability for your use of this information.      You have been provided the CDC Warning Signs in Pregnancy document.    Additional copies can be found here: www.[employer].com/780331.pdf  "

## 2024-03-15 NOTE — NURSING NOTE
"Initial /85 (BP Location: Right arm, Patient Position: Chair, Cuff Size: Adult Large)   Pulse 84   Temp 98.8  F (37.1  C) (Tympanic)   Resp 18   Ht 2.108 m (6' 11\")   Wt 109.3 kg (241 lb)   LMP 12/30/2023   BMI 24.60 kg/m   Estimated body mass index is 24.6 kg/m  as calculated from the following:    Height as of this encounter: 2.108 m (6' 11\").    Weight as of this encounter: 109.3 kg (241 lb). .    "

## 2024-03-16 LAB
BACTERIA UR CULT: NORMAL
C TRACH DNA SPEC QL PROBE+SIG AMP: NEGATIVE
HBV CORE AB SERPL QL IA: NONREACTIVE
HBV SURFACE AB SERPL IA-ACNC: <3.5 M[IU]/ML
HBV SURFACE AB SERPL IA-ACNC: NONREACTIVE M[IU]/ML
HBV SURFACE AG SERPL QL IA: NONREACTIVE
HCV AB SERPL QL IA: NONREACTIVE
HIV 1+2 AB+HIV1 P24 AG SERPL QL IA: NONREACTIVE
N GONORRHOEA DNA SPEC QL NAA+PROBE: NEGATIVE
T PALLIDUM AB SER QL: NONREACTIVE

## 2024-03-18 ENCOUNTER — TELEPHONE (OUTPATIENT)
Dept: NEUROLOGY | Facility: CLINIC | Age: 39
End: 2024-03-18
Payer: COMMERCIAL

## 2024-03-18 ENCOUNTER — TELEPHONE (OUTPATIENT)
Dept: NEUROSURGERY | Facility: CLINIC | Age: 39
End: 2024-03-18
Payer: COMMERCIAL

## 2024-03-18 LAB
RUBV IGG SERPL QL IA: 0.86 INDEX
RUBV IGG SERPL QL IA: NORMAL

## 2024-03-18 NOTE — TELEPHONE ENCOUNTER
Patient overdue for follow-up with Dr. Walker with [facility] prior.     [facility] non-detailed message on number listed in chart ([phone]), which states Lam's voice mail. Requested return call. Will send [employer] message as well.     Christy Hill RN 3/20/2024 3:29 PM

## 2024-03-18 NOTE — TELEPHONE ENCOUNTER
[facility]    Phone Message    May a detailed message be left on voicemail: yes     Reason for Call: Other: Pt calling back asking to speak to Christy Hill, writer reached Christy via teams and was told to send TE. Pt wants to talk to her about why she is able to do [facility] as she was turned away when she went to do it last time because she let them know she might be pregnant. Please call back to advise     Action Taken: Message routed to:  [facility]    Travel Screening: Not Applicable                                                                    Problem: Suicide  Goal: *STG: Remains safe in hospital  Description: Pt to remains safe in hospital during this admission. Outcome: Progressing Towards Goal  Goal: *STG/LTG: Complies with medication therapy  Description: Pt will be medication compliant daily during this admission. Outcome: Progressing Towards Goal   Pt. Is visible in the milieu. She interact well with peers ans staff. She is free from falls, self harm or harming others. She is meal and medication compliant. Will continue to monitor for safety and provide support as needed.

## 2024-03-22 ENCOUNTER — NURSE TRIAGE (OUTPATIENT)
Dept: NURSING | Facility: CLINIC | Age: 39
End: 2024-03-22
Payer: COMMERCIAL

## 2024-03-22 NOTE — TELEPHONE ENCOUNTER
Patient calling, with concern regarding heartburn. She was prescribed Unisom when she requested a prescription for heartburn medication. The provider that the patient states prescribed the medication is not listed on her chart, not is the visit from 3/18/2024 in the chart. (Dr. Antonette Whitman). The new prescriptions are there. Patient states that she never received her prescription for her vitamin B6, but did receive the prescription for ondansetron.     Patient is requesting a medication for for heartburn, and would like a provider's input regarding the safety of taking Unisom while pregnant.     Routed to Dr. DARNELL Borrero, who saw patient for her initial pregnancy test regarding heartburn medication prescription.     Patient is also suffering from an upper respiratory infection. She is not interested in triage. She states she will go to urgent care for testing and treatment if appropriate.     OB Triage Call      Is patient's OB/Midwife with the [facility]? [facility]- Proceed with triage     Reason for call: heartburn    Assessment: not related to delivery    Plan: patient will be seen in urgent care and have a message routed to primary care    Patient plans to deliver at [facility]    Patient's primary OB Provider is Dr. NIMA Whitman.      Per protocol recommendations Patient to follow home care recommendations.       Is patient's delivering hospital on divert? No      12w3d    Estimated Date of Delivery: Oct 1, 2024        OB History    Para Term  AB Living   6 2 2 0 3 2   SAB IAB Ectopic Multiple Live Births   3 0 0 0 2      # Outcome Date GA Lbr Tomy/2nd Weight Sex Delivery Anes PTL Lv   6 Current            5 Term 16 37w2d  3.289 kg (7 lb 4 oz) M CS-LTranv Spinal N DUNCAN      Name: Brown      Apgar1: 7  Apgar5: 8   4 Term 11 39w2d  2.58 kg (5 lb 11 oz) M Vag-Spont EPI N DUNCAN      Birth Comments: none      Name: Rajat      Apgar1: 8  Apgar5: 9   3 SAB  5w0d    SAB   DEC   2  SAB 2005 6w0d    SAB   DEC   1 SAB 2001 5w0d    SAB   DEC       Lab Results   Component Value Date    GBS  04/12/2016     Negative  No GBS DNA detected, presumed negative for GBS or number of bacteria may be   below the limit of detection of the assay.   Assay performed on incubated broth culture of specimen using [employer] real-time   PCR.            Opal Somers RN 03/22/24 3:05 PM  [facility] Nurse Advisor    Opal Somers RN  [facility]  March 22, 2024, 3:03 PM    Reason for Disposition   Prescription request for new medicine (not a refill)    Additional Information   Negative: Intentional drug overdose and suicidal thoughts or ideas   Negative: Drug overdose and triager unable to answer question   Negative: Caller requesting a renewal or refill of a medicine patient is currently taking   Negative: Caller requesting information unrelated to medicine   Negative: Caller requesting information about COVID-19 Vaccine   Negative: Caller requesting information about Emergency Contraception   Negative: Caller requesting information about Combined Birth Control Pills   Negative: Caller requesting information about Progestin Birth Control Pills   Negative: Caller requesting information about Post-Op pain or medicines   Negative: Caller requesting a prescription antibiotic (such as penicillin) for Strep throat and has a positive culture result   Negative: Caller requesting a prescription anti-viral med (such as Tamiflu) and has influenza (flu) symptoms   Negative: Immunization reaction suspected   Negative: Rash while taking a medicine or within 3 days of stopping it   Negative: Asthma and having symptoms of asthma (cough, wheezing, etc.)   Negative: Symptom of illness (e.g., headache, abdominal pain, earache, vomiting) that are more than mild   Negative: Breastfeeding questions about mother's medicines and diet   Negative: MORE THAN A DOUBLE DOSE of a prescription or over-the-counter (OTC) drug    Negative: DOUBLE DOSE (an extra dose or lesser amount) of prescription drug and any symptoms (e.g., dizziness, nausea, pain, sleepiness)   Negative: DOUBLE DOSE (an extra dose or lesser amount) of over-the-counter (OTC) drug and any symptoms (e.g., dizziness, nausea, pain, sleepiness)   Negative: Took another person's prescription drug   Negative: DOUBLE DOSE (an extra dose or lesser amount) of prescription drug and NO symptoms  (Exception: A double dose of antibiotics.)   Negative: Diabetes drug error or overdose (e.g., took wrong type of insulin or took extra dose)   Negative: Caller has medication question about med NOT prescribed by PCP and triager unable to answer question (e.g., compatibility with other med, storage)   Negative: Prescription not at pharmacy and was prescribed by PCP recently  (Exception: triager has access to EMR and prescription is recorded there. Go to Home Care and confirm for pharmacy.)   Negative: Pharmacy calling with prescription question and triager unable to answer question   Negative: Caller has URGENT medicine question about med that PCP or specialist prescribed and triager unable to answer question   Negative: Caller has NON-URGENT medicine question about med that PCP or specialist prescribed and triager unable to answer question   Negative: Caller wants to use a complementary or alternative medicine   Negative: Medicine patch causing local rash or itching    Protocols used: Medication Question Call-A-[place]

## 2024-04-01 ENCOUNTER — TELEPHONE (OUTPATIENT)
Dept: NEUROSURGERY | Facility: CLINIC | Age: 39
End: 2024-04-01
Payer: COMMERCIAL

## 2024-04-12 ENCOUNTER — TRANSCRIBE ORDERS (OUTPATIENT)
Dept: MATERNAL FETAL MEDICINE | Facility: HOSPITAL | Age: 39
End: 2024-04-12

## 2024-04-12 ENCOUNTER — PRENATAL OFFICE VISIT (OUTPATIENT)
Dept: OBGYN | Facility: CLINIC | Age: 39
End: 2024-04-12
Payer: COMMERCIAL

## 2024-04-12 VITALS
TEMPERATURE: 97.8 F | DIASTOLIC BLOOD PRESSURE: 80 MMHG | BODY MASS INDEX: 32.67 KG/M2 | HEIGHT: 72 IN | RESPIRATION RATE: 18 BRPM | SYSTOLIC BLOOD PRESSURE: 119 MMHG | WEIGHT: 241.2 LBS

## 2024-04-12 DIAGNOSIS — R12 HEARTBURN: ICD-10-CM

## 2024-04-12 DIAGNOSIS — O09.522 MULTIGRAVIDA OF ADVANCED MATERNAL AGE IN SECOND TRIMESTER: ICD-10-CM

## 2024-04-12 DIAGNOSIS — O21.9 NAUSEA AND VOMITING IN PREGNANCY: ICD-10-CM

## 2024-04-12 DIAGNOSIS — O26.90 PREGNANCY RELATED CONDITION, ANTEPARTUM: Primary | ICD-10-CM

## 2024-04-12 DIAGNOSIS — Z3A.15 15 WEEKS GESTATION OF PREGNANCY: Primary | ICD-10-CM

## 2024-04-12 PROCEDURE — 99213 OFFICE O/P EST LOW 20 MIN: CPT | Performed by: OBSTETRICS & GYNECOLOGY

## 2024-04-12 PROCEDURE — 99207 PR PRENATAL VISIT: CPT | Performed by: OBSTETRICS & GYNECOLOGY

## 2024-04-12 RX ORDER — FAMOTIDINE 20 MG/1
20 TABLET, FILM COATED ORAL 2 TIMES DAILY
Qty: 30 TABLET | Refills: 1 | Status: SHIPPED | OUTPATIENT
Start: 2024-04-12 | End: 2024-06-14

## 2024-04-12 RX ORDER — ONDANSETRON 8 MG/1
8 TABLET, ORALLY DISINTEGRATING ORAL EVERY 8 HOURS PRN
Qty: 30 TABLET | Refills: 1 | Status: SHIPPED | OUTPATIENT
Start: 2024-04-12 | End: 2024-09-30

## 2024-04-12 NOTE — NURSING NOTE
"Initial /80 (BP Location: Left arm, Patient Position: Sitting, Cuff Size: Adult Large)   Temp 97.8  F (36.6  C) (Tympanic)   Resp 18   Ht 1.82 m (5' 11.65\")   Wt 109.4 kg (241 lb 3.2 oz)   LMP 12/26/2023   BMI 33.03 kg/m   Estimated body mass index is 33.03 kg/m  as calculated from the following:    Height as of this encounter: 1.82 m (5' 11.65\").    Weight as of this encounter: 109.4 kg (241 lb 3.2 oz). .    "

## 2024-04-12 NOTE — PATIENT INSTRUCTIONS
"Weeks 14 to 18 of Your Pregnancy: Care Instructions  Around this time, you may start to look pregnant. Your baby is now able to pass urine. And the first stool (meconium) is starting to collect in your baby's intestines. Hair is starting to grow on your baby's head.    You may notice some skin changes, such as itchy spots on your palms or acne on your face.    At your next doctor visit, you may have an ultrasound. So you might think about whether you want to know the sex of your baby. Also ask your doctor about flu and COVID-19 shots.     How to reduce stress   Ask for help when you need it.  Try to avoid things that cause you stress.  Seek out things that relieve stress, such as breathing exercises or yoga.     How to get exercise   If you don't usually exercise, start slowly. Short walks may be a good choice.  Try to be active 30 minutes a day, at least 5 days a week.  Avoid activities where you're more likely to fall.  Use light weights to reduce stress on your joints.     How to stay at a healthy weight for you   Talk to your doctor or midwife about how much weight you should gain.  It's generally best to gain:  About 28 to 40 pounds if you're underweight.  About 25 to 35 pounds if you're at a healthy weight.  About 15 to 25 pounds if you're overweight.  About 11 to 20 pounds if you're very overweight (obese).  Follow-up care is a key part of your treatment and safety. Be sure to make and go to all appointments, and call your doctor if you are having problems. It's also a good idea to know your test results and keep a list of the medicines you take.  Where can you learn more?  Go to https://www.Ushi.net/patiented  Enter I453 in the search box to learn more about \"Weeks 14 to 18 of Your Pregnancy: Care Instructions.\"  Current as of: July 10, 2023               Content Version: 14.0    1739-4888 [employer].   Care instructions adapted under license by your healthcare professional. If you have " questions about a medical condition or this instruction, always ask your healthcare professional. Health[employer], [facility] disclaims any warranty or liability for your use of this information.      Second-Trimester Fetal Ultrasound: About This Test  What is it?     Fetal ultrasound is a test that uses sound waves to make pictures of your baby (fetus) and placenta inside the uterus. The test is the safest way to find out the age, size, and position of your baby. You also may be able to find out the sex of your baby. (But the test isn't done just to find out a baby's sex.)  No known risks to the mother or the baby are linked to fetal ultrasound. But you may feel anxious if the test reveals a problem with your pregnancy or baby.  Why is this test done?  In the second trimester, a fetal ultrasound is done to:  Estimate the number of weeks and days a fetus has developed since the beginning of the pregnancy. This is called the gestational age.  Look at the size and position of the fetus, the placenta, and the fluid that surrounds the fetus.  Find major birth defects, such as heart problems or problems with the brain and spinal cord (neural tube defects). But the test may not be able to find many minor defects and some major birth defects.  How do you prepare for the test?  In general, there's nothing you have to do before this test, unless your doctor tells you to.  How is the test done?  You may be able to leave your clothes on, or you will be given a gown to wear.  You will lie on your back on a padded examination table.  A gel will be spread on your belly. It will be removed after the test.  A small, handheld device called a transducer will be pressed against the gel on your skin and moved across your belly several times.  You may watch the monitor to see the picture of your baby during the test.  What happens after the test?  You will probably be able to go home right away.  You most likely will be able to go back to  "your usual activities right away.  Follow-up care is a key part of your treatment and safety. Be sure to make and go to all appointments, and call your doctor if you are having problems. It's also a good idea to keep a list of the medicines you take. Ask your doctor when you can expect to have your test results.  Where can you learn more?  Go to https://www.Prosetta.MiniBanda.ru/patiented  Enter Y671 in the search box to learn more about \"Second-Trimester Fetal Ultrasound: About This Test.\"  Current as of: July 10, 2023               Content Version: 14.0    7367-4070 [employer].   Care instructions adapted under license by your healthcare professional. If you have questions about a medical condition or this instruction, always ask your healthcare professional. [employer] disclaims any warranty or liability for your use of this information.      During Pregnancy: Exercises  Introduction  Here are some examples of exercises to do during your pregnancy. Start each exercise slowly. Ease off the exercise if you start to have pain.  Talk to your doctor about when you can start these exercises and which ones will work best for you.  How to do the exercises  Neck rotation    Sit up straight in a firm chair, or stand up straight. If you're standing, keep your feet about hip-width apart.  Keeping your chin level, turn your head to the right and hold for 15 to 30 seconds.  Turn your head to the left and hold for 15 to 30 seconds.  Repeat 2 to 4 times.  Neck stretch to the front    Sit up straight in a firm chair, or stand up straight. Look straight ahead. If you're standing, keep your feet about hip-width apart.  Slowly bend your head forward without moving your shoulders.  Hold for 15 to 30 seconds, then return to your starting position.  Repeat 2 to 4 times.  Back press    Stand with your back 10 to 12 inches away from a wall.  Lean into the wall until your back is against it. Press your lower back " against the wall by pulling in your stomach muscles.  Slowly slide down until your knees are slightly bent, pressing your lower back against the wall.  Hold for at least 6 seconds, then slide back up the wall.  Repeat 8 to 12 times.  Over time, work up to holding this position for as much as 1 minute.  Trunk twist    Sit on the floor with your legs crossed. If that's not comfortable, you can sit on a folded blanket so your bottom is a few inches off the floor. Or you can sit on a chair with your knees hip-width apart and your feet flat on the floor.  Reach your left hand toward your right knee. You can place your right hand at your side for support.  Slowly twist your body (trunk) to your right.  Relax and return to your starting position.  Repeat 2 to 4 times.  Switch your hands and twist to your left.  Repeat 2 to 4 times.  Pelvic rocking on hands and knees    Start on your hands and knees. Place your wrists directly below your shoulders and your knees below your hips.  Breathe in slowly. Tuck your head downward and round your back up, making a curve with your back in the shape of the letter C. Hold this position for about 6 seconds.  Breathe out slowly and bring your head back up. Relax, keeping your back straight. (Don't allow it to curve toward the floor.) Hold for about 6 seconds.  Repeat 8 to 12 times, gently rocking your pelvis.  Pelvic tilt    Lie on your back with your knees bent and your feet flat on the floor.  Tighten your belly muscles by pulling your belly button in toward your spine. Press your lower back to the floor. You should feel your hips and pelvis rock back.  Hold for 6 seconds while breathing smoothly, and then relax.  Repeat 8 to 12 times.  Do this exercise only during the first 4 months of pregnancy. After this point, lying on your back is not recommended, because it can cause blood flow problems for you and your baby.  Backward stretch    Start on your hands and knees with your knees 8 to  10 inches apart, hands directly below your shoulders, and arms and back straight.  Keeping your arms straight, slowly lower your buttocks toward your heels and tuck your head toward your knees. Hold for 15 to 30 seconds.  Slowly return to the starting position.  Repeat 2 to 4 times.  Forward bend    Sit comfortably in a chair, with your arms relaxed.  Slowly bend forward, allowing your arms to hang down. Lean only as far as you can without feeling discomfort or pressure on your belly.  Hold for 15 to 30 seconds and then slowly sit up straight.  Repeat 2 to 4 times or to your comfort level.  Donkey kick    Start on your hands and knees. Place your hands directly below your shoulders, and keep your arms straight.  Tighten your belly muscles by pulling your belly button in toward your spine. Keep breathing normally, and don't hold your breath.  Lift one knee and bring it toward your elbow.  Slowly extend that leg behind you without completely straightening it. Be careful not to let your hip drop down. Avoid arching your back.  Hold your leg behind you for about 6 seconds.  Return to your starting position.  Repeat 8 to 12 times for each leg.  Tailor sitting    Sit on the floor.  Bring your feet close to your body while crossing your ankles.  Keep your back straight. Relax your legs and let your knees drop toward the floor.  Hold this position for as long as you are comfortable.  Toe reach    Sit on the floor with your back straight, legs about 12 inches apart, and feet relaxed outward.  Stretch your hands forward toward your right foot, then sit up.  Stretch your hands straight forward, then sit up.  Stretch your hands forward toward your left foot, then sit up.  Hold each stretch for 15 to 30 seconds.  Repeat 2 to 4 times.  Follow-up care is a key part of your treatment and safety. Be sure to make and go to all appointments, and call your doctor if you are having problems. It's also a good idea to know your test  results and keep a list of the medicines you take.  Current as of: July 10, 2023               Content Version: 14.0    4746-9631 [employer].   Care instructions adapted under license by your healthcare professional. If you have questions about a medical condition or this instruction, always ask your healthcare professional. [employer] disclaims any warranty or liability for your use of this information.

## 2024-04-12 NOTE — PROGRESS NOTES
"[facility]    Return OB Note    CC: Return OB     Subjective:  Holly is a 39 year old  at 15w3d   Denies vaginal bleeding, loss of fluid, or pelvic cramping. pos fetal movement.  Complaints today: nausea is worse.  Bad heartburn when lying down. Tums doesn't help.    Objective:  /80 (BP Location: Left arm, Patient Position: Sitting, Cuff Size: Adult Large)   Temp 97.8  F (36.6  C) (Tympanic)   Resp 18   Ht 1.82 m (5' 11.65\")   Wt 109.4 kg (241 lb 3.2 oz)   LMP 2023   BMI 33.03 kg/m      See flowsheet    Assessment/Plan:     39 year old  at 15w3d   Encounter Diagnoses   Name Primary?    15 weeks gestation of pregnancy Yes    Multigravida of advanced maternal age in second trimester     Nausea and vomiting in pregnancy     Heartburn         Routine prenatal care:  - New OB labs nml except R-NI and hepB NI.  - Genetic screen: declined  - MsAFP discussed and declined today.  -L2 ultrasound ordered for AMA  -History of CS, 1 vaginal delivery prior, will continue to discuss options.  TOLAC vs CS discussed and handouts given today.    Asthma: Well-controlled with albuterol inhaler as needed.    Anxiety/depression: Well-controlled without medication at this time    History of gestational hypertension: ASA discussed and will start today    Nausea: using 4 mg Zofran and it helps, but wears off.  Prescribed 8mg zofran    Heartburn:  advised Pepcid and Tums.  Discussed incline sleeping.      RTC 4 weeks    Jumana Samaniego MD   "

## 2024-04-22 ENCOUNTER — TELEPHONE (OUTPATIENT)
Dept: OBGYN | Facility: CLINIC | Age: 39
End: 2024-04-22
Payer: COMMERCIAL

## 2024-04-22 ENCOUNTER — HOSPITAL ENCOUNTER (EMERGENCY)
Facility: CLINIC | Age: 39
Discharge: HOME | End: 2024-04-22
Attending: EMERGENCY MEDICINE | Admitting: EMERGENCY MEDICINE
Payer: COMMERCIAL

## 2024-04-22 VITALS
OXYGEN SATURATION: 98 % | SYSTOLIC BLOOD PRESSURE: 151 MMHG | DIASTOLIC BLOOD PRESSURE: 84 MMHG | TEMPERATURE: 98.2 F | HEART RATE: 74 BPM

## 2024-04-22 DIAGNOSIS — T75.4XXA ELECTROCUTION AND NONFATAL EFFECTS OF ELECTRIC CURRENT, INITIAL ENCOUNTER: ICD-10-CM

## 2024-04-22 PROCEDURE — 99284 EMERGENCY DEPT VISIT MOD MDM: CPT | Mod: 25 | Performed by: EMERGENCY MEDICINE

## 2024-04-22 PROCEDURE — 76815 OB US LIMITED FETUS(S): CPT | Mod: 26 | Performed by: EMERGENCY MEDICINE

## 2024-04-22 PROCEDURE — 76815 OB US LIMITED FETUS(S): CPT | Performed by: EMERGENCY MEDICINE

## 2024-04-22 ASSESSMENT — ENCOUNTER SYMPTOMS
ENDOCRINE NEGATIVE: 1
PSYCHIATRIC NEGATIVE: 1
EYES NEGATIVE: 1
HEMATOLOGIC/LYMPHATIC NEGATIVE: 1
RESPIRATORY NEGATIVE: 1
ALLERGIC/IMMUNOLOGIC NEGATIVE: 1
CARDIOVASCULAR NEGATIVE: 1
MUSCULOSKELETAL NEGATIVE: 1
NEUROLOGICAL NEGATIVE: 1
ROS GI COMMENTS: LEFT SIDED ABDOMINAL PAIN

## 2024-04-22 ASSESSMENT — COLUMBIA-SUICIDE SEVERITY RATING SCALE - C-SSRS
2. HAVE YOU ACTUALLY HAD ANY THOUGHTS OF KILLING YOURSELF IN THE PAST MONTH?: NO
1. IN THE PAST MONTH, HAVE YOU WISHED YOU WERE DEAD OR WISHED YOU COULD GO TO SLEEP AND NOT WAKE UP?: NO
6. HAVE YOU EVER DONE ANYTHING, STARTED TO DO ANYTHING, OR PREPARED TO DO ANYTHING TO END YOUR LIFE?: NO

## 2024-04-22 NOTE — TELEPHONE ENCOUNTER
"S-(situation): Patient calling to say she was shocked by a light bulb last night - concerned for baby    B-(background):  at 16w6d    A-(assessment): patient reports being shocked by light bulb last night at 2100 when changing a light bulb. Patient reports she did not get burned and \" thinks it only went through my fingers.\" Patient reports concerns for baby and want to be sure baby is OK. Patient has not had any bright red bleeding or leaking of fluid today like her water broke. Patient has not had any cramping or abdominal pain. Patient reports she feels she has felt \" flutters \" of baby today. Patient reports heartburn or chest pain, unsure of which.    R-(recommendations): patient advised to be seen for further evaluation and for check up on baby. Unable to know without evaluation if there are any concerns. Patient reports understanding. Patient will have boyfriend bring her in.    Patient in agreement and reports understanding of plan.    Radha KRAFT   [facility]         "

## 2024-04-22 NOTE — ED TRIAGE NOTES
Pharmacy requesting refill    Refill Medication-lexapro     LOV-12/7/20 v visit    Last refill-12/3/20    Please advise     Pt was changing light bulb last night in her house. Got electric shock on her left hand. Pt is 17 weeks pregnant. Called triage line and was advised to be seen due to pregnancy. Still having occasional numbness in left hand. No other concerns.      Triage Assessment (Adult)       Row Name 04/22/24 1557          Triage Assessment    Airway WDL WDL        Respiratory WDL    Respiratory WDL WDL        Skin Circulation/Temperature WDL    Skin Circulation/Temperature WDL WDL        Cardiac WDL    Cardiac WDL WDL        Peripheral/Neurovascular WDL    Peripheral Neurovascular WDL WDL        Cognitive/Neuro/Behavioral WDL    Cognitive/Neuro/Behavioral WDL WDL

## 2024-04-22 NOTE — ED PROVIDER NOTES
History     Chief Complaint   Patient presents with    Electric Shock     HPI  Holly Tolliver is a 39 year old female who presents for evaluation reporting she was shocked changing an electric light bulb yesterday.  Patient called the nurse triage line who recommended she be seen due to her pregnancy.  Patient on intake reports she is  at 17 wks 1 day.    Reviewed the medical record visit on 24-patient has a history of depression anxiety, asthma, history of gestational hypertension-started on aspirin during that visit.  History of GERD.    Patient's current prescribed medications were reviewed with the patient at the bedside.     On examination patient currently by her partner.  She reports she was changed to a light bulb a day prior when she got shocked.  She did not hold onto the light bulb and she was not thrown.  Since the shock yesterday she has had some numbness sensation involving her left index finger long finger and ring finger.  She also felt like she had some left-sided abdominal cramping.  She is felt baby move.  She spoke with the nurse advice line who recommended she come in to be assessed. No abnormal spotting, discharge or bleeding.    Allergies:  Allergies   Allergen Reactions    Amoxicillin Rash       Problem List:    Patient Active Problem List    Diagnosis Date Noted    Major depressive disorder, recurrent, unspecified (H24) 2023     Priority: Medium    Alcohol dependence in remission (H) 2023     Priority: Medium    Methamphetamine abuse (H) 2023     Priority: Medium    Tooth infection 2022     Priority: Medium    Routine postpartum follow-up 2016     Priority: Medium    Mirena IUD 2016     Priority: Medium     Lot: [id number]  Exp:       Single liveborn, born in hospital, delivered 2016     Priority: Medium    Placental abruption in third trimester 2016     Priority: Medium    Placental abruption 2016     Priority: Medium      delivery delivered 2016     Priority: Medium    Prenatal care, subsequent pregnancy 2016     Priority: Medium     FOBLAIR- Lam Robledo      Prenatal care, subsequent pregnancy in third trimester 2016     Priority: Medium    Health Care Home 2015     Priority: Medium     Care Coordinator: CHAYITO Lopez  See letters for Health Care home care plan    SW covering in [facility] absence    FREDY Luz, MSW   [phone]  3/24/2015 12:24 PM      Persons encountering health services in other specified circumstances 2015     Priority: Medium     Formatting of this note might be different from the original. Care Coordinator: CHAYITO Lopez See letters for Health Care home care plan SW covering in [facility] absence FREDY Luz, [phone] 3/24/2015 12:24 PM      Depression with anxiety 2014     Priority: Medium    Generalized anxiety disorder 2013     Priority: Medium     Diagnosis updated by automated process. Provider to review and confirm.      GERD (gastroesophageal reflux disease) 2013     Priority: Medium    Major depressive disorder, single episode, moderate (H) 2013     Priority: Medium    CARDIOVASCULAR SCREENING; LDL GOAL LESS THAN 160 10/23/2012     Priority: Medium    CTS (carpal tunnel syndrome) 10/14/2011     Priority: Medium    Panic disorder with agoraphobia and moderate panic attacks 03/10/2009     Priority: Medium    Tobacco use disorder 2008     Priority: Medium     Has decreased from 1 ppd to 1 cigarette per day since pregnancy.      Mild intermittent asthma 2008     Priority: Medium        Past Medical History:    Past Medical History:   Diagnosis Date    ALCOH DEP NEC/NOS-UNSPEC 2007    Asthma     Chickenpox     Chlamydia     Placental abruption 2016    Postpartum depression 2011    Preeclampsia     Pregnancy induced hypertension     Urinary tract infections         Past Surgical History:    Past Surgical History:   Procedure Laterality Date     SECTION Bilateral 2016    Procedure:  SECTION;  Surgeon: Beau Cardoso MD;  Location: [facility] OR    PE TUBES  age 3       Family History:    Family History   Problem Relation Age of Onset    Alcohol/Drug Mother         alcohol- recovered    Hypertension Mother     Depression Mother         also anxiety    Other - See Comments Mother         ankylosing spondylosis    Anxiety Disorder Mother     Irritable Bowel Syndrome Mother     Alcohol/Drug Father         alcohol- recovered    Alcohol/Drug Sister         A&D-recovered    Alcoholism Brother         recovered    Alcohol/Drug Brother         alcohol    Hypertension Brother     Schizophrenia Brother     Cancer Maternal Grandmother         lung- at age 44    Depression Maternal Grandmother         also anxiety       Social History:  Marital Status:  Single [1]  Social History     Tobacco Use    Smoking status: Former     Current packs/day: 0.00     Types: Cigarettes     Passive exposure: Never    Smokeless tobacco: Never   Vaping Use    Vaping status: Former   Substance Use Topics    Alcohol use: Not Currently     Comment: sober since 24    Drug use: Not Currently     Types: Methamphetamines, Marijuana     Comment: sober since 24        Medications:    acetaminophen (TYLENOL) 325 MG tablet  albuterol (PROAIR HFA/PROVENTIL HFA/VENTOLIN HFA) 108 (90 Base) MCG/ACT inhaler  doxylamine (UNISOM SLEEPTABS) 25 MG TABS tablet  famotidine (PEPCID) 20 MG tablet  ondansetron (ZOFRAN ODT) 4 MG ODT tab  ondansetron (ZOFRAN ODT) 8 MG ODT tab  Prenatal Vit-Fe Fumarate-FA (PRENATAL MULTIVITAMIN  PLUS IRON) 27-1 MG TABS  vitamin B6 (PYRIDOXINE) 100 MG tablet          Review of Systems   Constitutional:         Got shocked while changing a light bulb   HENT: Negative.     Eyes: Negative.    Respiratory: Negative.     Cardiovascular: Negative.    Gastrointestinal:          Left sided abdominal pain   Endocrine: Negative.    Genitourinary: Negative.    Musculoskeletal: Negative.    Skin: Negative.    Allergic/Immunologic: Negative.    Neurological: Negative.    Hematological: Negative.    Psychiatric/Behavioral: Negative.     All other systems reviewed and are negative.      Physical Exam   BP: (!) 151/84  Pulse: 74  SpO2: 98 %      Physical Exam  Constitutional:       General: She is not in acute distress.     Appearance: Normal appearance. She is not ill-appearing, toxic-appearing or diaphoretic.   HENT:      Head: Normocephalic and atraumatic.      Right Ear: Tympanic membrane normal.      Nose: Nose normal.   Eyes:      Extraocular Movements: Extraocular movements intact.      Pupils: Pupils are equal, round, and reactive to light.   Cardiovascular:      Rate and Rhythm: Normal rate and regular rhythm.      Pulses: Normal pulses.      Heart sounds: Normal heart sounds.   Musculoskeletal:         General: Normal range of motion.      Cervical back: Normal range of motion and neck supple.   Skin:     Capillary Refill: Capillary refill takes less than 2 seconds.      Coloration: Skin is not jaundiced or pale.      Findings: No bruising, erythema, lesion or rash.   Neurological:      General: No focal deficit present.      Mental Status: She is alert and oriented to person, place, and time.      Cranial Nerves: No cranial nerve deficit.      Sensory: No sensory deficit.      Motor: No weakness.      Coordination: Coordination normal.      Gait: Gait normal.      Deep Tendon Reflexes: Reflexes normal.   Psychiatric:         Mood and Affect: Mood normal.         Behavior: Behavior normal.         Thought Content: Thought content normal.         Judgment: Judgment normal.         ED Course        Procedures           Critical Care time:  none             ED medications: none      ED Vitals:  Vitals:    04/22/24 1837 04/22/24 1838 04/22/24 1839 04/22/24 1843   BP:       Pulse:        SpO2: 98% 97% 98% 98%        ED labs and imaging:  Results for orders placed or performed during the hospital encounter of 04/22/24   POC US ABDOMEN LIMITED     Status: None (In process)    Impression    [facility] Procedure Note     Limited Bedside ED abdominal Ultrasound (PREGNANT PATIENT):    PROCEDURE: PERFORMED BY: Dr. Tone Paul MD  INDICATIONS/SYMPTOM: Left sided abdominal cramping after attempting to change a light bulb and getting shocked.  PROBE: Low frequency convex probe  Type of US Study: Transabdominal Exam  BODY LOCATION: Pelvis  FINDINGS: Fetal heart rate: Present and counted at 160 bpm.  INTERPRETATION: Normal abdominal ultrasound with intrauterine pregnancy present. FHR was 160 with noted fetal activity.  NIMAGE DOCUMENTATION: Images were archived to PACs system      Assessments & Plan (with Medical Decision Making)   Assessment Summary and clinical Impression: 39-year-old female right hand dominant who is a G6, P2 at 17 weeks and 1 day who presented with concern that she was shocked while changing a light bulb a day prior.  She reported some numbness in the hand after the shock involving her left long finger, index finger and ring finger.  She did not hold onto the light bulb when she was not ejected.  She felt that she had some left-sided abdominal cramping.  After discussion with the nurse triage line recommended she come to be assessed.  On exam there is no soft tissue injury involving the hand or fingers.  No abdominal bruising no tenderness to palpation.  Bedside point-of-care abdominal ultrasound completed with fetal cardiac activity heart rate 160- and fetal movement.  Patient felt reassured by her abdominal ultrasound and we discussed low threshold to return for reassessment if there are new concerns.    ED course and plan:  Reviewed the medical record. Visit on 4/12/24.  Given absence of soft tissue injury on exam patient not holding onto the light bulb when she got  shocked, no injection no accompanying concern for blunt trauma and reassuring bedside POC abdomen with fetal activity and cardiac activity.  Patient was discharged with plan for continued monitoring with low threshold to return for reassessment if there are new concerns.    Disclaimer: This note consists of symbols derived from keyboarding, dictation and/or voice recognition software. As a result, there may be errors in the script that have gone undetected. Please consider this when interpreting information found in this chart.   I have reviewed the nursing notes.    I have reviewed the findings, diagnosis, plan and need for follow up with the patient.           Medical Decision Making  The patient's presentation was of high complexity (pregnant. Shocked).    The patient's evaluation involved:  ordering and/or review of 2 test(s) in this encounter (specialty consultation)    The patient's management necessitated moderate risk (prescription drug management including medications given in the ED).        New Prescriptions    No medications on file       Final diagnoses:   Electrocution and nonfatal effects of electric current, initial encounter - a day prior while attempting to change a light bulb.       4/22/2024   [facility] EMERGENCY DEPT       Tone Paul MD  04/22/24 [id number]

## 2024-04-22 NOTE — DISCHARGE INSTRUCTIONS
1) Your evaluation today did not reveal any new effects after getting shocked a day prior with changing a light bulb yesterday.  There is no evidence of soft tissue injury or burn wounds to your fingers.  Ultrasound today was reassuring with good activity of your baby good heart rate.     2) Be sure to monitor your symptoms in the event that you develop any skin changes involving your fingers, or you develop any new symptoms of concern including increasing abdominal pain, leakage of fluid or spotting or bleeding. Be sure to update your care team about your symptoms.

## 2024-04-29 ENCOUNTER — TELEPHONE (OUTPATIENT)
Dept: OBGYN | Facility: CLINIC | Age: 39
End: 2024-04-29
Payer: COMMERCIAL

## 2024-04-29 NOTE — TELEPHONE ENCOUNTER
[facility] Call Center    Phone Message    May a detailed message be left on voicemail: yes     Reason for Call: Patient is just calling to see if taking Colace for constipation is safe for pregnancy. Please reach out. Thank you!    Action Taken: Other: JORDON    Travel Screening: Not Applicable

## 2024-05-03 NOTE — PROGRESS NOTES
[facility]  New OB Visit Note         Assessment and Plan:      Holly Tolliver, 38 year old  at 11w3d by LMP c/w 11w5d US, presents for her initial OB visit.     Routine prenatal care:  - New OB labs ordered today: T&S, CBC, RPR/HIV/HepB surface antigen (Hep B sAg)/HepB anti-surface antibody (anti-HBs)/HepB total core antibody (total anti-HBc)/HepC antibody, Rubella antibody, chlamydia/gonorrhea PCR; PAP smear 2023 NILM w/ neg HPV  - Dating/viability US performed today   - Genetic screen: declined  - Counseling: Discussed routine prenatal care, group practice model at [facility], tertiary support and frequency of visits. Reviewed miscarriage precautions (present to ED or call clinic with abdominal pain, vaginal bleeding or fever). Weight gain: discussed weight gain expectations (BMI 18.5-25: 25-35lbs)   - Delivery plan: continue to discuss route of delivery, breastfeeding, pp contraception, pain management in labor    Asthma: Well-controlled with albuterol inhaler as needed.    Anxiety/depression: Well-controlled without medication at this time    History of gestational hypertension: Need to discuss the recommendation of aspirin at next visit    Nausea: Prescription for vitamin B6, Unisom, and Zofran given.    RTC 4 weeks    Antonette Whitman MD  Obstetrics and Gynecology  [facility]   03/15/2024          Subjective:   Denies any uterine cramping, abdominal pain or vaginal bleeding.  Endorsed nausea without vomiting.    ROS: A 10 pt ROS was completed and found to be negative unless mentioned in the HPI.          OB History:     OB History    Para Term  AB Living   6 2 2 0 3 2   SAB IAB Ectopic Multiple Live Births   3 0 0 0 2      # Outcome Date GA Lbr Tomy/2nd Weight Sex Delivery Anes PTL Lv   6 Current            5 Term 16 37w2d  3.289 kg (7 lb 4 oz) M CS-LTranv Spinal N DUNCAN      Name: Brown      Apgar1: 7  Apgar5: 8   4 Term 11  39w2d  2.58 kg (5 lb 11 oz) M Vag-Spont EPI N DUNCAN      Birth Comments: none      Name: Rajat      Apgar1: 8  Apgar5: 9   3 2008 5w0d    SAB   DEC   2 2005 6w0d    SAB   DEC   1 2001 5w0d    SAB   DEC      > CS for placental abruption (painless large amount of vaginal bleeding)  H/o gHTN w/ first pregnancy.       Past Medical History:     Past Medical History:   Diagnosis Date    ALCOH DEP NEC/NOS-UNSPEC 2007    Asthma     exercise induced    Chickenpox     Chlamydia     Placental abruption     Postpartum depression     also 2016    Preeclampsia     ? in     Pregnancy induced hypertension     Urinary tract infections           Past Surgical History:     Past Surgical History:   Procedure Laterality Date     SECTION Bilateral 2016    Procedure:  SECTION;  Surgeon: Beau Cardoso MD;  Location: [facility] OR    PE TUBES  age 3          Social History:   Denied smoking ,alcohol use, or recreational drug use. Unemployed. Lived with [facility] and the [facility].  Do not want to discuss custody issues of her 2 kids.         Family History:   Denies family history of bleeding disorder, clotting disorder, problems with anesthesia, congenital abnormalities.  Family History   Problem Relation Age of Onset    Alcohol/Drug Mother         alcohol- recovered    Hypertension Mother     Depression Mother         also anxiety    Other - See Comments Mother         ankylosing spondylosis    Anxiety Disorder Mother     Irritable Bowel Syndrome Mother     Alcohol/Drug Father         alcohol- recovered    Alcohol/Drug Sister         A&D-recovered    Alcoholism Brother         recovered    Alcohol/Drug Brother         alcohol    Hypertension Brother     Schizophrenia Brother     Cancer Maternal Grandmother         lung- at age 44    Depression Maternal Grandmother         also anxiety          Immunizations:     Immunization History   Administered Date(s) Administered    Influenza  "(IIV3) PF 09/30/2011    Influenza Vaccine >6 months,quad, PF 11/13/2015, 11/28/2018    Mantoux Tuberculin Skin Test 03/07/2012, 03/12/2013, 03/14/2017, 03/28/2017    TDAP Vaccine (Adacel) 12/02/2011, 03/04/2016          Allergies:     Allergies   Allergen Reactions    Amoxicillin Rash          Medications:     Current Outpatient Medications   Medication Sig Dispense Refill    Prenatal Vit-Fe Fumarate-FA (PRENATAL MULTIVITAMIN  PLUS IRON) 27-1 MG TABS Take 1 tablet by mouth daily      albuterol (PROAIR HFA/PROVENTIL HFA/VENTOLIN HFA) 108 (90 Base) MCG/ACT inhaler Inhale 2 puffs into the lungs every 6 hours as needed for shortness of breath or wheezing (Patient not taking: Reported on 8/7/2023) 8 g 4         Objective:   /85 (BP Location: Right arm, Patient Position: Chair, Cuff Size: Adult Large)   Pulse 84   Temp 98.8  F (37.1  C) (Tympanic)   Resp 18   Ht 2.108 m (6' 11\")   Wt 109.3 kg (241 lb)   LMP 12/30/2023   BMI 24.60 kg/m      Estimated body mass index is 24.6 kg/m  as calculated from the following:    Height as of this encounter: 2.108 m (6' 11\").    Weight as of this encounter: 109.3 kg (241 lb).    General appearance: well-hydrated, A&O x 3, no apparent distress  Lungs: Equal expansion bilaterally, no accessory muscle use  Heart: No heaves or thrills. No peripheral varicosities  Constitutional: See vitals  Abdomen: Soft, non-tender, non-distended. No rebound, rigidity, or guarding.         Labs/Imagings:     Bedside Ultrasound:     Transvaginal ultrasound was performed. A  intrauterine pregnancy was seen.      CRL= 4.91 cm   FHT= 165 bpm  EGA= 11 weeks 5 days  ESTEVAN based on US = 9/29/2024  ESTEVAN by LMP= 10/1/2024  FINAL ESTEVAN= 10/1/2024           " Ambulatory

## 2024-05-03 NOTE — PATIENT INSTRUCTIONS
"Weeks 18 to 22 of Your Pregnancy: Care Instructions  At this stage you may find that your nausea and fatigue are gone. You may feel better overall and have more energy. But you might now also have some new discomforts, like sleep problems or leg cramps.    You may start to feel your baby move. These movements can feel like butterflies or bubbles.    Babies at this stage can now suck their thumbs.    Get some exercise every day.  And avoid caffeine late in the day.     Take a warm shower or bath before bed.  Try relaxation exercises to calm your mind and body.     Use extra pillows.  They can help you get comfortable.     Don't use sleeping pills or alcohol.  They could harm your baby.     For leg cramps, stretch and apply heat.  A warm bath, leg warmers, a heating pad, or a hot water bottle can help with muscle aches.   Stretches for leg cramps    Straighten your leg and bend your foot (flex your ankle) slowly upward, toward your knee. Bend your toes up and down.    Stand on a flat surface. Stretch your toes upward. For balance, hold on to the wall or something stable. If it feels okay, take small steps walking on your heels.  Follow-up care is a key part of your treatment and safety. Be sure to make and go to all appointments, and call your doctor if you are having problems. It's also a good idea to know your test results and keep a list of the medicines you take.  Where can you learn more?  Go to https://www.[employer].net/patiented  Enter W603 in the search box to learn more about \"Weeks 18 to 22 of Your Pregnancy: Care Instructions.\"  Current as of: July 10, 2023               Content Version: 14.0    3482-8608 [employer].   Care instructions adapted under license by your healthcare professional. If you have questions about a medical condition or this instruction, always ask your healthcare professional. [employer] disclaims any warranty or liability for your use of this " "information.      Weeks 22 to 26 of Your Pregnancy: Care Instructions  Your baby's lungs are getting ready for breathing. Your baby may respond to your voice. Your baby likely turns less, and kicks or jerks more. Jerking may mean that your baby has hiccups.    Think about taking childbirth classes. And start to think about whether you want to have pain medicine during labor.    At your next doctor visit, you may be tested for anemia and for high blood sugar that first occurs during pregnancy (gestational diabetes). These conditions can cause problems for you and your baby.    To ease discomfort, such as back pain    Change your position often. Try not to sit or stand for too long.  Get some exercise. Things like walking or stretching may help.  Try using a heating pad or cold pack.    To ease or reduce swelling in your feet, ankles, hands, and fingers    Take off your rings.  Avoid high-sodium foods, such as potato chips.  Prop up your feet, and sleep with pillows under your feet.  Try to avoid standing for long periods of time.  Do not wear tight shoes.  Wear support stockings.  Kegel exercises to prevent urine from leaking    Squeeze your muscles as if you were trying not to pass gas. Your belly, legs, and buttocks shouldn't move. Hold the squeeze for 3 seconds, then relax for 5 to 10 seconds.    Add 1 second each week until you can squeeze for 10 seconds. Repeat the exercise 10 times a session. Do 3 to 8 sessions a day. If these exercises cause you pain, stop doing them and talk with your doctor.  Follow-up care is a key part of your treatment and safety. Be sure to make and go to all appointments, and call your doctor if you are having problems. It's also a good idea to know your test results and keep a list of the medicines you take.  Where can you learn more?  Go to https://www.healthwise.net/patiented  Enter G264 in the search box to learn more about \"Weeks 22 to 26 of Your Pregnancy: Care " "Instructions.\"  Current as of: July 10, 2023               Content Version: 14.0    1358-7719 [employer].   Care instructions adapted under license by your healthcare professional. If you have questions about a medical condition or this instruction, always ask your healthcare professional. [employer] disclaims any warranty or liability for your use of this information.      Round Ligament Pain: Care Instructions  Overview     Round ligament pain is a common pain during pregnancy. You may feel a sharp brief pain on one or both sides of your belly. It may go down into your groin. It's usually felt for the first time during the second trimester. This pain is a normal part of pregnancy.  Your uterus is supported by two ligaments that go from the top and sides of the uterus to the bones of the pelvis. These are the round ligaments. As your uterus grows, these ligaments stretch and tighten with your movements. This may be the cause of the pain. You may find that certain activities seem to cause pain. If you can, avoid those activities.  Your doctor can usually diagnose round ligament pain from your symptoms and an exam. If you have bleeding or other symptoms, your doctor may also do an imaging test, such as an ultrasound. Your doctor may suggest some things that can help the pain, such as rest and strengthening exercises.  Follow-up care is a key part of your treatment and safety. Be sure to make and go to all appointments, and call your doctor if you are having problems. It's also a good idea to know your test results and keep a list of the medicines you take.  How can you care for yourself at home?  If certain movements seem to trigger belly pain, see if you can avoid them or try moving more slowly so the ligaments don't stretch quickly.  Stay active. If your doctor says it's okay, try moderate exercise. You might try things like swimming, walking, or stretching. Ask your doctor about " "strengthening and stretching exercises that may help.  Try a heating pad or cold pack on the area. A warm bath or shower may also help.  Rest when you can.  Ask your doctor about taking acetaminophen for pain. Be safe with medicines. Read and follow all instructions on the label.  Try a belly support band. Some people find that these can help.  When should you call for help?   Call your doctor now or seek immediate medical care if:    You think you might be in labor.     You have new or worse pain.   Watch closely for changes in your health, and be sure to contact your doctor if you have any problems.  Where can you learn more?  Go to https://www.[employer].net/patiented  Enter R110 in the search box to learn more about \"Round Ligament Pain: Care Instructions.\"  Current as of: July 10, 2023               Content Version: 14.0    4532-6010 [employer].   Care instructions adapted under license by your healthcare professional. If you have questions about a medical condition or this instruction, always ask your healthcare professional. [employer] disclaims any warranty or liability for your use of this information.      Leg and Ankle Edema: Care Instructions  Your Care Instructions  Swelling in the legs, ankles, and feet is called edema. It is common after you sit or stand for a while. Long plane flights or car rides often cause swelling in the legs and feet. You may also have swelling if you have to stand for long periods of time at your job. Problems with the veins in the legs (varicose veins) and changes in hormones can also cause swelling. Sometimes the swelling in the ankles and feet is caused by a more serious problem, such as heart failure, infection, blood clots, or liver or kidney disease.  Follow-up care is a key part of your treatment and safety. Be sure to make and go to all appointments, and call your doctor if you are having problems. It's also a good idea to know your test " "results and keep a list of the medicines you take.  How can you care for yourself at home?  If your doctor gave you medicine, take it as prescribed. Call your doctor if you think you are having a problem with your medicine.  Whenever you are resting, raise your legs up. Try to keep the swollen area higher than the level of your heart.  Take breaks from standing or sitting in one position.  Walk around to increase the blood flow in your lower legs.  Move your feet and ankles often while you stand, or tighten and relax your leg muscles.  Wear support stockings. Put them on in the morning, before swelling gets worse.  Eat a balanced diet. Lose weight if you need to.  Limit the amount of salt (sodium) in your diet. Salt holds fluid in the body and may increase swelling.  When should you call for help?   Call 911 anytime you think you may need emergency care. For example, call if:    You have symptoms of a blood clot in your lung (called a pulmonary embolism). These may include:  Sudden chest pain.  Trouble breathing.  Coughing up blood.   Call your doctor now or seek immediate medical care if:    You have signs of a blood clot, such as:  Pain in your calf, back of the knee, thigh, or groin.  Redness and swelling in your leg or groin.     You have symptoms of infection, such as:  Increased pain, swelling, warmth, or redness.  Red streaks or pus.  A fever.   Watch closely for changes in your health, and be sure to contact your doctor if:    Your swelling is getting worse.     You have new or worsening pain in your legs.     You do not get better as expected.   Where can you learn more?  Go to https://www.[employer].net/patiented  Enter N696 in the search box to learn more about \"Leg and Ankle Edema: Care Instructions.\"  Current as of: August 6, 2023               Content Version: 14.0    2925-5545 [employer].   Care instructions adapted under license by your healthcare professional. If you have questions " about a medical condition or this instruction, always ask your healthcare professional. [employer] disclaims any warranty or liability for your use of this information.      Back Pain During Pregnancy: Care Instructions  Overview     Back pain has many possible causes. It is often caused by problems with muscles and ligaments in your back. The extra weight during pregnancy can put stress on your back. Moving, lifting, standing, sitting, or sleeping in an awkward way also can strain your back. Back pain can also be a sign of labor. Although it may hurt a lot, back pain often improves on its own. Use good home treatment, and take care not to stress your back.  Follow-up care is a key part of your treatment and safety. Be sure to make and go to all appointments, and call your doctor if you are having problems. It's also a good idea to know your test results and keep a list of the medicines you take.  How can you care for yourself at home?  Ask your doctor about taking acetaminophen (Tylenol) for pain. Do not take aspirin, ibuprofen (Advil, Motrin), or naproxen (Aleve).  Do not take two or more pain medicines at the same time unless the doctor told you to. Many pain medicines have acetaminophen, which is Tylenol. Too much acetaminophen (Tylenol) can be harmful.  Try heat or ice, whichever feels better. Apply it for 10 to 20 minutes at a time, several times a day. Put a thin cloth between the heat or ice and your skin. A warm bath or shower may also help.  Lie on your left side with your knees and hips bent and a pillow between your legs. This reduces stress on your back.  Try to avoid standing or sitting for too long or heavy lifting. If your job requires lots of standing, sitting, or heavy lifting, ask your employer if you can take short breaks or adjust your work activity. You can ask your doctor to write a note requesting these breaks or other adjustments.  Wear supportive, low-heeled shoes. Avoid flat  "or high-heeled shoes.  Try a belly support band. Supporting your belly can take the strain off your back.  Ask your doctor about how much exercise you can do. Regular exercise such as swimming, water aerobics, walking, or stretching can help with back pain.  Ask your doctor about exercises to stretch, strengthen, and relax your muscles. Your doctor may recommend physical therapy.  When should you call for help?   Call your doctor now or seek immediate medical care if:    You've been having regular contractions for an hour. This means that you've had at least 6 contractions within 1 hour, even after you change your position and drink fluids.     You have new numbness in your buttocks, genital or rectal areas, or legs.     You have a new loss of bowel or bladder control.     You have symptoms of a urinary tract infection. These may include:  Pain or burning when you urinate.  A frequent need to urinate without being able to pass much urine.  Pain in the flank, which is just below the rib cage and above the waist on either side of the back.  Blood in your urine.   Watch closely for changes in your health, and be sure to contact your doctor if:    You do not get better as expected.   Where can you learn more?  Go to https://www.[employer].net/patiented  Enter C696 in the search box to learn more about \"Back Pain During Pregnancy: Care Instructions.\"  Current as of: July 10, 2023               Content Version: 14.0    7396-5508 [employer].   Care instructions adapted under license by your healthcare professional. If you have questions about a medical condition or this instruction, always ask your healthcare professional. [employer] disclaims any warranty or liability for your use of this information.       Labor: Care Instructions  Overview      labor is the start of labor between 20 and 36 weeks of pregnancy. Most babies are born at 37 to 42 weeks of pregnancy. In labor, the " uterus contracts to open the cervix. This is the first stage of childbirth.  labor can be caused by a problem with the baby, the mother, or both. Often the cause is not known.  In some cases, doctors use medicines to try to delay labor until 34 or more weeks of pregnancy. By this time, a baby has grown enough so that problems are not likely. In some cases--such as with a serious infection--it is healthier for the baby to be born early. Your treatment will depend on how far along you are in your pregnancy and on your health and your baby's health.  Follow-up care is a key part of your treatment and safety. Be sure to make and go to all appointments, and call your doctor if you are having problems. It's also a good idea to know your test results and keep a list of the medicines you take.  How can you care for yourself at home?  If your doctor prescribed medicines, take them exactly as directed. Call your doctor if you think you are having a problem with your medicine.  Rest until your doctor advises you about activity.  Do not have sexual intercourse unless your doctor says it is safe.  Use sanitary pads if you have vaginal bleeding. Using pads makes it easier to monitor your bleeding.  Do not smoke or allow others to smoke around you. If you need help quitting, talk to your doctor about stop-smoking programs and medicines. These can increase your chances of quitting for good.  When should you call for help?   Call 911  anytime you think you may need emergency care. For example, call if:    You passed out (lost consciousness).     You have a seizure.     You have severe vaginal bleeding.     You have severe pain in your belly or pelvis that doesn't get better between contractions.     You have had fluid gushing or leaking from your vagina and you know or think the umbilical cord is bulging into your vagina. If this happens, immediately get down on your knees so your rear end (buttocks) is higher than your head.  "This will decrease the pressure on the cord until help arrives.   Call your doctor now or seek immediate medical care if:    You have signs of preeclampsia, such as:  Sudden swelling of your face, hands, or feet.  New vision problems (such as dimness, blurring, or seeing spots).  A severe headache.     You have any vaginal bleeding.     You have belly pain or cramping.     You have a fever.     You have had regular contractions (with or without pain) for an hour. This means that you have 6 or more within 1 hour after you change your position and drink fluids.     You have a sudden release of fluid from the vagina.     You have low back pain or pelvic pressure that does not go away.     You notice that your baby has stopped moving or is moving much less than normal.   Watch closely for changes in your health, and be sure to contact your doctor if you have any problems.  Where can you learn more?  Go to https://www.[employer].net/patiented  Enter Q400 in the search box to learn more about \" Labor: Care Instructions.\"  Current as of: July 10, 2023               Content Version: 14.0    4481-4508 [employer].   Care instructions adapted under license by your healthcare professional. If you have questions about a medical condition or this instruction, always ask your healthcare professional. [employer] disclaims any warranty or liability for your use of this information.      "

## 2024-05-06 ENCOUNTER — PRE VISIT (OUTPATIENT)
Dept: MATERNAL FETAL MEDICINE | Facility: HOSPITAL | Age: 39
End: 2024-05-06
Payer: COMMERCIAL

## 2024-05-09 ENCOUNTER — ANCILLARY PROCEDURE (OUTPATIENT)
Dept: ULTRASOUND IMAGING | Facility: HOSPITAL | Age: 39
End: 2024-05-09
Attending: OBSTETRICS & GYNECOLOGY
Payer: COMMERCIAL

## 2024-05-09 ENCOUNTER — OFFICE VISIT (OUTPATIENT)
Dept: MATERNAL FETAL MEDICINE | Facility: HOSPITAL | Age: 39
End: 2024-05-09
Attending: OBSTETRICS & GYNECOLOGY
Payer: COMMERCIAL

## 2024-05-09 DIAGNOSIS — Z36.2 ENCOUNTER FOR FOLLOW-UP ULTRASOUND OF FETAL ANATOMY: Primary | ICD-10-CM

## 2024-05-09 DIAGNOSIS — O99.212 OBESITY AFFECTING PREGNANCY IN SECOND TRIMESTER, UNSPECIFIED OBESITY TYPE: ICD-10-CM

## 2024-05-09 DIAGNOSIS — O09.522 ADVANCED MATERNAL AGE IN MULTIGRAVIDA, SECOND TRIMESTER: ICD-10-CM

## 2024-05-09 DIAGNOSIS — O26.90 PREGNANCY RELATED CONDITION, ANTEPARTUM: ICD-10-CM

## 2024-05-09 PROCEDURE — 76811 OB US DETAILED SNGL FETUS: CPT | Mod: 26 | Performed by: OBSTETRICS & GYNECOLOGY

## 2024-05-09 PROCEDURE — 99203 OFFICE O/P NEW LOW 30 MIN: CPT | Mod: 25 | Performed by: OBSTETRICS & GYNECOLOGY

## 2024-05-09 PROCEDURE — 76811 OB US DETAILED SNGL FETUS: CPT

## 2024-05-09 NOTE — NURSING NOTE
Holly Tolliver is a  at 19w2d who presents to [facility] for a comprehensive ultrasound. Pt reports positive fetal movement. Pt denies bldg/lof/change in discharge, contractions, headache, vision changes, chest pain/SOB or edema. SBAR given to Dr. Rocha, see note in Epic.      Carmen Olivarez RN

## 2024-05-09 NOTE — PROGRESS NOTES
Please see the imaging tab for details of the ultrasound performed today.    Ana Maria Rocha MD  Specialist in Maternal-Fetal Medicine

## 2024-05-10 ENCOUNTER — PRENATAL OFFICE VISIT (OUTPATIENT)
Dept: OBGYN | Facility: CLINIC | Age: 39
End: 2024-05-10
Payer: COMMERCIAL

## 2024-05-10 VITALS
TEMPERATURE: 97.9 F | DIASTOLIC BLOOD PRESSURE: 81 MMHG | HEIGHT: 72 IN | WEIGHT: 245 LBS | HEART RATE: 82 BPM | BODY MASS INDEX: 33.18 KG/M2 | SYSTOLIC BLOOD PRESSURE: 133 MMHG | RESPIRATION RATE: 18 BRPM

## 2024-05-10 DIAGNOSIS — Z3A.19 19 WEEKS GESTATION OF PREGNANCY: Primary | ICD-10-CM

## 2024-05-10 PROCEDURE — 99213 OFFICE O/P EST LOW 20 MIN: CPT | Mod: 25 | Performed by: OBSTETRICS & GYNECOLOGY

## 2024-05-10 PROCEDURE — 99207 PR PRENATAL VISIT: CPT | Performed by: OBSTETRICS & GYNECOLOGY

## 2024-05-10 NOTE — NURSING NOTE
"Initial /81 (BP Location: Right arm, Patient Position: Chair, Cuff Size: Adult Large)   Pulse 82   Temp 97.9  F (36.6  C) (Tympanic)   Resp 18   Ht 1.82 m (5' 11.65\")   Wt 111.1 kg (245 lb)   LMP 12/26/2023   BMI 33.55 kg/m   Estimated body mass index is 33.55 kg/m  as calculated from the following:    Height as of this encounter: 1.82 m (5' 11.65\").    Weight as of this encounter: 111.1 kg (245 lb). .    Bertha Da Silva, [facility]    "

## 2024-05-10 NOTE — PROGRESS NOTES
"[facility]    Return OB Note    CC: Return OB     Subjective:  Holly is a 39 year old  at 19w3d   Denies vaginal bleeding, loss of fluid, or pelvic cramping. pos fetal movement.  Complaints today: constipation. Has been taking over the counter colace.    Objective:  /81 (BP Location: Right arm, Patient Position: Chair, Cuff Size: Adult Large)   Pulse 82   Temp 97.9  F (36.6  C) (Tympanic)   Resp 18   Ht 1.82 m (5' 11.65\")   Wt 111.1 kg (245 lb)   LMP 2023   BMI 33.55 kg/m      See flowsheet    Assessment/Plan:     39 year old  at 19w3d   Encounter Diagnosis   Name Primary?    19 weeks gestation of pregnancy Yes        Routine prenatal care:  - New OB labs nml except R-NI and hepB NI.  - Genetic screen: declined  - MsAFP discussed and declined  -L2 ultrasound ordered for AMA  -History of CS, 1 vaginal delivery prior, will continue to discuss options.  TOLAC vs CS discussed and handouts given today.    Asthma: Well-controlled with albuterol inhaler as needed.    Anxiety/depression: Well-controlled without medication at this time    History of gestational hypertension: ASA discussed and will start today    Nausea: using 4 mg Zofran and it helps, but wears off.  Prescribed 8mg zofran    Heartburn:  advised Pepcid and Tums.  Discussed incline sleeping.    L2 ultrasound done yesterday, incomplete, follow up scheduled.    Constipation:  discussed laxatives and stool softeners and increased hydration.      RTC 4 weeks    Jumana Samaniego MD   "

## 2024-06-04 ENCOUNTER — ANCILLARY PROCEDURE (OUTPATIENT)
Dept: ULTRASOUND IMAGING | Facility: HOSPITAL | Age: 39
End: 2024-06-04
Attending: OBSTETRICS & GYNECOLOGY
Payer: COMMERCIAL

## 2024-06-04 ENCOUNTER — OFFICE VISIT (OUTPATIENT)
Dept: MATERNAL FETAL MEDICINE | Facility: HOSPITAL | Age: 39
End: 2024-06-04
Attending: OBSTETRICS & GYNECOLOGY
Payer: COMMERCIAL

## 2024-06-04 DIAGNOSIS — O09.292 H/O INTRAUTERINE GROWTH RESTRICTION IN PRIOR PREGNANCY, CURRENTLY PREGNANT, SECOND TRIMESTER: ICD-10-CM

## 2024-06-04 DIAGNOSIS — Z03.73 SUSPECTED FETAL ANOMALY NOT FOUND: ICD-10-CM

## 2024-06-04 DIAGNOSIS — O09.522 ADVANCED MATERNAL AGE IN MULTIGRAVIDA, SECOND TRIMESTER: Primary | ICD-10-CM

## 2024-06-04 DIAGNOSIS — Z36.2 ENCOUNTER FOR FOLLOW-UP ULTRASOUND OF FETAL ANATOMY: ICD-10-CM

## 2024-06-04 PROCEDURE — 99213 OFFICE O/P EST LOW 20 MIN: CPT | Mod: 25 | Performed by: OBSTETRICS & GYNECOLOGY

## 2024-06-04 PROCEDURE — 76816 OB US FOLLOW-UP PER FETUS: CPT | Mod: 26 | Performed by: OBSTETRICS & GYNECOLOGY

## 2024-06-04 PROCEDURE — 76816 OB US FOLLOW-UP PER FETUS: CPT

## 2024-06-04 NOTE — PROGRESS NOTES
"Please see \"Imaging\" tab under \"Chart Review\" for details of today's visit.    Miranda Cleaning    "

## 2024-06-04 NOTE — NURSING NOTE
Holly Tolliver is a  at 23w0d who presents to [facility] for scheduled follow up ultrasound. Pt reports positive fetal movement.  SBAR given to Dr. Cleaning, see note in Epic.

## 2024-06-07 ENCOUNTER — PRENATAL OFFICE VISIT (OUTPATIENT)
Dept: OBGYN | Facility: CLINIC | Age: 39
End: 2024-06-07
Payer: COMMERCIAL

## 2024-06-07 VITALS
SYSTOLIC BLOOD PRESSURE: 115 MMHG | RESPIRATION RATE: 16 BRPM | BODY MASS INDEX: 33.77 KG/M2 | HEIGHT: 72 IN | WEIGHT: 249.3 LBS | DIASTOLIC BLOOD PRESSURE: 75 MMHG | HEART RATE: 77 BPM | TEMPERATURE: 96.9 F

## 2024-06-07 DIAGNOSIS — Z98.891 HISTORY OF C-SECTION: ICD-10-CM

## 2024-06-07 DIAGNOSIS — Z87.59 HISTORY OF GESTATIONAL HYPERTENSION: ICD-10-CM

## 2024-06-07 DIAGNOSIS — Z34.92 PRENATAL CARE IN SECOND TRIMESTER: Primary | ICD-10-CM

## 2024-06-07 PROCEDURE — 99207 PR PRENATAL VISIT: CPT | Performed by: ADVANCED PRACTICE MIDWIFE

## 2024-06-07 RX ORDER — SENNOSIDES A AND B 8.6 MG/1
1 TABLET, FILM COATED ORAL DAILY PRN
COMMUNITY

## 2024-06-07 NOTE — PROGRESS NOTES
Feeling well.  Baby is active. Denies any leaking of fluid, vaginal bleeding, regular uterine contractions, or headaches or other concerns.  History of gHTN.  BP WNL.    History of  after a vaginal birth.    Discussed GCT and labs and next appt.    Reviewed to call for contractions, loss of fluid, vaginal bleeding, decreased fetal movement or any other questions or concerns.    RTC in 3-4 weeks.  Isabel Bear, MYRON, APRN, CNM

## 2024-06-14 ENCOUNTER — TELEPHONE (OUTPATIENT)
Dept: NURSING | Facility: CLINIC | Age: 39
End: 2024-06-14

## 2024-06-14 ENCOUNTER — VIRTUAL VISIT (OUTPATIENT)
Dept: FAMILY MEDICINE | Facility: CLINIC | Age: 39
End: 2024-06-14
Payer: COMMERCIAL

## 2024-06-14 ENCOUNTER — HOSPITAL ENCOUNTER (EMERGENCY)
Facility: CLINIC | Age: 39
Discharge: HOME | End: 2024-06-14
Attending: NURSE PRACTITIONER | Admitting: NURSE PRACTITIONER
Payer: COMMERCIAL

## 2024-06-14 VITALS
HEART RATE: 74 BPM | DIASTOLIC BLOOD PRESSURE: 69 MMHG | SYSTOLIC BLOOD PRESSURE: 119 MMHG | OXYGEN SATURATION: 96 % | RESPIRATION RATE: 18 BRPM | TEMPERATURE: 98.4 F

## 2024-06-14 DIAGNOSIS — R12 HEARTBURN: ICD-10-CM

## 2024-06-14 DIAGNOSIS — O12.02 SWELLING OF LOWER EXTREMITY DURING PREGNANCY IN SECOND TRIMESTER: ICD-10-CM

## 2024-06-14 DIAGNOSIS — B36.0 TINEA VERSICOLOR: ICD-10-CM

## 2024-06-14 LAB
ALBUMIN MFR UR ELPH: 9.2 MG/DL
ALBUMIN SERPL BCG-MCNC: 3.5 G/DL (ref 3.5–5.2)
ALBUMIN UR-MCNC: NEGATIVE MG/DL
ALP SERPL-CCNC: 50 U/L (ref 40–150)
ALT SERPL W P-5'-P-CCNC: 15 U/L (ref 0–50)
ANION GAP SERPL CALCULATED.3IONS-SCNC: 9 MMOL/L (ref 7–15)
APPEARANCE UR: CLEAR
AST SERPL W P-5'-P-CCNC: 18 U/L (ref 0–45)
BASOPHILS # BLD AUTO: 0 10E3/UL (ref 0–0.2)
BASOPHILS NFR BLD AUTO: 0 %
BILIRUB DIRECT SERPL-MCNC: <0.2 MG/DL (ref 0–0.3)
BILIRUB SERPL-MCNC: <0.2 MG/DL
BILIRUB UR QL STRIP: NEGATIVE
BUN SERPL-MCNC: 9.8 MG/DL (ref 6–20)
CALCIUM SERPL-MCNC: 9 MG/DL (ref 8.6–10)
CHLORIDE SERPL-SCNC: 103 MMOL/L (ref 98–107)
COLOR UR AUTO: YELLOW
CREAT SERPL-MCNC: 0.74 MG/DL (ref 0.51–0.95)
CREAT UR-MCNC: 115.5 MG/DL
DEPRECATED HCO3 PLAS-SCNC: 23 MMOL/L (ref 22–29)
EGFRCR SERPLBLD CKD-EPI 2021: >90 ML/MIN/1.73M2
EOSINOPHIL # BLD AUTO: 0.3 10E3/UL (ref 0–0.7)
EOSINOPHIL NFR BLD AUTO: 4 %
ERYTHROCYTE [DISTWIDTH] IN BLOOD BY AUTOMATED COUNT: 13.7 % (ref 10–15)
GLUCOSE SERPL-MCNC: 101 MG/DL (ref 70–99)
GLUCOSE UR STRIP-MCNC: NEGATIVE MG/DL
HCT VFR BLD AUTO: 33.1 % (ref 35–47)
HGB BLD-MCNC: 11.4 G/DL (ref 11.7–15.7)
HGB UR QL STRIP: NEGATIVE
IMM GRANULOCYTES # BLD: 0 10E3/UL
IMM GRANULOCYTES NFR BLD: 0 %
KETONES UR STRIP-MCNC: NEGATIVE MG/DL
LEUKOCYTE ESTERASE UR QL STRIP: ABNORMAL
LYMPHOCYTES # BLD AUTO: 2.1 10E3/UL (ref 0.8–5.3)
LYMPHOCYTES NFR BLD AUTO: 24 %
MAGNESIUM SERPL-MCNC: 1.8 MG/DL (ref 1.7–2.3)
MCH RBC QN AUTO: 28.6 PG (ref 26.5–33)
MCHC RBC AUTO-ENTMCNC: 34.4 G/DL (ref 31.5–36.5)
MCV RBC AUTO: 83 FL (ref 78–100)
MONOCYTES # BLD AUTO: 0.8 10E3/UL (ref 0–1.3)
MONOCYTES NFR BLD AUTO: 9 %
MUCOUS THREADS #/AREA URNS LPF: PRESENT /LPF
NEUTROPHILS # BLD AUTO: 5.7 10E3/UL (ref 1.6–8.3)
NEUTROPHILS NFR BLD AUTO: 63 %
NITRATE UR QL: NEGATIVE
NRBC # BLD AUTO: 0 10E3/UL
NRBC BLD AUTO-RTO: 0 /100
PH UR STRIP: 5 [PH] (ref 5–7)
PLATELET # BLD AUTO: 201 10E3/UL (ref 150–450)
POTASSIUM SERPL-SCNC: 3.7 MMOL/L (ref 3.4–5.3)
PROT SERPL-MCNC: 6.3 G/DL (ref 6.4–8.3)
PROT/CREAT 24H UR: 0.08 MG/MG CR (ref 0–0.2)
RBC # BLD AUTO: 3.98 10E6/UL (ref 3.8–5.2)
RBC URINE: 0 /HPF
SODIUM SERPL-SCNC: 135 MMOL/L (ref 135–145)
SP GR UR STRIP: 1.02 (ref 1–1.03)
SQUAMOUS EPITHELIAL: 6 /HPF
UROBILINOGEN UR STRIP-MCNC: NORMAL MG/DL
WBC # BLD AUTO: 9.1 10E3/UL (ref 4–11)
WBC URINE: 1 /HPF

## 2024-06-14 PROCEDURE — 81001 URINALYSIS AUTO W/SCOPE: CPT | Performed by: NURSE PRACTITIONER

## 2024-06-14 PROCEDURE — 84156 ASSAY OF PROTEIN URINE: CPT | Performed by: NURSE PRACTITIONER

## 2024-06-14 PROCEDURE — 82310 ASSAY OF CALCIUM: CPT | Performed by: NURSE PRACTITIONER

## 2024-06-14 PROCEDURE — 99213 OFFICE O/P EST LOW 20 MIN: CPT | Mod: 95 | Performed by: NURSE PRACTITIONER

## 2024-06-14 PROCEDURE — 80053 COMPREHEN METABOLIC PANEL: CPT | Performed by: NURSE PRACTITIONER

## 2024-06-14 PROCEDURE — 85004 AUTOMATED DIFF WBC COUNT: CPT | Performed by: NURSE PRACTITIONER

## 2024-06-14 PROCEDURE — 83735 ASSAY OF MAGNESIUM: CPT | Performed by: NURSE PRACTITIONER

## 2024-06-14 PROCEDURE — 99283 EMERGENCY DEPT VISIT LOW MDM: CPT

## 2024-06-14 PROCEDURE — 99283 EMERGENCY DEPT VISIT LOW MDM: CPT | Performed by: NURSE PRACTITIONER

## 2024-06-14 PROCEDURE — 36415 COLL VENOUS BLD VENIPUNCTURE: CPT | Performed by: NURSE PRACTITIONER

## 2024-06-14 RX ORDER — FAMOTIDINE 20 MG/1
20 TABLET, FILM COATED ORAL 2 TIMES DAILY
Qty: 30 TABLET | Refills: 1 | Status: ON HOLD | OUTPATIENT
Start: 2024-06-14 | End: 2024-09-17

## 2024-06-14 RX ORDER — KETOCONAZOLE 20 MG/G
CREAM TOPICAL DAILY
Qty: 30 G | Refills: 0 | Status: SHIPPED | OUTPATIENT
Start: 2024-06-14

## 2024-06-14 RX ORDER — FLUCONAZOLE 150 MG/1
150 TABLET ORAL ONCE
Qty: 1 TABLET | Refills: 0 | Status: SHIPPED | OUTPATIENT
Start: 2024-06-14 | End: 2024-06-14

## 2024-06-14 RX ORDER — FLUCONAZOLE 150 MG/1
150 TABLET ORAL ONCE
Qty: 1 TABLET | Refills: 0 | OUTPATIENT
Start: 2024-06-14 | End: 2024-08-08

## 2024-06-14 ASSESSMENT — PATIENT HEALTH QUESTIONNAIRE - PHQ9: SUM OF ALL RESPONSES TO PHQ QUESTIONS 1-9: 9

## 2024-06-14 ASSESSMENT — ACTIVITIES OF DAILY LIVING (ADL)
ADLS_ACUITY_SCORE: 35
ADLS_ACUITY_SCORE: 33

## 2024-06-14 NOTE — ED NOTES
Patient reports that she is 24 weeks pregnant she developed swelling in her feet and ankles and is concerned about preeclampsia.  Request to be evaluated for preeclampsia.  Recommended that patient be evaluated in the emergency department.   My assessment, based on my focused history, established that Holly Tolliver has a potential emergent condition, which requires further testing and/or treatment beyond the capabilities of the current urgent care setting.  Testing may require imaging, and/or blood testing.  As such, I have recommended that the patient be evaluated and treated in the  ED.     Disclaimer: This note consists of symbols derived from keyboarding, dictation, and/or voice recognition software. As a result, there may be errors in the script that have gone undetected.  Please consider this when interpreting information found in the chart.       Jenniffer Vazquez, APRN CNP  06/14/24 0188

## 2024-06-14 NOTE — PROGRESS NOTES
"Ana is a 39 year old who is being evaluated via a billable video visit.    How would you like to obtain your AVS? MyChart  If the video visit is dropped, the invitation should be resent by: Text to cell phone: [phone]  Will anyone else be joining your video visit? No      Assessment & Plan     Tinea versicolor  Given pregnancy advised treating first with topical and in no response in 1-2 weeks can add in 1 time dose of Diflucan.  Would get further oral recommendations from OB/GYN due to potential fetal concerns    - ketoconazole (NIZORAL) 2 % external cream  Dispense: 30 g; Refill: 0  - fluconazole (DIFLUCAN) 150 MG tablet  Dispense: 1 tablet; Refill: 0             BMI  Estimated body mass index is 34.14 kg/m  as calculated from the following:    Height as of 6/7/24: 1.82 m (5' 11.65\").    Weight as of 6/7/24: 113.1 kg (249 lb 4.8 oz).         See Patient Instructions    Subjective   Ana is a 39 year old, presenting for the following health issues:  Medication Request      6/14/2024    11:42 AM   Additional Questions   Roomed by tammy san cma         6/14/2024    11:28 AM   Patient Reported Additional Medications   Patient reports taking the following new medications none     Video Start Time: 2:35PM    HPI     Rash  Onset/Duration: years  Description  Location: chest  Character: raised  Itching: no  Intensity:  moderate  Progression of Symptoms:  same  Accompanying signs and symptoms:   Fever: No  Body aches or joint pain: No  Sore throat symptoms: No  Recent cold symptoms: No  History:           Previous episodes of similar rash: yes every summer  New exposures:  None  Recent travel: No  Exposure to similar rash: YES    Reports usually gets anti fungal oral and topical which helps rash.  Currently in 2nd trimester and normal pregnancy.      Review of Systems  Constitutional, neuro, ENT, endocrine, pulmonary, cardiac, gastrointestinal, genitourinary, musculoskeletal, integument and psychiatric systems are " negative, except as otherwise noted.      Objective           Vitals:  No vitals were obtained today due to virtual visit.    Physical Exam   GENERAL: alert and no distress  EYES: Eyes grossly normal to inspection.  No discharge or erythema, or obvious scleral/conjunctival abnormalities.  RESP: No audible wheeze, cough, or visible cyanosis.    SKIN: Visible skin clear. No significant rash, abnormal pigmentation or lesions.  NEURO: Cranial nerves grossly intact.  Mentation and speech appropriate for age.  PSYCH: Appropriate affect, tone, and pace of words           Video-Visit Details    Type of service:  Video Visit   Video End Time:2:48 PM  Originating Location (pt. Location): Home    Distant Location (provider location):  On-site  Platform used for Video Visit: Luba  Signed Electronically by: Janet Carbajal DNP

## 2024-06-14 NOTE — NURSING NOTE
"Chief Complaint   Patient presents with    Medication Request       Initial LMP 12/26/2023  Estimated body mass index is 34.14 kg/m  as calculated from the following:    Height as of 6/7/24: 1.82 m (5' 11.65\").    Weight as of 6/7/24: 113.1 kg (249 lb 4.8 oz).    Patient presents to the clinic using No DME    Is there anyone who you would like to be able to receive your results? No  If yes have patient fill out ARUNA      "

## 2024-06-14 NOTE — PROGRESS NOTES
"Ana is a 39 year old who is being evaluated via a billable video visit.    {ROOMING STAFF complete during rooming of virtual visit (Optional):175770}  {If patient encounters technical issues they should call [phone] :632117}    {PROVIDER CHARTING PREFERENCE:412035}    Subjective   Ana is a 39 year old, presenting for the following health issues:  Medication Request      6/14/2024    11:28 AM   Additional Questions   Roomed by ousmane   Accompanied by self         6/14/2024    11:28 AM   Patient Reported Additional Medications   Patient reports taking the following new medications none     Video Start Time: {video visit start/end time for provider to select:652804}    HPI     {SUPERLIST (Optional):341187}  {additonal problems for provider to add (Optional):786675}    {ROS Picklists (Optional):436126}      Objective           Vitals:  No vitals were obtained today due to virtual visit.    Physical Exam   {video visit exam brief selected:660046}    {Diagnostic Test Results (Optional):847328}      Video-Visit Details    Type of service:  Video Visit   Video End Time:{video visit start/end time for provider to select:531356}  Originating Location (pt. Location): {video visit patient location:553759::\"Home\"}  {PROVIDER LOCATION On-site should be selected for visits conducted from your clinic location or adjoining [facility] hospital, academic office, or other nearby [facility] building. Off-site should be selected for all other provider locations, including home:626460}  Distant Location (provider location):  {virtual location provider:088702}  Platform used for Video Visit: {Virtual Visit Platforms:192337::\"Cognitive Match\"}  Signed Electronically by: Janet Carbajal DNP  {Email feedback regarding this note to [email or web address]   :327835}  "

## 2024-06-14 NOTE — TELEPHONE ENCOUNTER
Patient is 24 weeks pregnant and due date is 10/01/2024.  [facility] [place].  Isabel Marianela CNRAFIQ.  Patient states that she is having swelling and bruising on right ankle.  Patient has concerns about preeclampsia and states that she will go to [place] ER.  Patient states that clinic was closed.

## 2024-06-15 NOTE — DISCHARGE INSTRUCTIONS
You are seen in the emergency room for swelling and pregnancy.  Your lab levels today are reassuring for no signs of preeclampsia.  I have refilled the prescription for the famotidine.  I moved the prescription from [place] over to [place] for the Diflucan that was prescribed.  We discussed the risks of this.  Please continue your care with your primary care or OB/GYN as scheduled.  Return to the emergency room if you do have uncontrolled pain.  I recommend wearing compression socks.  They do have some combination wall compression socks that allow more airway movement than the tighter compression socks.  [employer] may have some nice ones.

## 2024-06-15 NOTE — ED PROVIDER NOTES
History     Chief Complaint   Patient presents with    Leg Swelling     Bruising to bilateral ankles & swelling, denies injury/trauma     HPI  Holly Tolliver is a 39 year old female  6 para 2 with an ESTEVAN of 10/1/2024 currently at 24 weeks 3 days gestation who presents emergency department with lower extremity swelling.  She reports her blood pressure has been good at home but she read that signs of preeclampsia would be lower extremity swelling and she has been experiencing swelling for the past 2 days and thought she should come in and get reevaluated..  Patient reports that baby is active and no cramping is noted.  Patient denies headaches, visual scotomata.    Allergies:  Allergies   Allergen Reactions    Amoxicillin Rash       Problem List:    Patient Active Problem List    Diagnosis Date Noted    Major depressive disorder, recurrent, unspecified (H24) 2023     Priority: Medium    Alcohol dependence in remission (H) 2023     Priority: Medium    Methamphetamine abuse (H) 2023     Priority: Medium    Tooth infection 2022     Priority: Medium    Routine postpartum follow-up 2016     Priority: Medium    Mirena IUD 2016     Priority: Medium     Lot: [id number]  Exp:       Single liveborn, born in hospital, delivered 2016     Priority: Medium    Placental abruption in third trimester 2016     Priority: Medium    Placental abruption 2016     Priority: Medium     delivery delivered 2016     Priority: Medium    Prenatal care, subsequent pregnancy 2016     Priority: Medium     FOB- Lam Robledo      Prenatal care, subsequent pregnancy in third trimester 2016     Priority: Medium    Health Care Home 2015     Priority: Medium     Care Coordinator: Kelly DANIEL, MSW  See letters for Health Care home care plan    SW covering in [place], [place]  absence    FREDY Luz, MSW   [phone]  3/24/2015 12:24 PM       Persons encountering health services in other specified circumstances 2015     Priority: Medium     Formatting of this note might be different from the original. Care Coordinator: Kelly DANIEL, MSW See letters for Health Care home care plan SW covering in [place], [place] SW absence Kelly Simon, FREDY, [phone] 3/24/2015 12:24 PM      Depression with anxiety 2014     Priority: Medium    Generalized anxiety disorder 2013     Priority: Medium     Diagnosis updated by automated process. Provider to review and confirm.      GERD (gastroesophageal reflux disease) 2013     Priority: Medium    Major depressive disorder, single episode, moderate (H) 2013     Priority: Medium    CARDIOVASCULAR SCREENING; LDL GOAL LESS THAN 160 10/23/2012     Priority: Medium    CTS (carpal tunnel syndrome) 10/14/2011     Priority: Medium    Panic disorder with agoraphobia and moderate panic attacks 03/10/2009     Priority: Medium    Tobacco use disorder 2008     Priority: Medium     Has decreased from 1 ppd to 1 cigarette per day since pregnancy.      Mild intermittent asthma 2008     Priority: Medium        Past Medical History:    Past Medical History:   Diagnosis Date    ALCOH DEP NEC/NOS-UNSPEC 2007    Asthma     Chickenpox     Chlamydia     Placental abruption 2016    Postpartum depression     Preeclampsia     Pregnancy induced hypertension 2011    Urinary tract infections        Past Surgical History:    Past Surgical History:   Procedure Laterality Date     SECTION Bilateral 2016    Procedure:  SECTION;  Surgeon: Beau Cardoso MD;  Location: [place] OR    PE TUBES  age 3       Family History:    Family History   Problem Relation Age of Onset    Alcohol/Drug Mother         alcohol- recovered    Hypertension Mother     Depression Mother         also anxiety    Other - See Comments Mother         ankylosing spondylosis    Anxiety Disorder Mother      Irritable Bowel Syndrome Mother     Alcohol/Drug Father         alcohol- recovered    Alcohol/Drug Sister         A&D-recovered    Alcoholism Brother         recovered    Alcohol/Drug Brother         alcohol    Hypertension Brother     Schizophrenia Brother     Cancer Maternal Grandmother         lung- at age 44    Depression Maternal Grandmother         also anxiety       Social History:  Marital Status:  Single [1]  Social History     Tobacco Use    Smoking status: Former     Current packs/day: 0.00     Types: Cigarettes     Passive exposure: Never    Smokeless tobacco: Never   Vaping Use    Vaping status: Former   Substance Use Topics    Alcohol use: Not Currently     Comment: sober since 24    Drug use: Not Currently     Types: Methamphetamines, Marijuana     Comment: sober since 24        Medications:    famotidine (PEPCID) 20 MG tablet  fluconazole (DIFLUCAN) 150 MG tablet  acetaminophen (TYLENOL) 325 MG tablet  albuterol (PROAIR HFA/PROVENTIL HFA/VENTOLIN HFA) 108 (90 Base) MCG/ACT inhaler  doxylamine (UNISOM SLEEPTABS) 25 MG TABS tablet  ketoconazole (NIZORAL) 2 % external cream  ondansetron (ZOFRAN ODT) 4 MG ODT tab  ondansetron (ZOFRAN ODT) 8 MG ODT tab  Prenatal Vit-Fe Fumarate-FA (PRENATAL MULTIVITAMIN  PLUS IRON) 27-1 MG TABS  senna (SENOKOT) 8.6 MG tablet  vitamin B6 (PYRIDOXINE) 100 MG tablet      Review of Systems  As mentioned above in the history present illness. All other systems were reviewed and are negative.    Physical Exam   BP: 119/78  Pulse: 93  Temp: 98.4  F (36.9  C)  Resp: 18  SpO2: 96 %      Physical Exam  Vitals and nursing note reviewed.   Constitutional:       General: She is not in acute distress.     Appearance: She is well-developed. She is not diaphoretic.   HENT:      Head: Normocephalic and atraumatic.      Right Ear: External ear normal.      Left Ear: External ear normal.   Eyes:      General:         Right eye: No discharge.         Left eye: No discharge.       Conjunctiva/sclera: Conjunctivae normal.   Cardiovascular:      Rate and Rhythm: Normal rate and regular rhythm.      Heart sounds: Normal heart sounds. No murmur heard.     No friction rub.   Pulmonary:      Effort: Pulmonary effort is normal. No respiratory distress.      Breath sounds: Normal breath sounds. No stridor. No wheezing or rales.   Chest:      Chest wall: No tenderness.   Musculoskeletal:      Right lower leg: Edema (trace ankle) present.      Left lower leg: Edema (trace ankle) present.   Skin:     General: Skin is warm and dry.      Coloration: Skin is not pale.      Findings: No erythema or rash.   Neurological:      Mental Status: She is alert.   Psychiatric:         Mood and Affect: Mood normal.         ED Course     ED Course as of 06/14/24 2035 Fri Jun 14, 2024 1949 UA with Microscopic reflex to Culture(!)  UA reveals trace leukocytosis commonly seen in pregnancy no signs of acute infectious etiology   1949 Basic metabolic panel(!)  Basic metabolic panel unremarkable   1949 CBC with platelets differential(!)  CBC unremarkable specifically normal platelets   1949 Hepatic function panel(!)  Hepatic function panel reveals normal AST and ALT and normal bilirubin   1949 Magnesium  Magnesium normal at 1.8     Procedures      Results for orders placed or performed during the hospital encounter of 06/14/24 (from the past 24 hour(s))   CBC with platelets differential    Narrative    The following orders were created for panel order CBC with platelets differential.  Procedure                               Abnormality         Status                     ---------                               -----------         ------                     CBC with platelets and d...[[id number]]  Abnormal            Final result                 Please view results for these tests on the individual orders.   Basic metabolic panel   Result Value Ref Range    Sodium 135 135 - 145 mmol/L    Potassium 3.7 3.4 - 5.3 mmol/L     Chloride 103 98 - 107 mmol/L    Carbon Dioxide (CO2) 23 22 - 29 mmol/L    Anion Gap 9 7 - 15 mmol/L    Urea Nitrogen 9.8 6.0 - 20.0 mg/dL    Creatinine 0.74 0.51 - 0.95 mg/dL    GFR Estimate >90 >60 mL/min/1.73m2    Calcium 9.0 8.6 - 10.0 mg/dL    Glucose 101 (H) 70 - 99 mg/dL   Hepatic function panel   Result Value Ref Range    Protein Total 6.3 (L) 6.4 - 8.3 g/dL    Albumin 3.5 3.5 - 5.2 g/dL    Bilirubin Total <0.2 <=1.2 mg/dL    Alkaline Phosphatase 50 40 - 150 U/L    AST 18 0 - 45 U/L    ALT 15 0 - 50 U/L    Bilirubin Direct <0.20 0.00 - 0.30 mg/dL   Magnesium   Result Value Ref Range    Magnesium 1.8 1.7 - 2.3 mg/dL   CBC with platelets and differential   Result Value Ref Range    WBC Count 9.1 4.0 - 11.0 10e3/uL    RBC Count 3.98 3.80 - 5.20 10e6/uL    Hemoglobin 11.4 (L) 11.7 - 15.7 g/dL    Hematocrit 33.1 (L) 35.0 - 47.0 %    MCV 83 78 - 100 fL    MCH 28.6 26.5 - 33.0 pg    MCHC 34.4 31.5 - 36.5 g/dL    RDW 13.7 10.0 - 15.0 %    Platelet Count 201 150 - 450 10e3/uL    % Neutrophils 63 %    % Lymphocytes 24 %    % Monocytes 9 %    % Eosinophils 4 %    % Basophils 0 %    % Immature Granulocytes 0 %    NRBCs per 100 WBC 0 <1 /100    Absolute Neutrophils 5.7 1.6 - 8.3 10e3/uL    Absolute Lymphocytes 2.1 0.8 - 5.3 10e3/uL    Absolute Monocytes 0.8 0.0 - 1.3 10e3/uL    Absolute Eosinophils 0.3 0.0 - 0.7 10e3/uL    Absolute Basophils 0.0 0.0 - 0.2 10e3/uL    Absolute Immature Granulocytes 0.0 <=0.4 10e3/uL    Absolute NRBCs 0.0 10e3/uL   UA with Microscopic reflex to Culture    Specimen: Urine, Midstream   Result Value Ref Range    Color Urine Yellow Colorless, Straw, Light Yellow, Yellow    Appearance Urine Clear Clear    Glucose Urine Negative Negative mg/dL    Bilirubin Urine Negative Negative    Ketones Urine Negative Negative mg/dL    Specific Gravity Urine 1.018 1.003 - 1.035    Blood Urine Negative Negative    pH Urine 5.0 5.0 - 7.0    Protein Albumin Urine Negative Negative mg/dL    Urobilinogen Urine  Normal Normal, 2.0 mg/dL    Nitrite Urine Negative Negative    Leukocyte Esterase Urine Trace (A) Negative    Mucus Urine Present (A) None Seen /LPF    RBC Urine 0 <=2 /HPF    WBC Urine 1 <=5 /HPF    Squamous Epithelials Urine 6 (H) <=1 /HPF    Narrative    Urine Culture not indicated   Protein  random urine   Result Value Ref Range    Total Protein Urine mg/dL 9.2   mg/dL    Total Protein Urine mg/mg Creat 0.08 0.00 - 0.20 mg/mg Cr    Creatinine Urine mg/dL 115.5 mg/dL       Medications - No data to display    Assessments & Plan (with Medical Decision Making)     I have reviewed the nursing notes.    I have reviewed the findings, diagnosis, plan and need for follow up with the patient.  31-year-old female  6 para 2 presents emergency department  at 24+ weeks gestation concerned about the possibility of preeclampsia.  Patient states that she was reading and she read that people with lower extremity swelling with elevated blood pressure may have preeclampsia.  Patient states she has noted lower extremity swelling in her feet and ankles for the past 2 days.  Patient states when she is checked her blood pressure at home it has been normal.  Patient is also noticed a bruise on the inner left ankle that she cannot account for.  On exam blood pressure is 119/69, abdomen is soft nontender heart sounds are regular, patient has trace ankle edema bilaterally pedal pulses equal bilaterally.  Lab workup reveals no signs of preeclampsia at this time reassurance was provided.  Discussed worrisome reasons to return and encouraged follow-up with her regular OB care.  Patient discharged in stable condition    Discharge Medication List as of 2024  7:59 PM          Final diagnoses:   Swelling of lower extremity during pregnancy in second trimester       2024   [facility] EMERGENCY DEPT       Yue Cahn, WOLFGANG CNP  24

## 2024-06-16 ENCOUNTER — HEALTH MAINTENANCE LETTER (OUTPATIENT)
Age: 39
End: 2024-06-16

## 2024-06-18 ENCOUNTER — MYC MEDICAL ADVICE (OUTPATIENT)
Dept: OBGYN | Facility: CLINIC | Age: 39
End: 2024-06-18
Payer: COMMERCIAL

## 2024-06-18 NOTE — TELEPHONE ENCOUNTER
S-(situation): pt wondering if ok to continue taking additional doses of fluconazole for unresolved tinea versicolor     B-(background): ; 25+0; Last appt 24 with Isabel  Next appt 7/3/24 with Dr Carlson    A-(assessment): Pt is being treated with fluconazole for tinea versicolor that occurs d/t increased sun exposure in the summer  Her prescribing provider wanted her to reach out to OB to ensure that additional doses of fluconazole are safe to take during pregnancy     R-(recommendations): Routing to provider team to review and advise  Reassured pt that fluconazole can be used during pregnancy but is there a limit to it's use?    Thanks,     Jolly RN  [facility] Clinic

## 2024-06-24 NOTE — TELEPHONE ENCOUNTER
Letter typed and given to Isabel Bear CNM to sign.  Then will fax as requested.    -Ciara MORA [facility] [place]  [phone]

## 2024-07-01 ENCOUNTER — PRENATAL OFFICE VISIT (OUTPATIENT)
Dept: OBGYN | Facility: CLINIC | Age: 39
End: 2024-07-01
Payer: COMMERCIAL

## 2024-07-01 VITALS
HEART RATE: 75 BPM | DIASTOLIC BLOOD PRESSURE: 81 MMHG | HEIGHT: 72 IN | BODY MASS INDEX: 35.21 KG/M2 | RESPIRATION RATE: 18 BRPM | TEMPERATURE: 98.4 F | SYSTOLIC BLOOD PRESSURE: 135 MMHG | WEIGHT: 260 LBS

## 2024-07-01 DIAGNOSIS — Z34.82 PRENATAL CARE, SUBSEQUENT PREGNANCY IN SECOND TRIMESTER: Primary | ICD-10-CM

## 2024-07-01 DIAGNOSIS — O12.02 LEG SWELLING IN PREGNANCY IN SECOND TRIMESTER: ICD-10-CM

## 2024-07-01 DIAGNOSIS — Z87.59 PREVIOUS BABY WITH FETAL GROWTH RESTRICTION: ICD-10-CM

## 2024-07-01 LAB
ERYTHROCYTE [DISTWIDTH] IN BLOOD BY AUTOMATED COUNT: 13.4 % (ref 10–15)
GLUCOSE 1H P 50 G GLC PO SERPL-MCNC: 91 MG/DL (ref 70–129)
HCT VFR BLD AUTO: 34.9 % (ref 35–47)
HGB BLD-MCNC: 11.4 G/DL (ref 11.7–15.7)
MCH RBC QN AUTO: 27.7 PG (ref 26.5–33)
MCHC RBC AUTO-ENTMCNC: 32.7 G/DL (ref 31.5–36.5)
MCV RBC AUTO: 85 FL (ref 78–100)
PLATELET # BLD AUTO: 201 10E3/UL (ref 150–450)
RBC # BLD AUTO: 4.11 10E6/UL (ref 3.8–5.2)
T PALLIDUM AB SER QL: NONREACTIVE
WBC # BLD AUTO: 8.8 10E3/UL (ref 4–11)

## 2024-07-01 PROCEDURE — 82950 GLUCOSE TEST: CPT | Performed by: PHYSICIAN ASSISTANT

## 2024-07-01 PROCEDURE — 36415 COLL VENOUS BLD VENIPUNCTURE: CPT | Performed by: PHYSICIAN ASSISTANT

## 2024-07-01 PROCEDURE — 85027 COMPLETE CBC AUTOMATED: CPT | Performed by: PHYSICIAN ASSISTANT

## 2024-07-01 PROCEDURE — 99207 PR PRENATAL VISIT: CPT | Performed by: PHYSICIAN ASSISTANT

## 2024-07-01 PROCEDURE — 86780 TREPONEMA PALLIDUM: CPT | Performed by: PHYSICIAN ASSISTANT

## 2024-07-01 NOTE — NURSING NOTE
"Initial /81 (BP Location: Right arm, Patient Position: Chair, Cuff Size: Adult Regular)   Pulse 75   Temp 98.4  F (36.9  C) (Tympanic)   Resp 18   Ht 1.816 m (5' 11.5\")   Wt 117.9 kg (260 lb)   LMP 12/26/2023   BMI 35.76 kg/m   Estimated body mass index is 35.76 kg/m  as calculated from the following:    Height as of this encounter: 1.816 m (5' 11.5\").    Weight as of this encounter: 117.9 kg (260 lb). .    "

## 2024-07-01 NOTE — PROGRESS NOTES
"[facility]     Return OB Note     CC: Return OB      Subjective:  Holly is a 39 year old  at 26w6d   Denies vaginal bleeding, loss of fluid, or pelvic cramping. pos fetal movement.    Complaints today: right rib pain on and off; no chest pain or worsening shortness of breath. Nothing currently.   Right foot pain with occasional swelling on and off. No calf pain or redness.   Patient denies headaches, vision changes, no elevated blood pressure.      Objective:  /81 (BP Location: Right arm, Patient Position: Chair, Cuff Size: Adult Regular)   Pulse 75   Temp 98.4  F (36.9  C) (Tympanic)   Resp 18   Ht 1.816 m (5' 11.5\")   Wt 117.9 kg (260 lb)   LMP 2023   BMI 35.76 kg/m      Fetal heart tones: 135 bpm  Fundal height: 28 cm     Assessment/Plan:      39 year old  at 26w6d       Routine prenatal care:  - New OB labs nml except R-NI and hepB NI.  - Genetic screen: declined  - MsAFP discussed and declined  -L2 ultrasound nl: due to history of FGR at term in prior pregnancy recommend fetal growth US at 28 and 34 weeks with [facility]. (US ordered)   -History of CS, 1 vaginal delivery prior, will continue to discuss options.  TOLAC vs CS discussed and handouts given today.     Asthma: Well-controlled with albuterol inhaler as needed.     Anxiety/depression: Well-controlled without medication at this time     History of gestational hypertension: ASA taking daily     Nausea: using 4 mg Zofran and it helps, but wears off.  Prescribed 8mg zofran     Heartburn:  advised Pepcid and Tums.  Discussed incline sleeping.     Constipation:  discussed laxatives and stool softeners and increased hydration.     Bilateral foot swelling: resolves with elevation. Discussed compression socks. Patient would like prescription for these, which were sent.      RTC 4 weeks     Lucie Madrid PA-C   "

## 2024-07-01 NOTE — PROGRESS NOTES
"Patient completed the 1 hour glucose drink at 11:33 . Instructed patient to check in at the lab immediately after their provider visit.  Blood draw needs to be completed exactly one hour after finishing the drink.  Lab staff informed of this time through the router slip.      Patient was given QR code to scan and watch \"Anesthetic options for pain relief during labor\" video.     Patient in agreement with plan.    Ignacia Quiroz LPN on 7/1/2024 at 11:30 AM    "

## 2024-07-02 ENCOUNTER — TELEPHONE (OUTPATIENT)
Dept: OBGYN | Facility: CLINIC | Age: 39
End: 2024-07-02
Payer: COMMERCIAL

## 2024-07-02 DIAGNOSIS — Z87.59 PREVIOUS BABY WITH FETAL GROWTH RESTRICTION: Primary | ICD-10-CM

## 2024-07-02 NOTE — TELEPHONE ENCOUNTER
Pt called to schedule 34 week growth US- needed orders placed, RN placed order as standing. Pt wanting to schedule out as she has to arrange transportation.    Judy Cadena RN on 7/2/2024 at 9:37 AM

## 2024-07-12 ENCOUNTER — HOSPITAL ENCOUNTER (OUTPATIENT)
Dept: ULTRASOUND IMAGING | Facility: CLINIC | Age: 39
Discharge: HOME | End: 2024-07-12
Attending: PHYSICIAN ASSISTANT | Admitting: PHYSICIAN ASSISTANT
Payer: COMMERCIAL

## 2024-07-12 DIAGNOSIS — Z87.59 PREVIOUS BABY WITH FETAL GROWTH RESTRICTION: ICD-10-CM

## 2024-07-12 DIAGNOSIS — Z34.82 PRENATAL CARE, SUBSEQUENT PREGNANCY IN SECOND TRIMESTER: ICD-10-CM

## 2024-07-12 PROCEDURE — 76816 OB US FOLLOW-UP PER FETUS: CPT

## 2024-07-12 NOTE — PATIENT INSTRUCTIONS
Weeks 26 to 30 of Your Pregnancy: Care Instructions  You're starting your last trimester. You'll probably feel your baby moving around more. Your back may ache as your body gets used to your baby's size and length. Take care of yourself, and pay attention to what your body needs.    Talk to your doctor about getting the Tdap shot. It will help protect your  against whooping cough (pertussis). Also ask your doctor about flu and COVID-19 shots if you haven't had them yet. If your blood type is Rh negative, you may be given a shot of Rh immune globulin (such as RhoGAM). It can help prevent problems for your baby.    You may have Eber-Ramachandran contractions. They are single or several strong contractions without a pattern. These are practice contractions but not the start of labor.  Be kind to yourself.     Take breaks when you're tired.  Change positions often. Don't sit for too long or stand for too long.  At work, rest during breaks if you can. If you don't get breaks, talk to your doctor about writing a letter to your employer to request them.  Avoid fumes, chemicals, and tobacco smoke.  Be sexual if you want to.     You may be interested in sex, or you may not. Everyone is different.  Sex is okay unless your doctor tells you not to.  Your belly can make it hard to find good positions for sex. [place] and explore.  Watch for signs of  labor.    These signs include:   Menstrual-like cramps. Or you may have pain or pressure in your pelvis that happens in a pattern.  About 6 or more contractions in an hour (even after rest and a glass of water).  A low, dull backache that doesn't go away when you change positions.  An increase or change in vaginal discharge.  Light vaginal bleeding or spotting.  Your water breaking.  Know what to do if you think you are having contractions.     Drink 1 or 2 glasses of water.  Lie down on your left side for at least an hour.  While on your side, feel the top of your  "belly to see if it's tight.  Write down your contractions for an hour. Time how long it is from the start of one contraction to the start of the next.  Call your doctor if you have regular contractions.  Follow-up care is a key part of your treatment and safety. Be sure to make and go to all appointments, and call your doctor if you are having problems. It's also a good idea to know your test results and keep a list of the medicines you take.  Where can you learn more?  Go to https://www.[employer].net/patiented  Enter S999 in the search box to learn more about \"Weeks 26 to 30 of Your Pregnancy: Care Instructions.\"  Current as of: July 10, 2023               Content Version: 14.0    9964-3238 [employer].   Care instructions adapted under license by your healthcare professional. If you have questions about a medical condition or this instruction, always ask your healthcare professional. [employer] disclaims any warranty or liability for your use of this information.      Counting Your Baby's Kicks: Care Instructions  Overview     Counting your baby's kicks is one way your doctor can tell that your baby is healthy. You will probably feel your baby move for the first time between 16 and 22 weeks. The movement may feel like flutters rather than kicks. Your baby may move more at certain times of the day. When you are active, you may notice less kicking than when you are resting. At your prenatal visits, your doctor will ask whether the baby is active.  In your last trimester, your doctor may ask you to count the number of times you feel your baby move.  Follow-up care is a key part of your treatment and safety. Be sure to make and go to all appointments, and call your doctor if you are having problems. It's also a good idea to know your test results and keep a list of the medicines you take.  How do you count fetal kicks?  A common method of checking your baby's movement is to note the length " "of time it takes to count 10 movements (such as kicks, flutters, or rolls).  Pick your baby's most active time of day to count. This may be any time from morning to evening.  If you don't feel 10 movements in an hour, have something to eat or drink and count for another hour. If you don't feel at least 10 movements in the 2-hour period, call your doctor.  Do not use an at-home Doppler heart monitor in place of counting fetal movements.  When should you call for help?   Call your doctor now or seek immediate medical care if:    You feel fewer than 10 movements in a 2-hour period.     You noticed that your baby has stopped moving or is moving less than normal.   Watch closely for changes in your health, and be sure to contact your doctor if you have any problems.  Where can you learn more?  Go to https://www.[employer].net/patiented  Enter U048 in the search box to learn more about \"Counting Your Baby's Kicks: Care Instructions.\"  Current as of: July 10, 2023               Content Version: 14.0    9506-6274 [employer].   Care instructions adapted under license by your healthcare professional. If you have questions about a medical condition or this instruction, always ask your healthcare professional. [employer] disclaims any warranty or liability for your use of this information.        "

## 2024-07-15 ENCOUNTER — PRENATAL OFFICE VISIT (OUTPATIENT)
Dept: OBGYN | Facility: CLINIC | Age: 39
End: 2024-07-15
Payer: COMMERCIAL

## 2024-07-15 VITALS
DIASTOLIC BLOOD PRESSURE: 71 MMHG | HEART RATE: 90 BPM | SYSTOLIC BLOOD PRESSURE: 135 MMHG | BODY MASS INDEX: 36.24 KG/M2 | HEIGHT: 72 IN | RESPIRATION RATE: 20 BRPM | WEIGHT: 267.6 LBS | TEMPERATURE: 97 F

## 2024-07-15 DIAGNOSIS — Z34.93 PRENATAL CARE IN THIRD TRIMESTER: ICD-10-CM

## 2024-07-15 DIAGNOSIS — R10.11 ABDOMINAL PAIN, RIGHT UPPER QUADRANT: Primary | ICD-10-CM

## 2024-07-15 PROCEDURE — 99207 PR PRENATAL VISIT: CPT | Performed by: ADVANCED PRACTICE MIDWIFE

## 2024-07-15 NOTE — PROGRESS NOTES
Feeling well.  Baby is active. Denies any leaking of fluid, vaginal bleeding, regular uterine contractions, or headaches or other concerns.  She went to the ED due to right upper quadrant burning and vaginal bleeding. Her blood tests were normal, but she was treated for a UTI.  We reviewed the lab results together.  She is still very concerned about the burning feeling.  We talked about changes in pregnancy.  She feels like this is very different from her past pregnancies.  GI referral offered and accepted.    Reviewed to call for contractions, loss of fluid, vaginal bleeding, decreased fetal movement or any other questions or concerns.    RTC in 2 weeks.  Isabel Bear, MYRON, APRN, CNM

## 2024-07-25 NOTE — PROGRESS NOTES
Return OB-- 31wks    S: Having some mild pelvic cramping this morning. Fetus active. No contractions.    O: See flowsheet        A/P: 39 year old  at 31w0d      Routine prenatal care:  - New OB labs nml except R-NI and hepB NI. Blood type A positive. 1h GLT 91.  - Genetic screening: declined  - L2 ultrasound nl: due to history of FGR at term in prior pregnancy recommend fetal growth US at 28 and 34 weeks with [facility]. 28wk US EFW 74%ile.  - History of CS, 1 vaginal delivery prior, will continue to discuss options.  TOLAC vs CS discussed/handouts given. Readdressed today, questions answered. Still considering.  - Discussed option of salpingectomy at time of repeat c/s-- pt is interested but wants to think about it a little more.  Aware needs to sign consent at least 1 month prior to delivery, will readdress at next visit.  - Tdap today    Cramping: Recheck UA/culture.  Reviewed PTL precautions.     Asthma: Well-controlled with albuterol inhaler as needed.     Anxiety/depression: Well-controlled without medication at this time     History of gestational hypertension: ASA taking daily     Nausea: using 4 mg Zofran and it helps, but wears off.  Prescribed 8mg zofran     Heartburn:  advised Pepcid and Tums.  Discussed incline sleeping.     Constipation:  discussed laxatives and stool softeners and increased hydration.     Bilateral foot swelling: resolves with elevation. Compression socks.    Berenice Pastor MD

## 2024-07-30 ENCOUNTER — PRENATAL OFFICE VISIT (OUTPATIENT)
Dept: OBGYN | Facility: CLINIC | Age: 39
End: 2024-07-30
Payer: COMMERCIAL

## 2024-07-30 VITALS
HEIGHT: 72 IN | WEIGHT: 271.7 LBS | HEART RATE: 77 BPM | BODY MASS INDEX: 36.8 KG/M2 | SYSTOLIC BLOOD PRESSURE: 117 MMHG | DIASTOLIC BLOOD PRESSURE: 78 MMHG | RESPIRATION RATE: 18 BRPM | TEMPERATURE: 97 F

## 2024-07-30 DIAGNOSIS — Z34.83 PRENATAL CARE, SUBSEQUENT PREGNANCY IN THIRD TRIMESTER: Primary | ICD-10-CM

## 2024-07-30 DIAGNOSIS — O26.899 CRAMPING AFFECTING PREGNANCY, ANTEPARTUM: ICD-10-CM

## 2024-07-30 DIAGNOSIS — R10.9 CRAMPING AFFECTING PREGNANCY, ANTEPARTUM: ICD-10-CM

## 2024-07-30 DIAGNOSIS — O34.219 PREGNANCY WITH HISTORY OF CESAREAN SECTION, ANTEPARTUM: ICD-10-CM

## 2024-07-30 LAB
ALBUMIN UR-MCNC: NEGATIVE MG/DL
APPEARANCE UR: ABNORMAL
BACTERIA #/AREA URNS HPF: ABNORMAL /HPF
BILIRUB UR QL STRIP: NEGATIVE
COLOR UR AUTO: YELLOW
GLUCOSE UR STRIP-MCNC: NEGATIVE MG/DL
HGB UR QL STRIP: ABNORMAL
KETONES UR STRIP-MCNC: NEGATIVE MG/DL
LEUKOCYTE ESTERASE UR QL STRIP: NEGATIVE
MUCOUS THREADS #/AREA URNS LPF: PRESENT /LPF
NITRATE UR QL: NEGATIVE
PH UR STRIP: 6 [PH] (ref 5–7)
RBC #/AREA URNS AUTO: ABNORMAL /HPF
SP GR UR STRIP: >=1.03 (ref 1–1.03)
SQUAMOUS #/AREA URNS AUTO: ABNORMAL /LPF
UROBILINOGEN UR STRIP-ACNC: 0.2 E.U./DL
WBC #/AREA URNS AUTO: ABNORMAL /HPF

## 2024-07-30 PROCEDURE — 81001 URINALYSIS AUTO W/SCOPE: CPT | Performed by: OBSTETRICS & GYNECOLOGY

## 2024-07-30 PROCEDURE — 87086 URINE CULTURE/COLONY COUNT: CPT | Performed by: OBSTETRICS & GYNECOLOGY

## 2024-07-30 PROCEDURE — 90715 TDAP VACCINE 7 YRS/> IM: CPT | Performed by: OBSTETRICS & GYNECOLOGY

## 2024-07-30 PROCEDURE — 90471 IMMUNIZATION ADMIN: CPT | Performed by: OBSTETRICS & GYNECOLOGY

## 2024-07-30 NOTE — PROGRESS NOTES
Prior to immunization administration, verified patients identity using patient s name and date of birth. Please see Immunization Activity for additional information.     Screening Questionnaire for Adult Immunization    Are you sick today?   No   Do you have allergies to medications, food, a vaccine component or latex?   No   Have you ever had a serious reaction after receiving a vaccination?   No   Do you have a long-term health problem with heart, lung, kidney, or metabolic disease (e.g., diabetes), asthma, a blood disorder, no spleen, complement component deficiency, a cochlear implant, or a spinal fluid leak?  Are you on long-term aspirin therapy?   No   Do you have cancer, leukemia, HIV/AIDS, or any other immune system problem?   No   Do you have a parent, brother, or sister with an immune system problem?   No   In the past 3 months, have you taken medications that affect  your immune system, such as prednisone, other steroids, or anticancer drugs; drugs for the treatment of rheumatoid arthritis, Crohn s disease, or psoriasis; or have you had radiation treatments?   No   Have you had a seizure, or a brain or other nervous system problem?   No   During the past year, have you received a transfusion of blood or blood    products, or been given immune (gamma) globulin or antiviral drug?   No   For women: Are you pregnant or is there a chance you could become       pregnant during the next month?   YES   Have you received any vaccinations in the past 4 weeks?   No     Immunization questionnaire answers were all negative.      Patient instructed to remain in clinic for 15 minutes afterwards, and to report any adverse reactions.     Screening performed by Bertha Da Silva on 7/30/2024 at 9:59 AM.

## 2024-07-30 NOTE — PATIENT INSTRUCTIONS
"Weeks 30 to 32 of Your Pregnancy: Care Instructions  Your baby is growing more every day. Its eyes can open and close, and it may have hair on its head. Your baby may sleep 20 to 45 minutes at a time and is more active at certain times.    You should feel your baby move several times every day. Your baby now turns less and kicks more.    This is a good time to tour your hospital or birthing center. You may also want to find childcare if needed.    To ease heartburn    Avoid foods that make your symptoms worse, such as chocolate, spicy foods, and caffeine.  Avoid bending over or lying down after meals.  Do not eat for 2 hours before bedtime.  Take antacids like Tums, but don't take ones that have sodium bicarbonate, magnesium trisilicate, or aspirin.    To care for large, swollen veins (varicose veins)    Try to avoid standing for long periods of time.  Sit with your feet propped up.  Wear support hose.  Get some exercise every day, like walking or swimming.  Counting your baby's kicks  Your doctor may ask you to count your baby's movements, such as kicks, flutters, or rolls.    Find a quiet place, and get comfortable. Write down your start time. Count your baby's movements (except hiccups). When your baby has moved 10 times, you can stop counting. Write down how many minutes it took.    If an hour goes by and you don't feel 10 movements, have something to eat or drink. Count for another hour. If you don't feel at least 10 movements in the 2-hour period, call your doctor.  Follow-up care is a key part of your treatment and safety. Be sure to make and go to all appointments, and call your doctor if you are having problems. It's also a good idea to know your test results and keep a list of the medicines you take.  Where can you learn more?  Go to https://www.[employer]ce[employer].net/patiented  Enter X471 in the search box to learn more about \"Weeks 30 to 32 of Your Pregnancy: Care Instructions.\"  Current as of: July 10, " "2023               Content Version: 14.0    4157-6742 [employer].   Care instructions adapted under license by your healthcare professional. If you have questions about a medical condition or this instruction, always ask your healthcare professional. [employer] disclaims any warranty or liability for your use of this information.      Learning About Birth Control After Childbirth  What is birth control?  Birth control is any method used to prevent pregnancy. If you have vaginal sex without birth control, you could get pregnant--even if you haven't started having periods again. You're less likely to get pregnant while breastfeeding, but it's still possible. Finding birth control that works for you can help avoid an unplanned pregnancy.  There are many kinds of birth control. Each has pros and cons. Find what works for you. Talk to your doctor if you've just given birth or are breastfeeding.    Long-acting reversible contraception (LARC). These are placed inside your body by a doctor. They can prevent pregnancy for years.  Examples include:  An implant (hormonal).  Copper intrauterine device (IUD).  Hormonal IUDs.    Short-acting hormonal methods. These release hormones. Examples include:  Combination birth control pills (\"the pill\").  Skin patches.  A vaginal ring.  A shot.  Mini-pills. Choose progestin-only options soon after giving birth.    Barrier methods. Use these every time you have vaginal sex.  Examples include:  External (male) condoms.  Internal (female) condoms.  Diaphragms.  Cervical caps.  Sponges.    Spermicides. These kill sperm or stop sperm from moving. They can be gels, creams, foams, films, or tablets. Use them before vaginal sex.  Examples include:  Nonoxynol-9.  pH regulator gel.    Permanent birth control (sterilization). This can be an option if you're sure that you don't want to get pregnant later.  Examples include:  Vasectomy.  Having tubes tied (tubal " "ligation).    Emergency contraception. This is a backup method. Use it if you didn't use birth control or your birth control method failed.  Examples include:  Copper and hormonal IUDs.  Emergency contraceptive pills.    Fertility awareness. You'll learn when you are most likely to become pregnant (are fertile). You can avoid vaginal sex at that time.  It's also called:  Natural family planning.  The rhythm method.    Breastfeeding. This is most effective when all of these are true:  Your baby is younger than 6 months old.  You're breastfeeding and not bottle-feeding at all.  You aren't having periods.  Follow-up care is a key part of your treatment and safety. Be sure to make and go to all appointments, and call your doctor if you are having problems. It's also a good idea to know your test results and keep a list of the medicines you take.  Where can you learn more?  Go to https://[email or web address].net/patiented  Enter X408 in the search box to learn more about \"Learning About Birth Control After Childbirth.\"  Current as of: July 10, 2023               Content Version: 14.0    6962-6301 [employer].   Care instructions adapted under license by your healthcare professional. If you have questions about a medical condition or this instruction, always ask your healthcare professional. [employer] disclaims any warranty or liability for your use of this information.      "

## 2024-07-30 NOTE — NURSING NOTE
"Initial /78 (BP Location: Right arm, Patient Position: Chair, Cuff Size: Adult Regular)   Pulse 77   Temp 97  F (36.1  C) (Tympanic)   Resp 18   Ht 1.816 m (5' 11.5\")   Wt 123.2 kg (271 lb 11.2 oz)   LMP 12/26/2023   BMI 37.37 kg/m   Estimated body mass index is 37.37 kg/m  as calculated from the following:    Height as of this encounter: 1.816 m (5' 11.5\").    Weight as of this encounter: 123.2 kg (271 lb 11.2 oz). .    Berhta Da Silva, BRAXTON    "

## 2024-07-31 LAB — BACTERIA UR CULT: NO GROWTH

## 2024-08-06 NOTE — PATIENT INSTRUCTIONS
"Weeks 32 to 34 of Your Pregnancy: Care Instructions    Decide whether you want to bank or donate your baby's umbilical cord blood. If you want to save this blood, you have to arrange for it ahead of time.   Decide about circumcision. Personal, Jew, or cultural beliefs may play a role in your decision. You get to decide what you want for your baby.         Learn how to ease hemorrhoids.   Get more liquids, fruits, vegetables, and fiber in your diet.  Avoid sitting for too long.  Clean yourself with moist toilet paper. Or try witch hazel pads.  Try ice packs or warm sitz baths for discomfort.  Use hydrocortisone cream for pain or itching.  Ask your doctor about stool softeners.        Consider the benefits of breastfeeding.   It reduces your baby's risk of sudden infant death syndrome (SIDS).   babies are less likely to get certain infections. And they're less likely to be obese or get diabetes later in life.  It can lower your risk of breast and ovarian cancers and osteoporosis.  It saves you money.  Follow-up care is a key part of your treatment and safety. Be sure to make and go to all appointments, and call your doctor if you are having problems. It's also a good idea to know your test results and keep a list of the medicines you take.  Where can you learn more?  Go to https://www.[employer]oopShop.net/patiented  Enter X711 in the search box to learn more about \"Weeks 32 to 34 of Your Pregnancy: Care Instructions.\"  Current as of: July 10, 2023  Content Version: 14.1 2006-2024 [employer].   Care instructions adapted under license by your healthcare professional. If you have questions about a medical condition or this instruction, always ask your healthcare professional. [employer] disclaims any warranty or liability for your use of this information.    You have been provided the Any Day Now: Early Labor at Home document.    Additional copies can be found here:  " [email or web address]  You have been provided the What I'd Wish I'd Known About Giving Birth document.    Additional copies can be found here:  [email or web address]

## 2024-08-12 ENCOUNTER — PRENATAL OFFICE VISIT (OUTPATIENT)
Dept: OBGYN | Facility: CLINIC | Age: 39
End: 2024-08-12
Payer: COMMERCIAL

## 2024-08-12 VITALS
HEIGHT: 72 IN | DIASTOLIC BLOOD PRESSURE: 75 MMHG | WEIGHT: 274 LBS | HEART RATE: 86 BPM | RESPIRATION RATE: 18 BRPM | TEMPERATURE: 98.2 F | SYSTOLIC BLOOD PRESSURE: 132 MMHG | BODY MASS INDEX: 37.11 KG/M2

## 2024-08-12 DIAGNOSIS — Z34.93 PRENATAL CARE IN THIRD TRIMESTER: Primary | ICD-10-CM

## 2024-08-12 DIAGNOSIS — Z98.891 HISTORY OF C-SECTION: ICD-10-CM

## 2024-08-12 PROCEDURE — 99207 PR PRENATAL VISIT: CPT | Performed by: ADVANCED PRACTICE MIDWIFE

## 2024-08-12 RX ORDER — MULTIVITAMIN WITH IRON
100 TABLET ORAL DAILY
Qty: 90 TABLET | Refills: 3 | Status: CANCELLED | OUTPATIENT
Start: 2024-08-12

## 2024-08-12 NOTE — PROGRESS NOTES
Feeling well.  Baby is active. Denies any leaking of fluid, vaginal bleeding, regular uterine contractions, or headaches or other concerns.  After her last appt, she likes the idea of planning a  and with tubal ligation.  Tubal ligation paperwork signed with me today so that she has the opportunity.  Paperwork and copy of consent given to her.  Copy sent to scanning. We discussed seeing an OB MD for next appt to discuss and scheduled surgeries. I walked her the the  with schedule with one of the surgeons.    Reviewed to call for contractions, loss of fluid, vaginal bleeding, decreased fetal movement or any other questions or concerns.    RTC in 2 weeks.  Isabel Bear, MYRON, APRN, CNM

## 2024-08-12 NOTE — NURSING NOTE
"Initial /75 (BP Location: Right arm, Patient Position: Chair, Cuff Size: Adult Large)   Pulse 86   Temp 98.2  F (36.8  C) (Tympanic)   Resp 18   Ht 1.816 m (5' 11.5\")   Wt 124.3 kg (274 lb)   LMP 12/26/2023   BMI 37.68 kg/m   Estimated body mass index is 37.68 kg/m  as calculated from the following:    Height as of this encounter: 1.816 m (5' 11.5\").    Weight as of this encounter: 124.3 kg (274 lb). .    "

## 2024-08-19 ENCOUNTER — HOSPITAL ENCOUNTER (OUTPATIENT)
Dept: ULTRASOUND IMAGING | Facility: CLINIC | Age: 39
Discharge: HOME | End: 2024-08-19
Attending: PHYSICIAN ASSISTANT | Admitting: PHYSICIAN ASSISTANT
Payer: COMMERCIAL

## 2024-08-19 DIAGNOSIS — Z87.59 PREVIOUS BABY WITH FETAL GROWTH RESTRICTION: ICD-10-CM

## 2024-08-19 PROCEDURE — 76816 OB US FOLLOW-UP PER FETUS: CPT

## 2024-08-26 ENCOUNTER — TELEPHONE (OUTPATIENT)
Dept: OBGYN | Facility: CLINIC | Age: 39
End: 2024-08-26

## 2024-08-26 ENCOUNTER — PREP FOR PROCEDURE (OUTPATIENT)
Dept: OBGYN | Facility: CLINIC | Age: 39
End: 2024-08-26

## 2024-08-26 ENCOUNTER — PRENATAL OFFICE VISIT (OUTPATIENT)
Dept: OBGYN | Facility: CLINIC | Age: 39
End: 2024-08-26
Payer: COMMERCIAL

## 2024-08-26 VITALS
SYSTOLIC BLOOD PRESSURE: 126 MMHG | DIASTOLIC BLOOD PRESSURE: 84 MMHG | TEMPERATURE: 97.8 F | BODY MASS INDEX: 38.23 KG/M2 | HEART RATE: 86 BPM | WEIGHT: 278 LBS

## 2024-08-26 DIAGNOSIS — Z98.891 HISTORY OF CESAREAN DELIVERY: Primary | ICD-10-CM

## 2024-08-26 DIAGNOSIS — Z34.93 PRENATAL CARE, THIRD TRIMESTER: Primary | ICD-10-CM

## 2024-08-26 PROCEDURE — 99207 PR PRENATAL VISIT: CPT | Performed by: OBSTETRICS & GYNECOLOGY

## 2024-08-26 RX ORDER — BREAST PUMP
1 EACH MISCELLANEOUS SEE ADMIN INSTRUCTIONS
Qty: 1 EACH | Refills: 0 | Status: SHIPPED | OUTPATIENT
Start: 2024-08-26

## 2024-08-26 RX ORDER — LOPERAMIDE HCL 2 MG
4 CAPSULE ORAL
Status: CANCELLED | OUTPATIENT
Start: 2024-08-26

## 2024-08-26 RX ORDER — CITRIC ACID/SODIUM CITRATE 334-500MG
30 SOLUTION, ORAL ORAL
Status: CANCELLED | OUTPATIENT
Start: 2024-08-26

## 2024-08-26 RX ORDER — OXYTOCIN 10 [USP'U]/ML
10 INJECTION, SOLUTION INTRAMUSCULAR; INTRAVENOUS
Status: CANCELLED | OUTPATIENT
Start: 2024-08-26

## 2024-08-26 RX ORDER — OXYTOCIN/0.9 % SODIUM CHLORIDE 30/500 ML
100-340 PLASTIC BAG, INJECTION (ML) INTRAVENOUS CONTINUOUS PRN
Status: CANCELLED | OUTPATIENT
Start: 2024-08-26

## 2024-08-26 RX ORDER — METHYLERGONOVINE MALEATE 0.2 MG/ML
200 INJECTION INTRAVENOUS
Status: CANCELLED | OUTPATIENT
Start: 2024-08-26

## 2024-08-26 RX ORDER — FAMOTIDINE 20 MG/1
20 TABLET, FILM COATED ORAL 2 TIMES DAILY
Qty: 60 TABLET | Refills: 1 | Status: SHIPPED | OUTPATIENT
Start: 2024-08-26 | End: 2024-09-30

## 2024-08-26 RX ORDER — SODIUM CHLORIDE, SODIUM LACTATE, POTASSIUM CHLORIDE, CALCIUM CHLORIDE 600; 310; 30; 20 MG/100ML; MG/100ML; MG/100ML; MG/100ML
INJECTION, SOLUTION INTRAVENOUS CONTINUOUS
Status: CANCELLED | OUTPATIENT
Start: 2024-08-26

## 2024-08-26 RX ORDER — LOPERAMIDE HCL 2 MG
2 CAPSULE ORAL
Status: CANCELLED | OUTPATIENT
Start: 2024-08-26

## 2024-08-26 RX ORDER — LIDOCAINE 40 MG/G
CREAM TOPICAL
Status: CANCELLED | OUTPATIENT
Start: 2024-08-26

## 2024-08-26 RX ORDER — CARBOPROST TROMETHAMINE 250 UG/ML
250 INJECTION, SOLUTION INTRAMUSCULAR
Status: CANCELLED | OUTPATIENT
Start: 2024-08-26

## 2024-08-26 RX ORDER — MISOPROSTOL 200 UG/1
800 TABLET ORAL
Status: CANCELLED | OUTPATIENT
Start: 2024-08-26

## 2024-08-26 RX ORDER — CLINDAMYCIN PHOSPHATE 900 MG/50ML
900 INJECTION, SOLUTION INTRAVENOUS
Status: CANCELLED | OUTPATIENT
Start: 2024-08-26

## 2024-08-26 RX ORDER — TRANEXAMIC ACID 10 MG/ML
1 INJECTION, SOLUTION INTRAVENOUS EVERY 30 MIN PRN
Status: CANCELLED | OUTPATIENT
Start: 2024-08-26

## 2024-08-26 RX ORDER — MISOPROSTOL 200 UG/1
400 TABLET ORAL
Status: CANCELLED | OUTPATIENT
Start: 2024-08-26

## 2024-08-26 RX ORDER — ACETAMINOPHEN 325 MG/1
975 TABLET ORAL ONCE
Status: CANCELLED | OUTPATIENT
Start: 2024-08-26 | End: 2024-08-26

## 2024-08-26 RX ORDER — OXYTOCIN/0.9 % SODIUM CHLORIDE 30/500 ML
340 PLASTIC BAG, INJECTION (ML) INTRAVENOUS CONTINUOUS PRN
Status: CANCELLED | OUTPATIENT
Start: 2024-08-26

## 2024-08-26 NOTE — TELEPHONE ENCOUNTER
"[id number]  Hollyradha Tolliver    You are now scheduled for surgery at [facility].  Below are the details for your surgery.  Please read the \"Preparing for Your Surgery\" instructions and let us know if you have any questions.    Type of surgery: repeatCESAREAN SECTION, WITH BILATERAL SALPINGECTOMY (Bilateral)   Surgeon:  Lelia Grijalva MD  Location of surgery: [facility] OR    Date of surgery: 9/25/24    Time: 7:30am   Arrival Time: 6:00am    Time can change, to be confirmed a couple of days prior by pre-op surgery nurse.    Pre-Op Appt Date: Last OB Visit  Post-Op Appt Date: Mon Nov 4th   Time: 10:30am with Dr. Grijalva    Packet sent out: Yes  Pre-cert/Authorization completed:  TBD by Financial Securing Office.   MA Sterilization/Hysterectomy Acknowledgment Consent signed: Yes 8/12/24    [facility]  [phone]    Fax: [phone]  Same Day Surgery [phone]  Fax: [phone]  [facility] Center [phone]    "

## 2024-08-26 NOTE — PROGRESS NOTES
[facility]     Return OB Note     CC: Return OB      Subjective:  Holly is a 39 year old  at 34w6d   Denies vaginal bleeding, loss of fluid, or pelvic cramping. pos fetal movement.     Has had ongoing right upper side pain.  Notes that she has had normal liver labs and has GI visit upcoming.  Pain is intermittent.       Objective:  /84 (BP Location: Left arm, Patient Position: Chair, Cuff Size: Adult Large)   Pulse 86   Temp 97.8  F (36.6  C) (Tympanic)   Wt 126.1 kg (278 lb)   LMP 2023   BMI 38.23 kg/m      Fetal heart tones: 140s  Fundal height: 36     Assessment/Plan:      39 year old  at 34w6d       Routine prenatal care:  - New OB labs nml except R-NI and hepB NI.  - Genetic screen: declined  - MsAFP discussed and declined  -L2 ultrasound nl: due to history of FGR at term in prior pregnancy recommend fetal growth US at 28 and 34 weeks with [facility]. (US ordered)   -History of CS, 1 vaginal delivery prior, at this point patient is leaning towards repeat .  Did discuss risks of surgery as well as of .  The patient is leaning towards repeat  and would like to schedule this today.  Understands that she can change her mind at any point in time.  Also discussed with patient tubal ligation.  She has already signed tubal papers.  She is feeling fairly confident that she would like to proceed with tubal ligation and thus would like it scheduled with this in place but will continue to talk about this at home and cancel this portion if she is not 100% sure that she would like the tubes removed.  -Right upper side pain: No elevated blood pressures or other signs or symptoms of preeclampsia, LFTs were normal recently has GI follow-up planned.  Did recommend she trial Pepcid to see if this improves her discomfort     Asthma: Well-controlled with albuterol inhaler as needed.     Anxiety/depression: Well-controlled without medication  at this time     History of gestational hypertension: ASA taking daily     Nausea: using 4 mg Zofran and it helps, but wears off.  Prescribed 8mg zofran     Heartburn:  advised Pepcid and Tums.  Discussed incline sleeping.     Constipation:  discussed laxatives and stool softeners and increased hydration.     Bilateral foot swelling: resolves with elevation. Discussed compression socks. Patient would like prescription for these, which were sent.      RTC weekly    Lelia Grijalva MD

## 2024-08-26 NOTE — NURSING NOTE
"Initial /84 (BP Location: Left arm, Patient Position: Chair, Cuff Size: Adult Large)   Pulse 86   Temp 97.8  F (36.6  C) (Tympanic)   Wt 126.1 kg (278 lb)   LMP 12/26/2023   BMI 38.23 kg/m   Estimated body mass index is 38.23 kg/m  as calculated from the following:    Height as of 8/12/24: 1.816 m (5' 11.5\").    Weight as of this encounter: 126.1 kg (278 lb). .      Marielos Cherry MA    "

## 2024-09-06 NOTE — PATIENT INSTRUCTIONS
Weeks 34 to 36 of Your Pregnancy: Care Instructions  Your belly has grown quite large. It's almost time to give birth! Your baby's lungs are almost ready to breathe air. The skull bones are firm enough to protect your baby's head. But they're soft enough to move down through the birth canal.    You might be wondering what to expect during labor. Because each birth is different, there's no way to know exactly what childbirth will be like for you. Talk to your doctor or midwife about any concerns you have.    You'll probably have a test for group B streptococcus (GBS). GBS is bacteria that can live in the vagina and rectum. GBS can make your baby sick after birth. If you test positive, you'll get antibiotics during labor.    Choose what type of pain relief you would like during labor.  You can choose from a few types, including medicine and non-medicine options. You may want to use several types of pain relief.     Know how medicines can help with pain during labor.  Some medicines lower anxiety and help with some of the pain. Others make your lower body numb so that you will feel less pain.     Tell your doctor about your pain medicine choice.  Do this before you start labor or very early in your labor. You may be able to change your mind during labor.     Learn about the stages of labor.    The first stage includes the early (latent) and active phases of labor. Contractions start in early labor. During active labor, contractions get stronger, last longer, and happen more often. And the cervix opens more rapidly.  The second stage starts when you're ready to push. During this stage, your baby is born.  During the third stage, you'll usually have a few more contractions to push out the placenta.   Follow-up care is a key part of your treatment and safety. Be sure to make and go to all appointments, and call your doctor if you are having problems. It's also a good idea to know your test results and keep a list of the  "medicines you take.  Where can you learn more?  Go to https://www.Liberata.net/patiented  Enter B912 in the search box to learn more about \"Weeks 34 to 36 of Your Pregnancy: Care Instructions.\"  Current as of: July 10, 2023               Content Version: 14.0    9649-6033 [employer]   Care instructions adapted under license by your healthcare professional. If you have questions about a medical condition or this instruction, always ask your healthcare professional. [employer] disclaims any warranty or liability for your use of this information.      Group B Strep During Pregnancy: Care Instructions  Overview     Group B strep infection is caused by a type of bacteria. It's a different kind of bacteria than the kind that causes strep throat.  You may have this kind of bacteria in your body. Sometimes it may cause an infection, but most of the time it doesn't make you sick or cause symptoms. But if you pass the bacteria to your baby during the birth, it can cause serious health problems for your baby.  If you have this bacteria in your body, you will get antibiotics when you are in labor. Antibiotics help prevent problems for a  baby.  After birth, doctors will watch and may test your baby. If your baby tests positive for Group B strep, your baby will get antibiotics.  If you plan to breastfeed your baby, don't worry. It will be safe to breastfeed.  Follow-up care is a key part of your treatment and safety. Be sure to make and go to all appointments, and call your doctor if you are having problems. It's also a good idea to know your test results and keep a list of the medicines you take.  How can you care for yourself at home?  If your doctor has prescribed antibiotics, take them as directed. Do not stop taking them just because you feel better. You need to take the full course of antibiotics.  Tell your doctor if you are allergic to any antibiotic.  If you go into labor, or your " "water breaks, go to the hospital. Your doctor will give you antibiotics to help protect your baby from infection.  Tell the doctors and nurses if you have an allergy to penicillin.  Tell the doctors and nurses at the hospital that you tested positive for group B strep.  When should you call for help?   Call your doctor now or seek immediate medical care if:    You have symptoms of a urinary tract infection. These may include:  Pain or burning when you urinate.  A frequent need to urinate without being able to pass much urine.  Pain in the flank, which is just below the rib cage and above the waist on either side of the back.  Blood in your urine.  A fever.     You think you are in labor or your water has broken.     You have pain in your belly or pelvis.   Watch closely for changes in your health, and be sure to contact your doctor if you have any problems.  Where can you learn more?  Go to https://www.CAIS.[employer]/patiented  Enter M001 in the search box to learn more about \"Group B Strep During Pregnancy: Care Instructions.\"  Current as of: June 12, 2023               Content Version: 14.0    9049-4292 [employer].   Care instructions adapted under license by your healthcare professional. If you have questions about a medical condition or this instruction, always ask your healthcare professional. [employer] disclaims any warranty or liability for your use of this information.      Circumcision in Infants: What to Expect at Home  Your Child's Recovery  After circumcision, your baby's penis may look red and swollen. It may have petroleum jelly and gauze on it. The gauze will likely come off when your baby urinates. Follow your doctor's directions about whether to put clean gauze back on your baby's penis or to leave the gauze off. If you need to remove gauze from the penis, use warm water to soak the gauze and gently loosen it.  The doctor may have used a Plastibell device to do the " circumcision. If so, your baby will have a plastic ring around the head of the penis. The ring should fall off by itself in 10 to 12 days.  A thin, yellow film may form over the area the day after the procedure. This is part of the normal healing process. It should go away in a few days.  Your baby may seem fussy while the area heals. It may hurt for your baby to urinate. This pain often gets better in 3 or 4 days. But it may last for up to 2 weeks.  Even though your baby's penis will likely start to feel better after 3 or 4 days, it may look worse. The penis often starts to look like it's getting better after about 7 to 10 days.  This care sheet gives you a general idea about how long it will take for your child to recover. But each child recovers at a different pace. Follow the steps below to help your child get better as quickly as possible.  How can you care for your child at home?  Activity    Let your baby rest as much as possible. Sleeping will help with recovery.     You can give your baby a sponge bath the day after surgery. Ask your doctor when it is okay to give your baby a bath.   Medicines    Your doctor will tell you if and when your child can restart any medicines. The doctor will also give you instructions about your child taking any new medicines.     Your doctor may recommend giving your baby acetaminophen (Tylenol) to help with pain after the procedure. Be safe with medicines. Give your child medicines exactly as prescribed. Call your doctor if you think your child is having a problem with a medicine.     Do not give your child two or more pain medicines at the same time unless the doctor told you to. Many pain medicines have acetaminophen, which is Tylenol. Too much acetaminophen (Tylenol) can be harmful.   Circumcision care    Always wash your hands before and after touching the circumcision area.     Gently wash your baby's penis with plain, warm water after each diaper change, and pat it dry.  "Do not use soap. Don't use hydrogen peroxide or alcohol. They can slow healing.     Do not try to remove the film that forms on the penis. The film will go away on its own.     Put plenty of petroleum jelly (such as Vaseline) on the circumcision area during each diaper change. This will prevent your baby's penis from sticking to the diaper while it heals.     Fasten your baby's diapers loosely so that there is less pressure on the penis while it heals.   Follow-up care is a key part of your child's treatment and safety. Be sure to make and go to all appointments, and call your doctor if your child is having problems. It's also a good idea to know your child's test results and keep a list of the medicines your child takes.  When should you call for help?   Call your doctor now or seek immediate medical care if:    Your baby has a fever over 100.4 F.     Your baby is extremely fussy or irritable, has a high-pitched cry, or refuses to eat.     Your baby does not have a wet diaper within 12 hours after the circumcision.     You find a spot of bleeding larger than a 2-inch [place] from the incision.     Your baby has signs of infection. Signs may include severe swelling; redness; a red streak on the shaft of the penis; or a thick, yellow discharge.   Watch closely for changes in your child's health, and be sure to contact your doctor if:    A Plastibell device was used for the circumcision and the ring has not fallen off after 10 to 12 days.   Where can you learn more?  Go to https://www.[employer].net/patiented  Enter S255 in the search box to learn more about \"Circumcision in Infants: What to Expect at Home.\"  Current as of: October 24, 2023               Content Version: 14.0    9830-1954 [employer].   Care instructions adapted under license by your healthcare professional. If you have questions about a medical condition or this instruction, always ask your healthcare professional. [employer] disclaims any warranty or liability for your use of this information.

## 2024-09-09 ENCOUNTER — PRENATAL OFFICE VISIT (OUTPATIENT)
Dept: OBGYN | Facility: CLINIC | Age: 39
End: 2024-09-09
Payer: COMMERCIAL

## 2024-09-09 VITALS
WEIGHT: 281 LBS | HEART RATE: 76 BPM | SYSTOLIC BLOOD PRESSURE: 119 MMHG | DIASTOLIC BLOOD PRESSURE: 77 MMHG | HEIGHT: 72 IN | BODY MASS INDEX: 38.06 KG/M2 | RESPIRATION RATE: 18 BRPM | TEMPERATURE: 98.2 F

## 2024-09-09 DIAGNOSIS — Z87.59 PREVIOUS BABY WITH FETAL GROWTH RESTRICTION: ICD-10-CM

## 2024-09-09 DIAGNOSIS — Z98.891 HISTORY OF CESAREAN DELIVERY: ICD-10-CM

## 2024-09-09 DIAGNOSIS — Z87.59 HISTORY OF GESTATIONAL HYPERTENSION: ICD-10-CM

## 2024-09-09 DIAGNOSIS — J45.20 MILD INTERMITTENT ASTHMA, UNSPECIFIED WHETHER COMPLICATED: ICD-10-CM

## 2024-09-09 DIAGNOSIS — Z34.93 PRENATAL CARE, THIRD TRIMESTER: Primary | ICD-10-CM

## 2024-09-09 PROCEDURE — 87653 STREP B DNA AMP PROBE: CPT | Performed by: STUDENT IN AN ORGANIZED HEALTH CARE EDUCATION/TRAINING PROGRAM

## 2024-09-09 NOTE — PROGRESS NOTES
"[facility]  Return OB Note    Subjective:  Denies vaginal bleeding, loss of fluid, or regular contractions. Noted normal fetal movement.  Complaints today: none    Objective:  /77 (BP Location: Right arm, Patient Position: Chair, Cuff Size: Adult Large)   Pulse 76   Temp 98.2  F (36.8  C) (Tympanic)   Resp 18   Ht 1.816 m (5' 11.5\")   Wt 127.5 kg (281 lb)   LMP 2023   BMI 38.65 kg/m      FHT: 130bpm  Bedside ultrasound: cephalic    Assessment/Plan:   Holly Tolliver is a 39 year old  at 36w6d by LMP c/w 11w5d US, here for return OB visit.    -NOB labs noted for hepB and Rubella NI. Midtrimester labs nl. GBS collected today  -Declined genetic screening and MsAFP   -L2 ultrasound nl  -S/p Tdap vaccine.     -H/o , then PLTCS for concerns for placental abruption: s/p  counseling. Desires repeat CS even if she goes into spontaneous labor, which is scheduled on . Considering tubal ligation, but uncertain. Federal tubal paperwork signed on 2024  -H/o FGR at term in prior pregnancy: last growth US  EFW 84%, AC 97%  -AMA: noted  -Asthma: Well-controlled with albuterol inhaler as needed.  -Anxiety/depression: Well-controlled without medication at this time  -H/o gestational hypertension: taking ASA daily, baseline preE labs on nl    RTC 1 weeks    Antonette Whitman MD  Obstetrics and Gynecology  [facility]   2024       "

## 2024-09-09 NOTE — NURSING NOTE
"Initial /77 (BP Location: Right arm, Patient Position: Chair, Cuff Size: Adult Large)   Pulse 76   Temp 98.2  F (36.8  C) (Tympanic)   Resp 18   Ht 1.816 m (5' 11.5\")   Wt 127.5 kg (281 lb)   LMP 12/26/2023   BMI 38.65 kg/m   Estimated body mass index is 38.65 kg/m  as calculated from the following:    Height as of this encounter: 1.816 m (5' 11.5\").    Weight as of this encounter: 127.5 kg (281 lb). .    "

## 2024-09-10 LAB — GP B STREP DNA SPEC QL NAA+PROBE: NEGATIVE

## 2024-09-16 ENCOUNTER — ANESTHESIA (OUTPATIENT)
Dept: SURGERY | Facility: CLINIC | Age: 39
End: 2024-09-16
Payer: COMMERCIAL

## 2024-09-16 ENCOUNTER — ANESTHESIA EVENT (OUTPATIENT)
Dept: SURGERY | Facility: CLINIC | Age: 39
End: 2024-09-16
Payer: COMMERCIAL

## 2024-09-16 ENCOUNTER — HOSPITAL ENCOUNTER (INPATIENT)
Facility: CLINIC | Age: 39
LOS: 2 days | Discharge: HOME | End: 2024-09-18
Attending: STUDENT IN AN ORGANIZED HEALTH CARE EDUCATION/TRAINING PROGRAM | Admitting: STUDENT IN AN ORGANIZED HEALTH CARE EDUCATION/TRAINING PROGRAM
Payer: COMMERCIAL

## 2024-09-16 ENCOUNTER — HOSPITAL ENCOUNTER (EMERGENCY)
Facility: CLINIC | Age: 39
Discharge: STILL ADMITTED OR ADMINISTRATIVE | End: 2024-09-16
Payer: COMMERCIAL

## 2024-09-16 ENCOUNTER — PRENATAL OFFICE VISIT (OUTPATIENT)
Dept: OBGYN | Facility: CLINIC | Age: 39
End: 2024-09-16
Payer: COMMERCIAL

## 2024-09-16 ENCOUNTER — HOSPITAL ENCOUNTER (OUTPATIENT)
Facility: CLINIC | Age: 39
Discharge: HOME | End: 2024-09-16
Attending: OBSTETRICS & GYNECOLOGY | Admitting: OBSTETRICS & GYNECOLOGY
Payer: COMMERCIAL

## 2024-09-16 VITALS
HEIGHT: 72 IN | BODY MASS INDEX: 38.36 KG/M2 | TEMPERATURE: 97.3 F | RESPIRATION RATE: 20 BRPM | WEIGHT: 283.2 LBS | DIASTOLIC BLOOD PRESSURE: 80 MMHG | HEART RATE: 87 BPM | SYSTOLIC BLOOD PRESSURE: 133 MMHG

## 2024-09-16 VITALS — HEART RATE: 77 BPM | RESPIRATION RATE: 18 BRPM | DIASTOLIC BLOOD PRESSURE: 79 MMHG | SYSTOLIC BLOOD PRESSURE: 132 MMHG

## 2024-09-16 DIAGNOSIS — O13.3 GESTATIONAL HYPERTENSION, THIRD TRIMESTER: ICD-10-CM

## 2024-09-16 DIAGNOSIS — Z98.891 S/P CESAREAN SECTION: Primary | ICD-10-CM

## 2024-09-16 DIAGNOSIS — Z3A.37 37 WEEKS GESTATION OF PREGNANCY: Primary | ICD-10-CM

## 2024-09-16 PROBLEM — O13.9 GESTATIONAL HYPERTENSION: Status: ACTIVE | Noted: 2024-09-16

## 2024-09-16 PROBLEM — Z36.89 ENCOUNTER FOR TRIAGE IN PREGNANT PATIENT: Status: ACTIVE | Noted: 2024-09-16

## 2024-09-16 LAB
ABO/RH(D): NORMAL
ALBUMIN MFR UR ELPH: 9 MG/DL
ALBUMIN MFR UR ELPH: <6 MG/DL
ALBUMIN SERPL BCG-MCNC: 3.1 G/DL (ref 3.5–5.2)
ALBUMIN SERPL BCG-MCNC: 3.6 G/DL (ref 3.5–5.2)
ALP SERPL-CCNC: 161 U/L (ref 40–150)
ALP SERPL-CCNC: 186 U/L (ref 40–150)
ALT SERPL W P-5'-P-CCNC: 14 U/L (ref 0–50)
ALT SERPL W P-5'-P-CCNC: 20 U/L (ref 0–50)
ANION GAP SERPL CALCULATED.3IONS-SCNC: 13 MMOL/L (ref 7–15)
ANION GAP SERPL CALCULATED.3IONS-SCNC: 8 MMOL/L (ref 7–15)
ANTIBODY SCREEN: NEGATIVE
APTT PPP: 28 SECONDS (ref 22–38)
AST SERPL W P-5'-P-CCNC: 24 U/L (ref 0–45)
AST SERPL W P-5'-P-CCNC: 28 U/L (ref 0–45)
BILIRUB SERPL-MCNC: 0.2 MG/DL
BILIRUB SERPL-MCNC: 0.3 MG/DL
BUN SERPL-MCNC: 10.1 MG/DL (ref 6–20)
BUN SERPL-MCNC: 11.4 MG/DL (ref 6–20)
CALCIUM SERPL-MCNC: 9.3 MG/DL (ref 8.8–10.4)
CALCIUM SERPL-MCNC: 9.4 MG/DL (ref 8.8–10.4)
CHLORIDE SERPL-SCNC: 101 MMOL/L (ref 98–107)
CHLORIDE SERPL-SCNC: 103 MMOL/L (ref 98–107)
CREAT SERPL-MCNC: 0.65 MG/DL (ref 0.51–0.95)
CREAT SERPL-MCNC: 0.7 MG/DL (ref 0.51–0.95)
CREAT UR-MCNC: 51.5 MG/DL
CREAT UR-MCNC: 88.1 MG/DL
EGFRCR SERPLBLD CKD-EPI 2021: >90 ML/MIN/1.73M2
EGFRCR SERPLBLD CKD-EPI 2021: >90 ML/MIN/1.73M2
ERYTHROCYTE [DISTWIDTH] IN BLOOD BY AUTOMATED COUNT: 15.6 % (ref 10–15)
ERYTHROCYTE [DISTWIDTH] IN BLOOD BY AUTOMATED COUNT: 15.7 % (ref 10–15)
FIBRINOGEN PPP-MCNC: 585 MG/DL (ref 170–510)
GLUCOSE SERPL-MCNC: 121 MG/DL (ref 70–99)
GLUCOSE SERPL-MCNC: 91 MG/DL (ref 70–99)
HCO3 SERPL-SCNC: 20 MMOL/L (ref 22–29)
HCO3 SERPL-SCNC: 24 MMOL/L (ref 22–29)
HCT VFR BLD AUTO: 35.6 % (ref 35–47)
HCT VFR BLD AUTO: 38.1 % (ref 35–47)
HGB BLD-MCNC: 11.9 G/DL (ref 11.7–15.7)
HGB BLD-MCNC: 13 G/DL (ref 11.7–15.7)
INR PPP: 0.98 (ref 0.85–1.15)
MCH RBC QN AUTO: 27.5 PG (ref 26.5–33)
MCH RBC QN AUTO: 27.5 PG (ref 26.5–33)
MCHC RBC AUTO-ENTMCNC: 33.4 G/DL (ref 31.5–36.5)
MCHC RBC AUTO-ENTMCNC: 34.1 G/DL (ref 31.5–36.5)
MCV RBC AUTO: 81 FL (ref 78–100)
MCV RBC AUTO: 82 FL (ref 78–100)
PLATELET # BLD AUTO: 208 10E3/UL (ref 150–450)
PLATELET # BLD AUTO: 238 10E3/UL (ref 150–450)
POTASSIUM SERPL-SCNC: 3.8 MMOL/L (ref 3.4–5.3)
POTASSIUM SERPL-SCNC: 4 MMOL/L (ref 3.4–5.3)
PROT SERPL-MCNC: 6.2 G/DL (ref 6.4–8.3)
PROT SERPL-MCNC: 7 G/DL (ref 6.4–8.3)
PROT/CREAT 24H UR: 0.1 MG/MG CR (ref 0–0.2)
PROT/CREAT 24H UR: NORMAL MG/G{CREAT}
RBC # BLD AUTO: 4.32 10E6/UL (ref 3.8–5.2)
RBC # BLD AUTO: 4.73 10E6/UL (ref 3.8–5.2)
RUPTURE OF FETAL MEMBRANES BY ROM PLUS: NEGATIVE
SODIUM SERPL-SCNC: 133 MMOL/L (ref 135–145)
SODIUM SERPL-SCNC: 136 MMOL/L (ref 135–145)
SPECIMEN EXPIRATION DATE: NORMAL
T PALLIDUM AB SER QL: NONREACTIVE
WBC # BLD AUTO: 8.9 10E3/UL (ref 4–11)
WBC # BLD AUTO: 8.9 10E3/UL (ref 4–11)

## 2024-09-16 PROCEDURE — 85384 FIBRINOGEN ACTIVITY: CPT | Performed by: STUDENT IN AN ORGANIZED HEALTH CARE EDUCATION/TRAINING PROGRAM

## 2024-09-16 PROCEDURE — 82040 ASSAY OF SERUM ALBUMIN: CPT | Performed by: STUDENT IN AN ORGANIZED HEALTH CARE EDUCATION/TRAINING PROGRAM

## 2024-09-16 PROCEDURE — 0UT70ZZ RESECTION OF BILATERAL FALLOPIAN TUBES, OPEN APPROACH: ICD-10-PCS | Performed by: STUDENT IN AN ORGANIZED HEALTH CARE EDUCATION/TRAINING PROGRAM

## 2024-09-16 PROCEDURE — 85027 COMPLETE CBC AUTOMATED: CPT | Performed by: STUDENT IN AN ORGANIZED HEALTH CARE EDUCATION/TRAINING PROGRAM

## 2024-09-16 PROCEDURE — 88302 TISSUE EXAM BY PATHOLOGIST: CPT | Mod: 26 | Performed by: PATHOLOGY

## 2024-09-16 PROCEDURE — G0463 HOSPITAL OUTPT CLINIC VISIT: HCPCS

## 2024-09-16 PROCEDURE — 58611 LIGATE OVIDUCT(S) ADD-ON: CPT | Performed by: STUDENT IN AN ORGANIZED HEALTH CARE EDUCATION/TRAINING PROGRAM

## 2024-09-16 PROCEDURE — 84156 ASSAY OF PROTEIN URINE: CPT | Performed by: OBSTETRICS & GYNECOLOGY

## 2024-09-16 PROCEDURE — 250N000011 HC RX IP 250 OP 636: Performed by: STUDENT IN AN ORGANIZED HEALTH CARE EDUCATION/TRAINING PROGRAM

## 2024-09-16 PROCEDURE — 120N000013 HC R&B IMCU

## 2024-09-16 PROCEDURE — 258N000003 HC RX IP 258 OP 636: Performed by: STUDENT IN AN ORGANIZED HEALTH CARE EDUCATION/TRAINING PROGRAM

## 2024-09-16 PROCEDURE — 86780 TREPONEMA PALLIDUM: CPT | Performed by: STUDENT IN AN ORGANIZED HEALTH CARE EDUCATION/TRAINING PROGRAM

## 2024-09-16 PROCEDURE — 88302 TISSUE EXAM BY PATHOLOGIST: CPT | Mod: TC | Performed by: STUDENT IN AN ORGANIZED HEALTH CARE EDUCATION/TRAINING PROGRAM

## 2024-09-16 PROCEDURE — 272N000001 HC OR GENERAL SUPPLY STERILE: Performed by: STUDENT IN AN ORGANIZED HEALTH CARE EDUCATION/TRAINING PROGRAM

## 2024-09-16 PROCEDURE — 36415 COLL VENOUS BLD VENIPUNCTURE: CPT | Performed by: OBSTETRICS & GYNECOLOGY

## 2024-09-16 PROCEDURE — 250N000009 HC RX 250: Performed by: NURSE ANESTHETIST, CERTIFIED REGISTERED

## 2024-09-16 PROCEDURE — 258N000003 HC RX IP 258 OP 636: Performed by: NURSE ANESTHETIST, CERTIFIED REGISTERED

## 2024-09-16 PROCEDURE — 84155 ASSAY OF PROTEIN SERUM: CPT | Performed by: STUDENT IN AN ORGANIZED HEALTH CARE EDUCATION/TRAINING PROGRAM

## 2024-09-16 PROCEDURE — 85730 THROMBOPLASTIN TIME PARTIAL: CPT | Performed by: STUDENT IN AN ORGANIZED HEALTH CARE EDUCATION/TRAINING PROGRAM

## 2024-09-16 PROCEDURE — 370N000017 HC ANESTHESIA TECHNICAL FEE, PER MIN: Performed by: STUDENT IN AN ORGANIZED HEALTH CARE EDUCATION/TRAINING PROGRAM

## 2024-09-16 PROCEDURE — 84112 EVAL AMNIOTIC FLUID PROTEIN: CPT | Performed by: STUDENT IN AN ORGANIZED HEALTH CARE EDUCATION/TRAINING PROGRAM

## 2024-09-16 PROCEDURE — 86900 BLOOD TYPING SEROLOGIC ABO: CPT | Performed by: STUDENT IN AN ORGANIZED HEALTH CARE EDUCATION/TRAINING PROGRAM

## 2024-09-16 PROCEDURE — 271N000001 HC OR GENERAL SUPPLY NON-STERILE: Performed by: STUDENT IN AN ORGANIZED HEALTH CARE EDUCATION/TRAINING PROGRAM

## 2024-09-16 PROCEDURE — 250N000011 HC RX IP 250 OP 636: Performed by: NURSE ANESTHETIST, CERTIFIED REGISTERED

## 2024-09-16 PROCEDURE — 85610 PROTHROMBIN TIME: CPT | Performed by: STUDENT IN AN ORGANIZED HEALTH CARE EDUCATION/TRAINING PROGRAM

## 2024-09-16 PROCEDURE — 710N000009 HC RECOVERY PHASE 1, LEVEL 1, PER MIN: Performed by: STUDENT IN AN ORGANIZED HEALTH CARE EDUCATION/TRAINING PROGRAM

## 2024-09-16 PROCEDURE — 59515 CESAREAN DELIVERY: CPT | Performed by: STUDENT IN AN ORGANIZED HEALTH CARE EDUCATION/TRAINING PROGRAM

## 2024-09-16 PROCEDURE — 59514 CESAREAN DELIVERY ONLY: CPT | Mod: AS | Performed by: NURSE PRACTITIONER

## 2024-09-16 PROCEDURE — 360N000076 HC SURGERY LEVEL 3, PER MIN: Performed by: STUDENT IN AN ORGANIZED HEALTH CARE EDUCATION/TRAINING PROGRAM

## 2024-09-16 PROCEDURE — 80053 COMPREHEN METABOLIC PANEL: CPT | Performed by: OBSTETRICS & GYNECOLOGY

## 2024-09-16 PROCEDURE — 85027 COMPLETE CBC AUTOMATED: CPT | Performed by: OBSTETRICS & GYNECOLOGY

## 2024-09-16 PROCEDURE — 84156 ASSAY OF PROTEIN URINE: CPT | Performed by: STUDENT IN AN ORGANIZED HEALTH CARE EDUCATION/TRAINING PROGRAM

## 2024-09-16 PROCEDURE — 250N000013 HC RX MED GY IP 250 OP 250 PS 637: Performed by: STUDENT IN AN ORGANIZED HEALTH CARE EDUCATION/TRAINING PROGRAM

## 2024-09-16 PROCEDURE — 58611 LIGATE OVIDUCT(S) ADD-ON: CPT | Mod: AS | Performed by: NURSE PRACTITIONER

## 2024-09-16 PROCEDURE — 250N000013 HC RX MED GY IP 250 OP 250 PS 637: Performed by: OBSTETRICS & GYNECOLOGY

## 2024-09-16 RX ORDER — LOPERAMIDE HCL 2 MG
2 CAPSULE ORAL
Status: DISCONTINUED | OUTPATIENT
Start: 2024-09-16 | End: 2024-09-18 | Stop reason: HOSPADM

## 2024-09-16 RX ORDER — LIDOCAINE 40 MG/G
CREAM TOPICAL
Status: DISCONTINUED | OUTPATIENT
Start: 2024-09-16 | End: 2024-09-18 | Stop reason: HOSPADM

## 2024-09-16 RX ORDER — MORPHINE SULFATE 1 MG/ML
INJECTION, SOLUTION EPIDURAL; INTRATHECAL; INTRAVENOUS
Status: COMPLETED | OUTPATIENT
Start: 2024-09-16 | End: 2024-09-16

## 2024-09-16 RX ORDER — HYDROCORTISONE 25 MG/G
CREAM TOPICAL 3 TIMES DAILY PRN
Status: DISCONTINUED | OUTPATIENT
Start: 2024-09-16 | End: 2024-09-18 | Stop reason: HOSPADM

## 2024-09-16 RX ORDER — MODIFIED LANOLIN
OINTMENT (GRAM) TOPICAL
Status: DISCONTINUED | OUTPATIENT
Start: 2024-09-16 | End: 2024-09-18 | Stop reason: HOSPADM

## 2024-09-16 RX ORDER — ONDANSETRON 2 MG/ML
INJECTION INTRAMUSCULAR; INTRAVENOUS PRN
Status: DISCONTINUED | OUTPATIENT
Start: 2024-09-16 | End: 2024-09-16

## 2024-09-16 RX ORDER — OXYTOCIN 10 [USP'U]/ML
10 INJECTION, SOLUTION INTRAMUSCULAR; INTRAVENOUS
Status: DISCONTINUED | OUTPATIENT
Start: 2024-09-16 | End: 2024-09-18 | Stop reason: HOSPADM

## 2024-09-16 RX ORDER — ENOXAPARIN SODIUM 100 MG/ML
40 INJECTION SUBCUTANEOUS EVERY 24 HOURS
Status: DISCONTINUED | OUTPATIENT
Start: 2024-09-17 | End: 2024-09-18 | Stop reason: HOSPADM

## 2024-09-16 RX ORDER — ACETAMINOPHEN 325 MG/1
975 TABLET ORAL ONCE
Status: COMPLETED | OUTPATIENT
Start: 2024-09-16 | End: 2024-09-16

## 2024-09-16 RX ORDER — NIFEDIPINE 30 MG/1
30 TABLET, EXTENDED RELEASE ORAL EVERY 24 HOURS
Status: DISCONTINUED | OUTPATIENT
Start: 2024-09-16 | End: 2024-09-18 | Stop reason: HOSPADM

## 2024-09-16 RX ORDER — BISACODYL 10 MG
10 SUPPOSITORY, RECTAL RECTAL DAILY PRN
Status: DISCONTINUED | OUTPATIENT
Start: 2024-09-18 | End: 2024-09-18 | Stop reason: HOSPADM

## 2024-09-16 RX ORDER — LOPERAMIDE HCL 2 MG
2 CAPSULE ORAL
Status: DISCONTINUED | OUTPATIENT
Start: 2024-09-16 | End: 2024-09-16 | Stop reason: HOSPADM

## 2024-09-16 RX ORDER — LIDOCAINE 40 MG/G
CREAM TOPICAL
Status: DISCONTINUED | OUTPATIENT
Start: 2024-09-16 | End: 2024-09-16 | Stop reason: HOSPADM

## 2024-09-16 RX ORDER — BUPIVACAINE HYDROCHLORIDE 7.5 MG/ML
INJECTION, SOLUTION INTRASPINAL
Status: COMPLETED | OUTPATIENT
Start: 2024-09-16 | End: 2024-09-16

## 2024-09-16 RX ORDER — SODIUM CHLORIDE, SODIUM LACTATE, POTASSIUM CHLORIDE, CALCIUM CHLORIDE 600; 310; 30; 20 MG/100ML; MG/100ML; MG/100ML; MG/100ML
INJECTION, SOLUTION INTRAVENOUS CONTINUOUS PRN
Status: DISCONTINUED | OUTPATIENT
Start: 2024-09-16 | End: 2024-09-16

## 2024-09-16 RX ORDER — KETOROLAC TROMETHAMINE 30 MG/ML
INJECTION, SOLUTION INTRAMUSCULAR; INTRAVENOUS PRN
Status: DISCONTINUED | OUTPATIENT
Start: 2024-09-16 | End: 2024-09-16

## 2024-09-16 RX ORDER — OXYTOCIN 10 [USP'U]/ML
10 INJECTION, SOLUTION INTRAMUSCULAR; INTRAVENOUS
Status: DISCONTINUED | OUTPATIENT
Start: 2024-09-16 | End: 2024-09-16 | Stop reason: HOSPADM

## 2024-09-16 RX ORDER — DEXAMETHASONE SODIUM PHOSPHATE 4 MG/ML
INJECTION, SOLUTION INTRA-ARTICULAR; INTRALESIONAL; INTRAMUSCULAR; INTRAVENOUS; SOFT TISSUE PRN
Status: DISCONTINUED | OUTPATIENT
Start: 2024-09-16 | End: 2024-09-16

## 2024-09-16 RX ORDER — NALOXONE HYDROCHLORIDE 0.4 MG/ML
0.2 INJECTION, SOLUTION INTRAMUSCULAR; INTRAVENOUS; SUBCUTANEOUS
Status: DISCONTINUED | OUTPATIENT
Start: 2024-09-16 | End: 2024-09-18 | Stop reason: HOSPADM

## 2024-09-16 RX ORDER — TRANEXAMIC ACID 10 MG/ML
1 INJECTION, SOLUTION INTRAVENOUS EVERY 30 MIN PRN
Status: DISCONTINUED | OUTPATIENT
Start: 2024-09-16 | End: 2024-09-18 | Stop reason: HOSPADM

## 2024-09-16 RX ORDER — ACETAMINOPHEN 325 MG/1
975 TABLET ORAL EVERY 6 HOURS
Status: DISCONTINUED | OUTPATIENT
Start: 2024-09-16 | End: 2024-09-18 | Stop reason: HOSPADM

## 2024-09-16 RX ORDER — CITRIC ACID/SODIUM CITRATE 334-500MG
30 SOLUTION, ORAL ORAL
Status: COMPLETED | OUTPATIENT
Start: 2024-09-16 | End: 2024-09-16

## 2024-09-16 RX ORDER — LABETALOL HYDROCHLORIDE 5 MG/ML
20 INJECTION, SOLUTION INTRAVENOUS
Status: DISCONTINUED | OUTPATIENT
Start: 2024-09-16 | End: 2024-09-18 | Stop reason: HOSPADM

## 2024-09-16 RX ORDER — SODIUM CHLORIDE, SODIUM LACTATE, POTASSIUM CHLORIDE, CALCIUM CHLORIDE 600; 310; 30; 20 MG/100ML; MG/100ML; MG/100ML; MG/100ML
10-125 INJECTION, SOLUTION INTRAVENOUS CONTINUOUS
Status: DISCONTINUED | OUTPATIENT
Start: 2024-09-16 | End: 2024-09-18 | Stop reason: HOSPADM

## 2024-09-16 RX ORDER — KETOROLAC TROMETHAMINE 30 MG/ML
30 INJECTION, SOLUTION INTRAMUSCULAR; INTRAVENOUS EVERY 6 HOURS
Status: COMPLETED | OUTPATIENT
Start: 2024-09-16 | End: 2024-09-17

## 2024-09-16 RX ORDER — OXYCODONE HYDROCHLORIDE 5 MG/1
5 TABLET ORAL EVERY 4 HOURS PRN
Status: DISCONTINUED | OUTPATIENT
Start: 2024-09-16 | End: 2024-09-18 | Stop reason: HOSPADM

## 2024-09-16 RX ORDER — METOCLOPRAMIDE 10 MG/1
10 TABLET ORAL EVERY 6 HOURS PRN
Status: DISCONTINUED | OUTPATIENT
Start: 2024-09-16 | End: 2024-09-18 | Stop reason: HOSPADM

## 2024-09-16 RX ORDER — CARBOPROST TROMETHAMINE 250 UG/ML
250 INJECTION, SOLUTION INTRAMUSCULAR
Status: DISCONTINUED | OUTPATIENT
Start: 2024-09-16 | End: 2024-09-18 | Stop reason: HOSPADM

## 2024-09-16 RX ORDER — ONDANSETRON 4 MG/1
4 TABLET, ORALLY DISINTEGRATING ORAL EVERY 6 HOURS PRN
Status: DISCONTINUED | OUTPATIENT
Start: 2024-09-16 | End: 2024-09-18 | Stop reason: HOSPADM

## 2024-09-16 RX ORDER — TRANEXAMIC ACID 10 MG/ML
1 INJECTION, SOLUTION INTRAVENOUS EVERY 30 MIN PRN
Status: DISCONTINUED | OUTPATIENT
Start: 2024-09-16 | End: 2024-09-16 | Stop reason: HOSPADM

## 2024-09-16 RX ORDER — SODIUM CHLORIDE, SODIUM LACTATE, POTASSIUM CHLORIDE, CALCIUM CHLORIDE 600; 310; 30; 20 MG/100ML; MG/100ML; MG/100ML; MG/100ML
INJECTION, SOLUTION INTRAVENOUS CONTINUOUS
Status: DISCONTINUED | OUTPATIENT
Start: 2024-09-16 | End: 2024-09-16 | Stop reason: HOSPADM

## 2024-09-16 RX ORDER — MISOPROSTOL 200 UG/1
800 TABLET ORAL
Status: DISCONTINUED | OUTPATIENT
Start: 2024-09-16 | End: 2024-09-16 | Stop reason: HOSPADM

## 2024-09-16 RX ORDER — IBUPROFEN 800 MG/1
800 TABLET, FILM COATED ORAL EVERY 6 HOURS
Status: DISCONTINUED | OUTPATIENT
Start: 2024-09-17 | End: 2024-09-18 | Stop reason: HOSPADM

## 2024-09-16 RX ORDER — LOPERAMIDE HCL 2 MG
4 CAPSULE ORAL
Status: DISCONTINUED | OUTPATIENT
Start: 2024-09-16 | End: 2024-09-16 | Stop reason: HOSPADM

## 2024-09-16 RX ORDER — CLINDAMYCIN PHOSPHATE 900 MG/50ML
900 INJECTION, SOLUTION INTRAVENOUS
Status: COMPLETED | OUTPATIENT
Start: 2024-09-16 | End: 2024-09-16

## 2024-09-16 RX ORDER — SODIUM PHOSPHATE,MONO-DIBASIC 19G-7G/118
1 ENEMA (ML) RECTAL DAILY PRN
Status: DISCONTINUED | OUTPATIENT
Start: 2024-09-18 | End: 2024-09-18 | Stop reason: HOSPADM

## 2024-09-16 RX ORDER — LIDOCAINE HYDROCHLORIDE 20 MG/ML
INJECTION, SOLUTION INFILTRATION; PERINEURAL PRN
Status: DISCONTINUED | OUTPATIENT
Start: 2024-09-16 | End: 2024-09-16

## 2024-09-16 RX ORDER — OXYTOCIN 10 [USP'U]/ML
10 INJECTION, SOLUTION INTRAMUSCULAR; INTRAVENOUS
Status: DISCONTINUED | OUTPATIENT
Start: 2024-09-16 | End: 2024-09-17

## 2024-09-16 RX ORDER — MISOPROSTOL 200 UG/1
400 TABLET ORAL
Status: DISCONTINUED | OUTPATIENT
Start: 2024-09-16 | End: 2024-09-18 | Stop reason: HOSPADM

## 2024-09-16 RX ORDER — OXYTOCIN/0.9 % SODIUM CHLORIDE 30/500 ML
340 PLASTIC BAG, INJECTION (ML) INTRAVENOUS CONTINUOUS PRN
Status: DISCONTINUED | OUTPATIENT
Start: 2024-09-16 | End: 2024-09-16 | Stop reason: HOSPADM

## 2024-09-16 RX ORDER — PROPOFOL 10 MG/ML
INJECTION, EMULSION INTRAVENOUS PRN
Status: DISCONTINUED | OUTPATIENT
Start: 2024-09-16 | End: 2024-09-16

## 2024-09-16 RX ORDER — AMOXICILLIN 250 MG
2 CAPSULE ORAL 2 TIMES DAILY
Status: DISCONTINUED | OUTPATIENT
Start: 2024-09-16 | End: 2024-09-18 | Stop reason: HOSPADM

## 2024-09-16 RX ORDER — NALOXONE HYDROCHLORIDE 0.4 MG/ML
0.4 INJECTION, SOLUTION INTRAMUSCULAR; INTRAVENOUS; SUBCUTANEOUS
Status: DISCONTINUED | OUTPATIENT
Start: 2024-09-16 | End: 2024-09-18 | Stop reason: HOSPADM

## 2024-09-16 RX ORDER — METHYLERGONOVINE MALEATE 0.2 MG/ML
200 INJECTION INTRAVENOUS
Status: DISCONTINUED | OUTPATIENT
Start: 2024-09-16 | End: 2024-09-16 | Stop reason: HOSPADM

## 2024-09-16 RX ORDER — NIFEDIPINE 10 MG/1
10-20 CAPSULE ORAL
Status: DISCONTINUED | OUTPATIENT
Start: 2024-09-16 | End: 2024-09-18 | Stop reason: HOSPADM

## 2024-09-16 RX ORDER — OXYTOCIN/0.9 % SODIUM CHLORIDE 30/500 ML
100-340 PLASTIC BAG, INJECTION (ML) INTRAVENOUS CONTINUOUS PRN
Status: DISCONTINUED | OUTPATIENT
Start: 2024-09-16 | End: 2024-09-17

## 2024-09-16 RX ORDER — MISOPROSTOL 200 UG/1
400 TABLET ORAL
Status: DISCONTINUED | OUTPATIENT
Start: 2024-09-16 | End: 2024-09-16 | Stop reason: HOSPADM

## 2024-09-16 RX ORDER — DEXTROSE, SODIUM CHLORIDE, SODIUM LACTATE, POTASSIUM CHLORIDE, AND CALCIUM CHLORIDE 5; .6; .31; .03; .02 G/100ML; G/100ML; G/100ML; G/100ML; G/100ML
INJECTION, SOLUTION INTRAVENOUS CONTINUOUS
Status: DISCONTINUED | OUTPATIENT
Start: 2024-09-16 | End: 2024-09-18 | Stop reason: HOSPADM

## 2024-09-16 RX ORDER — METHYLERGONOVINE MALEATE 0.2 MG/ML
200 INJECTION INTRAVENOUS
Status: DISCONTINUED | OUTPATIENT
Start: 2024-09-16 | End: 2024-09-18 | Stop reason: HOSPADM

## 2024-09-16 RX ORDER — PROCHLORPERAZINE 25 MG
25 SUPPOSITORY, RECTAL RECTAL EVERY 12 HOURS PRN
Status: DISCONTINUED | OUTPATIENT
Start: 2024-09-16 | End: 2024-09-18 | Stop reason: HOSPADM

## 2024-09-16 RX ORDER — CARBOPROST TROMETHAMINE 250 UG/ML
250 INJECTION, SOLUTION INTRAMUSCULAR
Status: DISCONTINUED | OUTPATIENT
Start: 2024-09-16 | End: 2024-09-16 | Stop reason: HOSPADM

## 2024-09-16 RX ORDER — SIMETHICONE 80 MG
80 TABLET,CHEWABLE ORAL 4 TIMES DAILY PRN
Status: DISCONTINUED | OUTPATIENT
Start: 2024-09-16 | End: 2024-09-18 | Stop reason: HOSPADM

## 2024-09-16 RX ORDER — MISOPROSTOL 200 UG/1
800 TABLET ORAL
Status: DISCONTINUED | OUTPATIENT
Start: 2024-09-16 | End: 2024-09-18 | Stop reason: HOSPADM

## 2024-09-16 RX ORDER — LOPERAMIDE HCL 2 MG
4 CAPSULE ORAL
Status: DISCONTINUED | OUTPATIENT
Start: 2024-09-16 | End: 2024-09-18 | Stop reason: HOSPADM

## 2024-09-16 RX ORDER — TRANEXAMIC ACID 10 MG/ML
1 INJECTION, SOLUTION INTRAVENOUS ONCE
Status: DISCONTINUED | OUTPATIENT
Start: 2024-09-16 | End: 2024-09-18 | Stop reason: HOSPADM

## 2024-09-16 RX ORDER — OXYTOCIN/0.9 % SODIUM CHLORIDE 30/500 ML
PLASTIC BAG, INJECTION (ML) INTRAVENOUS CONTINUOUS PRN
Status: DISCONTINUED | OUTPATIENT
Start: 2024-09-16 | End: 2024-09-16

## 2024-09-16 RX ORDER — METOCLOPRAMIDE HYDROCHLORIDE 5 MG/ML
10 INJECTION INTRAMUSCULAR; INTRAVENOUS EVERY 6 HOURS PRN
Status: DISCONTINUED | OUTPATIENT
Start: 2024-09-16 | End: 2024-09-18 | Stop reason: HOSPADM

## 2024-09-16 RX ORDER — FENTANYL CITRATE 50 UG/ML
INJECTION, SOLUTION INTRAMUSCULAR; INTRAVENOUS PRN
Status: DISCONTINUED | OUTPATIENT
Start: 2024-09-16 | End: 2024-09-16

## 2024-09-16 RX ORDER — AMOXICILLIN 250 MG
1 CAPSULE ORAL 2 TIMES DAILY
Status: DISCONTINUED | OUTPATIENT
Start: 2024-09-16 | End: 2024-09-18 | Stop reason: HOSPADM

## 2024-09-16 RX ORDER — ONDANSETRON 2 MG/ML
4 INJECTION INTRAMUSCULAR; INTRAVENOUS EVERY 6 HOURS PRN
Status: DISCONTINUED | OUTPATIENT
Start: 2024-09-16 | End: 2024-09-18 | Stop reason: HOSPADM

## 2024-09-16 RX ORDER — LIDOCAINE 40 MG/G
CREAM TOPICAL
Status: DISCONTINUED | OUTPATIENT
Start: 2024-09-16 | End: 2024-09-16

## 2024-09-16 RX ORDER — PROCHLORPERAZINE MALEATE 5 MG
10 TABLET ORAL EVERY 6 HOURS PRN
Status: DISCONTINUED | OUTPATIENT
Start: 2024-09-16 | End: 2024-09-18 | Stop reason: HOSPADM

## 2024-09-16 RX ORDER — OXYTOCIN/0.9 % SODIUM CHLORIDE 30/500 ML
340 PLASTIC BAG, INJECTION (ML) INTRAVENOUS CONTINUOUS PRN
Status: DISCONTINUED | OUTPATIENT
Start: 2024-09-16 | End: 2024-09-18 | Stop reason: HOSPADM

## 2024-09-16 RX ADMIN — PROPOFOL 20 MG: 10 INJECTION, EMULSION INTRAVENOUS at 16:32

## 2024-09-16 RX ADMIN — TRANEXAMIC ACID 1 G: 1 INJECTION, SOLUTION INTRAVENOUS at 15:50

## 2024-09-16 RX ADMIN — PROPOFOL 30 MG: 10 INJECTION, EMULSION INTRAVENOUS at 16:51

## 2024-09-16 RX ADMIN — DEXAMETHASONE SODIUM PHOSPHATE 4 MG: 4 INJECTION, SOLUTION INTRA-ARTICULAR; INTRALESIONAL; INTRAMUSCULAR; INTRAVENOUS; SOFT TISSUE at 16:37

## 2024-09-16 RX ADMIN — ACETAMINOPHEN 975 MG: 325 TABLET ORAL at 20:17

## 2024-09-16 RX ADMIN — ACETAMINOPHEN 975 MG: 325 TABLET ORAL at 05:09

## 2024-09-16 RX ADMIN — KETOROLAC TROMETHAMINE 30 MG: 30 INJECTION, SOLUTION INTRAMUSCULAR at 16:55

## 2024-09-16 RX ADMIN — LIDOCAINE HYDROCHLORIDE 60 MG: 20 INJECTION, SOLUTION INFILTRATION; PERINEURAL at 16:51

## 2024-09-16 RX ADMIN — LIDOCAINE HYDROCHLORIDE 40 MG: 20 INJECTION, SOLUTION INFILTRATION; PERINEURAL at 16:32

## 2024-09-16 RX ADMIN — NIFEDIPINE 30 MG: 30 TABLET, FILM COATED, EXTENDED RELEASE ORAL at 21:41

## 2024-09-16 RX ADMIN — ONDANSETRON 4 MG: 2 INJECTION INTRAMUSCULAR; INTRAVENOUS at 15:46

## 2024-09-16 RX ADMIN — Medication 0.2 MG: at 15:58

## 2024-09-16 RX ADMIN — GENTAMICIN SULFATE 240 MG: 40 INJECTION, SOLUTION INTRAMUSCULAR; INTRAVENOUS at 14:30

## 2024-09-16 RX ADMIN — SODIUM CHLORIDE, SODIUM LACTATE, POTASSIUM CHLORIDE, CALCIUM CHLORIDE AND DEXTROSE MONOHYDRATE: 5; 600; 310; 30; 20 INJECTION, SOLUTION INTRAVENOUS at 20:17

## 2024-09-16 RX ADMIN — DEXAMETHASONE SODIUM PHOSPHATE 4 MG: 4 INJECTION, SOLUTION INTRA-ARTICULAR; INTRALESIONAL; INTRAMUSCULAR; INTRAVENOUS; SOFT TISSUE at 16:43

## 2024-09-16 RX ADMIN — PROPOFOL 20 MG: 10 INJECTION, EMULSION INTRAVENOUS at 16:37

## 2024-09-16 RX ADMIN — PHENYLEPHRINE HYDROCHLORIDE 0.2 MCG/KG/MIN: 10 INJECTION INTRAVENOUS at 16:00

## 2024-09-16 RX ADMIN — SODIUM CHLORIDE, POTASSIUM CHLORIDE, SODIUM LACTATE AND CALCIUM CHLORIDE 1000 ML: 600; 310; 30; 20 INJECTION, SOLUTION INTRAVENOUS at 11:22

## 2024-09-16 RX ADMIN — BUPIVACAINE HYDROCHLORIDE IN DEXTROSE 1.8 ML: 7.5 INJECTION, SOLUTION SUBARACHNOID at 15:58

## 2024-09-16 RX ADMIN — SENNOSIDES AND DOCUSATE SODIUM 2 TABLET: 50; 8.6 TABLET ORAL at 20:17

## 2024-09-16 RX ADMIN — ACETAMINOPHEN 975 MG: 325 TABLET ORAL at 13:56

## 2024-09-16 RX ADMIN — KETOROLAC TROMETHAMINE 30 MG: 30 INJECTION, SOLUTION INTRAMUSCULAR at 22:56

## 2024-09-16 RX ADMIN — SODIUM CITRATE AND CITRIC ACID MONOHYDRATE 30 ML: 500; 334 SOLUTION ORAL at 13:56

## 2024-09-16 RX ADMIN — Medication 340 ML/HR: at 16:31

## 2024-09-16 RX ADMIN — FENTANYL CITRATE 100 MCG: 50 INJECTION INTRAMUSCULAR; INTRAVENOUS at 16:37

## 2024-09-16 RX ADMIN — SODIUM CHLORIDE, POTASSIUM CHLORIDE, SODIUM LACTATE AND CALCIUM CHLORIDE: 600; 310; 30; 20 INJECTION, SOLUTION INTRAVENOUS at 16:15

## 2024-09-16 RX ADMIN — LIDOCAINE HYDROCHLORIDE 40 MG: 20 INJECTION, SOLUTION INFILTRATION; PERINEURAL at 16:37

## 2024-09-16 RX ADMIN — CLINDAMYCIN PHOSPHATE 900 MG: 900 INJECTION, SOLUTION INTRAVENOUS at 14:51

## 2024-09-16 ASSESSMENT — ACTIVITIES OF DAILY LIVING (ADL)
ADLS_ACUITY_SCORE: 35
ADLS_ACUITY_SCORE: 18
ADLS_ACUITY_SCORE: 35
ADLS_ACUITY_SCORE: 18
ADLS_ACUITY_SCORE: 18
ADLS_ACUITY_SCORE: 35
ADLS_ACUITY_SCORE: 18
ADLS_ACUITY_SCORE: 35
ADLS_ACUITY_SCORE: 20
ADLS_ACUITY_SCORE: 18
ADLS_ACUITY_SCORE: 18

## 2024-09-16 ASSESSMENT — LIFESTYLE VARIABLES: TOBACCO_USE: 1

## 2024-09-16 NOTE — PROGRESS NOTES
Labs wnl. Pt to discharge per MD. Parag pt on s/s of preE. Pt verbalized understanding. Pt has an OB appt later today and was reminded to keep all appts as scheduled. All questions answered at this time.

## 2024-09-16 NOTE — OP NOTE
[facility]  Full Operative Note     Name: Holly Tolliver   MRN: [id number]   : 1985   Procedure date: 24     Pre-operative diagnosis:   - at 37w6d   -Gestational hypertension  -History of  section x1, desires repeat  section  -Desires for permanent sterilization  -Asthma  -BMI 38.95  Post-operative diagnosis: Same, s/p below procedure    Procedure:   -Unscheduled repeat  section via Pfannestiel skin incision and low transverse uterine incision with double layer uterine closure  -Bilateral salpingectomy  Anesthesia: spinal anesthesia     Surgeon: Antonette Whitman MD   Assistant(s): Emily Castellanos CNP. This assistance of this surgeon was required due to the need for additional skilled surgical hands to retract and hold instruments due to the nature of the surgical procedure and risk of complications.     Indications: Holly Tolliver, 39 year old  at 37w6d by LMP c/w 11w5d US, presents for generally feeling unwell (headache, severe abdominal pain that migrates from lower abdomen  section scar to RUQ, a gush of fluid at ~0300). She is diagnosed with gestational hypertension. Thus, delivery was recommended. She had a history of  section x1 and desires repeat  section.    Quantitative blood loss: 93mL  Total IV fluids: 800 mL, Lactated Ringer  Total urine output: 200mL, clear urine  Drains: Stallings catheter  Specimens:   ID Type Source Tests Collected by Time Destination   1 : bilateral fallopian tubes Tissue Fallopian Tube, Bilateral SURGICAL PATHOLOGY EXAM Antonette Whitman MD 2024  3:41 PM    A : disposed of per Dr Whitman Placenta Placenta OR DOCUMENTATION ONLY Antonette Whitman MD 2024  4:39 PM    B : Not collected per Dr Whitman Cord blood Umbilical Cord OR DOCUMENTATION ONLY Antonette Whitman MD 2024  4:39 PM    C : Baby RN Collected Tissue Umbilical Cord OR DOCUMENTATION ONLY Antonette Whitman MD 2024  4:40 PM      Findings:    Moderate amount of recto-fascial adhesion. Dense amount of adhesions noted intraabdominally with omental adherent to the anterior abdominal wall.   Clear amniotic fluid  Liveborn female infant in vertex presentation. Apgars 9 at 1 minute & 9 at 5 minutes. Weight 3530g.  Normal uterus, fallopian tubes, and ovaries.     Procedure Details:   The patient was brought to the OR, where adequate spinal anesthesia was administered.  She was placed in the dorsal supine position with a slight leftward tilt. She was prepped and draped in the usual sterile fashion. A surgical time out was performed. IV clindamycin and gentamicin were given. IV TXA 1g was given.     A pfannenstiel skin incision was made with the scalpel, and carried down to the underlying fascia with sharp and blunt dissection. The fascia was incised in the midline, and the incision was extended laterally with the Raymundo scissors. The superior aspect of the fascia was grasped with the Kocher clamps and dissected off of the underlying rectus muscles with blunt and sharp dissection. Attention was then turned to the inferior aspect of the fascia, which was similarly dissected off of the underlying rectus muscles. The rectus muscles were  in the midline, and the peritoneum was entered bluntly, and the opening was extended with digital pressure and electrocautery. Dense amount of adhesions noted intraabdominally with omental adherent to the anterior abdominal wall. An Yordan O retractor was placed. A transverse hysterotomy was made with the scalpel in the lower uterine segment, and the incision was extended with digital pressure. The infant was noted to be in the LOT position, and was delivered atraumatically. The shoulders delivered easily. No nuchal cord was noted. The cord was doubly clamped and cut after 60sec, and the infant was handed off to the awaiting nursery staff. A segment of cord was cut and sent to pathology per routine. The placenta was  delivered with gentle traction on the umbilical cord and uterine massage. The uterus was cleared of all clots and debris. Uterine tone was noted to be firm with 20 units of pitocin given through the running IV and uterine massage.  The hysterotomy was closed with a running locked suture of 0 Vicryl.  The hysterotomy was then imbricated using an 0 Monocryl suture. The hysterotomy was noted to be hemostatic. Bilateral complete salpingectomy was performed with handheld LigaSure. The pericolic gutters were cleared of all clots and debris. The Yordan O retractor was removed. The hysterotomy was reexamined and noted to be hemostatic. The fascia and rectus muscles were examined and areas of oozing were controlled with electrocautery. The fascia was closed with a loop 0 PDS suture. The subcutaneous tissue was irrigated and areas of oozing were controlled with electrocautery. The subcutaneous tissue was more than 2 cm in thickness, and was therefore closed with 3-0 plain gut. The skin was closed with 4-0 Monocryl and covered with skin glue.     All sponge, needle, and instrument counts were correct. The patient tolerated the procedure well, and was transferred to recovery in stable condition.     Complications: None apparent   Condition: Patient taken to recovery in stable condition.    Antonette Whitman MD  Obstetrics and Gynecology  [facility]   09/16/2024 1630    [place], Female-Holly [[id number]]      Induction date/time:      Cervical ripening date/time:      Indications for induction: Gestational Hypertension       Delivery/Placenta Date and Time      Delivery Date: 9/16/24 Delivery Time:  4:30 PM                 Apgars    Living status: Living   1 Minute 5 Minute 10 Minute 15 Minute 20 Minute   Skin color: 0  1       Heart rate: 2  2       Reflex irritability: 2  2       Muscle tone: 2  2       Respiratory effort: 2  2       Total: 8  9       Apgars assigned by: BZ       Cord      Vessels: 3 Vessels   "  Cord Complications: None               Cord Blood Disposition: Discard    Gases Sent?: No    Delayed cord clamping?: Yes    Stem cell collection?: No            Measurements      Weight: 7 lb 12.5 oz Length: 1' 7\"     Head circumference: 35.6 cm    Birth Comments: PNP in attendance for repeat . Infant placed on sterile field and cried immediately. Delayed cord clamping done by OB. Brought to warmer and dried/stimulated. Apgars 8/9. Skin to skin with mother in OR.       Skin to Skin and Feeding Plan      Skin to skin initiation date/time: 1841    Skin to skin with: Mother  Skin to skin end date/time:            Delivery (Maternal) (Provider to Complete) ([id number])    Episiotomy: None  Perineal lacerations: None    Repair suture: None       Blood Loss  Mother: Ana Tolliver R #[id number]     Start of Mother's Information      Delivery Blood Loss  24 1613 - 24 1751      Total Surgical QBL Blood Loss (mL) Hospital Encounter 93 mL    Total  93 mL               End of Mother's Information  Mother: Ana Tolliver R #[id number]                Delivery - Provider to Complete ([id number])    Delivery Type (Choose the 1 that will go to the Birth History): , Low Transverse                          Priority: Non-urgent      Specifics: Repeat     Indications for : Other (Comment)                       Placenta    Removal: Spontaneous  Disposition: Hospital disposal             Anesthesia    Method: Spinal                    Presentation and Position    Presentation: Vertex    Position: Left Occiput Transverse                       "

## 2024-09-16 NOTE — PATIENT INSTRUCTIONS
"Week 37 of Your Pregnancy: Care Instructions    Most babies are born between 37 and 40 weeks.   This is a good time to pack a bag to take with you to the birth. Then it will be ready to go when you are.     Learn about breastfeeding.  For example, find out about ways to hold your baby to make breastfeeding easier. And think about learning how to pump and store milk.     Know that crying is normal.  It's common for babies to cry 1 to 3 hours a day. Some cry more, and some cry less.     Learn why babies cry.  They may be hungry; have gas; need a diaper change; or feel cold, warm, tired, lonely, or tense. Sometimes they cry for unknown reasons.     Think about what will help you stay calm when your baby cries.  Taking slow, deep breaths can help. So can taking a break. It's okay to put your baby somewhere safe (like their crib) and walk away for a few minutes.     Learn about safe sleep for your baby.  Always put your baby to sleep on their back. Place them alone in a crib or bassinet with a firm, flat surface. Keep soft items like stuffed animals out of the crib.     Learn what to expect with  poop.  Your baby will have their own bowel patterns. Some babies have several bowel movements a day. Some have fewer.     Know that  babies will often have loose, yellow bowel movements.  Formula-fed babies have more formed stools. If your baby's poop looks like pellets, your baby is constipated.   Follow-up care is a key part of your treatment and safety. Be sure to make and go to all appointments, and call your doctor if you are having problems. It's also a good idea to know your test results and keep a list of the medicines you take.  Where can you learn more?  Go to https://www.[employer].net/patiented  Enter N257 in the search box to learn more about \"Week 37 of Your Pregnancy: Care Instructions.\"  Current as of: July 10, 2023  Content Version: 14. [employer].   Care instructions " adapted under license by your healthcare professional. If you have questions about a medical condition or this instruction, always ask your healthcare professional. [employer] disclaims any warranty or liability for your use of this information.    Week 38 of Your Pregnancy: Care Instructions  Believe it or not, your baby is almost here. You may notice how your baby responds to sounds, warmth, cold, and light. You may even know what kind of music your baby likes.    Even if you expect a vaginal birth, it's a good idea to learn about  section ().  is the delivery of a baby through a cut (incision) in your belly. It's a major surgery, so it has more risks than a vaginal birth.    During the first 2 weeks after the birth, limit visitors. Don't allow visitors who have colds or infections. Ask visitors to wash their hands before they touch your baby. And never let anyone smoke around your baby.    Know about unplanned C-sections.  Reasons for an unplanned  include labor that slows or stops, signs of distress in your baby, and high blood pressure or other problems for you.     Know about planned C-sections.  If your baby isn't in a head-down position for delivery (breech position), your doctor may plan a . Or you may have a planned  if you're having twins or more.     Get as much help as you can while you're in the hospital.  You can learn about feeding, diapering, and bathing your baby.     Talk to your doctor or midwife about how to care for the umbilical cord stump.  If your baby will be circumcised, also ask about how to care for that.     Ask friends or family for help, as you need it.  If you can, have another adult in your home for at least 2 or 3 days after the birth.     Try to nap when your baby naps.  This may be your best chance to get some sleep.     Watch for changes in your mental health.  For the first 1 to 2 weeks after birth, it's common  "to cry or feel sad or irritable. If these feelings last for more than 2 weeks, you may have postpartum depression. Ask your doctor for help. Postpartum depression can be treated.   Follow-up care is a key part of your treatment and safety. Be sure to make and go to all appointments, and call your doctor if you are having problems. It's also a good idea to know your test results and keep a list of the medicines you take.  Where can you learn more?  Go to https://www.[employer].net/patiented  Enter B044 in the search box to learn more about \"Week 38 of Your Pregnancy: Care Instructions.\"  Current as of: July 10, 2023               Content Version: 14.0    8671-1085 [employer].   Care instructions adapted under license by your healthcare professional. If you have questions about a medical condition or this instruction, always ask your healthcare professional. [employer] disclaims any warranty or liability for your use of this information.      Labor Induction  What You Need to Know  What is labor induction?  In most cases, it is best to go into labor naturally. When labor does not start on its own, we may use medicine or other methods to start (induce) labor. This is called induction, which:  Helps the uterus contract  Thins, softens and opens the cervix (opening of the uterus)  When is induction used?  There are two types of induction.  Medical induction may be done to protect the health of the parent or baby if:  There are medical concerns for you or your baby.  You haven't had your baby by 41 weeks.  Induction for non-medical reasons may be done at 39 weeks or later if:  You live far from the hospital.  You've been having contractions for many days.  You've given birth quickly in the past.   We will not perform an induction for non-medical reasons before 39 weeks. Studies show that babies born before 39 weeks may struggle with breathing, feeding, sleeping and staying warm. They are more " "likely to have health problems and may need to stay in the hospital longer. If we start your labor for medical reasons, the benefits will outweigh these risks. We will talk to you about your risks, benefits and alternatives (other options) before we start your labor.  How is induction done?  We may start your labor by doing one or more of the following:  We may need to help your cervix soften and open (sometimes called \"ripening\") to prepare it for labor. There are two ways to do this:  Medicines--these may be in the form of pills that you swallow or medicines that are put into the vagina next to the cervix.  Mechanical--using either a single or double balloon. These are small balloons that are attached to tubes that go up inside the cervix. The balloons are then filled with water to put pressure on the cervix and help the cervix soften and open. Depending on the type of balloon used, you may be allowed to go home after it is placed.  After your cervix is ready:  We may give you medicine through an IV (a small tube placed in your vein). This medicine is called Pitocin. It helps the muscles of your uterus to contract.  We may make a small opening in your bag of water (the sac around your baby). This is called an amniotomy. Sometimes called \"breaking the water\". It may help your uterus contract and your cervix open.  What might happen if my labor is induced?  Some of this depends on how your labor is started and how your body responds. Your labor may be more complicated. You and your baby may need more medical treatments. In general:  You may not go into labor right away. If so, we may send you home with follow-up instructions.  It will be important to monitor you and your baby during the induction.  You may not be able to move around during labor. You will have two belts with monitors attached to your belly. These allow the nurse to watch your contractions and your baby's heart rate.  Your labor may take longer than " if you went into labor on your own. It might take more than one day.  If you need cervical ripening, it is important to know that it may be many hours (even days) until delivery happens.  Cervical ripening can be slow and require several doses before your body is ready to labor.  A long labor may increase the risk of infection in mother and baby.  Your labor may not progress as planned. This could lead to a  birth.  Can I plan the date of my delivery?  After 39 weeks, you may ask about planning your delivery date. This is only an option if your body--and your baby--are ready. To find out, we will check your cervix and your baby's heart rate.   If you are ready to be induced, we will give you a date and time to come to the hospital. If many patients are in labor that day, we may need to start your labor at another time.  If you are not ready, we will not start your labor. It will be safer for your baby to come on its own.  What else do I need to know?  Before you have an induction, make sure you understand the reasons, risks and benefits. Ask your doctor or nurse-midwife:  Why do I need to be induced?  What are the risks to my baby?  How will you start my labor?  How will you know if my baby is ready to be born?  How will you know if my body is ready to give birth?  Where can I get more information?  To learn more about induction, you may visit these websites:  The American College of Nurse-Midwives:  www.mymidwife.org  The American College of Obstetricians and Gynecologists: www.acog.org  Childbirth Connection:  www.childbirthconnection.org  March of Dimes: www.marchofdimes.com  Association of Women's Health, Obstetrics, and  Nursing  www.zrrgng9zcj.org/go-the-full-40&#047;  For informational purposes only. Not to replace the advice of your health care provider. Copyright   2008 [employer]. All rights reserved. Clinically reviewed by the  System Operations [employer] team.  [employer] 255834 - REV 03/22.

## 2024-09-16 NOTE — ANESTHESIA CARE TRANSFER NOTE
Patient: Holly Tolliver    Procedure: Procedure(s):  repeat  SECTION, WITH BILATERAL SALPINGECTOMY       Diagnosis: History of  delivery [Z98.891]  Diagnosis Additional Information: No value filed.    Anesthesia Type:   General     Note:    Oropharynx: oropharynx clear of all foreign objects and spontaneously breathing  Level of Consciousness: awake  Oxygen Supplementation: room air    Independent Airway: airway patency satisfactory and stable  Dentition: dentition unchanged  Vital Signs Stable: post-procedure vital signs reviewed and stable  Report to RN Given: handoff report given  Patient transferred to: Labor and Delivery    Handoff Report: Identifed the Patient, Identified the Reponsible Provider, Reviewed the pertinent medical history, Discussed the surgical course, Reviewed Intra-OP anesthesia mangement and issues during anesthesia, Set expectations for post-procedure period and Allowed opportunity for questions and acknowledgement of understanding      Vitals:  Vitals Value Taken Time   BP     Temp     Pulse     Resp     SpO2         Electronically Signed By: WOLFGANG Pardo CRNA  2024  5:16 PM   no

## 2024-09-16 NOTE — H&P
[facility]  OB History and Physical      Name: Holly Tolliver MRN#: [id number]   Age: 39 year old YOB: 1985      Assessment and Plan:   Holly Tolliver, 39 year old  at 37w6d by LMP c/w 11w5d US, presents for generally feeling unwell (headache, severe abdominal pain that migrates from lower abdomen  section scar to RUQ, a gush of fluid at ~0300). She is diagnosed with gestational hypertension. Delivery was recommended.     Unscheduled Repeat  Section + bilateral salpingectomy  -New diagnosis of gestational hypertension. Patient desires repeat  section.  -Patient is certain that she desires permanent sterilization.  -Placenta: anterior  -Anesthesia: Spinal   -3g Ancef   -PPH Meds/Ppx: IV TXA 1g  -Diet: NPO. Has been NPO since last night  - PPx: SCDs, Will need lovenox subQ 40mg q24hr starting tomorrow morning for DVT ppx  - Consent: Discussed risks and benefits of procedure, including but not limited to bleeding, infection, injury to surrounding organs (nerves, blood vessels, bladder, bowel, ureters, uterus), the potential need for another surgery should some injury go unrecognized or patient were to have continued bleeding. Patient had time to ask questions and expressed understanding of procedure and associated risks. Agreed to blood transfusion if necessary. Consent signed.     gHTN  - Serial BP monitoring   - IV Antihypertensives prn for sustained severe range blood pressures (>160/>110)  - Admission preE labs nl  - Daily weights, strict I&Os      FWB: Cat I tracing. Continuous Fetal Monitoring    Abdominal pain  -Labor was ruled out as patient is not having regular contractions. Uterine rupture is unlikely - patient appeared well; fetal heart tracing is reassuring; coagulation labs nl. Ruled out rupture of membrane.    Antonette Whitman MD  Obstetrics and Gynecology  2024 1:56 PM     HPI:     A gush of fluid at ~0300. No leakage of  fluid since.    Headache. Denies vision changes, chest pain, SOB, or acute worsening in bilateral lower extremities swelling.     Notes normal fetal movement. Denies vaginal bleeding. Denied contractions.    Generally not feeling well. Sharp intermittent  section scar pain that also goes to her RUQ.         ROS:   Complete 10-point ROS negative except as noted in HPI.       OB History:     Pregnancy Complications:  -H/o , then PLTCS for concerns for placental abruption:  Desires repeat CS even if she goes into spontaneous labor.  -Desires permanent sterilization: Federal tubal paperwork signed on 2024  -H/o FGR at term in prior pregnancy: last growth US  EFW 84%, AC 97%  -AMA  -Asthma: Well-controlled with albuterol inhaler as needed.  -Anxiety/depression: Well-controlled without medication at this time  -H/o gestational hypertension: taking ASA daily, baseline preE labs nl  -Stable 3mm brain aneurysm (anterior cerebral artery aneurysm/infundibulum): last MRA brain   OB History    Para Term  AB Living   6 2 2 0 3 2   SAB IAB Ectopic Multiple Live Births   3 0 0 0 2      # Outcome Date GA Lbr Tomy/2nd Weight Sex Type Anes PTL Lv   6 Current            5 Term 16 37w2d  3.289 kg (7 lb 4 oz) M CS-LTranv Spinal N DUNCAN      Name: Brown      Apgar1: 7  Apgar5: 8   4 Term 11 39w2d  2.58 kg (5 lb 11 oz) M Vag-Spont EPI N DUNCAN      Birth Comments: none      Name: Rajat      Apgar1: 8  Apgar5: 9   3 2008 5w0d    SAB   DEC   2 2005 6w0d    SAB   DEC   1 2001 5w0d       DEC          Past Medical History:     Past Medical History:   Diagnosis Date    ALCOH DEP NEC/NOS-UNSPEC 2007    Asthma     exercise induced    Chickenpox     Chlamydia     Placental abruption 2016    Postpartum depression 2011    also 2016    Preeclampsia     ? in 2011    Pregnancy induced hypertension 2011    Urinary tract infections           Past Surgical History:     Past Surgical  History:   Procedure Laterality Date     SECTION Bilateral 2016    Procedure:  SECTION;  Surgeon: Beau Cardoso MD;  Location: [facility] OR    PE TUBES  age 3          Social History:   Denied smoking, alcohol use, or recreational drug use.  Social History     Tobacco Use    Smoking status: Former     Current packs/day: 0.00     Types: Cigarettes     Passive exposure: Never    Smokeless tobacco: Never   Substance Use Topics    Alcohol use: Not Currently     Comment: sober since 24          Family History:   Denied family history of bleeding/clotting disorder or adverse reaction to anesthesia.  Family History   Problem Relation Age of Onset    Alcohol/Drug Mother         alcohol- recovered    Hypertension Mother     Depression Mother         also anxiety    Other - See Comments Mother         ankylosing spondylosis    Anxiety Disorder Mother     Irritable Bowel Syndrome Mother     Alcohol/Drug Father         alcohol- recovered    Alcohol/Drug Sister         A&D-recovered    Alcoholism Brother         recovered    Alcohol/Drug Brother         alcohol    Hypertension Brother     Schizophrenia Brother     Cancer Maternal Grandmother         lung- at age 44    Depression Maternal Grandmother         also anxiety          Immunizations:     Immunization History   Administered Date(s) Administered    Influenza (IIV3) PF 2011    Influenza Vaccine >6 months,quad, PF 2015, 2018    Mantoux Tuberculin Skin Test 2012, 2013, 2017, 2017    TDAP (Adacel,Boostrix) 2024    TDAP Vaccine (Adacel) 2011, 2016          Allergies:     Allergies   Allergen Reactions    Amoxicillin Rash          Medications:     Medications Prior to Admission   Medication Sig Dispense Refill Last Dose    acetaminophen (TYLENOL) 325 MG tablet Take 325-650 mg by mouth every 6 hours as needed for mild pain (Patient not taking: Reported on 2024)       albuterol  (PROAIR HFA/PROVENTIL HFA/VENTOLIN HFA) 108 (90 Base) MCG/ACT inhaler Inhale 2 puffs into the lungs every 6 hours as needed for shortness of breath or wheezing (Patient not taking: Reported on 9/16/2024) 8 g 4     doxylamine (UNISOM SLEEPTABS) 25 MG TABS tablet Take 1 tablet (25 mg) by mouth at bedtime (Patient not taking: Reported on 9/16/2024) 90 tablet 3     famotidine (PEPCID) 20 MG tablet Take 1 tablet (20 mg) by mouth 2 times daily. (Patient not taking: Reported on 9/9/2024) 60 tablet 1     famotidine (PEPCID) 20 MG tablet Take 1 tablet (20 mg) by mouth 2 times daily (Patient not taking: Reported on 9/16/2024) 30 tablet 1     ketoconazole (NIZORAL) 2 % external cream Apply topically daily (Patient not taking: Reported on 9/9/2024) 30 g 0     Misc. Devices (BREAST PUMP) MISC 1 each See Admin Instructions. (Patient not taking: Reported on 9/9/2024) 1 each 0     ondansetron (ZOFRAN ODT) 4 MG ODT tab Take 1 tablet (4 mg) by mouth every 8 hours as needed for nausea or vomiting (Patient not taking: Reported on 9/9/2024) 30 tablet 0     ondansetron (ZOFRAN ODT) 8 MG ODT tab Take 1 tablet (8 mg) by mouth every 8 hours as needed for nausea (Patient not taking: Reported on 9/9/2024) 30 tablet 1     Prenatal Vit-Fe Fumarate-FA (PRENATAL MULTIVITAMIN  PLUS IRON) 27-1 MG TABS Take 1 tablet by mouth daily (Patient not taking: Reported on 9/16/2024)       senna (SENOKOT) 8.6 MG tablet Take 1 tablet by mouth daily as needed for constipation. (Patient not taking: Reported on 9/16/2024)       vitamin B6 (PYRIDOXINE) 100 MG tablet Take 1 tablet (100 mg) by mouth daily (Patient not taking: Reported on 9/16/2024) 90 tablet 3           PE:   Vit: Patient Vitals for the past 4 hrs:   BP   09/16/24 1315 (!) 147/73   09/16/24 1300 (!) 146/64   09/16/24 1230 109/76   09/16/24 1215 131/67   09/16/24 1200 138/72   09/16/24 1045 132/84      Gen: Well-appearing, NAD, comfortable   CV: Well perfused  Pulm: Normal respiratory efforts    Abd: Soft, gravid, non-tender  Ext: trace LE edema b/l  Sterile speculum exam: no pooling    FHT: Baseline 120, moderate variability, accelerations +, no decelerations   [place]: no contractions in 10 minutes           Labs/Imagings:     Recent Results (from the past 24 hour(s))   Protein  random urine    Collection Time: 09/16/24  4:34 AM   Result Value Ref Range    Total Protein Urine mg/dL <6.0   mg/dL    Total Protein Urine mg/mg Creat      Creatinine Urine mg/dL 51.5 mg/dL   CBC with platelets    Collection Time: 09/16/24  4:59 AM   Result Value Ref Range    WBC Count 8.9 4.0 - 11.0 10e3/uL    RBC Count 4.32 3.80 - 5.20 10e6/uL    Hemoglobin 11.9 11.7 - 15.7 g/dL    Hematocrit 35.6 35.0 - 47.0 %    MCV 82 78 - 100 fL    MCH 27.5 26.5 - 33.0 pg    MCHC 33.4 31.5 - 36.5 g/dL    RDW 15.7 (H) 10.0 - 15.0 %    Platelet Count 208 150 - 450 10e3/uL   Comprehensive Metabolic Panel    Collection Time: 09/16/24  4:59 AM   Result Value Ref Range    Sodium 133 (L) 135 - 145 mmol/L    Potassium 3.8 3.4 - 5.3 mmol/L    Carbon Dioxide (CO2) 24 22 - 29 mmol/L    Anion Gap 8 7 - 15 mmol/L    Urea Nitrogen 11.4 6.0 - 20.0 mg/dL    Creatinine 0.70 0.51 - 0.95 mg/dL    GFR Estimate >90 >60 mL/min/1.73m2    Calcium 9.3 8.8 - 10.4 mg/dL    Chloride 101 98 - 107 mmol/L    Glucose 121 (H) 70 - 99 mg/dL    Alkaline Phosphatase 161 (H) 40 - 150 U/L    AST 24 0 - 45 U/L    ALT 14 0 - 50 U/L    Protein Total 6.2 (L) 6.4 - 8.3 g/dL    Albumin 3.1 (L) 3.5 - 5.2 g/dL    Bilirubin Total 0.2 <=1.2 mg/dL   CBC with platelets    Collection Time: 09/16/24 10:51 AM   Result Value Ref Range    WBC Count 8.9 4.0 - 11.0 10e3/uL    RBC Count 4.73 3.80 - 5.20 10e6/uL    Hemoglobin 13.0 11.7 - 15.7 g/dL    Hematocrit 38.1 35.0 - 47.0 %    MCV 81 78 - 100 fL    MCH 27.5 26.5 - 33.0 pg    MCHC 34.1 31.5 - 36.5 g/dL    RDW 15.6 (H) 10.0 - 15.0 %    Platelet Count 238 150 - 450 10e3/uL   Comprehensive metabolic panel    Collection Time: 09/16/24 10:51 AM    Result Value Ref Range    Sodium 136 135 - 145 mmol/L    Potassium 4.0 3.4 - 5.3 mmol/L    Carbon Dioxide (CO2) 20 (L) 22 - 29 mmol/L    Anion Gap 13 7 - 15 mmol/L    Urea Nitrogen 10.1 6.0 - 20.0 mg/dL    Creatinine 0.65 0.51 - 0.95 mg/dL    GFR Estimate >90 >60 mL/min/1.73m2    Calcium 9.4 8.8 - 10.4 mg/dL    Chloride 103 98 - 107 mmol/L    Glucose 91 70 - 99 mg/dL    Alkaline Phosphatase 186 (H) 40 - 150 U/L    AST 28 0 - 45 U/L    ALT 20 0 - 50 U/L    Protein Total 7.0 6.4 - 8.3 g/dL    Albumin 3.6 3.5 - 5.2 g/dL    Bilirubin Total 0.3 <=1.2 mg/dL   INR    Collection Time: 09/16/24 10:51 AM   Result Value Ref Range    INR 0.98 0.85 - 1.15   Partial thromboplastin time    Collection Time: 09/16/24 10:51 AM   Result Value Ref Range    aPTT 28 22 - 38 Seconds   Fibrinogen activity    Collection Time: 09/16/24 10:51 AM   Result Value Ref Range    Fibrinogen Activity 585 (H) 170 - 510 mg/dL   Adult Type and Screen    Collection Time: 09/16/24 10:51 AM   Result Value Ref Range    ABO/RH(D) A POS     Antibody Screen Negative Negative    SPECIMEN EXPIRATION DATE [id number]    Protein  random urine    Collection Time: 09/16/24 10:52 AM   Result Value Ref Range    Total Protein Urine mg/dL 9.0   mg/dL    Total Protein Urine mg/mg Creat 0.10 0.00 - 0.20 mg/mg Cr    Creatinine Urine mg/dL 88.1 mg/dL   Rupture of Fetal Membranes by ROM Plus    Collection Time: 09/16/24 12:00 PM   Result Value Ref Range    Rupture of Fetal Membranes by ROM Plus Negative Negative, Invalid, Suggest Repeat

## 2024-09-16 NOTE — PLAN OF CARE
Patient ambulated to the OR at 1540 after preop checklist completed      Plan of Care Reviewed With: patient

## 2024-09-16 NOTE — NURSING NOTE
"Initial /80 (BP Location: Right arm, Patient Position: Chair, Cuff Size: Adult Large)   Pulse 87   Temp 97.3  F (36.3  C) (Tympanic)   Resp 20   Ht 1.816 m (5' 11.5\")   Wt 128.5 kg (283 lb 3.2 oz)   LMP 12/26/2023   BMI 38.95 kg/m   Estimated body mass index is 38.95 kg/m  as calculated from the following:    Height as of this encounter: 1.816 m (5' 11.5\").    Weight as of this encounter: 128.5 kg (283 lb 3.2 oz). .    Bertha Da Silva CMA    "

## 2024-09-16 NOTE — DISCHARGE INSTRUCTIONS
Discharge Instructions for Undelivered Patients  Birthplace [phone]    Diet:  Drink 8 to 12 glasses of water every day.  You may eat meals and snacks as before    Activity:  If you are experiencing  labor, rest the pelvic area. No sex. Do not stimulate breasts or nipples.  Rest often as needed.  Count fetal kicks every day. (See handout.)  Call your doctor if your baby is moving less than usual.    Medicines:  My care team has reviewed my medicines with me.  My care team has given me a list of my medicines.  My care team has prescribed a new medicine. They have either sent it home with me or ordered it from the pharmacy.    Call your provider if you notice:  Swelling in your face or increased swelling in your hands or legs.  Headaches that are not relieved by Tylenol (acetaminophen).  Changes in your vision (blurring; seeing spots or stars).  Nausea (sick to your stomach) and vomiting (throwing up).  Weight gain of 5 pounds per week.  Heartburn that doesn't go away.  Signs of bladder infection: pain when you urinate (use the toilet), needing to go more often or more urgently.  The bag of cherry (membranes) breaks, or you notice leaking in your underwear.  Bright red blood in your underwear.  Abdominal (lower belly) or stomach pain.  For Second (plus) baby: Contractions (tightenings) less than 10 minutes apart and getting stronger.  Increase or change in vaginal discharge (note the color and amount).    Follow up with your provider as scheduled.

## 2024-09-16 NOTE — TELEPHONE ENCOUNTER
C-sec for 9/25 being done today 9/16.  FYI to Lelia Grijalva MD.    -[facility]   [phone]

## 2024-09-16 NOTE — ANESTHESIA PROCEDURE NOTES
"Intrathecal injection Procedure Note    Pre-Procedure   Staff -        CRNA: [place] APRN CRNA       Performed By: CRNA       Location: OR       Procedure Start/Stop Times: 9/16/2024 3:52 PM and 9/16/2024 3:58 PM       Pre-Anesthestic Checklist: patient identified, IV checked, risks and benefits discussed, informed consent, monitors and equipment checked, pre-op evaluation, at physician/surgeon's request and post-op pain management  Timeout:       Correct Patient: Yes        Correct Procedure: Yes        Correct Site: Yes        Correct Position: Yes   Procedure Documentation  Procedure: intrathecal injection       Patient Position: sitting       Patient Prep/Sterile Barriers: sterile gloves, mask, patient draped       Skin prep: Chloraprep       Insertion Site: L3-4. (midline approach).       Needle Gauge: 25.        Needle Length (Inches): 3.5        Spinal Needle Type: Pencan       Introducer used       Introducer: 20 G       # of attempts: 3 and  # of redirects:  2    Assessment/Narrative         Paresthesias: Resolved.       CSF fluid: clear.    Medication(s) Administered   0.75% Hyperbaric Bupivacaine (Intrathecal) - Intrathecal   1.8 mL - 9/16/2024 3:58:00 PM  Morphine PF 1 mg/mL (Intrathecal) - Intrathecal   0.2 mg - 9/16/2024 3:58:00 PM  Medication Administration Time: 9/16/2024 3:52 PM      FOR [facility] ([facility] [place]:   Pain Team Contact information: please page the Pain Team Via [employer]. Search \"Pain\". During daytime hours, please page the attending first. At night please page the resident first.      "

## 2024-09-16 NOTE — ANESTHESIA POSTPROCEDURE EVALUATION
Patient: Holly Tolliver    Procedure: Procedure(s):  repeat  SECTION, WITH BILATERAL SALPINGECTOMY       Anesthesia Type:  Spinal    Note:  Disposition: Inpatient   Postop Pain Control: Uneventful            Sign Out: Well controlled pain   PONV: No   Neuro/Psych: Uneventful            Sign Out: Acceptable/Baseline neuro status   Airway/Respiratory: Uneventful            Sign Out: Acceptable/Baseline resp. status   CV/Hemodynamics: Uneventful            Sign Out: Acceptable CV status; No obvious hypovolemia; No obvious fluid overload   Other NRE: NONE   DID A NON-ROUTINE EVENT OCCUR? No           Last vitals:  Vitals Value Taken Time   /68 24 1817   Temp     Pulse 65 24 1817   Resp     SpO2 96 % 24 1819   Vitals shown include unfiled device data.    Electronically Signed By: WOLFGANG Pardo CRNA  2024  6:20 PM

## 2024-09-16 NOTE — PLAN OF CARE
S:Delivery  B:No Labor,37w6d    Lab Results   Component Value Date    GBS  2016     Negative  No GBS DNA detected, presumed negative for GBS or number of bacteria may be   below the limit of detection of the assay.   Assay performed on incubated broth culture of specimen using [employer] real-time   PCR.      with antibiotic treatment not indicated 4 hours prior to delivery.  A: Patient delivered Repeat C/S at 1630 with Dr. Whitman in attendance and baby placed on mother's abdomen for delayed cord clamping. Baby dried and stimulated. Baby placed  skin to skin @ 1645.. Apgar 8/9.Delivery QBL ( see delivery summary).  IV infusion of Oxytocin  infused. Placenta removal expressed. MD does not want placenta sent to pathology.  See Flowsheet for VS and PP checks.  .  Labor care plan goals met, transition now to postpartum care. Postpartum QBL TBD  R: Expect routine postpartum care. Anticipate first feeding within the hour or whenever infant displays feeding cues. Continue skin to skin. Prior discussion with mother indicates that feeding plan is Breast feeding . Educated mother on importance of exclusive breastfeeding, expected feeding readiness cues and encouraged her to observe for these cues while rooming in. Informed her that breastfeeding assistance would be provided.      Plan of Care Reviewed With: patient

## 2024-09-16 NOTE — PROGRESS NOTES
"[facility]    Return OB Note    CC: Return OB     Subjective:  Holly is a 39 year old   at 37w6d     Complaints today: doesn't feel well.  Has a headache. No visual changes.  + RUQ pain.  Has pain in her lower abdomen, currently more on the left lower abdomen, but has pain that comes and goes across lower abdomen and then it occurs in the upper abdomen shortly after.  Had a gush of fluid earlier.  No bleeding.  Baby has been active.    Objective:  /80 (BP Location: Right arm, Patient Position: Chair, Cuff Size: Adult Large)   Pulse 87   Temp 97.3  F (36.3  C) (Tympanic)   Resp 20   Ht 1.816 m (5' 11.5\")   Wt 128.5 kg (283 lb 3.2 oz)   LMP 2023   BMI 38.95 kg/m      See flowsheet      Assessment/Plan:       39 year old year old  female with IUP at 37w6d  Encounter Diagnosis   Name Primary?    37 weeks gestation of pregnancy Yes          -NOB labs noted for hepB and Rubella NI. Midtrimester labs nl. GBS collected today  -Declined genetic screening and MsAFP   -L2 ultrasound nl  -S/p Tdap vaccine.     -H/o , then PLTCS for concerns for placental abruption: s/p  counseling. Desires repeat CS even if she goes into spontaneous labor, which is scheduled on . Considering tubal ligation, but uncertain. Federal tubal paperwork signed on 2024  -H/o FGR at term in prior pregnancy: last growth US  EFW 84%, AC 97%  -AMA: noted  -Asthma: Well-controlled with albuterol inhaler as needed.  -Anxiety/depression: Well-controlled without medication at this time  -H/o gestational hypertension: taking ASA daily, baseline preE labs on nl    -sent to L&D for evaluation of possible ROM, preeclampsia evaluation and lower abdominal pain that sounds like contraction pain.  Currently with a headache and RUQ pain.    Return precautions given  Discussed labor, rupture of membranes and preeclampsia precautions.  Discussed fetal movement precautions.    RTC 1 " nestor Samaniego MD

## 2024-09-16 NOTE — PROGRESS NOTES
S:Patient presents due to  cramping, headache, and upper belly pain.  B:37w6d   Allergies: Amoxicillin  A:A:moderate variablility, + accels, no decels, Category I  normal uterine activity  Cervix not examined.     Dr. GORDY Grijalva called, and was informed to patient status. and orders received.  Plan includes; Monitor, observe and reevaluate and Labs . Reviewed with patient and she agrees with plan.      Questions answered.    Oriented to room and call light.

## 2024-09-16 NOTE — ANESTHESIA PREPROCEDURE EVALUATION
Anesthesia Pre-Procedure Evaluation    Patient: Holly Tolliver   MRN: [id number] : 1985        Procedure : Procedure(s):  repeat  SECTION, WITH BILATERAL SALPINGECTOMY          Past Medical History:   Diagnosis Date    ALCOH DEP NEC/NOS-UNSPEC 2007    Asthma     exercise induced    Chickenpox     Chlamydia     Placental abruption 2016    Postpartum depression     also 2016    Preeclampsia     ? in     Pregnancy induced hypertension     Urinary tract infections       Past Surgical History:   Procedure Laterality Date     SECTION Bilateral 2016    Procedure:  SECTION;  Surgeon: Beau Cardoso MD;  Location: [facility] OR    PE TUBES  age 3      Allergies   Allergen Reactions    Amoxicillin Rash      Social History     Tobacco Use    Smoking status: Former     Current packs/day: 0.00     Types: Cigarettes     Passive exposure: Never    Smokeless tobacco: Never   Substance Use Topics    Alcohol use: Not Currently     Comment: sober since 24      Wt Readings from Last 1 Encounters:   24 128.5 kg (283 lb 3.2 oz)        Anesthesia Evaluation            ROS/MED HX  ENT/Pulmonary:     (+)                tobacco use, Past use,     asthma                  Neurologic:       Cardiovascular:     (+)  hypertension- -   -  - -                                 Previous cardiac testing   Echo: Date: Results:    Stress Test:  Date: Results:    ECG Reviewed:  Date: 22 Results:  Sinus  Rhythm   WITHIN NORMAL LIMITS  Cath:  Date: Results:      METS/Exercise Tolerance:     Hematologic:       Musculoskeletal:       GI/Hepatic:     (+) GERD,                   Renal/Genitourinary:       Endo:       Psychiatric/Substance Use:     (+) psychiatric history depression and anxiety alcohol abuse      Infectious Disease:       Malignancy:       Other:            Physical Exam    Airway  airway exam normal      Mallampati: II   TM distance: > 3 FB   Neck ROM: full   Mouth  opening: > 3 cm    Respiratory Devices and Support         Dental  no notable dental history     (+) Minor Abnormalities - some fillings, tiny chips      Cardiovascular   cardiovascular exam normal          Pulmonary   pulmonary exam normal                OUTSIDE LABS:  CBC:   Lab Results   Component Value Date    WBC 8.9 09/16/2024    WBC 8.9 09/16/2024    HGB 13.0 09/16/2024    HGB 11.9 09/16/2024    HCT 38.1 09/16/2024    HCT 35.6 09/16/2024     09/16/2024     09/16/2024     BMP:   Lab Results   Component Value Date     09/16/2024     (L) 09/16/2024    POTASSIUM 4.0 09/16/2024    POTASSIUM 3.8 09/16/2024    CHLORIDE 103 09/16/2024    CHLORIDE 101 09/16/2024    CO2 20 (L) 09/16/2024    CO2 24 09/16/2024    BUN 10.1 09/16/2024    BUN 11.4 09/16/2024    CR 0.65 09/16/2024    CR 0.70 09/16/2024    GLC 91 09/16/2024     (H) 09/16/2024     COAGS:   Lab Results   Component Value Date    PTT 28 09/16/2024    INR 0.98 09/16/2024    FIBR 585 (H) 09/16/2024     POC:   Lab Results   Component Value Date    HCG Positive (A) 02/19/2024    HCGS Negative 03/19/2018     HEPATIC:   Lab Results   Component Value Date    ALBUMIN 3.6 09/16/2024    PROTTOTAL 7.0 09/16/2024    ALT 20 09/16/2024    AST 28 09/16/2024    ALKPHOS 186 (H) 09/16/2024    BILITOTAL 0.3 09/16/2024     OTHER:   Lab Results   Component Value Date    A1C 5.0 03/15/2024    CHINYERE 9.4 09/16/2024    MAG 1.8 06/14/2024    LIPASE 95 10/22/2014    TSH 2.72 09/23/2021    CRP 13.5 (H) 10/22/2014       Anesthesia Plan    ASA Status:  2    NPO Status:  NPO Appropriate    Anesthesia Type: Spinal.   Induction: Propofol, Intravenous.   Maintenance: TIVA.        Consents    Anesthesia Plan(s) and associated risks, benefits, and realistic alternatives discussed. Questions answered and patient/representative(s) expressed understanding.     - Discussed: Risks, Benefits and Alternatives for BOTH SEDATION and the PROCEDURE were discussed     - Discussed  with:  Patient            Postoperative Care    Pain management: Multi-modal analgesia, IV analgesics, Oral pain medications.   PONV prophylaxis: Ondansetron (or other 5HT-3), Dexamethasone or Solumedrol, Background Propofol Infusion     Comments:               WOLFGANG Pardo CRNA    I have reviewed the pertinent notes and labs in the chart from the past 30 days and (re)examined the patient.  Any updates or changes from those notes are reflected in this note.     # Hyponatremia: Lowest Na = 133 mmol/L in last 30 days, will monitor as appropriate      # Hypoalbuminemia: Lowest albumin = 3.1 g/dL at 9/16/2024  4:59 AM, will monitor as appropriate

## 2024-09-16 NOTE — PROGRESS NOTES
D: Holly Tolliver 1985   37w6d   Patient arrived to the birthplace from the clinic because she isn't feeling overall well. She has a headache, coughing, sweaty, and lower abd pain by her cs scar.    Labs ordered, will monitor bp and fetus and update Dr. Whitman.    Eva Logan RN

## 2024-09-17 LAB
ALBUMIN SERPL BCG-MCNC: 3.1 G/DL (ref 3.5–5.2)
ALP SERPL-CCNC: 136 U/L (ref 40–150)
ALT SERPL W P-5'-P-CCNC: 16 U/L (ref 0–50)
ANION GAP SERPL CALCULATED.3IONS-SCNC: 8 MMOL/L (ref 7–15)
AST SERPL W P-5'-P-CCNC: 27 U/L (ref 0–45)
BILIRUB SERPL-MCNC: <0.2 MG/DL
BUN SERPL-MCNC: 10.1 MG/DL (ref 6–20)
CALCIUM SERPL-MCNC: 9 MG/DL (ref 8.8–10.4)
CHLORIDE SERPL-SCNC: 104 MMOL/L (ref 98–107)
CREAT SERPL-MCNC: 0.71 MG/DL (ref 0.51–0.95)
EGFRCR SERPLBLD CKD-EPI 2021: >90 ML/MIN/1.73M2
ERYTHROCYTE [DISTWIDTH] IN BLOOD BY AUTOMATED COUNT: 15.5 % (ref 10–15)
GLUCOSE SERPL-MCNC: 175 MG/DL (ref 70–99)
HCO3 SERPL-SCNC: 22 MMOL/L (ref 22–29)
HCT VFR BLD AUTO: 31.7 % (ref 35–47)
HGB BLD-MCNC: 10.6 G/DL (ref 11.7–15.7)
MCH RBC QN AUTO: 27.5 PG (ref 26.5–33)
MCHC RBC AUTO-ENTMCNC: 33.4 G/DL (ref 31.5–36.5)
MCV RBC AUTO: 82 FL (ref 78–100)
PLATELET # BLD AUTO: 195 10E3/UL (ref 150–450)
POTASSIUM SERPL-SCNC: 4.1 MMOL/L (ref 3.4–5.3)
PROT SERPL-MCNC: 5.5 G/DL (ref 6.4–8.3)
RBC # BLD AUTO: 3.86 10E6/UL (ref 3.8–5.2)
SODIUM SERPL-SCNC: 134 MMOL/L (ref 135–145)
WBC # BLD AUTO: 15.3 10E3/UL (ref 4–11)

## 2024-09-17 PROCEDURE — 80053 COMPREHEN METABOLIC PANEL: CPT | Performed by: STUDENT IN AN ORGANIZED HEALTH CARE EDUCATION/TRAINING PROGRAM

## 2024-09-17 PROCEDURE — 250N000013 HC RX MED GY IP 250 OP 250 PS 637: Performed by: STUDENT IN AN ORGANIZED HEALTH CARE EDUCATION/TRAINING PROGRAM

## 2024-09-17 PROCEDURE — 250N000013 HC RX MED GY IP 250 OP 250 PS 637: Performed by: OBSTETRICS & GYNECOLOGY

## 2024-09-17 PROCEDURE — 120N000013 HC R&B IMCU

## 2024-09-17 PROCEDURE — 36415 COLL VENOUS BLD VENIPUNCTURE: CPT | Performed by: STUDENT IN AN ORGANIZED HEALTH CARE EDUCATION/TRAINING PROGRAM

## 2024-09-17 PROCEDURE — 85027 COMPLETE CBC AUTOMATED: CPT | Performed by: STUDENT IN AN ORGANIZED HEALTH CARE EDUCATION/TRAINING PROGRAM

## 2024-09-17 PROCEDURE — 250N000011 HC RX IP 250 OP 636: Performed by: STUDENT IN AN ORGANIZED HEALTH CARE EDUCATION/TRAINING PROGRAM

## 2024-09-17 RX ORDER — HYDROXYZINE HYDROCHLORIDE 25 MG/1
25 TABLET, FILM COATED ORAL EVERY 6 HOURS PRN
Status: DISCONTINUED | OUTPATIENT
Start: 2024-09-17 | End: 2024-09-18 | Stop reason: HOSPADM

## 2024-09-17 RX ORDER — FAMOTIDINE 10 MG
10 TABLET ORAL 2 TIMES DAILY
Status: DISCONTINUED | OUTPATIENT
Start: 2024-09-17 | End: 2024-09-18 | Stop reason: HOSPADM

## 2024-09-17 RX ORDER — HYDROXYZINE HYDROCHLORIDE 50 MG/1
50 TABLET, FILM COATED ORAL EVERY 6 HOURS PRN
Status: DISCONTINUED | OUTPATIENT
Start: 2024-09-17 | End: 2024-09-18 | Stop reason: HOSPADM

## 2024-09-17 RX ORDER — CALCIUM CARBONATE 500 MG/1
500-1000 TABLET, CHEWABLE ORAL 3 TIMES DAILY PRN
Status: DISCONTINUED | OUTPATIENT
Start: 2024-09-17 | End: 2024-09-18 | Stop reason: HOSPADM

## 2024-09-17 RX ADMIN — NIFEDIPINE 30 MG: 30 TABLET, FILM COATED, EXTENDED RELEASE ORAL at 20:06

## 2024-09-17 RX ADMIN — KETOROLAC TROMETHAMINE 30 MG: 30 INJECTION, SOLUTION INTRAMUSCULAR at 05:05

## 2024-09-17 RX ADMIN — IBUPROFEN 800 MG: 800 TABLET ORAL at 23:13

## 2024-09-17 RX ADMIN — FAMOTIDINE 10 MG: 10 TABLET ORAL at 20:32

## 2024-09-17 RX ADMIN — ACETAMINOPHEN 975 MG: 325 TABLET ORAL at 20:06

## 2024-09-17 RX ADMIN — ACETAMINOPHEN 975 MG: 325 TABLET ORAL at 02:10

## 2024-09-17 RX ADMIN — HYDROXYZINE HYDROCHLORIDE 25 MG: 25 TABLET, FILM COATED ORAL at 23:26

## 2024-09-17 RX ADMIN — FAMOTIDINE 10 MG: 10 TABLET ORAL at 10:30

## 2024-09-17 RX ADMIN — CALCIUM CARBONATE (ANTACID) CHEW TAB 500 MG 500 MG: 500 CHEW TAB at 10:30

## 2024-09-17 RX ADMIN — ENOXAPARIN SODIUM 40 MG: 40 INJECTION SUBCUTANEOUS at 08:01

## 2024-09-17 RX ADMIN — ACETAMINOPHEN 975 MG: 325 TABLET ORAL at 08:01

## 2024-09-17 RX ADMIN — ACETAMINOPHEN 975 MG: 325 TABLET ORAL at 13:54

## 2024-09-17 RX ADMIN — KETOROLAC TROMETHAMINE 30 MG: 30 INJECTION, SOLUTION INTRAMUSCULAR at 11:53

## 2024-09-17 RX ADMIN — HYDROXYZINE HYDROCHLORIDE 25 MG: 25 TABLET, FILM COATED ORAL at 11:54

## 2024-09-17 ASSESSMENT — ACTIVITIES OF DAILY LIVING (ADL)
ADLS_ACUITY_SCORE: 26
ADLS_ACUITY_SCORE: 20
ADLS_ACUITY_SCORE: 20
ADLS_ACUITY_SCORE: 26
ADLS_ACUITY_SCORE: 18
ADLS_ACUITY_SCORE: 26
ADLS_ACUITY_SCORE: 18
ADLS_ACUITY_SCORE: 26

## 2024-09-17 NOTE — PROGRESS NOTES
Patient refused mcclain catheter removal at 4 hours post . RN educated on risks versus benefits and patient agreed to reassess removal at 2230.    Noble Hector RN on 2024 at 9:33 PM

## 2024-09-17 NOTE — PLAN OF CARE
"VSS. Receiving scheduled pain medications. Ice pack utilized to incision. Fundus firm, midline, U/1. Vaginal bleeding light, no clots. Incision CDI. Ambulated for 1st time after delivery with assist x2. Patient did get dizzy at this time but was resolved after sitting again. Second ambulation patient reported slight dizziness. Had 150mL output first void after Stallings removal. 200mL second void. No difficulty/pain with urination. Patient requested SCDs to be taken off due to discomfort. Trace dependent edema. Denies pre-eclampsia symptoms. Bonding well with baby.    Problem: Adult Inpatient Plan of Care  Goal: Plan of Care Review  Description: The Plan of Care Review/Shift note should be completed every shift.  The Outcome Evaluation is a brief statement about your assessment that the patient is improving, declining, or no change.  This information will be displayed automatically on your shift  note.  Outcome: Progressing  Goal: Patient-Specific Goal (Individualized)  Description: You can add care plan individualizations to a care plan. Examples of Individualization might be:  \"Parent requests to be called daily at 9am for status\", \"I have a hard time hearing out of my right ear\", or \"Do not touch me to wake me up as it startles  me\".  Outcome: Progressing  Goal: Absence of Hospital-Acquired Illness or Injury  Outcome: Progressing  Intervention: Prevent Skin Injury  Recent Flowsheet Documentation  Taken 9/17/2024 0209 by Fabien Viera, RN  Body Position: position changed independently  Intervention: Prevent and Manage VTE (Venous Thromboembolism) Risk  Recent Flowsheet Documentation  Taken 9/17/2024 0209 by Fabien Viera RN  VTE Prevention/Management: (Patient request) SCDs off (sequential compression devices)  Intervention: Prevent Infection  Recent Flowsheet Documentation  Taken 9/17/2024 0209 by Fabien Viera RN  Infection Prevention:   equipment surfaces disinfected   hand hygiene promoted   personal " protective equipment utilized   rest/sleep promoted   single patient room provided  Goal: Optimal Comfort and Wellbeing  Outcome: Progressing  Intervention: Monitor Pain and Promote Comfort  Recent Flowsheet Documentation  Taken 2024 by Fabien Viera RN  Pain Management Interventions: medication (see MAR)  Intervention: Provide Person-Centered Care  Recent Flowsheet Documentation  Taken 2024 by Fabien Viera RN  Trust Relationship/Rapport:   care explained   choices provided   questions answered   questions encouraged   reassurance provided   thoughts/feelings acknowledged  Goal: Readiness for Transition of Care  Outcome: Progressing     Problem: Postpartum ( Delivery)  Goal: Successful Parent Role Transition  Outcome: Progressing  Intervention: Support Parent Role Transition  Recent Flowsheet Documentation  Taken 2024 by Fabien Viera, RN  Supportive Measures:   active listening utilized   relaxation techniques promoted   positive reinforcement provided  Parent-Child Attachment Promotion:   caring behavior modeled   positive reinforcement provided   rooming-in promoted   skin-to-skin contact encouraged  Goal: Hemostasis  Outcome: Progressing  Goal: Effective Bowel Elimination  Outcome: Progressing  Intervention: Enhance Bowel Motility and Elimination  Recent Flowsheet Documentation  Taken 2024 by Fabien Viera RN  Bowel Elimination Promotion: adequate fluid intake promoted  Goal: Fluid and Electrolyte Balance  Outcome: Progressing  Goal: Absence of Infection Signs and Symptoms  Outcome: Progressing  Goal: Anesthesia/Sedation Recovery  Outcome: Progressing  Goal: Optimal Pain Control and Function  Outcome: Progressing  Intervention: Prevent or Manage Pain  Recent Flowsheet Documentation  Taken 2024 by Fabien Viera, RN  Pain Management Interventions: medication (see MAR)  Perineal Care:   absorbent brief/pad changed   perineum cleansed   perineal  spray bottle/warm water use encouraged   perineal hygiene encouraged  Goal: Nausea and Vomiting Relief  Outcome: Progressing  Goal: Effective Urinary Elimination  Outcome: Progressing  Goal: Effective Oxygenation and Ventilation  Outcome: Progressing   Goal Outcome Evaluation:                      Fabien Viera RN on 9/17/2024 at 5:36 AM

## 2024-09-17 NOTE — PLAN OF CARE
Data: Vital signs within normal limits. Postpartum checks within normal limits - see flow record. Patient  Is tolerating po intake. Patient has an indwelling catheter. . Patient has not yet ambulated..   No apparent signs of infection. Incision healing well. Patient Is not performing self cares and Is able to care for infant. Positive attachment behaviors are observed with infant. Support persons are present.  Action:  Pain plan was discussed. Pain meds, post  are scheduled and will be brought in when they are due. Additional narcotics will be administered prn. Patient was medicated during the shift for pain and cramping. See MAR.Patient education done about breastfeeding,  cares, postpartum cares, pain management/plan, fall risk,  safety, and rest. See flow record.  Response:   Patient reassessed within 1 hour after each medication for pain. Patient stated that pain had improved. Patient stated that she was comfortable. .   Plan: Anticipate discharge on 24  .Goal Outcome Evaluation:      Plan of Care Reviewed With: patient           Noble Hector RN on 2024 at 10:15 PM

## 2024-09-17 NOTE — PROGRESS NOTES
[facility] Daily Postpartum Note    S: Ms. Tolliver is feeling well this morning. She denies any complaints. Her pain is well-controlled on oral pain medications. She tolerating a regular diet. Having acid reflux, better with medications. Feeling a little dizzy with walking. Getting better every time she walks, but still needing nursing assist to the bathroom. Was fine with a shower. Voiding spontaneously. Lochia is decreasing.     O:   VS: Patient Vitals for the past 24 hrs:   BP Temp Temp src Pulse Resp SpO2   09/17/24 1354 133/69 98  F (36.7  C) -- 73 -- --   09/17/24 0809 113/61 97.8  F (36.6  C) Oral 70 16 --   09/17/24 0517 134/65 98.1  F (36.7  C) Oral 81 18 96 %   09/17/24 0209 123/81 98.2  F (36.8  C) Oral 91 18 97 %   09/16/24 2140 124/61 98.1  F (36.7  C) Oral 79 18 97 %   09/16/24 2015 132/59 98  F (36.7  C) Oral -- 18 97 %   09/16/24 1915 137/67 -- -- 90 -- 98 %   09/16/24 1900 128/53 -- -- -- -- 99 %   09/16/24 1845 119/82 -- -- 76 -- 97 %   09/16/24 1830 (!) 118/92 -- -- 78 -- 97 %   09/16/24 1815 131/68 -- -- -- -- 97 %     General: resting in bed, in NAD  CV: Reg rate, warm and well perfused  Resp:  breathing comfortably on room air   Abdomen: soft, appropriately tender, nondistended  Fundus firm 2cm below the umbilicus  Extremities: non-tender, trace edema bilaterally     Recent Labs   Lab 09/17/24  0607 09/16/24  1051 09/16/24  0459   HGB 10.6* 13.0 11.9       A/P: 38 yo P3, POD#1 s/p repeat c/s  Routine post-op cares  Gestational HTN: normal HELLP labs, BPs well-controlled on nifedipine 30mg XL continue at this time   Con ppx lovenox while inpatient     Anticipate discharge POD#2-3    Kristi Engel MD, MD  [facility]   9/17/2024 6:13 PM

## 2024-09-17 NOTE — PLAN OF CARE
C/o nausea and dizziness when she gets up.  Also is bothered by itchy skin.  She also c/o heart burn.  She was given pepsid and tums for heartburn  will help her with a shower for her itching and discuss with MD.  Will cont to assess   she is able to empty her bladder.      Plan of Care Reviewed With: patient

## 2024-09-18 VITALS
TEMPERATURE: 97.5 F | HEART RATE: 83 BPM | OXYGEN SATURATION: 100 % | DIASTOLIC BLOOD PRESSURE: 72 MMHG | BODY MASS INDEX: 38.52 KG/M2 | SYSTOLIC BLOOD PRESSURE: 120 MMHG | RESPIRATION RATE: 18 BRPM | WEIGHT: 280.1 LBS

## 2024-09-18 PROCEDURE — 250N000013 HC RX MED GY IP 250 OP 250 PS 637: Performed by: STUDENT IN AN ORGANIZED HEALTH CARE EDUCATION/TRAINING PROGRAM

## 2024-09-18 PROCEDURE — 90471 IMMUNIZATION ADMIN: CPT | Performed by: STUDENT IN AN ORGANIZED HEALTH CARE EDUCATION/TRAINING PROGRAM

## 2024-09-18 PROCEDURE — 250N000011 HC RX IP 250 OP 636: Performed by: STUDENT IN AN ORGANIZED HEALTH CARE EDUCATION/TRAINING PROGRAM

## 2024-09-18 PROCEDURE — 90707 MMR VACCINE SC: CPT | Performed by: STUDENT IN AN ORGANIZED HEALTH CARE EDUCATION/TRAINING PROGRAM

## 2024-09-18 RX ORDER — OXYCODONE HYDROCHLORIDE 5 MG/1
5 TABLET ORAL EVERY 4 HOURS PRN
Qty: 12 TABLET | Refills: 0 | Status: SHIPPED | OUTPATIENT
Start: 2024-09-18 | End: 2024-09-30

## 2024-09-18 RX ORDER — NIFEDIPINE 30 MG
30 TABLET, EXTENDED RELEASE ORAL EVERY 24 HOURS
Qty: 30 TABLET | Refills: 2 | Status: SHIPPED | OUTPATIENT
Start: 2024-09-18

## 2024-09-18 RX ORDER — AMOXICILLIN 250 MG
1 CAPSULE ORAL 2 TIMES DAILY
Qty: 30 TABLET | Refills: 1 | Status: SHIPPED | OUTPATIENT
Start: 2024-09-18

## 2024-09-18 RX ORDER — ACETAMINOPHEN 325 MG/1
975 TABLET ORAL EVERY 6 HOURS
COMMUNITY
Start: 2024-09-18 | End: 2024-09-30

## 2024-09-18 RX ORDER — IBUPROFEN 800 MG/1
800 TABLET, FILM COATED ORAL EVERY 6 HOURS
Qty: 30 TABLET | Refills: 1 | Status: SHIPPED | OUTPATIENT
Start: 2024-09-18

## 2024-09-18 RX ADMIN — OXYCODONE HYDROCHLORIDE 5 MG: 5 TABLET ORAL at 05:27

## 2024-09-18 RX ADMIN — OXYCODONE HYDROCHLORIDE 5 MG: 5 TABLET ORAL at 09:19

## 2024-09-18 RX ADMIN — IBUPROFEN 800 MG: 800 TABLET ORAL at 05:27

## 2024-09-18 RX ADMIN — ENOXAPARIN SODIUM 40 MG: 40 INJECTION SUBCUTANEOUS at 09:20

## 2024-09-18 RX ADMIN — ACETAMINOPHEN 975 MG: 325 TABLET ORAL at 02:12

## 2024-09-18 RX ADMIN — MEASLES, MUMPS, AND RUBELLA VIRUS VACCINE LIVE 0.5 ML: 1000; 12500; 1000 INJECTION, POWDER, LYOPHILIZED, FOR SUSPENSION SUBCUTANEOUS at 09:19

## 2024-09-18 RX ADMIN — IBUPROFEN 800 MG: 800 TABLET ORAL at 11:47

## 2024-09-18 RX ADMIN — ACETAMINOPHEN 975 MG: 325 TABLET ORAL at 09:18

## 2024-09-18 ASSESSMENT — ACTIVITIES OF DAILY LIVING (ADL)
ADLS_ACUITY_SCORE: 18

## 2024-09-18 NOTE — DISCHARGE SUMMARY
[facility]  Delivery Discharge Summary    Admit date: 2024  Discharge date: 2024     Admit Dx:   - 39 year old y/o  at 37w6d   - History of  section  - gestational hypertension    Discharge Dx:  - Same as above, s/p repeat low transverse  section, not present on admission  -acute anemia secondary to blood loss, not present on admission      Procedures:  - Repeat low transverse  section   -bilateral salpingectomy  - Spinal analgesia      Admit HPI:  Ms. Holly Tolliver is a 39 year old  at 37w6d who was admitted on 2024 for evaluation of pain and preeclampsia symptoms.    Please see her admit H&P for full details of her PMH, PSH, Meds, Allergies and exam on admit.    Hospital summary:   Holly Tolliver, 39 year old  at 37w6d by LMP c/w 11w5d US, presents for generally feeling unwell (headache, severe abdominal pain that migrates from lower abdomen  section scar to RUQ, a gush of fluid at ~0300). She is diagnosed with gestational hypertension. Thus, delivery was recommended. She had a history of  section x1 and desires repeat  section and bilateral salpingectomy. She was started in Nifedipine postpartum and her BP improved.  She had no severe range blood pressures for 24 hours prior to discharge.       QBL from the delivery was 93. Please see her  Section Operative Note for full details regarding her delivery.    Her postoperative course was uncomplicated. On POD#2, she was meeting all of her postpartum goals and deemed stable for discharge. She was voiding without difficulty, tolerating a regular diet without nausea and vomiting, her pain was well controlled on oral pain medicines and her lochia was appropriate. Her hemoglobin after delivery was 10.6. Her Rh status was pos and Rhogam was not indicated. At the time of discharge, she was breast feeding her infant.     Physical exam on the day of  discharge:  Patient Vitals for the past 24 hrs:   BP Temp Temp src Pulse Resp SpO2   09/18/24 0908 120/72 97.5  F (36.4  C) Oral -- 18 100 %   09/17/24 2319 -- -- -- -- -- 97 %   09/17/24 2318 118/63 98.1  F (36.7  C) Oral 83 16 97 %   09/17/24 1957 -- -- -- -- -- 98 %   09/17/24 1956 (!) 146/76 98  F (36.7  C) Oral 94 18 96 %   09/17/24 1955 (!) 146/76 -- -- 83 -- --   09/17/24 1354 133/69 98  F (36.7  C) -- 73 -- --            General: sitting up, alert and cooperative  Abd: soft, non-distended, non-tender. Fundus firm, nontender, 1 cm below umbilicus.   Incision clean/dry/intact with dermabond in place.  Extremities: calves nontender, 1+ edema of lower extremities bilaterally    Lab Results   Component Value Date    HGB 10.6 09/17/2024    HGB 13.0 09/16/2024    HGB 14.1 05/28/2020    HGB 14.4 03/19/2018     Blood type:   Lab Results   Component Value Date    RH  Pos 04/19/2016       Discharge/Disposition:  Holly Tolliver was discharged to home in stable condition with the following instructions/medications:  1) Call for temperature > 100.4, foul smelling vaginal discharge, bleeding > 1 pad per hour x 2 hrs, pain not controlled by oral pain meds, severe constipation or severe nausea or vomiting.  2) She was instructed to follow-up with her primary OB in 6 weeks for a routine postpartum visit and BP check in 3-5 days.  3) She was instructed to continue her PNV on discharge if she wished to breast feed her infant.  4) She was discharged home with the following medications: prenata vitamins, ibuprofen, senna-docusate, tylenol and oxycodone and nifedipine  5) mild anemia treated with iron in prenatal vitamins.    Jumana Samaniego MD   [facility]

## 2024-09-18 NOTE — PLAN OF CARE
"BP slightly elevated, received scheduled Nifedipine. Trace dependent edema. Denies pre-eclampsia symptoms. Received scheduled pain medications. Requested Oxycodone for 8/10 abdominal pain; Pain increasing after increased ambulation. Requested Hydroxyzine before bed. Ice pack utilized to incision as needed. Fundus firm, midline. U/1. Vaginal bleeding scant, no clots. Incision CDI. Bonding well with baby.    Problem: Adult Inpatient Plan of Care  Goal: Plan of Care Review  Description: The Plan of Care Review/Shift note should be completed every shift.  The Outcome Evaluation is a brief statement about your assessment that the patient is improving, declining, or no change.  This information will be displayed automatically on your shift  note.  Outcome: Progressing  Goal: Patient-Specific Goal (Individualized)  Description: You can add care plan individualizations to a care plan. Examples of Individualization might be:  \"Parent requests to be called daily at 9am for status\", \"I have a hard time hearing out of my right ear\", or \"Do not touch me to wake me up as it startles  me\".  Outcome: Progressing  Goal: Absence of Hospital-Acquired Illness or Injury  Outcome: Progressing  Intervention: Prevent Skin Injury  Recent Flowsheet Documentation  Taken 9/17/2024 1956 by Fabien Viera, RN  Body Position: position changed independently  Intervention: Prevent Infection  Recent Flowsheet Documentation  Taken 9/17/2024 1956 by Fabien Viera, RN  Infection Prevention:   equipment surfaces disinfected   hand hygiene promoted   personal protective equipment utilized   rest/sleep promoted   single patient room provided  Goal: Optimal Comfort and Wellbeing  Outcome: Progressing  Intervention: Monitor Pain and Promote Comfort  Recent Flowsheet Documentation  Taken 9/17/2024 2006 by Fabien Viera, RN  Pain Management Interventions:   medication (see MAR)   cold applied  Intervention: Provide Person-Centered Care  Recent Flowsheet " Documentation  Taken 2024 by Fabien Viera RN  Trust Relationship/Rapport:   care explained   choices provided   questions answered   questions encouraged   reassurance provided   thoughts/feelings acknowledged  Goal: Readiness for Transition of Care  Outcome: Progressing     Problem: Postpartum ( Delivery)  Goal: Successful Parent Role Transition  Outcome: Progressing  Intervention: Support Parent Role Transition  Recent Flowsheet Documentation  Taken 2024 by Fabien Viera, RN  Supportive Measures:   active listening utilized   relaxation techniques promoted   self-care encouraged   positive reinforcement provided  Parent-Child Attachment Promotion:   caring behavior modeled   positive reinforcement provided   rooming-in promoted   skin-to-skin contact encouraged  Goal: Hemostasis  Outcome: Progressing  Goal: Effective Bowel Elimination  Outcome: Progressing  Intervention: Enhance Bowel Motility and Elimination  Recent Flowsheet Documentation  Taken 2024 by Fabien Viera RN  Bowel Motility Enhancement:   ambulation promoted   fluid intake encouraged  Bowel Elimination Promotion:   adequate fluid intake promoted   ambulation promoted  Goal: Fluid and Electrolyte Balance  Outcome: Progressing  Goal: Absence of Infection Signs and Symptoms  Outcome: Progressing  Goal: Anesthesia/Sedation Recovery  Outcome: Progressing  Goal: Optimal Pain Control and Function  Outcome: Progressing  Intervention: Prevent or Manage Pain  Recent Flowsheet Documentation  Taken 2024 by Fabien Viera RN  Pain Management Interventions:   medication (see MAR)   cold applied  Goal: Nausea and Vomiting Relief  Outcome: Progressing  Goal: Effective Urinary Elimination  Outcome: Progressing  Goal: Effective Oxygenation and Ventilation  Outcome: Progressing   Goal Outcome Evaluation:                      Fabien Viera RN on 2024 at 5:31 AM   Pt received on sign out from Dr. Kelley after p/w lethargy that was concerning for abnormal lithium level. Pt was found to have normal lithium level and was at baseline on his evaluation and was pending UA. UA is unremarkable. Pt stable for dc home.

## 2024-09-18 NOTE — DISCHARGE INSTRUCTIONS
MAKE A NURSE VISIT APPOINTMENT IN THE OB CLINIC TO CHECK YOUR BP IN 3-5 DAYS. [phone]  MAKE AN APPOINTMENT TO FOLLOW UP WITH YOUR DOCTOR IN 6 WEEKS, SOONER IF PHYSICAL OR EMOTIONAL CONCERNS. [phone]   Section: What to Expect at Home  Your Recovery     A  section, or , is surgery to deliver your baby through a cut that the doctor makes in your lower belly and uterus. The cut is called an incision.  You may have some pain in your lower belly and need pain medicine for 1 to 2 weeks. You can expect some vaginal bleeding for several weeks. You will probably need about 6 weeks to fully recover.  It's important to take it easy while the incision heals. Avoid heavy lifting, strenuous activities, and exercises that strain the belly muscles while you recover. Ask a family member or friend for help with housework, cooking, and shopping.  This care sheet gives you a general idea about how long it will take for you to recover. But each person recovers at a different pace. Follow the steps below to get better as quickly as possible.  How can you care for yourself at home?  Activity    Rest when you feel tired. Getting enough sleep will help you recover.     Try to walk each day. Start by walking a little more than you did the day before. Bit by bit, increase the amount you walk. Walking boosts blood flow and helps prevent pneumonia, constipation, and blood clots.     Avoid strenuous activities, such as bicycle riding, jogging, weightlifting, and aerobic exercise, for 6 weeks or until your doctor says it is okay.     Until your doctor says it is okay, do not lift anything heavier than your baby.     Do not do sit-ups or other exercises that strain the belly muscles for 6 weeks or until your doctor says it is okay.     Hold a pillow over your incision when you cough or take deep breaths. This will support your belly and decrease your pain.     You may shower as usual. Pat the incision dry when you  are done.     You will have some vaginal bleeding. Wear sanitary pads. Do not douche or use tampons until your doctor says it is okay.     Ask your doctor when you can drive again.     You will probably need to take at least 6 weeks off work. It depends on the type of work you do and how you feel.     Ask your doctor when it is okay for you to have sex.   Diet    You can eat your normal diet. If your stomach is upset, try bland, low-fat foods like plain rice, broiled chicken, toast, and yogurt.     Drink plenty of fluids (unless your doctor tells you not to).     You may notice that your bowel movements are not regular right after your surgery. This is common. Try to avoid constipation and straining with bowel movements. You may want to take a fiber supplement every day. If you have not had a bowel movement after a couple of days, ask your doctor about taking a mild laxative.     If you are breastfeeding, limit alcohol. Alcohol can cause a lack of energy and other health problems for the baby when a breastfeeding woman drinks heavily. It can also get in the way of a mom's ability to feed her baby or to care for the child in other ways. There isn't a lot of research about exactly how much alcohol can harm a baby. Having no alcohol is the safest choice for your baby. If you choose to have a drink now and then, have only one drink, and limit the number of occasions that you have a drink. Wait to breastfeed at least 2 hours after you have a drink to reduce the amount of alcohol the baby may get in the milk.   Medicines    Your doctor will tell you if and when you can restart your medicines. You will also get instructions about taking any new medicines.     If you stopped taking aspirin or some other blood thinner, your doctor will tell you when to start taking it again.     Take pain medicines exactly as directed.  If the doctor gave you a prescription medicine for pain, take it as prescribed.  If you are not taking a  prescription pain medicine, ask your doctor if you can take an over-the-counter medicine.     If you think your pain medicine is making you sick to your stomach:  Take your medicine after meals (unless your doctor has told you not to).  Ask your doctor for a different pain medicine.     If your doctor prescribed antibiotics, take them as directed. Do not stop taking them just because you feel better. You need to take the full course of antibiotics.   Incision care    If you have strips of tape on the incision, leave the tape on for a week or until it falls off.     Wash the area daily with warm, soapy water, and pat it dry. Don't use hydrogen peroxide or alcohol, which can slow healing. You may cover the area with a gauze bandage if it weeps or rubs against clothing. Change the bandage every day.     Keep the area clean and dry.   Other instructions    If you breastfeed your baby, you may be more comfortable while you are healing if you don't rest your baby on your belly. Try tucking your baby under your arm, with your baby's body along the side you will be feeding on. Support your baby's upper body with your arm. With that hand you can control your baby's head to bring your baby's mouth to your breast. This is sometimes called the football hold.   Follow-up care is a key part of your treatment and safety. Be sure to make and go to all appointments, and call your doctor if you are having problems. It's also a good idea to know your test results and keep a list of the medicines you take.  When should you call for help?  Share this information with your partner, family, or a friend. They can help you watch for warning signs.  Call 911  anytime you think you may need emergency care. For example, call if:    You feel you cannot stop from hurting yourself, your baby, or someone else.     You passed out (lost consciousness).     You have chest pain, are short of breath, or cough up blood.     You have a seizure.   Where  to get help 24 hours a day, 7 days a week   If you or someone you know talks about suicide, self-harm, a mental health crisis, a substance use crisis, or any other kind of emotional distress, get help right away. You can:    Call the Suicide and Crisis Lifeline at 988.     Call 6-587-423-TALK (1-983.986.5979).     Text HOME to 379693 to access the Crisis Text Line.   Consider saving these numbers in your phone.  Go to [employer] for more information or to chat online.  Call your doctor or midwife now or seek immediate medical care if:    You have loose stitches, or your incision comes open.     You have signs of hemorrhage (too much bleeding), such as:  Heavy vaginal bleeding. This means that you are soaking through one or more pads in an hour. Or you pass blood clots bigger than an egg.  Feeling dizzy or lightheaded, or you feel like you may faint.  Feeling so tired or weak that you cannot do your usual activities.  A fast or irregular heartbeat.  New or worse belly pain.     You have symptoms of infection, such as:  Increased pain, swelling, warmth, or redness.  Red streaks leading from the incision.  Pus draining from the incision.  A fever.  Frequent or painful urination or blood in your urine.  Vaginal discharge that smells bad.  New or worse belly pain.     You have symptoms of a blood clot in your leg (called a deep vein thrombosis), such as:  Pain in the calf, back of the knee, thigh, or groin.  Swelling in the leg or groin.  A color change on the leg or groin. The skin may be reddish or purplish, depending on your usual skin color.     You have signs of preeclampsia, such as:  Sudden swelling of your face, hands, or feet.  New vision problems (such as dimness, blurring, or seeing spots).  A severe headache.     You have signs of heart failure, such as:  New or increased shortness of breath.  New or worse swelling in your legs, ankles, or feet.  Sudden weight gain, such as more than 2 to 3 pounds in a  "day or 5 pounds in a week.  Feeling so tired or weak that you cannot do your usual activities.     You had spinal or epidural pain relief and have:  New or worse back pain.  Increased pain, swelling, warmth, or redness at the injection site.  Tingling, weakness, or numbness in your legs or groin.   Watch closely for changes in your health, and be sure to contact your doctor or midwife if:    Your vaginal bleeding isn't decreasing.     You feel sad, anxious, or hopeless for more than a few days.     You are having problems with your breasts or breastfeeding.   Where can you learn more?  Go to https://www.[employer].Tappx/patiented  Enter M806 in the search box to learn more about \" Section: What to Expect at Home.\"  Current as of: July 10, 2023               Content Version: 14.0    0900-7759 [employer].   Care instructions adapted under license by your healthcare professional. If you have questions about a medical condition or this instruction, always ask your healthcare professional. [employer] disclaims any warranty or liability for your use of this information.      Warning Signs after Having a Baby    Keep this paper on your fridge or somewhere else where you can see it.    Call your provider if you have any of these symptoms up to 12 weeks after having your baby.    Thoughts of hurting yourself or your baby  Pain in your chest or trouble breathing  Severe headache not helped by pain medicine  Eyesight concerns (blurry vision, seeing spots or flashes of light, other changes to eyesight)  Fainting, shaking or other signs of a seizure    Call  if you feel that it is an emergency.     The symptoms below can happen to anyone after giving birth. They can be very serious. Call your provider if you have any of these warning signs.    My provider s phone number: ___[phone]____________________    Losing too much blood (hemorrhage)    Call your provider if you soak through a pad " in less than an hour or pass blood clots bigger than a golf ball. These may be signs that you are bleeding too much.    Blood clots in the legs or lungs    After you give birth, your body naturally clots its blood to help prevent blood loss. Sometimes this increased clotting can happen in other areas of the body, like the legs or lungs. This can block your blood flow and be very dangerous.     Call your provider if you:  Have a red, swollen spot on the back of your leg that is warm or painful when you touch it.   Are coughing up blood.     Infection    Call your provider if you have any of these symptoms:  Fever of 100.4 F (38 C) or higher.  Pain or redness around your stitches if you had an incision.   Any yellow, white, or green fluid coming from places where you had stitches or surgery.    Mood Problems (postpartum depression)    Many people feel sad or have mood changes after having a baby. But for some people, these mood swings are worse.     Call your provider right away if you feel so anxious or nervous that you can't care for yourself or your baby.    Preeclampsia (high blood pressure)    Even if you didn't have high blood pressure when you were pregnant, you are at risk for the high blood pressure disease called preeclampsia. This risk can last up to 12 weeks after giving birth.     Call your provider if you have:   Pain on your right side under your rib cage  Sudden swelling in the hands and face    Remember: You know your body. If something doesn't feel right, get medical help.     For informational purposes only. Not to replace the advice of your health care provider. Copyright 2020 [facility]. All rights reserved. Clinically reviewed by Hodan Kent, RNC-OB, MSN. [place] 778702 - Rev 02/23.

## 2024-09-18 NOTE — MEDICATION SCRIBE - ADMISSION MEDICATION HISTORY
Medication Scribe Admission Medication History    Admission medication history is complete. The information provided in this note is only as accurate as the sources available at the time of the update.    Information Source(s): Patient and CareEverywhere/SureScripts via  with patient by room phone.    Pertinent Information: She states that she was taking a baby Aspirin at home as well but that she probably wouldn't be taking it any longer.  She thought it was odd that it wasn't on her med list.  Script for Albuterol Inhaler  2024.  Patient does not have any left at home and is requesting a refill when discharged.    Changes made to PTA medication list:  Added: None  Deleted: duplicate Famotidine 20 mg and Ondansetron 4 mg.  Changed: Unisom from at bedtime to at bedtime as needed.    Allergies reviewed with patient and updates made in EHR: yes, no change.    Medication History Completed By: Lisbet Earl 2024 7:39 PM    PTA Med List   Medication Sig Note Last Dose    acetaminophen (TYLENOL) 325 MG tablet Take 325-650 mg by mouth every 6 hours as needed for mild pain.  9/15/2024    albuterol (PROAIR HFA/PROVENTIL HFA/VENTOLIN HFA) 108 (90 Base) MCG/ACT inhaler Inhale 2 puffs into the lungs every 6 hours as needed for shortness of breath or wheezing 2024: This script  2024.  Patient does not have any left at home and is requesting a refill when discharged. script  at has none left at home    doxylamine (UNISOM SLEEPTABS) 25 MG TABS tablet Take 1 tablet (25 mg) by mouth at bedtime (Patient taking differently: Take 25 mg by mouth nightly as needed.)  More than a month at prn    famotidine (PEPCID) 20 MG tablet Take 1 tablet (20 mg) by mouth 2 times daily.  Past Month at Unknown    ketoconazole (NIZORAL) 2 % external cream Apply topically daily  More than a month at on hand if needed    Misc. Devices (BREAST PUMP) MISC 1 each See Admin Instructions.  new at not on yet    ondansetron  (ZOFRAN ODT) 8 MG ODT tab Take 1 tablet (8 mg) by mouth every 8 hours as needed for nausea  Past Month at prn    Prenatal Vit-Fe Fumarate-FA (PRENATAL MULTIVITAMIN  PLUS IRON) 27-1 MG TABS Take 1 tablet by mouth daily.  9/15/2024 at am    senna (SENOKOT) 8.6 MG tablet Take 1 tablet by mouth daily as needed for constipation.  Past Month at prn    vitamin B6 (PYRIDOXINE) 100 MG tablet Take 1 tablet (100 mg) by mouth daily  9/15/2024 at am

## 2024-09-19 LAB
PATH REPORT.COMMENTS IMP SPEC: NORMAL
PATH REPORT.COMMENTS IMP SPEC: NORMAL
PATH REPORT.FINAL DX SPEC: NORMAL
PATH REPORT.GROSS SPEC: NORMAL
PATH REPORT.MICROSCOPIC SPEC OTHER STN: NORMAL
PATH REPORT.RELEVANT HX SPEC: NORMAL
PHOTO IMAGE: NORMAL

## 2024-09-20 ENCOUNTER — PATIENT OUTREACH (OUTPATIENT)
Dept: CARE COORDINATION | Facility: CLINIC | Age: 39
End: 2024-09-20
Payer: COMMERCIAL

## 2024-09-20 NOTE — PROGRESS NOTES
Connected Care Resource Center:   [facility] Contact  [facility]/Voicemail     Clinical Data: Post-Discharge Outreach     Outreach attempted x 2.  Left message on patient's voicemail, providing [facility]'s central phone number of [phone] ([phone]) for questions/concerns and/or to schedule an appt with an [facility] provider, if they do not have a PCP.      Plan:  [facility] will do no further outreaches at this time.       REMIGIO Lobo  Connected Care Resource Center, [facility]    *Connected Care Resource Team does NOT follow patient ongoing. Referrals are identified based on internal discharge reports and the outreach is to ensure patient has an understanding of their discharge instructions.

## 2024-09-23 ENCOUNTER — ALLIED HEALTH/NURSE VISIT (OUTPATIENT)
Dept: OBGYN | Facility: CLINIC | Age: 39
End: 2024-09-23
Payer: COMMERCIAL

## 2024-09-23 VITALS
WEIGHT: 267.5 LBS | BODY MASS INDEX: 36.79 KG/M2 | DIASTOLIC BLOOD PRESSURE: 84 MMHG | SYSTOLIC BLOOD PRESSURE: 131 MMHG | HEART RATE: 91 BPM

## 2024-09-23 PROCEDURE — 99207 PR NO CHARGE NURSE ONLY: CPT

## 2024-09-23 ASSESSMENT — PAIN SCALES - GENERAL: PAINLEVEL: MILD PAIN (3)

## 2024-09-23 NOTE — NURSING NOTE
BLOOD PRESSURE CHECK    Subjective:    Holly is being seen in clinic for a blood pressure check due to Gestational hypertension . Patient is postpartum (delivery date: 9/16/2024).    Patient reports taking the following antihypertensive medications: nifedipine . Patient taking in the evening. 30 mg /24hr dose of Nifedipine    Patient denies no hypertension symptoms.       Objective:    Patient Vitals for the past 24 hrs:   BP Pulse Weight   09/23/24 1040 131/84 91 --   09/23/24 1031 136/85 93 --   09/23/24 1029 (!) 144/88 92 121.3 kg (267 lb 8 oz)        Two blood pressures were taken, at least one minute apart.     Plan:    Blood pressure results are in the NORMAL (-139 and DBP 60-89) range of the ACOG Hypertensive Disorders of Pregnancy thresholds for pregnant and recently pregnant patients.    Reviewed with patient to call triage number if symptoms (chest pain, shortness of breath, visual changes, severe headache, lower extremity edema or right upper quadrant pain) occur. Visit encounter routed to provider. Will contact patient if anything further is needed.    Radha MICHELLE RN   [facility]

## 2024-09-30 ENCOUNTER — TELEPHONE (OUTPATIENT)
Dept: VASCULAR SURGERY | Facility: CLINIC | Age: 39
End: 2024-09-30

## 2024-09-30 ENCOUNTER — PRENATAL OFFICE VISIT (OUTPATIENT)
Dept: OBGYN | Facility: CLINIC | Age: 39
End: 2024-09-30
Payer: COMMERCIAL

## 2024-09-30 ENCOUNTER — HOSPITAL ENCOUNTER (OUTPATIENT)
Dept: CT IMAGING | Facility: CLINIC | Age: 39
Discharge: HOME | End: 2024-09-30
Attending: OBSTETRICS & GYNECOLOGY | Admitting: OBSTETRICS & GYNECOLOGY
Payer: COMMERCIAL

## 2024-09-30 ENCOUNTER — TELEPHONE (OUTPATIENT)
Dept: OBGYN | Facility: CLINIC | Age: 39
End: 2024-09-30

## 2024-09-30 VITALS
HEART RATE: 89 BPM | SYSTOLIC BLOOD PRESSURE: 124 MMHG | BODY MASS INDEX: 36.17 KG/M2 | WEIGHT: 263 LBS | DIASTOLIC BLOOD PRESSURE: 83 MMHG | TEMPERATURE: 98.5 F

## 2024-09-30 DIAGNOSIS — L08.9 WOUND INFECTION: ICD-10-CM

## 2024-09-30 DIAGNOSIS — T14.8XXA WOUND INFECTION: Primary | ICD-10-CM

## 2024-09-30 DIAGNOSIS — T14.8XXA WOUND INFECTION: ICD-10-CM

## 2024-09-30 DIAGNOSIS — L08.9 WOUND INFECTION: Primary | ICD-10-CM

## 2024-09-30 LAB
BASOPHILS # BLD AUTO: 0 10E3/UL (ref 0–0.2)
BASOPHILS NFR BLD AUTO: 1 %
CRP SERPL-MCNC: 70.35 MG/L
EOSINOPHIL # BLD AUTO: 0.2 10E3/UL (ref 0–0.7)
EOSINOPHIL NFR BLD AUTO: 3 %
ERYTHROCYTE [DISTWIDTH] IN BLOOD BY AUTOMATED COUNT: 14.2 % (ref 10–15)
HCT VFR BLD AUTO: 39.8 % (ref 35–47)
HGB BLD-MCNC: 12.2 G/DL (ref 11.7–15.7)
IMM GRANULOCYTES # BLD: 0 10E3/UL
IMM GRANULOCYTES NFR BLD: 0 %
LYMPHOCYTES # BLD AUTO: 2 10E3/UL (ref 0.8–5.3)
LYMPHOCYTES NFR BLD AUTO: 29 %
MCH RBC QN AUTO: 25.6 PG (ref 26.5–33)
MCHC RBC AUTO-ENTMCNC: 30.7 G/DL (ref 31.5–36.5)
MCV RBC AUTO: 84 FL (ref 78–100)
MONOCYTES # BLD AUTO: 0.7 10E3/UL (ref 0–1.3)
MONOCYTES NFR BLD AUTO: 9 %
NEUTROPHILS # BLD AUTO: 4.2 10E3/UL (ref 1.6–8.3)
NEUTROPHILS NFR BLD AUTO: 58 %
PLATELET # BLD AUTO: 384 10E3/UL (ref 150–450)
RBC # BLD AUTO: 4.76 10E6/UL (ref 3.8–5.2)
WBC # BLD AUTO: 7.2 10E3/UL (ref 4–11)

## 2024-09-30 PROCEDURE — 36415 COLL VENOUS BLD VENIPUNCTURE: CPT | Performed by: OBSTETRICS & GYNECOLOGY

## 2024-09-30 PROCEDURE — 74177 CT ABD & PELVIS W/CONTRAST: CPT

## 2024-09-30 PROCEDURE — 87070 CULTURE OTHR SPECIMN AEROBIC: CPT | Performed by: OBSTETRICS & GYNECOLOGY

## 2024-09-30 PROCEDURE — 86140 C-REACTIVE PROTEIN: CPT | Performed by: OBSTETRICS & GYNECOLOGY

## 2024-09-30 PROCEDURE — 87077 CULTURE AEROBIC IDENTIFY: CPT | Performed by: OBSTETRICS & GYNECOLOGY

## 2024-09-30 PROCEDURE — 99024 POSTOP FOLLOW-UP VISIT: CPT | Performed by: OBSTETRICS & GYNECOLOGY

## 2024-09-30 PROCEDURE — 250N000011 HC RX IP 250 OP 636: Performed by: OBSTETRICS & GYNECOLOGY

## 2024-09-30 PROCEDURE — 250N000009 HC RX 250: Performed by: OBSTETRICS & GYNECOLOGY

## 2024-09-30 PROCEDURE — 87205 SMEAR GRAM STAIN: CPT | Performed by: OBSTETRICS & GYNECOLOGY

## 2024-09-30 PROCEDURE — 85025 COMPLETE CBC W/AUTO DIFF WBC: CPT | Performed by: OBSTETRICS & GYNECOLOGY

## 2024-09-30 RX ORDER — CEPHALEXIN 500 MG/1
500 CAPSULE ORAL
COMMUNITY
Start: 2024-09-26 | End: 2024-10-06

## 2024-09-30 RX ORDER — IOPAMIDOL 755 MG/ML
129 INJECTION, SOLUTION INTRAVASCULAR ONCE
Status: COMPLETED | OUTPATIENT
Start: 2024-09-30 | End: 2024-09-30

## 2024-09-30 RX ADMIN — IOPAMIDOL 129 ML: 755 INJECTION, SOLUTION INTRAVENOUS at 16:59

## 2024-09-30 RX ADMIN — SODIUM CHLORIDE 72 ML: 9 INJECTION, SOLUTION INTRAVENOUS at 16:58

## 2024-09-30 ASSESSMENT — EDINBURGH POSTNATAL DEPRESSION SCALE (EPDS)
THE THOUGHT OF HARMING MYSELF HAS OCCURRED TO ME: NEVER
I HAVE BEEN SO UNHAPPY THAT I HAVE BEEN CRYING: NO, NEVER
TOTAL SCORE: 6
I HAVE BEEN ANXIOUS OR WORRIED FOR NO GOOD REASON: YES, SOMETIMES
I HAVE BLAMED MYSELF UNNECESSARILY WHEN THINGS WENT WRONG: NO, NEVER
THINGS HAVE BEEN GETTING ON TOP OF ME: NO, MOST OF THE TIME I HAVE COPED QUITE WELL
I HAVE LOOKED FORWARD WITH ENJOYMENT TO THINGS: AS MUCH AS I EVER DID
I HAVE BEEN SO UNHAPPY THAT I HAVE HAD DIFFICULTY SLEEPING: NOT VERY OFTEN
I HAVE FELT SCARED OR PANICKY FOR NO GOOD REASON: NO, NOT AT ALL
I HAVE FELT SAD OR MISERABLE: NOT VERY OFTEN
I HAVE BEEN ABLE TO LAUGH AND SEE THE FUNNY SIDE OF THINGS: NOT QUITE SO MUCH NOW

## 2024-09-30 NOTE — TELEPHONE ENCOUNTER
Dr. Grijalva asked for this writer to call her about the referral. Noted that the referral was in process. Explained to provider that new consults have to be seen in [place] by a provider, that a wound provider will start in [place] in November. Dr. Grijalva stated the pt's appointment with Dr. Whitman can be adjusted if needed. This writer reached out to schedulers and asked to see if pt could be scheduled for this week. She was scheduled for Wednesday with Lyssa. This writer contacted the OB clinic and asked that they reach out to the pt to reschedule the pt's appointment with Dr. Whitman for follow up. They plan to call her.

## 2024-09-30 NOTE — PROGRESS NOTES
Postpartum Visit Note    S:  Holly Tolliver is here for her 1-week postpartum for follow-up of wound infection.  The patient states that she was seen on Friday with fever and was told she has a wound infection.  She started on Keflex for antibiotics and states that she has since noticed increased drainage from the incision.  States this was worse over the weekend but today is moderately improved.  Has also noticed a small amount of bleeding.  She denies any further fevers.  Otherwise feeling okay.  Sure what she needs to do to take care of the wound.    Delivery Date: .    Delivering provider:  Antonette  Type of delivery:  EULALIA  Bleeding:  normal lochia  Bowel/Urinary problems:  denies  Mood: denies concern, thoughts of self harm  Contraception Planned:  discuss at 6w PP visit  ================================================================  ROS: 10 point ROS neg other than the symptoms noted above in the HPI.     O:  EXAM:  /83 (BP Location: Right arm, Patient Position: Chair, Cuff Size: Adult Regular)   Pulse 89   Temp 98.5  F (36.9  C) (Tympanic)   Wt 119.3 kg (263 lb)   LMP 2023   BMI 36.17 kg/m      General: alert, oriented, well appearing   Psych: mood appropriate,    Breasts:  no swelling or redness   Abdomen: soft, non tender, uterus properly involuted,   Incision : Diffuse redness noted across incision without obvious fluctuance, there is an approximately 2 cm area of dehiscence (this was injected with 2 cc of 1% lidocaine to help with discomfort during  exploration)  at the right aspect that tracks deeply approximately 4 cm but very little lateral tracking noted, there is a approximately 1 cm area on the left that is similarly open and tracks 3 to 4 cm in depth.  The remainder of incision is red in appearance but intact.    A/P:   39 year old  who presents for follow-up of wound infection.  The patient has been on Keflex x 4 days.  She continues to have tenderness as well as  increased drainage from the incision.  On evaluation it is difficult to evaluate the fascia given the depth of the patient's pannus.  No obvious defect palpable but did recommend a stat CT scan for further evaluation.  The incision is open and several areas with serosanguineous drainage.  Collected.  Did recommend she continue antibiotics as previously prescribed.  Also recommended CBC and CRP today.  Assuming no acute concerning findings with CT scan we will plan to have her visit with Dr. Whitman in 2 days for repeat evaluation to confirm stability and hopeful improvement.  Additionally, will place urgent wound care referral with hope that they can begin seeing her this week as well to aid in management.      From routine postpartum care I spent 30 minutes reviewing chart, obtaining history, counseling, coordinating care, documenting with regard to patient's wound infection    Lelia Grijalva MD   9/30/2024 2:36 PM     Received call from Radiology following STAT CT scan. There is a small/thin fluid collection 6 cm x 1 cm below CS scar. Gas within likely related to probing in clinic just prior to CS. No obvious fascial defects. Did also receive call from wound care. Pt has appt with them on Wednesday. Also recommended pt keep appt with Dr. Whitman.    Lelia Grijalva MD

## 2024-09-30 NOTE — NURSING NOTE
"Initial /83 (BP Location: Right arm, Patient Position: Chair, Cuff Size: Adult Regular)   Pulse 89   Temp 98.5  F (36.9  C) (Tympanic)   Wt 119.3 kg (263 lb)   LMP 12/26/2023   BMI 36.17 kg/m   Estimated body mass index is 36.17 kg/m  as calculated from the following:    Height as of 9/16/24: 1.816 m (5' 11.5\").    Weight as of this encounter: 119.3 kg (263 lb). .      Marielos Cherry MA    "

## 2024-10-02 ENCOUNTER — MYC MEDICAL ADVICE (OUTPATIENT)
Dept: GASTROENTEROLOGY | Facility: CLINIC | Age: 39
End: 2024-10-02

## 2024-10-02 ENCOUNTER — TELEPHONE (OUTPATIENT)
Dept: VASCULAR SURGERY | Facility: CLINIC | Age: 39
End: 2024-10-02

## 2024-10-02 ENCOUNTER — OFFICE VISIT (OUTPATIENT)
Dept: VASCULAR SURGERY | Facility: CLINIC | Age: 39
End: 2024-10-02
Attending: OBSTETRICS & GYNECOLOGY
Payer: COMMERCIAL

## 2024-10-02 VITALS
DIASTOLIC BLOOD PRESSURE: 81 MMHG | TEMPERATURE: 98 F | OXYGEN SATURATION: 97 % | HEART RATE: 84 BPM | SYSTOLIC BLOOD PRESSURE: 118 MMHG

## 2024-10-02 DIAGNOSIS — L08.9 WOUND INFECTION: ICD-10-CM

## 2024-10-02 DIAGNOSIS — T81.31XA DEHISCENCE OF OPERATIVE WOUND, INITIAL ENCOUNTER: Primary | ICD-10-CM

## 2024-10-02 DIAGNOSIS — T14.8XXA WOUND INFECTION: ICD-10-CM

## 2024-10-02 DIAGNOSIS — E88.09 HYPOALBUMINEMIA: ICD-10-CM

## 2024-10-02 PROBLEM — R10.11 RIGHT UPPER QUADRANT ABDOMINAL PAIN AFFECTING PREGNANCY: Status: ACTIVE | Noted: 2024-07-05

## 2024-10-02 PROBLEM — N93.9 VAGINAL SPOTTING: Status: ACTIVE | Noted: 2024-07-05

## 2024-10-02 PROBLEM — O23.42 URINARY TRACT INFECTION IN MOTHER DURING SECOND TRIMESTER OF PREGNANCY: Status: ACTIVE | Noted: 2024-07-05

## 2024-10-02 PROBLEM — O26.899 RIGHT UPPER QUADRANT ABDOMINAL PAIN AFFECTING PREGNANCY: Status: ACTIVE | Noted: 2024-07-05

## 2024-10-02 PROCEDURE — G0463 HOSPITAL OUTPT CLINIC VISIT: HCPCS | Performed by: NURSE PRACTITIONER

## 2024-10-02 PROCEDURE — 99203 OFFICE O/P NEW LOW 30 MIN: CPT | Mod: 25 | Performed by: NURSE PRACTITIONER

## 2024-10-02 PROCEDURE — 11042 DBRDMT SUBQ TIS 1ST 20SQCM/<: CPT | Performed by: NURSE PRACTITIONER

## 2024-10-02 RX ORDER — LIDOCAINE 50 MG/G
OINTMENT TOPICAL DAILY PRN
Status: ACTIVE | OUTPATIENT
Start: 2024-10-02

## 2024-10-02 ASSESSMENT — PAIN SCALES - GENERAL: PAINLEVEL: MILD PAIN (3)

## 2024-10-02 NOTE — PROGRESS NOTES
Clinic Administered Medication Documentation    Patient was given Lidocaine prior to debridement. Prior to medication administration, verified patient's identity using patient's name and date of birth.    Keesha Javier LPN

## 2024-10-02 NOTE — PROGRESS NOTES
[facility] Consult Note    Date of Service: 2024     Requesting Provider: Dr. Lelia Grijalva    Chief Complaint: dehisced  incision    History of Present Illness: Holly Tolliver is being seen at [facility] today regarding dehisced  incision. They arrive to the clinic today with her mother and infant. The patient reports that she had a  on 24 and noted issues with the incision 5 days after her ; had increased pain; she was found to have infection; she had fevers up to 102. She was started on keflex; tolerating well. Was currently/previously using nothing on the wounds. Reports pain of 3/10; currently using apap for pain. Denies any fevers, chills, or generalized ill feeling. Non-smoker. No diabetes.     Review of Systems:   Constitutional:  Negative   EENTM: negative for glasses;  negative [place]  GI:  negative for nausea/vomiting;   negative for constipation   negative diarrhea;   negative for fecal incontinence  negative weight loss; normal weight loss after birth  :   negative dysuria,  negative incontinence  MS: patient is ambulatory;  does not use assistive devices  Cardiac: negative   Respiratory:  negative SOB; +asthma  Endocrine:  negative  diabetes  Psych: negative  depression/anxiety    Past Medical History:   Past Medical History:   Diagnosis Date    ALCOH DEP NEC/NOS-UNSPEC 2007    Alcohol dependence in remission (H) 2023    Asthma     exercise induced    Brain aneurysm     CARDIOVASCULAR SCREENING; LDL GOAL LESS THAN 160 10/23/2012     delivery delivered 2016    Chickenpox     Chlamydia     Depression with anxiety 2014    Encounter for triage in pregnant patient 2024    Generalized anxiety disorder 2013    Diagnosis updated by automated process. Provider to review and confirm.      GERD (gastroesophageal reflux disease) 2013    Gestational  hypertension 2024    Major depressive disorder, recurrent, unspecified (H) 2023    Major depressive disorder, single episode, moderate (H) 2013    Methamphetamine abuse (H) 2023    Mild intermittent asthma 2008    Mirena IUD 2016    Lot: [id number]  Exp:       Panic disorder with agoraphobia and moderate panic attacks 03/10/2009    Persons encountering health services in other specified circumstances 2015    Formatting of this note might be different from the original. Care Coordinator: Kelly DANIEL, MSW See letters for Health Care home care plan SW covering in [place] SW absence FREDY Luz, [phone] 3/24/2015 12:24 PM      Placental abruption 2016    Placental abruption in third trimester 2016    Postpartum depression     also 2016    Preeclampsia     ? in     Pregnancy induced hypertension     Pregnancy with history of  section, antepartum 2024    Right upper quadrant abdominal pain affecting pregnancy 2024    Routine postpartum follow-up 2016    Single liveborn, born in hospital, delivered 2016    Tobacco use disorder 2008    Has decreased from 1 ppd to 1 cigarette per day since pregnancy.      Tooth infection 2022    Urinary tract infection in mother during second trimester of pregnancy 2024    Vaginal spotting 2024        Surgical History:   Past Surgical History:   Procedure Laterality Date     SECTION Bilateral 2016    Procedure:  SECTION;  Surgeon: Beau Cardoso MD;  Location: [facility] OR    COMBINED  SECTION, SALPINGECTOMY BILATERAL Bilateral 2024    Procedure: repeat  SECTION, WITH BILATERAL SALPINGECTOMY;  Surgeon: Antonette Whitman MD;  Location: [facility] OR    PE TUBES  age 3        Medications:   Current Outpatient Medications   Medication Sig Dispense Refill    acetaminophen (TYLENOL) 325 MG tablet Take 325-650 mg by  mouth every 6 hours as needed for mild pain.      cephALEXin (KEFLEX) 500 MG capsule Take 500 mg by mouth.      ibuprofen (ADVIL/MOTRIN) 800 MG tablet Take 1 tablet (800 mg) by mouth every 6 hours. 30 tablet 1    Misc. Devices (BREAST PUMP) MISC 1 each See Admin Instructions. 1 each 0    NIFEdipine ER OSMOTIC (ADALAT CC) 30 MG 24 hr tablet Take 1 tablet (30 mg) by mouth every 24 hours. 30 tablet 2    Prenatal Vit-Fe Fumarate-FA (PRENATAL MULTIVITAMIN  PLUS IRON) 27-1 MG TABS Take 1 tablet by mouth daily.      senna (SENOKOT) 8.6 MG tablet Take 1 tablet by mouth daily as needed for constipation.      senna-docusate (SENOKOT-S/PERICOLACE) 8.6-50 MG tablet Take 1 tablet by mouth 2 times daily. 30 tablet 1    vitamin B6 (PYRIDOXINE) 100 MG tablet Take 1 tablet (100 mg) by mouth daily 90 tablet 3    ketoconazole (NIZORAL) 2 % external cream Apply topically daily (Patient not taking: Reported on 2024) 30 g 0     No current facility-administered medications for this visit.       Allergies:   Allergies   Allergen Reactions    Amoxicillin Rash       Family history:   Family History   Problem Relation Age of Onset    Alcohol/Drug Mother         alcohol- recovered    Hypertension Mother     Depression Mother         also anxiety    Other - See Comments Mother         ankylosing spondylosis    Anxiety Disorder Mother     Irritable Bowel Syndrome Mother     Alcohol/Drug Father         alcohol- recovered    Alcohol/Drug Sister         A&D-recovered    Alcoholism Brother         recovered    Alcohol/Drug Brother         alcohol    Hypertension Brother     Schizophrenia Brother     Cancer Maternal Grandmother         lung- at age 44    Depression Maternal Grandmother         also anxiety        Social History:   Social History     Socioeconomic History    Marital status: Single     Spouse name: Not on file    Number of children: 1    Years of education: Not on file    Highest education level: Not on file   Occupational  History    Occupation: PCA     Employer: [employer]   Tobacco Use    Smoking status: Former     Current packs/day: 0.00     Types: Cigarettes     Passive exposure: Never    Smokeless tobacco: Never   Vaping Use    Vaping status: Former   Substance and Sexual Activity    Alcohol use: Not Currently     Comment: sober since 5/19/23    Drug use: Not Currently     Types: Methamphetamines, Marijuana     Comment: sober since 5/19/23    Sexual activity: Yes     Partners: Male   Other Topics Concern    Parent/sibling w/ CABG, MI or angioplasty before 65F 55M? No   Social History Narrative    Not on file     Social Determinants of Health     Financial Resource Strain: Low Risk  (9/16/2024)    Financial Resource Strain     Within the past 12 months, have you or your family members you live with been unable to get utilities (heat, electricity) when it was really needed?: No   Food Insecurity: Low Risk  (9/16/2024)    Food Insecurity     Within the past 12 months, did you worry that your food would run out before you got money to buy more?: No     Within the past 12 months, did the food you bought just not last and you didn t have money to get more?: No   Transportation Needs: Low Risk  (9/16/2024)    Transportation Needs     Within the past 12 months, has lack of transportation kept you from medical appointments, getting your medicines, non-medical meetings or appointments, work, or from getting things that you need?: No   Physical Activity: Not on file   Stress: Not on file   Social Connections: Not on file   Interpersonal Safety: Low Risk  (9/16/2024)    Interpersonal Safety     Do you feel physically and emotionally safe where you currently live?: Yes     Within the past 12 months, have you been hit, slapped, kicked or otherwise physically hurt by someone?: No     Within the past 12 months, have you been humiliated or emotionally abused in other ways by your partner or ex-partner?: No   Housing  "Stability: Low Risk  (9/16/2024)    Housing Stability     Do you have housing? : Yes     Are you worried about losing your housing?: No        Physical Exam  Vitals: /81   Pulse 84   Temp 98  F (36.7  C)   LMP 12/26/2023   SpO2 97%   Weight is 0 lbs 0 oz          There is no height or weight on file to calculate BMI.  General: This is a 39 year old female who appears their reported age, not in acute distress  Head: normocephalic, Atraumatic; not wearing glasses; non-icteric sclera; no exudate; no hearing loss   Respiratory: unlabored breathing; no cough   Skin: Uniformly warm and dry  Psych: Alert and oriented x4; calm and cooperative throughout exam  Abdomen: Normal bowel sounds. Soft, symmetric, no guarding or rigidity, nontender with palpation.  No organomegaly or masses palpated. Low transverse insicion; there are x3 full thickness openings along the incision line; see measures below; deep tunneling; no purulence; mild induration; no erythema; no odor; moderate drainage     Circumferential volume measures:             No data to display                Ulceration(s)/Wound(s):          Laboratory studies:     I personally reviewed the following lab results today and those on care everywhere, if indicated     CRP Inflammation   Date Value Ref Range Status   10/22/2014 13.5 (H) 0.0 - 8.0 mg/L Final      No results found for: \"SED\"   Last Renal Panel:  Sodium   Date Value Ref Range Status   09/17/2024 134 (L) 135 - 145 mmol/L Final   05/28/2020 137 133 - 144 mmol/L Final     Potassium   Date Value Ref Range Status   09/17/2024 4.1 3.4 - 5.3 mmol/L Final   01/24/2022 3.5 3.4 - 5.3 mmol/L Final   05/28/2020 4.2 3.4 - 5.3 mmol/L Final     Chloride   Date Value Ref Range Status   09/17/2024 104 98 - 107 mmol/L Final   01/24/2022 105 94 - 109 mmol/L Final   05/28/2020 106 94 - 109 mmol/L Final     Carbon Dioxide   Date Value Ref Range Status   05/28/2020 26 20 - 32 mmol/L Final     Carbon Dioxide (CO2)   Date " Value Ref Range Status   09/17/2024 22 22 - 29 mmol/L Final   01/24/2022 27 20 - 32 mmol/L Final     Anion Gap   Date Value Ref Range Status   09/17/2024 8 7 - 15 mmol/L Final   01/24/2022 5 3 - 14 mmol/L Final   05/28/2020 5 3 - 14 mmol/L Final     Glucose   Date Value Ref Range Status   09/17/2024 175 (H) 70 - 99 mg/dL Final   01/24/2022 85 70 - 99 mg/dL Final   05/28/2020 85 70 - 99 mg/dL Final     Urea Nitrogen   Date Value Ref Range Status   09/17/2024 10.1 6.0 - 20.0 mg/dL Final   01/24/2022 11 7 - 30 mg/dL Final   05/28/2020 15 7 - 30 mg/dL Final     Creatinine   Date Value Ref Range Status   09/17/2024 0.71 0.51 - 0.95 mg/dL Final   05/28/2020 0.82 0.52 - 1.04 mg/dL Final     GFR Estimate   Date Value Ref Range Status   09/17/2024 >90 >60 mL/min/1.73m2 Final     Comment:     eGFR calculated using 2021 CKD-EPI equation.   05/28/2020 >90 >60 mL/min/[1.73_m2] Final     Comment:     Non  GFR Calc  Starting 12/18/2018, serum creatinine based estimated GFR (eGFR) will be   calculated using the Chronic Kidney Disease Epidemiology Collaboration   (CKD-EPI) equation.       Calcium   Date Value Ref Range Status   09/17/2024 9.0 8.8 - 10.4 mg/dL Final     Comment:     Reference intervals for this test were updated on 7/16/2024 to reflect our healthy population more accurately. There may be differences in the flagging of prior results with similar values performed with this method. Those prior results can be interpreted in the context of the updated reference intervals.   05/28/2020 8.7 8.5 - 10.1 mg/dL Final     Albumin   Date Value Ref Range Status   09/17/2024 3.1 (L) 3.5 - 5.2 g/dL Final   01/24/2022 3.8 3.4 - 5.0 g/dL Final   05/28/2020 3.9 3.4 - 5.0 g/dL Final      Lab Results   Component Value Date    WBC 7.2 09/30/2024    WBC 5.6 05/28/2020     Lab Results   Component Value Date    RBC 4.76 09/30/2024    RBC 4.88 05/28/2020     Lab Results   Component Value Date    HGB 12.2 09/30/2024    HGB 14.1  "05/28/2020     Lab Results   Component Value Date    HCT 39.8 09/30/2024    HCT 42.4 05/28/2020     No components found for: \"MCT\"  Lab Results   Component Value Date    MCV 84 09/30/2024    MCV 87 05/28/2020     Lab Results   Component Value Date    MCH 25.6 09/30/2024    MCH 28.9 05/28/2020     Lab Results   Component Value Date    MCHC 30.7 09/30/2024    MCHC 33.3 05/28/2020     Lab Results   Component Value Date    RDW 14.2 09/30/2024    RDW 12.9 05/28/2020     Lab Results   Component Value Date     09/30/2024     05/28/2020      Lab Results   Component Value Date    A1C 5.0 03/15/2024      TSH   Date Value Ref Range Status   09/23/2021 2.72 0.40 - 4.00 mU/L Final   09/28/2015 0.85 0.40 - 4.00 mU/L Final      No results found for: \"VITDT\"       Impression:    Encounter Diagnoses   Name Primary?    Wound infection            Assessment/Plan:  1. Debridement: Nursing staff remove the old dressing and cleanse the wound and surrounding skin with recommended solution. After discussion of risk factors and verbal consent was obtained 2% Lidocaine HCL jelly was applied, under clean conditions, the abdominal ulceration(s) were debrided using currette. Devitalized and nonviable tissue, along with any fibrin and slough, was removed to improve granulation tissue formation, stimulate wound healing, decrease overall bacteria load, disrupt biofilm formation and decrease edge senescence.  Total excisional debridement was 3.8 sq cm from the epidermis/dermis area or into the subcutaneous tissue with a depth of 3-5.5 cm.   Ulcers were improved afterwards and .  Measures were unchanged after debridement.    2. Treatment: wound treatment will include irrigation and dressings to promote autolytic debridement which will include: will order home care; if home care unavailable will have family or friend change this; will use strip of aquacel ag into the wound; cover with bordered foam; change 3 days per week.    If " for some reason the patient is not able to get your dressing(s) changed as outlined above (due to illness, lack of supplies, lack of help) please do the following: remove old, soiled dressings; wash the ulcers with saline; pat dry; apply ABD pad or other absorbant pad and secure with rolled gauze; avoid tape directly on your skin; Patient instructed to call the clinic as soon as possible to let us know what the current issues are in receiving ulcer care.    3. Edema. Na    4. Offloading: na    5. Nutrition: noted to have low albumin; educated on importance of protein intake; provided handout    6. Wound Etiology: surgical    Patient to return to clinic in 3 week(s) for re-evaluation. They were instructed to call the clinic sooner with any further questions or concerns. Answered all questions.          Lyssa Geronimo NP   [facility]  [phone]      This note was electronically signed by Lyssa Geronimo NP

## 2024-10-02 NOTE — TELEPHONE ENCOUNTER
Pt contacted the clinic because she doesn't know if she'll have anyone to help with wound care until her next appointment. This writer called and spoke with pt and her mom over speaker phone. Pt's mom is afraid she'll hurt her, wondering if Ana can squeeze a ball to help with pain. This writer stated that was a good idea and that pt can watch something on her phone or listen to music. Educated pt/mom that many family members do wound care successfully even if they are scared to do it, that it needs to be done per orders 3x/week so that the wound heals/doesn't get a worse infection. Ana asked what she'd do if she didn't have anyone. This writer stated that she would have to go to the ED. The pt doesn't want to do that, working on talking her mom into doing it. Pt has OB appointment tomorrow. Educated pt that they can do the wound care at that appointment so they can assess the wound, possible OB will want to follow weekly at first but not sure. Also educated pt that this clinic is working on home care but that it isn't always a quick process depending on the agency. Ana voiced understanding with the plan.

## 2024-10-02 NOTE — TELEPHONE ENCOUNTER
[facility] care referral faxed to [facility] - declined 10/3 due to capacity [facility].    10/3/24 faxed referrals to:  [facility]  [facility]-unable to accept due to insurance JS 10/3  [facility]IL[facility]

## 2024-10-02 NOTE — PATIENT INSTRUCTIONS
We will order home care; if you cannot get home care you will need a family member or friend to do the dressing    Wound care supplies were not ordered or needed today.        Wound Care Instructions    3 TIMES PER WEEK and as needed, Cleanse your abdominal  wound(s) with Dilute hibiclens 30cc in 500cc NS.    Pat Dry with non-sterile gauze      Primary Dressing: Apply strip of aquacel ag  into/onto the wounds; use only one strip; leave long tail hanging out for easy removal    Secondary dressing: Cover with bordered foam    It is ok to get your wound wet in the bath or shower; shower right before home care comes to change the dressing      If for some reason you are not able to get your dressing(s) changed as outlined above (due to illness, lack of supplies, lack of help) please do the following: remove old, soiled dressings; wash the wounds with saline; pat dry; apply ABD pad or other absorbant pad and secure with rolled gauze; avoid tape directly on your skin; Call the clinic as soon as possible to let us know what the current issues are in receiving wound care [phone].      SEEK MEDICAL CARE IF:  You have an increase in swelling, pain, or redness around the wound.  You have an increase in the amount of pus coming from the wound.  There is a bad smell coming from the wound.  The wound appears to be worsening/enlarging  You have a fever greater than 101.5 F      It is ok to continue current wound care treatment/products for the next 2-3 days until new wound care supplies are ordered and arrive. If longer than this please contact our office at [phone].    If you have a 2 layer or 4 layer compression wrap on these are safe to have on for ONLY 7 days. If for some reason you are not able to get the wrap(s) changed (due to illness; lack of supplies, lack of help, lack of transportation) please do the following: unwrap the old 2 or 4 layer compression wrap; avoid using scissors as you could cut your skin and  cause wounds; use tubular compression when available. Call to reschedule your home care or clinic visit appointment as soon as possible.    Please NOTE: if you are 15 minutes late to your clinic appointment you will have to be rescheduled. Please call our clinic as soon as possible if you know you will not be able to get to your appointment at [phone].    If you fail to show up to 3 scheduled clinic appointments you will be dismissed from our clinic.              We want to hear from you!  In the next few weeks, you should receive a call or email to complete a survey about your visit at [facility]. Please help us improve your appointment experience by letting us know how we did today. We strive to make your experience good and value any ways in which we could do better.      We value your input and suggestions.    Thank you for choosing the [facility]!           High Protein Foods  Chicken  -Chicken breast, 3.5oz.-30 grams protein  -Chicken thigh-10 grams(average size)  -Drumstick-11 grams  -Wing- 6 grams  -Chicken meat, cooked, 4 oz.  Beef  -Hamburger georgette, 4 oz-28 grams protein  -Steak, 6 oz-42 grams  -Most cuts of beef- 7 grams of protein per ounce  Fish  -Most fish fillets or steaks are about 22 grams of protein for 3 1/2 oz(100 grams) of cooked fish, or 6 grams per ounce  -Tuna, 6 oz can-40 grams of protein  Pork  -Pork chop, average-22 grams protein  -Pork loin or tenderloin, 4 oz.-29 grams  -Ham, 3oz serving- 19 grams  -Ground pork 3oz cooked-22 grams  -Jimenez, 1 slice-3 grams  -Citizen of [place]-style jimenez(black jimenez), slice-5-6 grams  Eggs and Dairy  -Egg, large-7 grams  -Milk, 1 cup-8 grams  -Cottage cheese, 1/2 cup-15 grams  -Greek yogurt, 1 cup-usually 8-12 grams, check label  -Soft cheeses (Mozzarella, Brie, Camembert)- 6 grams  -Medium cheeses(cheddar, swiss)- 7 or 8 grams per oz  -Hard cheeses(parmesan)- 10 grams per oz  Beans  -Tofu, 1/2 cup 20 grams  -Tofu, 1  oz., 2.3 grams  -Soy milk, 1 cup-6-10 grams  -Most beans(black, mckeon, lentils, etc.) about 7-10 grams protein per half cup of cooked beans  -soy beans, 1/2 cup cooked-14 grams  -Split peas, 1/2 cup cooked- 8 grams  Nuts and Seeds  -Peanut butter, 2 Tablespoons- 8 grams protein  -Almonds, 1/4 cup- 8 grams  -Peanuts, 1/4 cup-9 grams  -Cashews, 1/4 cup- 5 grams  -Pecans, 1/4 cup- 2.5 grams  -Sunflower seeds, 1/4 cup- 6 grams  -Pumpkin seeds, 1/4 cup-8 grams  -Flax seeds- 1/4 cup- 8 grams  Protein Supplements  -Ensure  -Boost  -Glucerna, if diabetic  When you have an open ulcer, your bodies protein needs are much higher, so it is recommended eat good sources of protein

## 2024-10-02 NOTE — LETTER
[facility]  [address] [place] [address] [address]  [place], [place] [address]  [phone]      Fax [phone]    [facility]           FAX: [phone]            Customer Service: [phone]        Account #: [id number]    Wound Dressing Rx and Order Form  Order Status: new  Verbal: Sarah  Date: 2024     Hollyradha Tolliver  Gender: female  : 1985  [address] [address]  [phone] (home)     Medical Record: [id number]  Primary Care Provider: Jose Zapata      ICD-10-CM    1. Dehiscence of operative wound, initial encounter  T81.31XA Home Care Referral     DEBRIDE SKIN/SUBQ TISSUE     Wound care      2. Wound infection  T14.8XXA Wound Care Referral    L08.9 lidocaine (XYLOCAINE) 5 % ointment     Home Care Referral     DEBRIDE SKIN/SUBQ TISSUE     Wound care      3. Hypoalbuminemia  E88.09 Home Care Referral     DEBRIDE SKIN/SUBQ TISSUE     Wound care            Insurance Info:  INSURER: Payor: BLUE PLUS / Plan: BLUE PLUS ADVANTAGE [place] / Product Type: HMO /   Policy ID#:  [id number]  SECONDARY INSURANCE:    Secondary Policy ID#:  N/A        Physician Info:   Name:  ARTEM CROUCH     Dept Address/Phones:   [address] [place] [address], [address] [address] [phone]  Fax: [phone]    Lymphedema circumferential measurements (in cm):       No data to display                  Wound info:  VASC Wound ABD middle (Active)   Pre Size Length 0.3 10/02/24 1400   Pre Size Width 6 10/02/24 1400   Pre Size Depth 5 10/02/24 1400   Pre Total Sq cm 1.8 10/02/24 1400   Number of days: 2       VASC Wound ABD LEFT (Active)   Pre Size Length 0.3 10/02/24 1400   Pre Size Width 6 10/02/24 1400   Pre Size Depth 5.5 10/02/24 1400   Pre Total Sq cm 1.8 10/02/24 1400   Number of days: 2       VASC Wound surgical abdominal right (Active)   Pre Size Length 1 10/02/24 1400   Pre Size Width 0.2 10/02/24 1400   Pre Size Depth 3 10/02/24 1400   Pre Total Sq cm 0.2 10/02/24  "1400   Number of days: 2       Incision/Surgical Site Abdomen (Active)   Incision Assessment WDL 09/18/24 0952   Dressing Open to air 09/18/24 0952   Closure Liquid bandage 09/18/24 0952   Incision Drainage Amount None 09/17/24 2318   Dressing Intervention Open to air / No Dressing 09/18/24 0952   Number of days:         Drainage: moderate  Thickness:  full  Duration of Need: 30 DAYS  Days Supply: 30 DAYS  Start Date: 10/4/2024  Starter Kit, Ancillary Kit (saline, gloves, gauze): Yes   Qualifying wound/Debridement: Yes     NO SUBSTITUTIONS. Call [phone]. Please call patient for out-of-pocket costs and options.      Dressing Type Brand Size Frequency of change  Quantity   Primary Aquacel ag  4\"x5\" 3 TIMES PER WEEK and as needed 15 pieces   secondary ABD  5\"x9\" 3 TIMES PER WEEK and as needed 20 pieces    Cotton tipped applicators  6\" 3 TIMES PER WEEK and as needed 1 box     NO SUBSTITUTIONS. Call [phone] with questions.     Wound Care Instructions    3 TIMES PER WEEK and as needed, Cleanse your abdominal  wound(s) with Dilute hibiclens 30cc in 500cc NS.    Pat Dry with non-sterile gauze    Primary Dressing: Apply strip of aquacel ag  into/onto the wounds; use only one strip; leave long tail hanging out for easy removal    Secondary dressing: Cover with bordered foam or ABD 5\"x9\"    OK to forward to covered supplier.    Electronically Signed Physician:  ARTEM CROUCH             Date: October 4, 2024    "

## 2024-10-03 ENCOUNTER — TELEPHONE (OUTPATIENT)
Dept: FAMILY MEDICINE | Facility: CLINIC | Age: 39
End: 2024-10-03

## 2024-10-03 ENCOUNTER — PRENATAL OFFICE VISIT (OUTPATIENT)
Dept: OBGYN | Facility: CLINIC | Age: 39
End: 2024-10-03
Payer: COMMERCIAL

## 2024-10-03 ENCOUNTER — TELEPHONE (OUTPATIENT)
Dept: VASCULAR SURGERY | Facility: CLINIC | Age: 39
End: 2024-10-03

## 2024-10-03 VITALS
HEART RATE: 67 BPM | DIASTOLIC BLOOD PRESSURE: 78 MMHG | TEMPERATURE: 97.2 F | SYSTOLIC BLOOD PRESSURE: 116 MMHG | WEIGHT: 260 LBS | BODY MASS INDEX: 35.76 KG/M2

## 2024-10-03 DIAGNOSIS — T14.8XXA WOUND INFECTION: Primary | ICD-10-CM

## 2024-10-03 DIAGNOSIS — L08.9 WOUND INFECTION: Primary | ICD-10-CM

## 2024-10-03 PROCEDURE — 99024 POSTOP FOLLOW-UP VISIT: CPT | Performed by: OBSTETRICS & GYNECOLOGY

## 2024-10-03 NOTE — TELEPHONE ENCOUNTER
"  General Call    Contacts       Contact Date/Time Type Contact Phone/Fax    10/03/2024 12:38 PM CDT Phone (Incoming) Lorenzo Zamarripa [phone]          Reason for Call:  Home Care Referral    What are your questions or concerns:      [email or web address] is calling to inform provider of Declining Home Care Referral. The reasoning is:    \" Patient is out of their service area, insurance is out-of-network and their nursing staff is at capacity.\"    Patient is not yet aware. Chart reviewed, it appears Dr. Zapata did not place the referral but Dr. Grijalva did.      Date of last appointment with provider: 10/03/2024    Routing message to Dr. Grijalva to review and advise.    Karen Rich, NIYAHN, RN, PHN   [facility]      "

## 2024-10-03 NOTE — PROGRESS NOTES
Postpartum Visit Note     S:  Holly Tolliver is here for her 1-week postpartum for follow-up of wound infection.     Patient was seen by wound care yesterday and started on plan of wound management.  She states that the patches they gave her to place over the open areas of the incision have already begun falling off despite suggestion that they should last several days.  She therefore states she feels she will run out of these patches much sooner.  Her mother is helping her with packing these areas.  She has had no further fevers.  No worsening pain.  Otherwise feeling well.  She is just worried about running out of supplies and general plan for wound management.        Delivery Date: 9/22.    Delivering provider:  Antonette  Type of delivery:  EULALIA  Bleeding:  normal lochia  Bowel/Urinary problems:  denies  Mood: denies concern, thoughts of self harm  Contraception Planned:  discuss at 6w PP visit  ================================================================  ROS: 10 point ROS neg other than the symptoms noted above in the HPI.      O:  EXAM:  /78 (BP Location: Left arm, Patient Position: Chair, Cuff Size: Adult Regular)   Pulse 67   Temp 97.2  F (36.2  C) (Tympanic)   Wt 117.9 kg (260 lb)   LMP 12/26/2023   Breastfeeding Yes   BMI 35.76 kg/m     General: alert, oriented, well appearing   Psych: mood appropriate,    Breasts:  no swelling or redness   Abdomen: soft, non tender, uterus properly involuted,   Incision : This across incision is improved compared with Monday's evaluation in our clinic.  There continues to be several open areas at both the right and left aspect of the incision.  And recent placement of bandages by wound care did not reopen bandages but was able to see packing in place.     A/P:   39 year old 37w6d who presents for follow-up of wound infection.  The patient has been on Keflex.  She has had no further worsening of symptoms, no fevers.  Incision today does appear somewhat  improved without as much redness surrounding the incision.  She has been seen with wound care and plan is for dressing changes every few days.  However, unfortunately the dressings are falling off faster and did reach out to see if they could get her replacement of the pads given to her in clinic.  Unfortunately they were unable to provide them in wound clinic next-door today and thus they recommended ABD pads with tape.  These were provided to the patient.  Recommended that she continue to call the wound clinic should she have wound concerns and gave her this phone number to be used as needed.  Home care referral is pending.  Current plan is for visit with wound care in 3 weeks assuming that home care referral goes through.  Did discuss that if she has any worsening symptoms she can certainly be seen in our clinic sooner.  Otherwise if home care referral unsuccessful may need to be seen in wound clinic more frequently.  All questions answered and patient agreeable with plan of care    From routine postpartum care I spent 30 minutes reviewing chart, obtaining history, counseling, coordinating care, documenting with regard to patient's wound infection       Lelia Grijalva MD   10/3/2024 2:19 PM

## 2024-10-03 NOTE — TELEPHONE ENCOUNTER
"See other phone enc from 10/2:    \"10/3/24 faxed referrals to:  [facility]\"  "

## 2024-10-03 NOTE — TELEPHONE ENCOUNTER
Patient saw Lyssa Geronimo yesterday for dehisced  incision.  Wound care orders are aquacel ag covered by [employer].    Patient went to check up at her OB today and the nurse called with update that patient is stating the mepilex is not staying on.    Advised to cover with ABD and paper tape as secondary dressing for now and will update Lyssa Chaoney tomorrow and call patient if she would like to change to different dressing.    Patient will also call the triage line to speak with a nurse at our clinic directly.    They were also inquiring about home care, a referral was sent yesterday to [facility] but declined due to capacity.  Will fax to other agencies.

## 2024-10-03 NOTE — NURSING NOTE
"Initial /78 (BP Location: Left arm, Patient Position: Chair, Cuff Size: Adult Regular)   Pulse 67   Temp 97.2  F (36.2  C) (Tympanic)   Wt 117.9 kg (260 lb)   LMP 12/26/2023   Breastfeeding Yes   BMI 35.76 kg/m   Estimated body mass index is 35.76 kg/m  as calculated from the following:    Height as of 9/16/24: 1.816 m (5' 11.5\").    Weight as of this encounter: 117.9 kg (260 lb). .      Marielos Cherry MA    "

## 2024-10-04 ENCOUNTER — PATIENT OUTREACH (OUTPATIENT)
Dept: CARE COORDINATION | Facility: CLINIC | Age: 39
End: 2024-10-04
Payer: COMMERCIAL

## 2024-10-04 LAB
BACTERIA WND CULT: ABNORMAL
GRAM STAIN RESULT: ABNORMAL

## 2024-10-04 NOTE — TELEPHONE ENCOUNTER
Received call from Isela, patient's advocate from the shelter she is staying at (on consent to communicate). She had multiple questions regarding patient's care, dressing changes, supplies, etc. Advised that patient's mother was taught how to do dressing changes, Isela states that patient's mother is unable to do dressing changes. She asked if patient can come in for routine dressing changes. Advised that we cannot do that in our clinic as it is not a skilled need in this clinic and can be taught and was taught to family to complete, patient can also complete herself. Advised that we did order home care, but cannot guarantee that we can find an accepting agency based on insurance and location. Advised that we also ordered supplies for patient and they will be shipped to the address that she has on file with us. Isela verbalized understanding at above, stated that she will try to get the [facility] nurse involved to help with routine dressing changes. We can call Isela on her cell if there are any issues or concerns [phone].

## 2024-10-04 NOTE — TELEPHONE ENCOUNTER
Spoke with patient she agrees to use ABD pads with minimal tape.  Supplies ordered.  Patient will  a few ABD pads from Vascular clinic in [place] today.

## 2024-10-04 NOTE — TELEPHONE ENCOUNTER
Home care referral status:    ABLE TO ACCEPT: 10/7- [facility]    Faxed:  10/3 [facility] - 10/7 [facility] to see if they are able to accept            Declined:  [facility] 10/3 capacity  [facility] 10/3 insurance  [facility] 10/4 location  [facility] 10/4 outside service area  [facility] - declined due to location 10/7  [facility] 10/7

## 2024-10-07 ENCOUNTER — PRENATAL OFFICE VISIT (OUTPATIENT)
Dept: OBGYN | Facility: CLINIC | Age: 39
End: 2024-10-07
Payer: COMMERCIAL

## 2024-10-07 ENCOUNTER — TELEPHONE (OUTPATIENT)
Dept: OBGYN | Facility: CLINIC | Age: 39
End: 2024-10-07

## 2024-10-07 VITALS
SYSTOLIC BLOOD PRESSURE: 129 MMHG | DIASTOLIC BLOOD PRESSURE: 80 MMHG | TEMPERATURE: 97.8 F | HEART RATE: 77 BPM | RESPIRATION RATE: 18 BRPM | BODY MASS INDEX: 35.21 KG/M2 | HEIGHT: 72 IN | WEIGHT: 260 LBS

## 2024-10-07 DIAGNOSIS — T81.30XA WOUND DEHISCENCE: Primary | ICD-10-CM

## 2024-10-07 PROCEDURE — 99213 OFFICE O/P EST LOW 20 MIN: CPT | Performed by: STUDENT IN AN ORGANIZED HEALTH CARE EDUCATION/TRAINING PROGRAM

## 2024-10-07 NOTE — TELEPHONE ENCOUNTER
Patient called, concerned that the wound is looking worse, has an odor and is more red.  She is going to have her mom take a photo and email it to [email or web address] to review and is asking for a call back to advise ASAP.        [phone]

## 2024-10-07 NOTE — TELEPHONE ENCOUNTER
[facility] is able to accept this referral and will be reaching out now to schedule.

## 2024-10-07 NOTE — TELEPHONE ENCOUNTER
Caller reporting the following red-flag symptom(s):  wound infection getting worse     Per the system red-flag symptom policy, patient was instructed to:  speak with a Registered Nurse    Action:  Patient warm transferred to a Registered Nurse

## 2024-10-07 NOTE — TELEPHONE ENCOUNTER
Millie Valadez1 minute ago (12:39 PM)     [facility] is able to accept this referral and will be reaching out now to schedule.

## 2024-10-07 NOTE — TELEPHONE ENCOUNTER
Spoke with patient on the phone.     S-(situation): worsening symptoms or wound infection    B-(background): 9/16/2024 repeat C/S      A-(assessment): Patient calling today reporting that wound seems more red today with increased discharge. Patient reports areas of incision that is open seems to be widening. Patient has not reached out to wound care so will do that now.     R-(recommendations): Appointment scheduled in clinic this afternoon for further assessment. Patient in agreement and reports understanding of plan for clinic appointment here and to call wound care for recommendations.    Radha MICHELLE RN   [facility]

## 2024-10-07 NOTE — TELEPHONE ENCOUNTER
Updated patient that [facility] has accepted her and will be reaching out to schedule. She is also going to her OB clinic today for an incision check.    She is concerned that the largest wound looks like it is getting bigger and more painful. No other symptoms of infection. Will send photos to Lyssa Geronimo to review.

## 2024-10-07 NOTE — NURSING NOTE
"Initial /80 (BP Location: Right arm, Patient Position: Chair, Cuff Size: Adult Large)   Pulse 77   Temp 97.8  F (36.6  C) (Tympanic)   Resp 18   Ht 1.816 m (5' 11.5\")   Wt 117.9 kg (260 lb)   LMP 12/26/2023   Breastfeeding Yes   BMI 35.76 kg/m   Estimated body mass index is 35.76 kg/m  as calculated from the following:    Height as of this encounter: 1.816 m (5' 11.5\").    Weight as of this encounter: 117.9 kg (260 lb). .    Bertha Da Silva, [facility]    "

## 2024-10-08 NOTE — PROGRESS NOTES
"[facility]  Post Partum Office Visit    Assessment and Plan:   Holly Tolliver, 39 year old  s/p RLTCS+BS on 2024 at 37w5d, presents for concerns for worsening  section incision cellulitis and dehiscence. Her pregnancy was complicated by gestational hypertension, asthma, BMI 38.95 . Her delivery was uncomplicated. Her postoperative course has been complicated by  incision cellulitis and dehiscence.     pfannenstiel skin incision cellulitis and dehiscence  -Incision cellulitis appears resolved, s/p 10-day course of keflex  -Dehiscence appears stable. The wounds were irrigated with sterile saline, padded dry, and ABD dressing was reapplied. Patient was given more supplies of ABD dressings. She is waiting for home wound care team to reach out to her.    Antonette Whitman MD  Obstetrics and Gynecology  [facility]   10/07/2024   ---------------------------------------------------------------------------------------------------------------------------  Subjective: The left edge of the  section incision separation appears to be bigger and more painful.    Objective:   Vitals:    10/07/24 1425   BP: 129/80   BP Location: Right arm   Patient Position: Chair   Cuff Size: Adult Large   Pulse: 77   Resp: 18   Temp: 97.8  F (36.6  C)   TempSrc: Tympanic   Weight: 117.9 kg (260 lb)   Height: 1.816 m (5' 11.5\")      Estimated body mass index is 35.76 kg/m  as calculated from the following:    Height as of this encounter: 1.816 m (5' 11.5\").    Weight as of this encounter: 117.9 kg (260 lb).    General appearance: well-hydrated, A&O x 3, no apparent distress  Lungs: Equal expansion bilaterally, no accessory muscle use  Heart: No heaves or thrills. No peripheral varicosities  Abdomen: Soft, non-tender, non-distended. No rebound, rigidity, or guarding. Pfannenstiel skin incision with NO surround redness/erythema. 3 separate spots of the incisions " are open: from patient's right to left (1st about 7mm separation, 2nd about 3cm separation; 3rd about 5mm separation). After opening up the incision, the wound edges appear pink and healthy with small amount of active bleeding.

## 2024-10-11 ENCOUNTER — TELEPHONE (OUTPATIENT)
Dept: VASCULAR SURGERY | Facility: CLINIC | Age: 39
End: 2024-10-11
Payer: COMMERCIAL

## 2024-10-11 RX ORDER — LIDOCAINE 50 MG/G
OINTMENT TOPICAL PRN
Qty: 50 G | Refills: 3 | Status: SHIPPED | OUTPATIENT
Start: 2024-10-11

## 2024-10-11 NOTE — TELEPHONE ENCOUNTER
Spoke with patient and notified her lidocaine order has been sent to pharmacy and for her to call pharmacy to check on cost as this may be an out of pocket expense for her.  Patient states understanding.

## 2024-10-11 NOTE — TELEPHONE ENCOUNTER
Caller: Holly    Provider: NP Lyssa Geronimo    Detailed reason for call: Holly called to state the home care nurse recommended she call to ask for a prescription to be placed for lidocaine cream to help with the pain during the dressing changes.  She is asking that an order be placed and asks for a call back to discuss either way.     Best phone number to contact: [phone]    Best time to contact: any    Ok to leave a detailed message: Yes    Ok to speak to authorized person if needed: No      (Noted to patient if reason is related to wound or incision, to please send a photo via email or Transmit Promot.)

## 2024-10-14 ENCOUNTER — OFFICE VISIT (OUTPATIENT)
Dept: GASTROENTEROLOGY | Facility: CLINIC | Age: 39
End: 2024-10-14
Attending: ADVANCED PRACTICE MIDWIFE
Payer: COMMERCIAL

## 2024-10-14 VITALS
WEIGHT: 265 LBS | HEART RATE: 80 BPM | HEIGHT: 72 IN | BODY MASS INDEX: 35.89 KG/M2 | DIASTOLIC BLOOD PRESSURE: 80 MMHG | SYSTOLIC BLOOD PRESSURE: 108 MMHG

## 2024-10-14 DIAGNOSIS — R10.11 ABDOMINAL PAIN, RIGHT UPPER QUADRANT: ICD-10-CM

## 2024-10-14 PROCEDURE — 99204 OFFICE O/P NEW MOD 45 MIN: CPT | Performed by: NURSE PRACTITIONER

## 2024-10-14 ASSESSMENT — PAIN SCALES - GENERAL: PAINLEVEL: NO PAIN (0)

## 2024-10-14 NOTE — PATIENT INSTRUCTIONS
It was a pleasure taking care of you today.  I've included a brief summary of our discussion and care plan from today's visit below.  Please review this information with your primary care provider.  ______________________________________________________________________    My recommendations are summarized as follows:    As we discussed today, I am referring you to abdominal ultrasound to check for gallstones.    2. Let us know if pain recurs.    Return to GI Clinic as needed   ______________________________________________________________________    What is the gallbladder?  The gallbladder is a small, pear-shaped organ that is tucked under your liver. It stores bile, a fluid that helps the body break down fat.  What are gallstones?  Gallstones are small stones that form inside the gallbladder. They can be tiny specks or get as big as the whole gallbladder, which can be up to 6 inches long.  Normally, the gallbladder fills with bile between meals. Then, when you eat fatty foods, the gallbladder empties the bile into the intestine. Sometimes, though, gallstones clog the gallbladder and keep it from draining. Other times, gallstones just irritate the gallbladder. If the gallstones are pushed out of the gallbladder, they can keep the liver or pancreas from draining.  What are the symptoms of gallstones?  In most cases, gallstones do not cause any symptoms. When they do cause symptoms, gallstones can cause:  ?Belly pain - Often on the right side just under the rib cage or in the middle top portion of the belly.  ?Pain in the back or right shoulder  ?Nausea and vomiting  If you know that you have gallstones but have no symptoms, you will probably not need treatment. But if you start having symptoms, you should get treated. The symptoms can come and go, but they often get worse over time.  Are gallstones serious?  Not usually. In some cases, they can lead to serious problems, including:  ?Jaundice, a condition that turns  your skin and eyes yellow  ?Infection of the gallbladder  ?Tears in the gallbladder, which can lead to death  ?Inflammation of the pancreas (the pancreas is an organ that makes hormones and juices involved in food breakdown)  Is there a test for gallstones?  Yes, doctors can find out if you have gallstones by doing an imaging test, such as an ultrasound. An ultrasound is a painless test that uses sound waves to create a picture of your gallbladder.  Even if tests show that you have gallstones, that does not mean they are causing symptoms. Your doctor might need to do other tests to make sure your stones and your symptoms are related.  How are gallstones treated?  People with gallstones generally have 3 treatment options. They can have:  ?No treatment - This option is best for most people who have no symptoms. If they start having symptoms, they can think about treatment then.  ?Surgery to remove the gallbladder and the stones - Gallbladder surgery is routine in the [place]. But it involves using anesthesia, so it has some risks. The surgery does not affect digestion very much. But about half of people who have surgery have mild symptoms afterward, including watery bowel movements, gas, or bloating. These symptoms usually get better.  ?Treatment to get rid of the stones but keep the gallbladder - People who choose this approach can take medicines to break up gallstones. In rare cases, doctors can use with a device to break up and remove stones. Medicines only work with some stones, and even then they take time - months to years. The stones can also come back after these treatments.  How do I know which treatment to have?  The right treatment for you will depend on:  ?How large your stones are  ?Whether you have symptoms, and how bad the symptoms are  ?How you feel about the treatment options  Ask your doctor or nurse how each treatment might affect you. Then work with them to find the treatment that makes the  most sense for you.  Can I do anything to keep from getting (more) gallstones?  Yes. You can try to keep yourself at a healthy weight. People who are overweight are more likely to get gallstones.  If you plan to lose weight quickly - even if you have never had gallstones - ask your doctor or nurse what you can do to keep from getting gallstones. Losing weight quickly - for example, through weight loss surgery - can lead to gallstones. But your doctor or nurse can give you medicines to keep that from happening.            ____________________________________________________________________        Who do I call with any questions after my visit?  Please be in touch if there are any further questions that arise following today's visit.  There are multiple ways to contact your gastroenterology care team.      During business hours, you may reach a Gastroenterology nurse at [phone], option 3.     To schedule or reschedule an appointment, please call [phone].   To schedule your imaging studies (CT, MRI, ultrasound)  call [phone] (or toll-free # [phone])  To schedule your lab work at [facility], please call [phone]    You can always send a secure message through [employer].  Spark messages are answered by your nurse or doctor typically within 24 hours.  Please allow extra time on weekends and holidays.      For urgent/emergent questions after business hours, you may reach the on-call GI Fellow by contacting the [facility]  at [phone].    In order for your refill to be processed in a timely fashion, it is your responsibility to ensure you follow the recommendations from your provider regarding your laboratory studies and follow up appointments.       How will I get the results of any tests ordered?    You will receive all of your results.  If you have signed up for [employer], any tests ordered at your visit will be available to you after your  physician reviews them.  Typically this takes 1-2 weeks.  If there are urgent results that require a change in your care plan, your physician or nurse will call you to discuss the next steps.   What is [employer]?  [employer] is a secure way for you to access all of your healthcare records from the [facility].  It is a web based computer program, so you can sign on to it from any location.  It also allows you to send secure messages to your care team.  I recommend signing up for [employer] access if you have not already done so and are comfortable with using a computer.    How to I schedule a follow-up visit?  If you did not schedule a follow-up visit today, please call [phone] to schedule a follow-up office visit.      Sincerely,  BRAD Hairston,  [facility],  Division of Gastroenterology   ([facility])

## 2024-10-14 NOTE — PROGRESS NOTES
[address] [address]  Phone: [phone]    Patient:  Holly Tolliver, Date of birth 1985    Referring Provider Isabel Bear    Gastroenterology CLINIC VISIT, NEW PATIENT    REASON FOR CONSULTATION: RUQ abdominal pain    HPI: 39 year old female was referred  to GI clinic in July for a consult on RUQ pain. Patient underwent repeat  on  at 38 weeks of gestation.  Patient stated that she was having right upper quadrant pain during her pregnancy. Not certain if the pain was related to meal intake. It was moderate in intensity and continuous. Sharp and severe at times. Pain did not radiate. Stated that she was having nausea and emesis throughout her entire pregnancy. Denies episodes of skin jaundice, pale stools, or dark urine. Stated that RUQ pain resolved after she delivered her baby daughter. Reported lower abdominal pain from surgical incision and infection.   Denies problems with bowel elimination.     PREVIOUS ENDOSCOPY:  None  PERTINENT STUDIES Reviewed in EMR  24 Abdominal/pelvic CT  FINDINGS:    LOWER CHEST: Normal.   HEPATOBILIARY: Normal.   PANCREAS: Normal.   SPLEEN: Mildly enlarged, measuring 14 cm.    ADRENAL GLANDS: Normal.   KIDNEYS/BLADDER: Symmetric renal enhancement. No urinary calculi or hydronephrosis. Normal bladder.    BOWEL: No bowel obstruction or inflammation. Normal appendix. No free intraperitoneal air.      LYMPH NODES: Enlarged 1.5 cm left external iliac/common femoral lymph node (3/#213), likely reactive. No other enlarged pelvic lymph node.      VASCULATURE: Patent portal, splenic, and superior mesenteric veins. No abdominal aortic aneurysm.      PELVIC ORGANS: Status post  section. Fluid throughout the endometrial canal. A 1.2 x 1.1 cm outpouching of fluid extends from the endometrial canal into the myometrium at the  section incision site (4/#79). No periuterine fluid  collections.      MUSCULOSKELETAL: Rectus diastasis. Ill-defined fluid and gas containing collection within the lower abdominal wall centered anterior to the left lower rectus muscle measuring 6.8 x 1.5 x 2.3 cm in maximal dimensions (3/#201, 4/#83). No other body wall   fluid collections. Subcutaneous edema throughout the lower abdominal wall. Infraumbilical abdominal wall skin thickening.                                                                       IMPRESSION:    1.  Thin ill-defined fluid and gas containing collection within the lower abdominal wall centered anterior to the left lower rectus muscle as described, which could be sterile or infected. Gas within the collection may be related to the recent instrumentation. No intraperitoneal extension or clear fascial defect is identified.     2.  No other fluid collections within the abdomen or pelvis.     3.  Fluid throughout the endometrial canal. A 1.2 x 1.1 cm outpouching of fluid extending from the endometrial canal into the myometrium at the  section incision site is nonspecific but could be a small  section scar niche; consider ultrasound follow-up in 3-6 months to evaluate for persistence.      4.  Reactive left external iliac/common femoral lymph node.     5.  Mild splenomegaly.       ROS: 10pt ROS performed and otherwise negative.    PAST MEDICAL HISTORY:  Past Medical History:   Diagnosis Date    ALCOH DEP NEC/NOS-UNSPEC 2007    Alcohol dependence in remission (H) 2023    Asthma     exercise induced    Brain aneurysm     CARDIOVASCULAR SCREENING; LDL GOAL LESS THAN 160 10/23/2012     delivery delivered 2016    Chickenpox     Chlamydia     Depression with anxiety 2014    Encounter for triage in pregnant patient 2024    Generalized anxiety disorder 2013    Diagnosis updated by automated process. Provider to review and confirm.      GERD (gastroesophageal reflux disease) 2013     Gestational hypertension 2024    Major depressive disorder, recurrent, unspecified (H) 2023    Major depressive disorder, single episode, moderate (H) 2013    Methamphetamine abuse (H) 2023    Mild intermittent asthma 2008    Mirena IUD 2016    Lot: [id number]  Exp:       Panic disorder with agoraphobia and moderate panic attacks 03/10/2009    Persons encountering health services in other specified circumstances 2015    Formatting of this note might be different from the original. Care Coordinator: Kelly DANIEL, MSW See letters for Health Care home care plan SW covering in [place] SW absence FREDY Luz, [phone] 3/24/2015 12:24 PM      Placental abruption 2016    Placental abruption in third trimester 2016    Postpartum depression     also 2016    Preeclampsia     ? in     Pregnancy induced hypertension     Pregnancy with history of  section, antepartum 2024    Right upper quadrant abdominal pain affecting pregnancy 2024    Routine postpartum follow-up 2016    Single liveborn, born in hospital, delivered 2016    Tobacco use disorder 2008    Has decreased from 1 ppd to 1 cigarette per day since pregnancy.      Tooth infection 2022    Urinary tract infection in mother during second trimester of pregnancy 2024    Vaginal spotting 2024       PREVIOUS ABDOMINAL/GYNECOLOGIC SURGERIES:  Past Surgical History:   Procedure Laterality Date     SECTION Bilateral 2016    Procedure:  SECTION;  Surgeon: Beau Cardoso MD;  Location: [facility] OR    COMBINED  SECTION, SALPINGECTOMY BILATERAL Bilateral 2024    Procedure: repeat  SECTION, WITH BILATERAL SALPINGECTOMY;  Surgeon: Antonette Whitman MD;  Location: [facility] OR    PE TUBES  age 3         PERTINENT MEDICATIONS:  Current Outpatient Medications   Medication Sig Dispense Refill    acetaminophen  (TYLENOL) 325 MG tablet Take 325-650 mg by mouth every 6 hours as needed for mild pain.      ibuprofen (ADVIL/MOTRIN) 800 MG tablet Take 1 tablet (800 mg) by mouth every 6 hours. 30 tablet 1    Misc. Devices (BREAST PUMP) MISC 1 each See Admin Instructions. 1 each 0    NIFEdipine ER OSMOTIC (ADALAT CC) 30 MG 24 hr tablet Take 1 tablet (30 mg) by mouth every 24 hours. 30 tablet 2    Prenatal Vit-Fe Fumarate-FA (PRENATAL MULTIVITAMIN  PLUS IRON) 27-1 MG TABS Take 1 tablet by mouth daily.      vitamin B6 (PYRIDOXINE) 100 MG tablet Take 1 tablet (100 mg) by mouth daily 90 tablet 3    ketoconazole (NIZORAL) 2 % external cream Apply topically daily (Patient not taking: Reported on 10/7/2024) 30 g 0    lidocaine (XYLOCAINE) 5 % external ointment Apply topically as needed for moderate pain (for wound care). (Patient not taking: Reported on 10/14/2024) 50 g 3    senna (SENOKOT) 8.6 MG tablet Take 1 tablet by mouth daily as needed for constipation. (Patient not taking: Reported on 10/7/2024)      senna-docusate (SENOKOT-S/PERICOLACE) 8.6-50 MG tablet Take 1 tablet by mouth 2 times daily. (Patient not taking: Reported on 10/7/2024) 30 tablet 1         SOCIAL HISTORY:  Social History     Socioeconomic History    Marital status: Single     Spouse name: Not on file    Number of children: 1    Years of education: Not on file    Highest education level: Not on file   Occupational History    Occupation: PCA     Employer: [employer]   Tobacco Use    Smoking status: Former     Current packs/day: 0.00     Types: Cigarettes     Passive exposure: Never    Smokeless tobacco: Never   Vaping Use    Vaping status: Former   Substance and Sexual Activity    Alcohol use: Not Currently     Comment: sober since 5/19/23    Drug use: Not Currently     Types: Methamphetamines, Marijuana     Comment: sober since 5/19/23    Sexual activity: Yes     Partners: Male   Other Topics Concern    Parent/sibling w/ CABG, MI or  angioplasty before 65F 55M? No   Social History Narrative    Not on file     Social Determinants of Health     Financial Resource Strain: Low Risk  (9/16/2024)    Financial Resource Strain     Within the past 12 months, have you or your family members you live with been unable to get utilities (heat, electricity) when it was really needed?: No   Food Insecurity: Low Risk  (9/16/2024)    Food Insecurity     Within the past 12 months, did you worry that your food would run out before you got money to buy more?: No     Within the past 12 months, did the food you bought just not last and you didn t have money to get more?: No   Transportation Needs: Low Risk  (9/16/2024)    Transportation Needs     Within the past 12 months, has lack of transportation kept you from medical appointments, getting your medicines, non-medical meetings or appointments, work, or from getting things that you need?: No   Physical Activity: Not on file   Stress: Not on file   Social Connections: Not on file   Interpersonal Safety: Low Risk  (9/16/2024)    Interpersonal Safety     Do you feel physically and emotionally safe where you currently live?: Yes     Within the past 12 months, have you been hit, slapped, kicked or otherwise physically hurt by someone?: No     Within the past 12 months, have you been humiliated or emotionally abused in other ways by your partner or ex-partner?: No   Housing Stability: Low Risk  (9/16/2024)    Housing Stability     Do you have housing? : Yes     Are you worried about losing your housing?: No       FAMILY HISTORY:  Denies colon/panc/esophageal/other GI CA, no other Vines or other HPS-related Dimitris. No IBD/celiac, no other AI/liver/thyroid disease.    Family History   Problem Relation Age of Onset    Alcohol/Drug Mother         alcohol- recovered    Hypertension Mother     Depression Mother         also anxiety    Other - See Comments Mother         ankylosing spondylosis    Anxiety Disorder Mother      "Irritable Bowel Syndrome Mother     Alcohol/Drug Father         alcohol- recovered    Alcohol/Drug Sister         A&D-recovered    Alcoholism Brother         recovered    Alcohol/Drug Brother         alcohol    Hypertension Brother     Schizophrenia Brother     Cancer Maternal Grandmother         lung- at age 44    Depression Maternal Grandmother         also anxiety       PHYSICAL EXAMINATION:  Vitals reviewed  /80 (BP Location: Right arm, Patient Position: Sitting, Cuff Size: Adult Large)   Pulse 80   Ht 1.816 m (5' 11.5\")   Wt 120.2 kg (265 lb)   LMP 2023   Breastfeeding Yes   BMI 36.44 kg/m      General: Patient appears well in no acute distress.    Skin: No visualized rash or lesions on visualized skin  HEENT:    EOMI, no erythema, sclera icterus or discharge noted.  Mouth mucosa intact, pink, moist  No cervical or supraclavicular lymphadenopathy. Thyroid gland not enlarged.  Resp: breathing comfortably without accessory muscle usage, speaking in full sentences, no cough. Lung sounds clear  Card: Regular and rhythmic S1 and S2. No gallop or rub. No murmur.  No LE edema.  Abdomen: Active bowel sounds X 4 quadrants. Soft to palpation. Tenderness in epigastrium and RUQ.  No guarding or rebound tenderness. Gunter's sign negative.  MSK: Appears to have normal range of motion based on visualized movements  Neurologic: No apparent tremors, facial movements symmetric  Psych: affect normal, alert and oriented      ASSESSMENT/PLAN:    ICD-10-CM    1. Abdominal pain, right upper quadrant  R10.11 Adult GI  Referral - Consult Only     US Abdomen Limited     Adult GI Clinic Follow-Up Order (Blank)         39 year old female  presented to GI clinic for a consultation on the RUQ abdominal pain that she developed during pregnancy. Stated that pain got resolved after delivery.Although, there was tenderness on palpation during exam today. Patient reported resolution of acid reflux, nausea and " vomiting. Denies any issues with bowel elimination. Stated that she never had any gastrointestinal problems before. Noted mild splenomegaly on CT scan of abdomen. There was fluid and gas containing collection within the lower abdominal wall. Patient found to have incisional cellulitis and dehiscence. Has been followed by OB GYN.  We discussed possible etiologies of abdominal pain in the right upper quadrant including abdominal wall pain from increased intraabdominal pressure during pregnancy, strain from vomiting, GERD, gastritis, cholelithiasis, peptic ulcer, and bloating.  Will proceed with limited abdominal ultrasound for evaluation of gallbladder and biliary ducts. Will send [employer] message with study report.  If cholelithiasis is present and pain recurs, the patient will be referred to general surgery. Definitely, if possible, would allow the patient to recover from  and ensure wound healing.  Briefly talked about low fat diet.  Patient verbalized understanding and appreciation of care provided. Stated that all of the questions were answered to her/his satisfaction.  RTC as needed    Thank you for this consultation. It was a pleasure to participate in the care of this patient; please contact us with any further questions.    WOLFGANG Hairston, FNP-C  Division of Gastroenterology  [facility], [place], [place]    This note was created with Dragon voice recognition software, and while reviewed for accuracy, inadvertent minor typographic errors may occur. Please contact the provider if you have any questions.

## 2024-10-14 NOTE — LETTER
10/14/2024      Holly Tolliver  [address] [address]      Dear Colleague,    Thank you for referring your patient, Holly Tolliver, to the [facility]. Please see a copy of my visit note below.      [facility]  [address] [address]  Phone: [phone]    Patient:  Holly Tolliver, Date of birth 1985    Referring Provider Isabel Bear    Gastroenterology CLINIC VISIT, NEW PATIENT    REASON FOR CONSULTATION: RUQ abdominal pain    HPI: 39 year old female was referred  to GI clinic in July for a consult on RUQ pain. Patient underwent repeat  on  at 38 weeks of gestation.  Patient stated that she was having right upper quadrant pain during her pregnancy. Not certain if the pain was related to meal intake. It was moderate in intensity and continuous. Sharp and severe at times. Pain did not radiate. Stated that she was having nausea and emesis throughout her entire pregnancy. Denies episodes of skin jaundice, pale stools, or dark urine. Stated that RUQ pain resolved after she delivered her baby daughter. Reported lower abdominal pain from surgical incision and infection.   Denies problems with bowel elimination.     PREVIOUS ENDOSCOPY:  None  PERTINENT STUDIES Reviewed in EMR  24 Abdominal/pelvic CT  FINDINGS:    LOWER CHEST: Normal.   HEPATOBILIARY: Normal.   PANCREAS: Normal.   SPLEEN: Mildly enlarged, measuring 14 cm.    ADRENAL GLANDS: Normal.   KIDNEYS/BLADDER: Symmetric renal enhancement. No urinary calculi or hydronephrosis. Normal bladder.    BOWEL: No bowel obstruction or inflammation. Normal appendix. No free intraperitoneal air.      LYMPH NODES: Enlarged 1.5 cm left external iliac/common femoral lymph node (3/#213), likely reactive. No other enlarged pelvic lymph node.      VASCULATURE: Patent portal, splenic, and superior mesenteric veins. No abdominal aortic aneurysm.      PELVIC  ORGANS: Status post  section. Fluid throughout the endometrial canal. A 1.2 x 1.1 cm outpouching of fluid extends from the endometrial canal into the myometrium at the  section incision site (4/#79). No periuterine fluid collections.      MUSCULOSKELETAL: Rectus diastasis. Ill-defined fluid and gas containing collection within the lower abdominal wall centered anterior to the left lower rectus muscle measuring 6.8 x 1.5 x 2.3 cm in maximal dimensions (3/#201, 4/#83). No other body wall   fluid collections. Subcutaneous edema throughout the lower abdominal wall. Infraumbilical abdominal wall skin thickening.                                                                       IMPRESSION:    1.  Thin ill-defined fluid and gas containing collection within the lower abdominal wall centered anterior to the left lower rectus muscle as described, which could be sterile or infected. Gas within the collection may be related to the recent instrumentation. No intraperitoneal extension or clear fascial defect is identified.     2.  No other fluid collections within the abdomen or pelvis.     3.  Fluid throughout the endometrial canal. A 1.2 x 1.1 cm outpouching of fluid extending from the endometrial canal into the myometrium at the  section incision site is nonspecific but could be a small  section scar niche; consider ultrasound follow-up in 3-6 months to evaluate for persistence.      4.  Reactive left external iliac/common femoral lymph node.     5.  Mild splenomegaly.       ROS: 10pt ROS performed and otherwise negative.    PAST MEDICAL HISTORY:  Past Medical History:   Diagnosis Date     ALCOH DEP NEC/NOS-UNSPEC 2007     Alcohol dependence in remission (H) 2023     Asthma     exercise induced     Brain aneurysm      CARDIOVASCULAR SCREENING; LDL GOAL LESS THAN 160 10/23/2012      delivery delivered 2016     Chickenpox      Chlamydia      Depression with anxiety  2014     Encounter for triage in pregnant patient 2024     Generalized anxiety disorder 2013    Diagnosis updated by automated process. Provider to review and confirm.       GERD (gastroesophageal reflux disease) 2013     Gestational hypertension 2024     Major depressive disorder, recurrent, unspecified (H) 2023     Major depressive disorder, single episode, moderate (H) 2013     Methamphetamine abuse (H) 2023     Mild intermittent asthma 2008     Mirena IUD 2016    Lot: [id number]  Exp:        Panic disorder with agoraphobia and moderate panic attacks 03/10/2009     Persons encountering health services in other specified circumstances 2015    Formatting of this note might be different from the original. Care Coordinator: Kelly DANIEL, MSW See letters for Health Care home care plan SW covering in [facility] absence FREDY Luz, [phone] 3/24/2015 12:24 PM       Placental abruption 2016     Placental abruption in third trimester 2016     Postpartum depression     also 2016     Preeclampsia     ? in      Pregnancy induced hypertension      Pregnancy with history of  section, antepartum 2024     Right upper quadrant abdominal pain affecting pregnancy 2024     Routine postpartum follow-up 2016     Single liveborn, born in hospital, delivered 2016     Tobacco use disorder 2008    Has decreased from 1 ppd to 1 cigarette per day since pregnancy.       Tooth infection 2022     Urinary tract infection in mother during second trimester of pregnancy 2024     Vaginal spotting 2024       PREVIOUS ABDOMINAL/GYNECOLOGIC SURGERIES:  Past Surgical History:   Procedure Laterality Date      SECTION Bilateral 2016    Procedure:  SECTION;  Surgeon: Beau Cardoso MD;  Location: [facility] OR     COMBINED  SECTION, SALPINGECTOMY  BILATERAL Bilateral 2024    Procedure: repeat  SECTION, WITH BILATERAL SALPINGECTOMY;  Surgeon: Antonette Whitman MD;  Location: [facility] OR     PE TUBES  age 3         PERTINENT MEDICATIONS:  Current Outpatient Medications   Medication Sig Dispense Refill     acetaminophen (TYLENOL) 325 MG tablet Take 325-650 mg by mouth every 6 hours as needed for mild pain.       ibuprofen (ADVIL/MOTRIN) 800 MG tablet Take 1 tablet (800 mg) by mouth every 6 hours. 30 tablet 1     Misc. Devices (BREAST PUMP) MISC 1 each See Admin Instructions. 1 each 0     NIFEdipine ER OSMOTIC (ADALAT CC) 30 MG 24 hr tablet Take 1 tablet (30 mg) by mouth every 24 hours. 30 tablet 2     Prenatal Vit-Fe Fumarate-FA (PRENATAL MULTIVITAMIN  PLUS IRON) 27-1 MG TABS Take 1 tablet by mouth daily.       vitamin B6 (PYRIDOXINE) 100 MG tablet Take 1 tablet (100 mg) by mouth daily 90 tablet 3     ketoconazole (NIZORAL) 2 % external cream Apply topically daily (Patient not taking: Reported on 10/7/2024) 30 g 0     lidocaine (XYLOCAINE) 5 % external ointment Apply topically as needed for moderate pain (for wound care). (Patient not taking: Reported on 10/14/2024) 50 g 3     senna (SENOKOT) 8.6 MG tablet Take 1 tablet by mouth daily as needed for constipation. (Patient not taking: Reported on 10/7/2024)       senna-docusate (SENOKOT-S/PERICOLACE) 8.6-50 MG tablet Take 1 tablet by mouth 2 times daily. (Patient not taking: Reported on 10/7/2024) 30 tablet 1         SOCIAL HISTORY:  Social History     Socioeconomic History     Marital status: Single     Spouse name: Not on file     Number of children: 1     Years of education: Not on file     Highest education level: Not on file   Occupational History     Occupation: PCA     Employer: [employer]   Tobacco Use     Smoking status: Former     Current packs/day: 0.00     Types: Cigarettes     Passive exposure: Never     Smokeless tobacco: Never   Vaping Use     Vaping status: Former    Substance and Sexual Activity     Alcohol use: Not Currently     Comment: sober since 5/19/23     Drug use: Not Currently     Types: Methamphetamines, Marijuana     Comment: sober since 5/19/23     Sexual activity: Yes     Partners: Male   Other Topics Concern     Parent/sibling w/ CABG, MI or angioplasty before 65F 55M? No   Social History Narrative     Not on file     Social Determinants of Health     Financial Resource Strain: Low Risk  (9/16/2024)    Financial Resource Strain      Within the past 12 months, have you or your family members you live with been unable to get utilities (heat, electricity) when it was really needed?: No   Food Insecurity: Low Risk  (9/16/2024)    Food Insecurity      Within the past 12 months, did you worry that your food would run out before you got money to buy more?: No      Within the past 12 months, did the food you bought just not last and you didn t have money to get more?: No   Transportation Needs: Low Risk  (9/16/2024)    Transportation Needs      Within the past 12 months, has lack of transportation kept you from medical appointments, getting your medicines, non-medical meetings or appointments, work, or from getting things that you need?: No   Physical Activity: Not on file   Stress: Not on file   Social Connections: Not on file   Interpersonal Safety: Low Risk  (9/16/2024)    Interpersonal Safety      Do you feel physically and emotionally safe where you currently live?: Yes      Within the past 12 months, have you been hit, slapped, kicked or otherwise physically hurt by someone?: No      Within the past 12 months, have you been humiliated or emotionally abused in other ways by your partner or ex-partner?: No   Housing Stability: Low Risk  (9/16/2024)    Housing Stability      Do you have housing? : Yes      Are you worried about losing your housing?: No       FAMILY HISTORY:  Denies colon/panc/esophageal/other GI CA, no other Vines or other HPS-related Dimitris. No  "IBD/celiac, no other AI/liver/thyroid disease.    Family History   Problem Relation Age of Onset     Alcohol/Drug Mother         alcohol- recovered     Hypertension Mother      Depression Mother         also anxiety     Other - See Comments Mother         ankylosing spondylosis     Anxiety Disorder Mother      Irritable Bowel Syndrome Mother      Alcohol/Drug Father         alcohol- recovered     Alcohol/Drug Sister         A&D-recovered     Alcoholism Brother         recovered     Alcohol/Drug Brother         alcohol     Hypertension Brother      Schizophrenia Brother      Cancer Maternal Grandmother         lung- at age 44     Depression Maternal Grandmother         also anxiety       PHYSICAL EXAMINATION:  Vitals reviewed  /80 (BP Location: Right arm, Patient Position: Sitting, Cuff Size: Adult Large)   Pulse 80   Ht 1.816 m (5' 11.5\")   Wt 120.2 kg (265 lb)   LMP 2023   Breastfeeding Yes   BMI 36.44 kg/m      General: Patient appears well in no acute distress.    Skin: No visualized rash or lesions on visualized skin  HEENT:    EOMI, no erythema, sclera icterus or discharge noted.  Mouth mucosa intact, pink, moist  No cervical or supraclavicular lymphadenopathy. Thyroid gland not enlarged.  Resp: breathing comfortably without accessory muscle usage, speaking in full sentences, no cough. Lung sounds clear  Card: Regular and rhythmic S1 and S2. No gallop or rub. No murmur.  No LE edema.  Abdomen: Active bowel sounds X 4 quadrants. Soft to palpation. Tenderness in epigastrium and RUQ.  No guarding or rebound tenderness. Gunter's sign negative.  MSK: Appears to have normal range of motion based on visualized movements  Neurologic: No apparent tremors, facial movements symmetric  Psych: affect normal, alert and oriented      ASSESSMENT/PLAN:    ICD-10-CM    1. Abdominal pain, right upper quadrant  R10.11 Adult GI  Referral - Consult Only     US Abdomen Limited     Adult GI Clinic " Follow-Up Order (Blank)         39 year old female  presented to GI clinic for a consultation on the RUQ abdominal pain that she developed during pregnancy. Stated that pain got resolved after delivery.Although, there was tenderness on palpation during exam today. Patient reported resolution of acid reflux, nausea and vomiting. Denies any issues with bowel elimination. Stated that she never had any gastrointestinal problems before. Noted mild splenomegaly on CT scan of abdomen. There was fluid and gas containing collection within the lower abdominal wall. Patient found to have incisional cellulitis and dehiscence. Has been followed by OB GYN.  We discussed possible etiologies of abdominal pain in the right upper quadrant including abdominal wall pain from increased intraabdominal pressure during pregnancy, strain from vomiting, GERD, gastritis, cholelithiasis, peptic ulcer, and bloating.  Will proceed with limited abdominal ultrasound for evaluation of gallbladder and biliary ducts. Will send [employer] message with study report.  If cholelithiasis is present and pain recurs, the patient will be referred to general surgery. Definitely, if possible, would allow the patient to recover from  and ensure wound healing.  Briefly talked about low fat diet.  Patient verbalized understanding and appreciation of care provided. Stated that all of the questions were answered to her/his satisfaction.  RTC as needed    Thank you for this consultation. It was a pleasure to participate in the care of this patient; please contact us with any further questions.    WOLFGANG Hairston, NETTE-C  Division of Gastroenterology  [facility], [place], [place]    This note was created with Dragon voice recognition software, and while reviewed for accuracy, inadvertent minor typographic errors may occur. Please contact the provider if you have any questions.       Again, thank you for allowing me to  participate in the care of your patient.        Sincerely,        WOLFGANG THORNTON CNP

## 2024-10-25 ENCOUNTER — HOSPITAL ENCOUNTER (OUTPATIENT)
Dept: ULTRASOUND IMAGING | Facility: CLINIC | Age: 39
Discharge: HOME | End: 2024-10-25
Attending: NURSE PRACTITIONER | Admitting: NURSE PRACTITIONER
Payer: COMMERCIAL

## 2024-10-25 ENCOUNTER — OFFICE VISIT (OUTPATIENT)
Dept: VASCULAR SURGERY | Facility: CLINIC | Age: 39
End: 2024-10-25
Attending: NURSE PRACTITIONER
Payer: COMMERCIAL

## 2024-10-25 VITALS — HEART RATE: 62 BPM | SYSTOLIC BLOOD PRESSURE: 128 MMHG | DIASTOLIC BLOOD PRESSURE: 67 MMHG | OXYGEN SATURATION: 96 %

## 2024-10-25 DIAGNOSIS — T81.31XD DEHISCENCE OF OPERATIVE WOUND, SUBSEQUENT ENCOUNTER: Primary | ICD-10-CM

## 2024-10-25 DIAGNOSIS — L08.9 WOUND INFECTION: ICD-10-CM

## 2024-10-25 DIAGNOSIS — T14.8XXA WOUND INFECTION: ICD-10-CM

## 2024-10-25 PROCEDURE — 76705 ECHO EXAM OF ABDOMEN: CPT

## 2024-10-25 PROCEDURE — 11042 DBRDMT SUBQ TIS 1ST 20SQCM/<: CPT | Performed by: NURSE PRACTITIONER

## 2024-10-25 RX ORDER — LIDOCAINE 50 MG/G
OINTMENT TOPICAL DAILY PRN
Status: ACTIVE | OUTPATIENT
Start: 2024-10-25

## 2024-10-25 ASSESSMENT — PAIN SCALES - GENERAL: PAINLEVEL_OUTOF10: MILD PAIN (3)

## 2024-10-25 NOTE — PROGRESS NOTES
Follow up Vascular Visit       Date of Service:10/25/24      Chief Complaint: Disruption of  incision      Pt returns to [facility] with regards to their disruption of  incision.  They arrive today with her infant. They are currently using aquacel ag; ABD to the wounds. This is being done by home care 3 days per week; pt reports the dressing is frequently falling off and she is not able to repack this; just covering with abd pad. She did not like the bordered foam; states this was painful. They are using nothing for compression. They are feeling well today. Denies fevers, chills. No shortness of breath.     Allergies:   Allergies   Allergen Reactions    Amoxicillin Rash       Medications:   Current Outpatient Medications:     acetaminophen (TYLENOL) 325 MG tablet, Take 325-650 mg by mouth every 6 hours as needed for mild pain., Disp: , Rfl:     ibuprofen (ADVIL/MOTRIN) 800 MG tablet, Take 1 tablet (800 mg) by mouth every 6 hours., Disp: 30 tablet, Rfl: 1    Misc. Devices (BREAST PUMP) MISC, 1 each See Admin Instructions., Disp: 1 each, Rfl: 0    NIFEdipine ER OSMOTIC (ADALAT CC) 30 MG 24 hr tablet, Take 1 tablet (30 mg) by mouth every 24 hours., Disp: 30 tablet, Rfl: 2    Prenatal Vit-Fe Fumarate-FA (PRENATAL MULTIVITAMIN  PLUS IRON) 27-1 MG TABS, Take 1 tablet by mouth daily., Disp: , Rfl:     vitamin B6 (PYRIDOXINE) 100 MG tablet, Take 1 tablet (100 mg) by mouth daily, Disp: 90 tablet, Rfl: 3    ketoconazole (NIZORAL) 2 % external cream, Apply topically daily (Patient not taking: Reported on 10/7/2024), Disp: 30 g, Rfl: 0    lidocaine (XYLOCAINE) 5 % external ointment, Apply topically as needed for moderate pain (for wound care). (Patient not taking: Reported on 10/25/2024), Disp: 50 g, Rfl: 3    senna (SENOKOT) 8.6 MG tablet, Take 1 tablet by mouth daily as needed for constipation. (Patient not taking: Reported on 10/7/2024), Disp: , Rfl:     senna-docusate  (SENOKOT-S/PERICOLACE) 8.6-50 MG tablet, Take 1 tablet by mouth 2 times daily. (Patient not taking: Reported on 10/25/2024), Disp: 30 tablet, Rfl: 1    Current Facility-Administered Medications:     lidocaine (XYLOCAINE) 5 % ointment, , Topical, Daily PRN, Lyssa Geronimo, NP    lidocaine (XYLOCAINE) 5 % ointment, , Topical, Daily PRN, Lyssa Geronimo, NP    History:   Past Medical History:   Diagnosis Date    ALCOH DEP NEC/NOS-UNSPEC 2007    Alcohol dependence in remission (H) 2023    Asthma     exercise induced    Brain aneurysm     CARDIOVASCULAR SCREENING; LDL GOAL LESS THAN 160 10/23/2012     delivery delivered 2016    Chickenpox     Chlamydia     Depression with anxiety 2014    Encounter for triage in pregnant patient 2024    Generalized anxiety disorder 2013    Diagnosis updated by automated process. Provider to review and confirm.      GERD (gastroesophageal reflux disease) 2013    Gestational hypertension 2024    Major depressive disorder, recurrent, unspecified (H) 2023    Major depressive disorder, single episode, moderate (H) 2013    Methamphetamine abuse (H) 2023    Mild intermittent asthma 2008    Mirena IUD 2016    Lot: [id number]  Exp:       Panic disorder with agoraphobia and moderate panic attacks 03/10/2009    Persons encountering health services in other specified circumstances 2015    Formatting of this note might be different from the original. Care Coordinator: Kelly DANIEL, MSW See letters for Health Care home care plan SW covering in KIM Mccabe  absence Kelly Simon, FREDY, [phone] 3/24/2015 12:24 PM      Placental abruption 2016    Placental abruption in third trimester 2016    Postpartum depression     also 2016    Preeclampsia     ? in     Pregnancy induced hypertension     Pregnancy with history of  section, antepartum 2024    Right upper  quadrant abdominal pain affecting pregnancy 07/05/2024    Routine postpartum follow-up 05/31/2016    Single liveborn, born in hospital, delivered 04/19/2016    Tobacco use disorder 02/19/2008    Has decreased from 1 ppd to 1 cigarette per day since pregnancy.      Tooth infection 02/20/2022    Urinary tract infection in mother during second trimester of pregnancy 07/05/2024    Vaginal spotting 07/05/2024       Physical Exam:    /67   Pulse 62   LMP 12/26/2023   SpO2 96%     General:  Patient presents to clinic in no apparent distress.  Head: normocephalic atraumatic  Psychiatric:  Alert and oriented x3.   Respiratory: unlabored breathing; no cough  Integumentary:  Skin is uniformly warm, dry and pink.    Ulcer #1 Location: right abdomen  Size: 0.3x1.2 x 1depth.  No sinus tract present, Wound base: red; agranular  no undermining present. Ulcer is full thickness. There is moderate drainage. Periwound: no denudement, erythema, induration, maceration or warmth.      Ulcer #2 Location: left abdomen  Size: 0.3 x1.2x 0.5 depth.  No sinus tract present, Wound base: red; agranular  no undermining present. Ulcer is full thickness. There is moderate drainage. Periwound: no denudement, erythema, induration, maceration or warmth.      VASC Wound ABD middle (Active)   Pre Size Length 0.2 10/25/24 0800   Pre Size Width 0.2 10/25/24 0800   Pre Size Depth 5 10/02/24 1400   Pre Total Sq cm 0.04 10/25/24 0800   Post Size Length 0 10/25/24 0800   Post Size Width 0 10/25/24 0800   Post Size Depth 0 10/25/24 0800   Post Total Sq cm 0 10/25/24 0800   Undermined 0 10/25/24 0800   Number of days: 23       VASC Wound ABD LEFT (Active)   Pre Size Length 0.3 10/25/24 0800   Pre Size Width 1.2 10/25/24 0800   Pre Size Depth 0.5 10/25/24 0800   Pre Total Sq cm 0.36 10/25/24 0800   Number of days: 23       VASC Wound surgical abdominal right (Active)   Pre Size Length 0.3 10/25/24 0800   Pre Size Width 1.2 10/25/24 0800   Pre Size Depth  "1 10/25/24 0800   Pre Total Sq cm 0.36 10/25/24 0800   Number of days: 23       Incision/Surgical Site Abdomen (Active)   Number of days:             Circumferential volume measures:           No data to display                Labs:    I personally reviewed the following lab results today and those on care everywhere    CRP Inflammation   Date Value Ref Range Status   10/22/2014 13.5 (H) 0.0 - 8.0 mg/L Final      No results found for: \"SED\"   Last Renal Panel:  Sodium   Date Value Ref Range Status   09/17/2024 134 (L) 135 - 145 mmol/L Final   05/28/2020 137 133 - 144 mmol/L Final     Potassium   Date Value Ref Range Status   09/17/2024 4.1 3.4 - 5.3 mmol/L Final   01/24/2022 3.5 3.4 - 5.3 mmol/L Final   05/28/2020 4.2 3.4 - 5.3 mmol/L Final     Chloride   Date Value Ref Range Status   09/17/2024 104 98 - 107 mmol/L Final   01/24/2022 105 94 - 109 mmol/L Final   05/28/2020 106 94 - 109 mmol/L Final     Carbon Dioxide   Date Value Ref Range Status   05/28/2020 26 20 - 32 mmol/L Final     Carbon Dioxide (CO2)   Date Value Ref Range Status   09/17/2024 22 22 - 29 mmol/L Final   01/24/2022 27 20 - 32 mmol/L Final     Anion Gap   Date Value Ref Range Status   09/17/2024 8 7 - 15 mmol/L Final   01/24/2022 5 3 - 14 mmol/L Final   05/28/2020 5 3 - 14 mmol/L Final     Glucose   Date Value Ref Range Status   09/17/2024 175 (H) 70 - 99 mg/dL Final   01/24/2022 85 70 - 99 mg/dL Final   05/28/2020 85 70 - 99 mg/dL Final     Urea Nitrogen   Date Value Ref Range Status   09/17/2024 10.1 6.0 - 20.0 mg/dL Final   01/24/2022 11 7 - 30 mg/dL Final   05/28/2020 15 7 - 30 mg/dL Final     Creatinine   Date Value Ref Range Status   09/17/2024 0.71 0.51 - 0.95 mg/dL Final   05/28/2020 0.82 0.52 - 1.04 mg/dL Final     GFR Estimate   Date Value Ref Range Status   09/17/2024 >90 >60 mL/min/1.73m2 Final     Comment:     eGFR calculated using 2021 CKD-EPI equation.   05/28/2020 >90 >60 mL/min/[1.73_m2] Final     Comment:     Non  " "GFR Calc  Starting 12/18/2018, serum creatinine based estimated GFR (eGFR) will be   calculated using the Chronic Kidney Disease Epidemiology Collaboration   (CKD-EPI) equation.       Calcium   Date Value Ref Range Status   09/17/2024 9.0 8.8 - 10.4 mg/dL Final     Comment:     Reference intervals for this test were updated on 7/16/2024 to reflect our healthy population more accurately. There may be differences in the flagging of prior results with similar values performed with this method. Those prior results can be interpreted in the context of the updated reference intervals.   05/28/2020 8.7 8.5 - 10.1 mg/dL Final     Albumin   Date Value Ref Range Status   09/17/2024 3.1 (L) 3.5 - 5.2 g/dL Final   01/24/2022 3.8 3.4 - 5.0 g/dL Final   05/28/2020 3.9 3.4 - 5.0 g/dL Final      Lab Results   Component Value Date    WBC 7.2 09/30/2024    WBC 5.6 05/28/2020     Lab Results   Component Value Date    RBC 4.76 09/30/2024    RBC 4.88 05/28/2020     Lab Results   Component Value Date    HGB 12.2 09/30/2024    HGB 14.1 05/28/2020     Lab Results   Component Value Date    HCT 39.8 09/30/2024    HCT 42.4 05/28/2020     No components found for: \"MCT\"  Lab Results   Component Value Date    MCV 84 09/30/2024    MCV 87 05/28/2020     Lab Results   Component Value Date    MCH 25.6 09/30/2024    MCH 28.9 05/28/2020     Lab Results   Component Value Date    MCHC 30.7 09/30/2024    MCHC 33.3 05/28/2020     Lab Results   Component Value Date    RDW 14.2 09/30/2024    RDW 12.9 05/28/2020     Lab Results   Component Value Date     09/30/2024     05/28/2020      Lab Results   Component Value Date    A1C 5.0 03/15/2024      TSH   Date Value Ref Range Status   09/23/2021 2.72 0.40 - 4.00 mU/L Final   09/28/2015 0.85 0.40 - 4.00 mU/L Final      No results found for: \"VITDT\"                Impression:  Encounter Diagnoses   Name Primary?    Dehiscence of operative wound, subsequent encounter Yes    Wound infection     "                      Are any of these ulcers new today: No; Location: na    Assessment/Plan:          1. Debridement: Nursing staff removed the old dressing and cleanse the wound(s) with specified solution. After discussion of risk factors and verbal consent was obtained 2% Lidocaine HCL jelly was applied, under clean conditions, the abdominal ulceration(s) were debrided using currette. Devitalized and nonviable tissue, along with any fibrin and slough, was removed to improve granulation tissue formation, stimulate wound healing, decrease overall bacteria load, disrupt biofilm formation and decrease edge senescence.  Total excisional debridement was 0.72 sq cm from the epidermis/dermis area and into the subcutaneous tissue with a depth of 0.5-1 cm.   Ulcers were improved afterwards and .  Measures were unchanged after debridement.       2.  Ulcer treatment: ulcer treatment will include irrigation and dressings to promote autolytic debridement which will include:will continue with home care; continue with aquacel ag; bordered foam or ABD; If for some reason the patient is not able to get their dressing(s) changed as outlined above (due to illness, lack of supplies, lack of help) please do the following: remove old, soiled dressings; wash the ulcers with saline; pat dry; apply ABD pad or other absorbant pad and secure with rolled gauze; avoid tape directly on your skin; patient instructed to call the clinic as soon as possible to let us know what the current issues are in receiving ulcer care. Stable            3. Edema: na.            4. Nutrition: focus on protein           5. Offloading: na          6. Wound Etiology: surgical     Patient will follow up with me in 2-3 weeks for reevaluation. They were instructed to call the clinic sooner with any signs or symptoms of infection or any further questions/concerns. Answered all questions.          Lyssa Geronimo DNP, RN, CNP, CWOCN, CFCN, CLT  [facility]   [phone]        This note was electronically signed by Lyssa Geronimo NP

## 2024-10-25 NOTE — PATIENT INSTRUCTIONS
We will order home care; if you cannot get home care you will need a family member or friend to do the dressing    Wound care supplies were not ordered or needed today.        Wound Care Instructions    3 TIMES PER WEEK and as needed, Cleanse your abdominal  wound(s) with Dilute hibiclens 30cc in 500cc NS.    Pat Dry with non-sterile gauze      Primary Dressing: Apply strip of aquacel ag  into/onto the wounds; use only one strip; leave long tail hanging out for easy removal    Secondary dressing: Cover with bordered foam or ABD    It is ok to get your wound wet in the bath or shower; shower right before home care comes to change the dressing      If for some reason you are not able to get your dressing(s) changed as outlined above (due to illness, lack of supplies, lack of help) please do the following: remove old, soiled dressings; wash the wounds with saline; pat dry; apply ABD pad or other absorbant pad and secure with rolled gauze; avoid tape directly on your skin; Call the clinic as soon as possible to let us know what the current issues are in receiving wound care [phone].      SEEK MEDICAL CARE IF:  You have an increase in swelling, pain, or redness around the wound.  You have an increase in the amount of pus coming from the wound.  There is a bad smell coming from the wound.  The wound appears to be worsening/enlarging  You have a fever greater than 101.5 F      It is ok to continue current wound care treatment/products for the next 2-3 days until new wound care supplies are ordered and arrive. If longer than this please contact our office at [phone].    If you have a 2 layer or 4 layer compression wrap on these are safe to have on for ONLY 7 days. If for some reason you are not able to get the wrap(s) changed (due to illness; lack of supplies, lack of help, lack of transportation) please do the following: unwrap the old 2 or 4 layer compression wrap; avoid using scissors as you could cut your skin  and cause wounds; use tubular compression when available. Call to reschedule your home care or clinic visit appointment as soon as possible.    Please NOTE: if you are 15 minutes late to your clinic appointment you will have to be rescheduled. Please call our clinic as soon as possible if you know you will not be able to get to your appointment at [phone].    If you fail to show up to 3 scheduled clinic appointments you will be dismissed from our clinic.              We want to hear from you!  In the next few weeks, you should receive a call or email to complete a survey about your visit at [facility]. Please help us improve your appointment experience by letting us know how we did today. We strive to make your experience good and value any ways in which we could do better.      We value your input and suggestions.    Thank you for choosing the [facility]!           High Protein Foods  Chicken  -Chicken breast, 3.5oz.-30 grams protein  -Chicken thigh-10 grams(average size)  -Drumstick-11 grams  -Wing- 6 grams  -Chicken meat, cooked, 4 oz.  Beef  -Hamburger georgette, 4 oz-28 grams protein  -Steak, 6 oz-42 grams  -Most cuts of beef- 7 grams of protein per ounce  Fish  -Most fish fillets or steaks are about 22 grams of protein for 3 1/2 oz(100 grams) of cooked fish, or 6 grams per ounce  -Tuna, 6 oz can-40 grams of protein  Pork  -Pork chop, average-22 grams protein  -Pork loin or tenderloin, 4 oz.-29 grams  -Ham, 3oz serving- 19 grams  -Ground pork 3oz cooked-22 grams  -Jimenez, 1 slice-3 grams  -Monegasque-style jimenez(black jimenez), slice-5-6 grams  Eggs and Dairy  -Egg, large-7 grams  -Milk, 1 cup-8 grams  -Cottage cheese, 1/2 cup-15 grams  -Greek yogurt, 1 cup-usually 8-12 grams, check label  -Soft cheeses (Mozzarella, Brie, Camembert)- 6 grams  -Medium cheeses(cheddar, swiss)- 7 or 8 grams per oz  -Hard cheeses(parmesan)- 10 grams per oz  Beans  -Tofu, 1/2 cup 20 grams  -Tofu, 1  oz., 2.3 grams  -Soy milk, 1 cup-6-10 grams  -Most beans(black, mckeon, lentils, etc.) about 7-10 grams protein per half cup of cooked beans  -soy beans, 1/2 cup cooked-14 grams  -Split peas, 1/2 cup cooked- 8 grams  Nuts and Seeds  -Peanut butter, 2 Tablespoons- 8 grams protein  -Almonds, 1/4 cup- 8 grams  -Peanuts, 1/4 cup-9 grams  -Cashews, 1/4 cup- 5 grams  -Pecans, 1/4 cup- 2.5 grams  -Sunflower seeds, 1/4 cup- 6 grams  -Pumpkin seeds, 1/4 cup-8 grams  -Flax seeds- 1/4 cup- 8 grams  Protein Supplements  -Ensure  -Boost  -Glucerna, if diabetic  When you have an open ulcer, your bodies protein needs are much higher, so it is recommended eat good sources of protein

## 2024-11-04 ENCOUNTER — PRENATAL OFFICE VISIT (OUTPATIENT)
Dept: OBGYN | Facility: CLINIC | Age: 39
End: 2024-11-04
Payer: COMMERCIAL

## 2024-11-04 VITALS
SYSTOLIC BLOOD PRESSURE: 118 MMHG | HEART RATE: 70 BPM | BODY MASS INDEX: 36.31 KG/M2 | DIASTOLIC BLOOD PRESSURE: 72 MMHG | TEMPERATURE: 97.9 F | WEIGHT: 264 LBS

## 2024-11-04 PROCEDURE — 99214 OFFICE O/P EST MOD 30 MIN: CPT | Performed by: OBSTETRICS & GYNECOLOGY

## 2024-11-04 PROCEDURE — G2211 COMPLEX E/M VISIT ADD ON: HCPCS | Performed by: OBSTETRICS & GYNECOLOGY

## 2024-11-04 PROCEDURE — 99207 PR POST PARTUM EXAM: CPT | Performed by: OBSTETRICS & GYNECOLOGY

## 2024-11-04 RX ORDER — ESCITALOPRAM OXALATE 10 MG/1
5 TABLET ORAL DAILY
Qty: 60 TABLET | Refills: 1 | Status: SHIPPED | OUTPATIENT
Start: 2024-11-04

## 2024-11-04 ASSESSMENT — EDINBURGH POSTNATAL DEPRESSION SCALE (EPDS)
TOTAL SCORE: 12
THE THOUGHT OF HARMING MYSELF HAS OCCURRED TO ME: NEVER
I HAVE BEEN ANXIOUS OR WORRIED FOR NO GOOD REASON: YES, SOMETIMES
I HAVE LOOKED FORWARD WITH ENJOYMENT TO THINGS: RATHER LESS THAN I USED TO
I HAVE BLAMED MYSELF UNNECESSARILY WHEN THINGS WENT WRONG: YES, SOME OF THE TIME
I HAVE BEEN SO UNHAPPY THAT I HAVE BEEN CRYING: ONLY OCCASIONALLY
I HAVE BEEN ABLE TO LAUGH AND SEE THE FUNNY SIDE OF THINGS: AS MUCH AS I ALWAYS COULD
I HAVE BEEN SO UNHAPPY THAT I HAVE HAD DIFFICULTY SLEEPING: NOT VERY OFTEN
I HAVE FELT SCARED OR PANICKY FOR NO GOOD REASON: NO, NOT MUCH
I HAVE FELT SAD OR MISERABLE: YES, QUITE OFTEN
THINGS HAVE BEEN GETTING ON TOP OF ME: YES, SOMETIMES I HAVEN'T BEEN COPING AS WELL AS USUAL

## 2024-11-04 NOTE — NURSING NOTE
"Initial /72 (BP Location: Left arm, Patient Position: Chair, Cuff Size: Adult Large)   Pulse 70   Temp 97.9  F (36.6  C) (Tympanic)   Wt 119.7 kg (264 lb)   LMP 12/26/2023   Breastfeeding Yes   BMI 36.31 kg/m   Estimated body mass index is 36.31 kg/m  as calculated from the following:    Height as of 10/14/24: 1.816 m (5' 11.5\").    Weight as of this encounter: 119.7 kg (264 lb). .      Marielos Cherry MA    "

## 2024-11-04 NOTE — PROGRESS NOTES
[place] Depression Scale  Thoughts of Harming Self: (Patient-Rptd) Never  Total Score: (Patient-Rptd) 12

## 2024-11-04 NOTE — PROGRESS NOTES
6 week Postpartum Visit Note    S:  Holly Tolliver is here for her 6-week postpartum checkup.     Delivery Date: .    Delivering provider:  Antonette  Type of delivery:  RLTCS, with BTL  Feeding Method:  breast  Bleeding:  resolved  Bowel/Urinary problems:  denies  Continues with dressing changes for postop infection and wound dehiscence.  She recently had dressing changed yesterday and does not want to removed today as it has been improving.    Continues to follow closely with wound clinic    Mood: States she has been feeling more down.  Depression screen is a 12.  Denies thoughts of self-harm.  Amenable to medications but on review of chart has tried Zoloft and Celexa in the past and did not continue them.       ================================================================  ROS: 10 point ROS neg other than the symptoms noted above in the HPI.     O:  EXAM:  /72 (BP Location: Left arm, Patient Position: Chair, Cuff Size: Adult Large)   Pulse 70   Temp 97.9  F (36.6  C) (Tympanic)   Wt 119.7 kg (264 lb)   LMP 2023   Breastfeeding Yes   BMI 36.31 kg/m      General: alert, oriented, well appearing   Psych: mood appropriate,    Breasts:  no swelling or redness   Abdomen: soft, non tender, uterus properly involuted,   Incision :  declines exam    A/P:   39 year old  who is 6 weeks status post repeat  and bilateral tubal ligation.  Patient overall is doing well.  Wound per her report is improving.  She continues care with wound clinic and has necessary materials at home to continue with changes.  Further management as dictated by their clinic.    Additionally patient has mood concerns.  Her depression screen is elevated at 12.  No thoughts of self-harm.  She is amenable to starting medication.  Discussed SSRI type medication.  She has tried Zoloft and Celexa in the past per notes.  Thus we will try Lexapro.  Recommended that she follow-up in 2 to 3 weeks to confirm improvement.   Will also send note to mental health  to reach out to patient with therapy resources.  Patient is agreeable with this plan of care.  She has had a salpingectomy for Contraception.  No other concerns today.  Return to clinic in 2 to 3 weeks for mood check    Separate from routine postpartum visit I spent 20 minutes reviewing chart, obtaining history, counseling, examining, coordinating care, documenting with regard to mood disorder.    Lelia Grijalva MD   11/4/2024 11:26 AM

## 2024-11-08 ENCOUNTER — OFFICE VISIT (OUTPATIENT)
Dept: VASCULAR SURGERY | Facility: CLINIC | Age: 39
End: 2024-11-08
Attending: NURSE PRACTITIONER
Payer: COMMERCIAL

## 2024-11-08 VITALS
SYSTOLIC BLOOD PRESSURE: 110 MMHG | DIASTOLIC BLOOD PRESSURE: 83 MMHG | TEMPERATURE: 97.4 F | OXYGEN SATURATION: 98 % | HEART RATE: 62 BPM

## 2024-11-08 DIAGNOSIS — S31.109D OPEN ABDOMINAL WALL WOUND, SUBSEQUENT ENCOUNTER: ICD-10-CM

## 2024-11-08 DIAGNOSIS — T81.31XD DEHISCENCE OF OPERATIVE WOUND, SUBSEQUENT ENCOUNTER: Primary | ICD-10-CM

## 2024-11-08 PROCEDURE — 11042 DBRDMT SUBQ TIS 1ST 20SQCM/<: CPT | Performed by: NURSE PRACTITIONER

## 2024-11-08 RX ADMIN — LIDOCAINE: 50 OINTMENT TOPICAL at 08:41

## 2024-11-08 ASSESSMENT — PAIN SCALES - GENERAL: PAINLEVEL_OUTOF10: MILD PAIN (2)

## 2024-11-08 NOTE — PATIENT INSTRUCTIONS
Your wound was treated with silver nitrate today this will discolor the area silver gray    Wound care supplies were not ordered or needed today.        Wound Care Instructions    3 TIMES PER WEEK and as needed, Cleanse your abdominal  wound(s) with Dilute hibiclens 30cc in 500cc NS.    Pat Dry with non-sterile gauze      Primary Dressing: Apply strip of aquacel ag  into/onto the wounds; use only one strip; leave long tail hanging out for easy removal    Secondary dressing: Cover with bordered foam or ABD    It is ok to get your wound wet in the bath or shower; shower right before home care comes to change the dressing      If for some reason you are not able to get your dressing(s) changed as outlined above (due to illness, lack of supplies, lack of help) please do the following: remove old, soiled dressings; wash the wounds with saline; pat dry; apply ABD pad or other absorbant pad and secure with rolled gauze; avoid tape directly on your skin; Call the clinic as soon as possible to let us know what the current issues are in receiving wound care [phone].      SEEK MEDICAL CARE IF:  You have an increase in swelling, pain, or redness around the wound.  You have an increase in the amount of pus coming from the wound.  There is a bad smell coming from the wound.  The wound appears to be worsening/enlarging  You have a fever greater than 101.5 F      It is ok to continue current wound care treatment/products for the next 2-3 days until new wound care supplies are ordered and arrive. If longer than this please contact our office at [phone].    If you have a 2 layer or 4 layer compression wrap on these are safe to have on for ONLY 7 days. If for some reason you are not able to get the wrap(s) changed (due to illness; lack of supplies, lack of help, lack of transportation) please do the following: unwrap the old 2 or 4 layer compression wrap; avoid using scissors as you could cut your skin and cause wounds; use  tubular compression when available. Call to reschedule your home care or clinic visit appointment as soon as possible.    Please NOTE: if you are 15 minutes late to your clinic appointment you will have to be rescheduled. Please call our clinic as soon as possible if you know you will not be able to get to your appointment at [phone].    If you fail to show up to 3 scheduled clinic appointments you will be dismissed from our clinic.              We want to hear from you!  In the next few weeks, you should receive a call or email to complete a survey about your visit at [facility]. Please help us improve your appointment experience by letting us know how we did today. We strive to make your experience good and value any ways in which we could do better.      We value your input and suggestions.    Thank you for choosing the [facility]!           High Protein Foods  Chicken  -Chicken breast, 3.5oz.-30 grams protein  -Chicken thigh-10 grams(average size)  -Drumstick-11 grams  -Wing- 6 grams  -Chicken meat, cooked, 4 oz.  Beef  -Hamburger georgette, 4 oz-28 grams protein  -Steak, 6 oz-42 grams  -Most cuts of beef- 7 grams of protein per ounce  Fish  -Most fish fillets or steaks are about 22 grams of protein for 3 1/2 oz(100 grams) of cooked fish, or 6 grams per ounce  -Tuna, 6 oz can-40 grams of protein  Pork  -Pork chop, average-22 grams protein  -Pork loin or tenderloin, 4 oz.-29 grams  -Ham, 3oz serving- 19 grams  -Ground pork 3oz cooked-22 grams  -Jimenez, 1 slice-3 grams  -Mathews-style jimenez(black jimenez), slice-5-6 grams  Eggs and Dairy  -Egg, large-7 grams  -Milk, 1 cup-8 grams  -Cottage cheese, 1/2 cup-15 grams  -Greek yogurt, 1 cup-usually 8-12 grams, check label  -Soft cheeses (Mozzarella, Brie, Camembert)- 6 grams  -Medium cheeses(cheddar, swiss)- 7 or 8 grams per oz  -Hard cheeses(parmesan)- 10 grams per oz  Beans  -Tofu, 1/2 cup 20 grams  -Tofu, 1 oz., 2.3 grams  -Soy  milk, 1 cup-6-10 grams  -Most beans(black, mckeon, lentils, etc.) about 7-10 grams protein per half cup of cooked beans  -soy beans, 1/2 cup cooked-14 grams  -Split peas, 1/2 cup cooked- 8 grams  Nuts and Seeds  -Peanut butter, 2 Tablespoons- 8 grams protein  -Almonds, 1/4 cup- 8 grams  -Peanuts, 1/4 cup-9 grams  -Cashews, 1/4 cup- 5 grams  -Pecans, 1/4 cup- 2.5 grams  -Sunflower seeds, 1/4 cup- 6 grams  -Pumpkin seeds, 1/4 cup-8 grams  -Flax seeds- 1/4 cup- 8 grams  Protein Supplements  -Ensure  -Boost  -Glucerna, if diabetic  When you have an open ulcer, your bodies protein needs are much higher, so it is recommended eat good sources of protein

## 2024-11-08 NOTE — PROGRESS NOTES
Clinic Administered Medication Documentation    Patient was given lidocaine. Prior to medication administration, verified patient's identity using patient's name and date of birth.    Lelia Tovar RN

## 2024-11-08 NOTE — PROGRESS NOTES
Follow up Vascular Visit       Date of Service:24      Chief Complaint: dehisced  incision      Pt returns to [facility] with regards to their dehisced  incision.  They arrive today with infant. They are currently using aquacel ag; island dressing to the wounds. This is being done by home care; 3 days per week. They are using nothing for compression. They are feeling well today. Denies fevers, chills. No shortness of breath.     Allergies:   Allergies   Allergen Reactions    Amoxicillin Rash       Medications:   Current Outpatient Medications:     acetaminophen (TYLENOL) 325 MG tablet, Take 325-650 mg by mouth every 6 hours as needed for mild pain., Disp: , Rfl:     escitalopram (LEXAPRO) 10 MG tablet, Take 0.5 tablets (5 mg) by mouth daily. Take 5 mg for 7-10 days and if well tolerated increase to 10 mg daily, Disp: 60 tablet, Rfl: 1    ibuprofen (ADVIL/MOTRIN) 800 MG tablet, Take 1 tablet (800 mg) by mouth every 6 hours., Disp: 30 tablet, Rfl: 1    ketoconazole (NIZORAL) 2 % external cream, Apply topically daily (Patient not taking: Reported on 2024), Disp: 30 g, Rfl: 0    lidocaine (XYLOCAINE) 5 % external ointment, Apply topically as needed for moderate pain (for wound care)., Disp: 50 g, Rfl: 3    Misc. Devices (BREAST PUMP) MISC, 1 each See Admin Instructions., Disp: 1 each, Rfl: 0    NIFEdipine ER OSMOTIC (ADALAT CC) 30 MG 24 hr tablet, Take 1 tablet (30 mg) by mouth every 24 hours. (Patient not taking: Reported on 2024), Disp: 30 tablet, Rfl: 2    Prenatal Vit-Fe Fumarate-FA (PRENATAL MULTIVITAMIN  PLUS IRON) 27-1 MG TABS, Take 1 tablet by mouth daily., Disp: , Rfl:     senna (SENOKOT) 8.6 MG tablet, Take 1 tablet by mouth daily as needed for constipation. (Patient not taking: Reported on 2024), Disp: , Rfl:     senna-docusate (SENOKOT-S/PERICOLACE) 8.6-50 MG tablet, Take 1 tablet by mouth 2 times daily. (Patient not taking: Reported on  2024), Disp: 30 tablet, Rfl: 1    vitamin B6 (PYRIDOXINE) 100 MG tablet, Take 1 tablet (100 mg) by mouth daily, Disp: 90 tablet, Rfl: 3    Current Facility-Administered Medications:     lidocaine (XYLOCAINE) 5 % ointment, , Topical, Daily PRN, [place], Lyssa, NP, Given at 24 0841    lidocaine (XYLOCAINE) 5 % ointment, , Topical, Daily PRN, Lyssa Geronimo NP    History:   Past Medical History:   Diagnosis Date    ALCOH DEP NEC/NOS-UNSPEC 2007    Alcohol dependence in remission (H) 2023    Asthma     exercise induced    Brain aneurysm     CARDIOVASCULAR SCREENING; LDL GOAL LESS THAN 160 10/23/2012     delivery delivered 2016    Chickenpox     Chlamydia     Depression with anxiety 2014    Encounter for triage in pregnant patient 2024    Generalized anxiety disorder 2013    Diagnosis updated by automated process. Provider to review and confirm.      GERD (gastroesophageal reflux disease) 2013    Gestational hypertension 2024    Major depressive disorder, recurrent, unspecified (H) 2023    Major depressive disorder, single episode, moderate (H) 2013    Methamphetamine abuse (H) 2023    Mild intermittent asthma 2008    Mirena IUD 2016    Lot: [id number]  Exp:       Panic disorder with agoraphobia and moderate panic attacks 03/10/2009    Persons encountering health services in other specified circumstances 2015    Formatting of this note might be different from the original. Care Coordinator: Kelly DANIEL, MSW See letters for Health Care home care plan SW covering in KIM Mccabe  absence FREDY Luz, [phone] 3/24/2015 12:24 PM      Placental abruption 2016    Placental abruption in third trimester 2016    Postpartum depression     also 2016    Preeclampsia     ? in     Pregnancy induced hypertension 2011    Pregnancy with history of  section, antepartum 2024     Right upper quadrant abdominal pain affecting pregnancy 07/05/2024    Routine postpartum follow-up 05/31/2016    Single liveborn, born in hospital, delivered 04/19/2016    Tobacco use disorder 02/19/2008    Has decreased from 1 ppd to 1 cigarette per day since pregnancy.      Tooth infection 02/20/2022    Urinary tract infection in mother during second trimester of pregnancy 07/05/2024    Vaginal spotting 07/05/2024       Physical Exam:    /83   Pulse 62   Temp 97.4  F (36.3  C)   LMP 12/26/2023   SpO2 98%     General:  Patient presents to clinic in no apparent distress.  Head: normocephalic atraumatic  Psychiatric:  Alert and oriented x3.   Respiratory: unlabored breathing; no cough  Integumentary:  Skin is uniformly warm, dry and pink.    Ulcer #1 Location: right abdomen  Size: 0.3L x 1.2W x 1depth.  No sinus tract present, Wound base: red  no undermining present. Ulcer is full thickness. There is moderate drainage. Periwound: no denudement, erythema, induration, maceration or warmth.      Ulcer #2 Location: left abdomen  Size: 0.3L x 1.2W x 0.5depth.  No sinus tract present, Wound base: red  no undermining present. Ulcer is full thickness. There is moderate drainage. Periwound: no denudement, erythema, induration, maceration or warmth.      VASC Wound ABD middle (Active)   Pre Size Length 0.2 10/25/24 0800   Pre Size Width 0.2 10/25/24 0800   Pre Size Depth 5 10/02/24 1400   Pre Total Sq cm 0.04 10/25/24 0800   Post Size Length 0 10/25/24 0800   Post Size Width 0 10/25/24 0800   Post Size Depth 0 10/25/24 0800   Post Total Sq cm 0 10/25/24 0800   Undermined 0 10/25/24 0800   Number of days: 37       VASC Wound ABD LEFT (Active)   Pre Size Length 0.3 10/25/24 0800   Pre Size Width 1.2 10/25/24 0800   Pre Size Depth 0.5 10/25/24 0800   Pre Total Sq cm 0.36 10/25/24 0800   Number of days: 37       VASC Wound surgical abdominal right (Active)   Pre Size Length 0.3 10/25/24 0800   Pre Size Width 1.2  "10/25/24 0800   Pre Size Depth 1 10/25/24 0800   Pre Total Sq cm 0.36 10/25/24 0800   Number of days: 37       Incision/Surgical Site Abdomen (Active)   Number of days:             Circumferential volume measures:           No data to display                Labs:    I personally reviewed the following lab results today and those on care everywhere    CRP Inflammation   Date Value Ref Range Status   10/22/2014 13.5 (H) 0.0 - 8.0 mg/L Final      No results found for: \"SED\"   Last Renal Panel:  Sodium   Date Value Ref Range Status   09/17/2024 134 (L) 135 - 145 mmol/L Final   05/28/2020 137 133 - 144 mmol/L Final     Potassium   Date Value Ref Range Status   09/17/2024 4.1 3.4 - 5.3 mmol/L Final   01/24/2022 3.5 3.4 - 5.3 mmol/L Final   05/28/2020 4.2 3.4 - 5.3 mmol/L Final     Chloride   Date Value Ref Range Status   09/17/2024 104 98 - 107 mmol/L Final   01/24/2022 105 94 - 109 mmol/L Final   05/28/2020 106 94 - 109 mmol/L Final     Carbon Dioxide   Date Value Ref Range Status   05/28/2020 26 20 - 32 mmol/L Final     Carbon Dioxide (CO2)   Date Value Ref Range Status   09/17/2024 22 22 - 29 mmol/L Final   01/24/2022 27 20 - 32 mmol/L Final     Anion Gap   Date Value Ref Range Status   09/17/2024 8 7 - 15 mmol/L Final   01/24/2022 5 3 - 14 mmol/L Final   05/28/2020 5 3 - 14 mmol/L Final     Glucose   Date Value Ref Range Status   09/17/2024 175 (H) 70 - 99 mg/dL Final   01/24/2022 85 70 - 99 mg/dL Final   05/28/2020 85 70 - 99 mg/dL Final     Urea Nitrogen   Date Value Ref Range Status   09/17/2024 10.1 6.0 - 20.0 mg/dL Final   01/24/2022 11 7 - 30 mg/dL Final   05/28/2020 15 7 - 30 mg/dL Final     Creatinine   Date Value Ref Range Status   09/17/2024 0.71 0.51 - 0.95 mg/dL Final   05/28/2020 0.82 0.52 - 1.04 mg/dL Final     GFR Estimate   Date Value Ref Range Status   09/17/2024 >90 >60 mL/min/1.73m2 Final     Comment:     eGFR calculated using 2021 CKD-EPI equation.   05/28/2020 >90 >60 mL/min/[1.73_m2] Final     " "Comment:     Non  GFR Calc  Starting 12/18/2018, serum creatinine based estimated GFR (eGFR) will be   calculated using the Chronic Kidney Disease Epidemiology Collaboration   (CKD-EPI) equation.       Calcium   Date Value Ref Range Status   09/17/2024 9.0 8.8 - 10.4 mg/dL Final     Comment:     Reference intervals for this test were updated on 7/16/2024 to reflect our healthy population more accurately. There may be differences in the flagging of prior results with similar values performed with this method. Those prior results can be interpreted in the context of the updated reference intervals.   05/28/2020 8.7 8.5 - 10.1 mg/dL Final     Albumin   Date Value Ref Range Status   09/17/2024 3.1 (L) 3.5 - 5.2 g/dL Final   01/24/2022 3.8 3.4 - 5.0 g/dL Final   05/28/2020 3.9 3.4 - 5.0 g/dL Final      Lab Results   Component Value Date    WBC 7.2 09/30/2024    WBC 5.6 05/28/2020     Lab Results   Component Value Date    RBC 4.76 09/30/2024    RBC 4.88 05/28/2020     Lab Results   Component Value Date    HGB 12.2 09/30/2024    HGB 14.1 05/28/2020     Lab Results   Component Value Date    HCT 39.8 09/30/2024    HCT 42.4 05/28/2020     No components found for: \"MCT\"  Lab Results   Component Value Date    MCV 84 09/30/2024    MCV 87 05/28/2020     Lab Results   Component Value Date    MCH 25.6 09/30/2024    MCH 28.9 05/28/2020     Lab Results   Component Value Date    MCHC 30.7 09/30/2024    MCHC 33.3 05/28/2020     Lab Results   Component Value Date    RDW 14.2 09/30/2024    RDW 12.9 05/28/2020     Lab Results   Component Value Date     09/30/2024     05/28/2020      Lab Results   Component Value Date    A1C 5.0 03/15/2024      TSH   Date Value Ref Range Status   09/23/2021 2.72 0.40 - 4.00 mU/L Final   09/28/2015 0.85 0.40 - 4.00 mU/L Final      No results found for: \"VITDT\"                Impression:  Encounter Diagnoses   Name Primary?    Dehiscence of operative wound, subsequent encounter " Yes    Open abdominal wall wound, subsequent encounter           11/8/2024 right abdomen    10/25/24 right abdomen         11/8/2024 left abdomen    10/25/24 left abdomen          Are any of these ulcers new today: No; Location: na    Assessment/Plan:          1. Debridement: Nursing staff removed the old dressing and cleanse the wound(s) with specified solution. After discussion of risk factors and verbal consent was obtained 2% Lidocaine HCL jelly was applied, under clean conditions, the abdominal ulceration(s) were debrided using currette. Devitalized and nonviable tissue, along with any fibrin and slough, was removed to improve granulation tissue formation, stimulate wound healing, decrease overall bacteria load, disrupt biofilm formation and decrease edge senescence.  Total excisional debridement was 0.72 sq cm from the epidermis/dermis area and into the subcutaneous tissue with a depth of 0.5-1 cm.   Ulcers were improved afterwards and .  Measures were unchanged after debridement. Additionally treated with silver nitrate.      2.  Ulcer treatment: ulcer treatment will include irrigation and dressings to promote autolytic debridement which will include:will continue home care; will continue aquacel ag; island dressing If for some reason the patient is not able to get their dressing(s) changed as outlined above (due to illness, lack of supplies, lack of help) please do the following: remove old, soiled dressings; wash the ulcers with saline; pat dry; apply ABD pad or other absorbant pad and secure with rolled gauze; avoid tape directly on your skin; patient instructed to call the clinic as soon as possible to let us know what the current issues are in receiving ulcer care. Stable            3. Edema: na.            4. Nutrition: focus on protein           5. Offloading: avoid tight clothing over the incision/wound area          6. Wound Etiology: surgical     Patient will follow up with me in 3 weeks for  reevaluation. They were instructed to call the clinic sooner with any signs or symptoms of infection or any further questions/concerns. Answered all questions.          Lyssa Geronimo DNP, RN, CNP, CWOCN, CFCN, CLT  [facility]   [phone]        This note was electronically signed by Lyssa Geronimo NP

## 2024-11-21 ENCOUNTER — PRENATAL OFFICE VISIT (OUTPATIENT)
Dept: OBGYN | Facility: CLINIC | Age: 39
End: 2024-11-21
Payer: COMMERCIAL

## 2024-11-21 VITALS
HEART RATE: 84 BPM | BODY MASS INDEX: 36.58 KG/M2 | TEMPERATURE: 98 F | WEIGHT: 266 LBS | DIASTOLIC BLOOD PRESSURE: 90 MMHG | SYSTOLIC BLOOD PRESSURE: 124 MMHG

## 2024-11-21 ASSESSMENT — EDINBURGH POSTNATAL DEPRESSION SCALE (EPDS)
I HAVE FELT SCARED OR PANICKY FOR NO GOOD REASON: NO, NOT MUCH
I HAVE BEEN SO UNHAPPY THAT I HAVE BEEN CRYING: ONLY OCCASIONALLY
I HAVE BEEN ABLE TO LAUGH AND SEE THE FUNNY SIDE OF THINGS: NOT QUITE SO MUCH NOW
I HAVE FELT SAD OR MISERABLE: NOT VERY OFTEN
I HAVE BEEN SO UNHAPPY THAT I HAVE HAD DIFFICULTY SLEEPING: NOT VERY OFTEN
TOTAL SCORE: 9
THE THOUGHT OF HARMING MYSELF HAS OCCURRED TO ME: NEVER
THINGS HAVE BEEN GETTING ON TOP OF ME: NO, MOST OF THE TIME I HAVE COPED QUITE WELL
I HAVE BEEN ANXIOUS OR WORRIED FOR NO GOOD REASON: YES, SOMETIMES
I HAVE LOOKED FORWARD WITH ENJOYMENT TO THINGS: RATHER LESS THAN I USED TO
I HAVE BLAMED MYSELF UNNECESSARILY WHEN THINGS WENT WRONG: NO, NEVER

## 2024-11-21 NOTE — PROGRESS NOTES
Gyn Visit Note     S:  Holly Tolliver is here for follow-up of mood postpartum.  The patient started on Lexapro about a week ago.  She states that perhaps is having some side effects with regard to sleep but no thoughts of self-harm.  Mood is stable.  No worsening.  Recently moved and so likely will not be returning to our clinic.  Wound continues to heal.  She does not wish to remove bandage today as wound RN prefers to leave it in place and only have changed through their clinic.     Delivery Date: .    Delivering provider:  Antonette  Type of delivery:  RLTCS, with BTL  Feeding Method:  breast  Bleeding:  resolved  Bowel/Urinary problems:  denies         ================================================================  ROS: 10 point ROS neg other than the symptoms noted above in the HPI.      O:  EXAM:  BP (!) 124/90 (BP Location: Right arm, Patient Position: Chair, Cuff Size: Adult Large)   Pulse 84   Temp 98  F (36.7  C) (Tympanic)   Wt 120.7 kg (266 lb)   LMP 2023   BMI 36.58 kg/m        General: alert, oriented, well appearing   Psych: mood appropriate,    Breasts:  no swelling or redness   Abdomen: soft, non tender, uterus properly involuted,   Incision :  declines exam     A/P:   39 year old  who follows up for postpartum mood concerns.  The patient just recently started Lexapro only 1 week ago.  Discussed that given that she is currently tolerating okay would be recommend she continue.  Recommended follow-up visit with family medicine at her new clinic in a couple of weeks to confirm hopeful improvement and stability in mood with no worsening.  She does not report thoughts of self-harm.  Otherwise doing well.  Continues with wound care through wound clinic.  All questions answered and patient agreeable with plan of care.    Lelia Grijalva MD

## 2024-11-21 NOTE — NURSING NOTE
"Initial BP (!) 124/90 (BP Location: Right arm, Patient Position: Chair, Cuff Size: Adult Large)   Pulse 84   Temp 98  F (36.7  C) (Tympanic)   Wt 120.7 kg (266 lb)   LMP 12/26/2023   BMI 36.58 kg/m   Estimated body mass index is 36.58 kg/m  as calculated from the following:    Height as of 10/14/24: 1.816 m (5' 11.5\").    Weight as of this encounter: 120.7 kg (266 lb). .    Marielos Cherry MA    "

## 2024-12-03 ENCOUNTER — OFFICE VISIT (OUTPATIENT)
Dept: VASCULAR SURGERY | Facility: CLINIC | Age: 39
End: 2024-12-03
Attending: NURSE PRACTITIONER
Payer: COMMERCIAL

## 2024-12-03 VITALS
RESPIRATION RATE: 18 BRPM | DIASTOLIC BLOOD PRESSURE: 84 MMHG | SYSTOLIC BLOOD PRESSURE: 130 MMHG | HEART RATE: 67 BPM | OXYGEN SATURATION: 97 % | TEMPERATURE: 97.5 F

## 2024-12-03 DIAGNOSIS — T81.31XD DEHISCENCE OF OPERATIVE WOUND, SUBSEQUENT ENCOUNTER: Primary | ICD-10-CM

## 2024-12-03 DIAGNOSIS — S31.109D OPEN ABDOMINAL WALL WOUND, SUBSEQUENT ENCOUNTER: ICD-10-CM

## 2024-12-03 PROCEDURE — 11042 DBRDMT SUBQ TIS 1ST 20SQCM/<: CPT | Performed by: NURSE PRACTITIONER

## 2024-12-03 ASSESSMENT — PAIN SCALES - GENERAL: PAINLEVEL_OUTOF10: NO PAIN (0)

## 2024-12-03 NOTE — PROGRESS NOTES
Follow up Vascular Visit       Date of Service:24      Chief Complaint: dehisced surgical incision;       Pt returns to [facility] with regards to their dehisced  incision.  They arrive today with her infant. They are currently using aquacel ag; bordered foam to the wounds. This is being done by home care 2 times per week. They are using nothing for compression. They are feeling well today. Denies fevers, chills. No shortness of breath.     Allergies:   Allergies   Allergen Reactions    Amoxicillin Rash       Medications:   Current Outpatient Medications:     acetaminophen (TYLENOL) 325 MG tablet, Take 325-650 mg by mouth every 6 hours as needed for mild pain., Disp: , Rfl:     escitalopram (LEXAPRO) 10 MG tablet, Take 0.5 tablets (5 mg) by mouth daily. Take 5 mg for 7-10 days and if well tolerated increase to 10 mg daily, Disp: 60 tablet, Rfl: 1    ibuprofen (ADVIL/MOTRIN) 800 MG tablet, Take 1 tablet (800 mg) by mouth every 6 hours., Disp: 30 tablet, Rfl: 1    ketoconazole (NIZORAL) 2 % external cream, Apply topically daily, Disp: 30 g, Rfl: 0    lidocaine (XYLOCAINE) 5 % external ointment, Apply topically as needed for moderate pain (for wound care)., Disp: 50 g, Rfl: 3    Misc. Devices (BREAST PUMP) MISC, 1 each See Admin Instructions., Disp: 1 each, Rfl: 0    NIFEdipine ER OSMOTIC (ADALAT CC) 30 MG 24 hr tablet, Take 1 tablet (30 mg) by mouth every 24 hours., Disp: 30 tablet, Rfl: 2    Prenatal Vit-Fe Fumarate-FA (PRENATAL MULTIVITAMIN  PLUS IRON) 27-1 MG TABS, Take 1 tablet by mouth daily., Disp: , Rfl:     senna (SENOKOT) 8.6 MG tablet, Take 1 tablet by mouth daily as needed for constipation., Disp: , Rfl:     senna-docusate (SENOKOT-S/PERICOLACE) 8.6-50 MG tablet, Take 1 tablet by mouth 2 times daily., Disp: 30 tablet, Rfl: 1    vitamin B6 (PYRIDOXINE) 100 MG tablet, Take 1 tablet (100 mg) by mouth daily, Disp: 90 tablet, Rfl: 3    Current  Facility-Administered Medications:     lidocaine (XYLOCAINE) 5 % ointment, , Topical, Daily PRN, Lyssa Geronimo, NP, Given at 24 0841    lidocaine (XYLOCAINE) 5 % ointment, , Topical, Daily PRN, Lyssa Geronimo, NP    silver nitrate (ARZOL) Misc, , Topical, Once, Lyssa Geronimo, NP    History:   Past Medical History:   Diagnosis Date    ALCOH DEP NEC/NOS-UNSPEC 2007    Alcohol dependence in remission (H) 2023    Asthma     exercise induced    Brain aneurysm     CARDIOVASCULAR SCREENING; LDL GOAL LESS THAN 160 10/23/2012     delivery delivered 2016    Chickenpox     Chlamydia     Depression with anxiety 2014    Encounter for triage in pregnant patient 2024    Generalized anxiety disorder 2013    Diagnosis updated by automated process. Provider to review and confirm.      GERD (gastroesophageal reflux disease) 2013    Gestational hypertension 2024    Major depressive disorder, recurrent, unspecified (H) 2023    Major depressive disorder, single episode, moderate (H) 2013    Methamphetamine abuse (H) 2023    Mild intermittent asthma 2008    Mirena IUD 2016    Lot: [id number]  Exp:       Panic disorder with agoraphobia and moderate panic attacks 03/10/2009    Persons encountering health services in other specified circumstances 2015    Formatting of this note might be different from the original. Care Coordinator: Kelly DANIEL, MSW See letters for Health Care home care plan SW covering in [place], [place]  absence FREDY Luz, [phone] 3/24/2015 12:24 PM      Placental abruption 2016    Placental abruption in third trimester 2016    Postpartum depression     also 2016    Preeclampsia     ? in 2011    Pregnancy induced hypertension 2011    Pregnancy with history of  section, antepartum 2024    Right upper quadrant abdominal pain affecting pregnancy 2024    Routine  postpartum follow-up 05/31/2016    Single liveborn, born in hospital, delivered 04/19/2016    Tobacco use disorder 02/19/2008    Has decreased from 1 ppd to 1 cigarette per day since pregnancy.      Tooth infection 02/20/2022    Urinary tract infection in mother during second trimester of pregnancy 07/05/2024    Vaginal spotting 07/05/2024       Physical Exam:    /84   Pulse 67   Temp 97.5  F (36.4  C)   Resp 18   LMP 12/26/2023   SpO2 97%     General:  Patient presents to clinic in no apparent distress.  Head: normocephalic atraumatic  Psychiatric:  Alert and oriented x3.   Respiratory: unlabored breathing; no cough  Integumentary:  Skin is uniformly warm, dry and pink.    Ulcer #1 Location: left abdomen  Size: 0.5L x 2.5W x 0.5depth.  no sinus tract present, Wound base: red  no undermining present. Ulcer is full thickness. There is moderate drainage. Periwound: no denudement, erythema, induration, maceration or warmth.      Ulcer #2 Location: right abdomen  Size: 0.6L x 2W x 0.3depth.  no sinus tract present, Wound base: red  no undermining present. Ulcer is full thickness. There is moderate drainage. Periwound: no denudement, erythema, induration, maceration or warmth.      VASC Wound ABD middle (Active)   Pre Size Length 0.2 10/25/24 0800   Pre Size Width 0.2 10/25/24 0800   Pre Size Depth 5 10/02/24 1400   Pre Total Sq cm 0.04 10/25/24 0800   Post Size Length 0 10/25/24 0800   Post Size Width 0 10/25/24 0800   Post Size Depth 0 10/25/24 0800   Post Total Sq cm 0 10/25/24 0800   Undermined 0 10/25/24 0800   Number of days: 62       VASC Wound ABD LEFT (Active)   Pre Size Length 0.5 12/03/24 1000   Pre Size Width 2.5 12/03/24 1000   Pre Size Depth 0.5 12/03/24 1000   Pre Total Sq cm 1.25 12/03/24 1000   Number of days: 62       VASC Wound surgical abdominal right (Active)   Pre Size Length 0.6 12/03/24 1000   Pre Size Width 2 12/03/24 1000   Pre Size Depth 0.3 12/03/24 1000   Pre Total Sq cm 1.2  "12/03/24 1000   Number of days: 62       Incision/Surgical Site Abdomen (Active)   Number of days:             Circumferential volume measures:           No data to display                Labs:    I personally reviewed the following lab results today and those on care everywhere    CRP Inflammation   Date Value Ref Range Status   10/22/2014 13.5 (H) 0.0 - 8.0 mg/L Final      No results found for: \"SED\"   Last Renal Panel:  Sodium   Date Value Ref Range Status   09/17/2024 134 (L) 135 - 145 mmol/L Final   05/28/2020 137 133 - 144 mmol/L Final     Potassium   Date Value Ref Range Status   09/17/2024 4.1 3.4 - 5.3 mmol/L Final   01/24/2022 3.5 3.4 - 5.3 mmol/L Final   05/28/2020 4.2 3.4 - 5.3 mmol/L Final     Chloride   Date Value Ref Range Status   09/17/2024 104 98 - 107 mmol/L Final   01/24/2022 105 94 - 109 mmol/L Final   05/28/2020 106 94 - 109 mmol/L Final     Carbon Dioxide   Date Value Ref Range Status   05/28/2020 26 20 - 32 mmol/L Final     Carbon Dioxide (CO2)   Date Value Ref Range Status   09/17/2024 22 22 - 29 mmol/L Final   01/24/2022 27 20 - 32 mmol/L Final     Anion Gap   Date Value Ref Range Status   09/17/2024 8 7 - 15 mmol/L Final   01/24/2022 5 3 - 14 mmol/L Final   05/28/2020 5 3 - 14 mmol/L Final     Glucose   Date Value Ref Range Status   09/17/2024 175 (H) 70 - 99 mg/dL Final   01/24/2022 85 70 - 99 mg/dL Final   05/28/2020 85 70 - 99 mg/dL Final     Urea Nitrogen   Date Value Ref Range Status   09/17/2024 10.1 6.0 - 20.0 mg/dL Final   01/24/2022 11 7 - 30 mg/dL Final   05/28/2020 15 7 - 30 mg/dL Final     Creatinine   Date Value Ref Range Status   09/17/2024 0.71 0.51 - 0.95 mg/dL Final   05/28/2020 0.82 0.52 - 1.04 mg/dL Final     GFR Estimate   Date Value Ref Range Status   09/17/2024 >90 >60 mL/min/1.73m2 Final     Comment:     eGFR calculated using 2021 CKD-EPI equation.   05/28/2020 >90 >60 mL/min/[1.73_m2] Final     Comment:     Non  GFR Calc  Starting 12/18/2018, serum " "creatinine based estimated GFR (eGFR) will be   calculated using the Chronic Kidney Disease Epidemiology Collaboration   (CKD-EPI) equation.       Calcium   Date Value Ref Range Status   09/17/2024 9.0 8.8 - 10.4 mg/dL Final     Comment:     Reference intervals for this test were updated on 7/16/2024 to reflect our healthy population more accurately. There may be differences in the flagging of prior results with similar values performed with this method. Those prior results can be interpreted in the context of the updated reference intervals.   05/28/2020 8.7 8.5 - 10.1 mg/dL Final     Albumin   Date Value Ref Range Status   09/17/2024 3.1 (L) 3.5 - 5.2 g/dL Final   01/24/2022 3.8 3.4 - 5.0 g/dL Final   05/28/2020 3.9 3.4 - 5.0 g/dL Final      Lab Results   Component Value Date    WBC 7.2 09/30/2024    WBC 5.6 05/28/2020     Lab Results   Component Value Date    RBC 4.76 09/30/2024    RBC 4.88 05/28/2020     Lab Results   Component Value Date    HGB 12.2 09/30/2024    HGB 14.1 05/28/2020     Lab Results   Component Value Date    HCT 39.8 09/30/2024    HCT 42.4 05/28/2020     No components found for: \"MCT\"  Lab Results   Component Value Date    MCV 84 09/30/2024    MCV 87 05/28/2020     Lab Results   Component Value Date    MCH 25.6 09/30/2024    MCH 28.9 05/28/2020     Lab Results   Component Value Date    MCHC 30.7 09/30/2024    MCHC 33.3 05/28/2020     Lab Results   Component Value Date    RDW 14.2 09/30/2024    RDW 12.9 05/28/2020     Lab Results   Component Value Date     09/30/2024     05/28/2020      Lab Results   Component Value Date    A1C 5.0 03/15/2024      TSH   Date Value Ref Range Status   09/23/2021 2.72 0.40 - 4.00 mU/L Final   09/28/2015 0.85 0.40 - 4.00 mU/L Final      No results found for: \"VITDT\"                Impression:  Encounter Diagnoses   Name Primary?    Dehiscence of operative wound, subsequent encounter Yes    Open abdominal wall wound, subsequent encounter              "                      Are any of these ulcers new today: No; Location: na    Assessment/Plan:          1. Debridement: Nursing staff removed the old dressing and cleanse the wound(s) with specified solution. After discussion of risk factors and verbal consent was obtained 2% Lidocaine HCL jelly was applied, under clean conditions, the abdominal ulceration(s) were debrided using currette. Devitalized and nonviable tissue, along with any fibrin and slough, was removed to improve granulation tissue formation, stimulate wound healing, decrease overall bacteria load, disrupt biofilm formation and decrease edge senescence.  Total excisional debridement was 2.45 sq cm from the epidermis/dermis area and into the subcutaneous tissue with a depth of 0.3-0.5 cm.   Ulcers were improved afterwards and .  Measures were unchanged after debridement. Additionally was treated with silver nitrate.       2.  Ulcer treatment: ulcer treatment will include irrigation and dressings to promote autolytic debridement which will include:continue aquacel ag; bordered foam; change 2-3 times per week; continue home care. If for some reason the patient is not able to get their dressing(s) changed as outlined above (due to illness, lack of supplies, lack of help) please do the following: remove old, soiled dressings; wash the ulcers with saline; pat dry; apply ABD pad or other absorbant pad and secure with rolled gauze; avoid tape directly on your skin; patient instructed to call the clinic as soon as possible to let us know what the current issues are in receiving ulcer care. Stable            3. Edema: na. The compression wraps were applied today in clinic.     If a 2 layer or 4 layer compression wrap is being used; these are safe to have on for ONLY 7 days. If for some reason the patient is not able to get the wrap(s) changed (due to illness; lack of supplies, lack of help, lack of transportation) please do the following: unwrap the old 2  or 4 layer compression wrap; avoid using scissors as you could cut your skin and cause ulcers; use tubular compression when available. Call to reschedule your home care or clinic visit appointment as soon as possible.  Stable            4. Nutrition: focus on protein           5. Offloading: avoid underwear and pants over the incision area          6. Wound Etiology: surgical     Patient will follow up with me in 3-4 weeks for reevaluation. They were instructed to call the clinic sooner with any signs or symptoms of infection or any further questions/concerns. Answered all questions.          Lyssa Geronimo DNP, RN, CNP, CWOCN, CFCN, CLT  [facility]   [phone]        This note was electronically signed by Lyssa Geronimo NP

## 2024-12-04 ENCOUNTER — TELEPHONE (OUTPATIENT)
Dept: NEUROSURGERY | Facility: CLINIC | Age: 39
End: 2024-12-04
Payer: COMMERCIAL

## 2024-12-04 DIAGNOSIS — I67.1 NONRUPTURED CEREBRAL ANEURYSM: Primary | ICD-10-CM

## 2024-12-31 ENCOUNTER — OFFICE VISIT (OUTPATIENT)
Dept: VASCULAR SURGERY | Facility: CLINIC | Age: 39
End: 2024-12-31
Attending: NURSE PRACTITIONER
Payer: COMMERCIAL

## 2024-12-31 VITALS
DIASTOLIC BLOOD PRESSURE: 74 MMHG | OXYGEN SATURATION: 94 % | TEMPERATURE: 97.8 F | SYSTOLIC BLOOD PRESSURE: 124 MMHG | RESPIRATION RATE: 18 BRPM | HEART RATE: 73 BPM

## 2024-12-31 DIAGNOSIS — S31.109D OPEN ABDOMINAL WALL WOUND, SUBSEQUENT ENCOUNTER: ICD-10-CM

## 2024-12-31 DIAGNOSIS — T81.31XD DEHISCENCE OF OPERATIVE WOUND, SUBSEQUENT ENCOUNTER: Primary | ICD-10-CM

## 2024-12-31 PROCEDURE — 11042 DBRDMT SUBQ TIS 1ST 20SQCM/<: CPT | Performed by: NURSE PRACTITIONER

## 2024-12-31 ASSESSMENT — PAIN SCALES - GENERAL: PAINLEVEL_OUTOF10: NO PAIN (0)

## 2024-12-31 NOTE — PROGRESS NOTES
Follow up Vascular Visit       Date of Service:24      Chief Complaint: dehisced  incision      Pt returns to [facility] with regards to their .  They arrive today with dehisced  incision. They are currently using aquacel ag; abd or island dressingto the wounds. This is being done by home care 2-3 times per week. They are using nothing for compression. They are feeling well today. Denies fevers, chills. No shortness of breath.     Allergies:   Allergies   Allergen Reactions    Amoxicillin Rash       Medications:   Current Outpatient Medications:     acetaminophen (TYLENOL) 325 MG tablet, Take 325-650 mg by mouth every 6 hours as needed for mild pain., Disp: , Rfl:     escitalopram (LEXAPRO) 10 MG tablet, Take 0.5 tablets (5 mg) by mouth daily. Take 5 mg for 7-10 days and if well tolerated increase to 10 mg daily, Disp: 60 tablet, Rfl: 1    ibuprofen (ADVIL/MOTRIN) 800 MG tablet, Take 1 tablet (800 mg) by mouth every 6 hours., Disp: 30 tablet, Rfl: 1    ketoconazole (NIZORAL) 2 % external cream, Apply topically daily, Disp: 30 g, Rfl: 0    lidocaine (XYLOCAINE) 5 % external ointment, Apply topically as needed for moderate pain (for wound care)., Disp: 50 g, Rfl: 3    Misc. Devices (BREAST PUMP) MISC, 1 each See Admin Instructions., Disp: 1 each, Rfl: 0    NIFEdipine ER OSMOTIC (ADALAT CC) 30 MG 24 hr tablet, Take 1 tablet (30 mg) by mouth every 24 hours., Disp: 30 tablet, Rfl: 2    Prenatal Vit-Fe Fumarate-FA (PRENATAL MULTIVITAMIN  PLUS IRON) 27-1 MG TABS, Take 1 tablet by mouth daily., Disp: , Rfl:     senna (SENOKOT) 8.6 MG tablet, Take 1 tablet by mouth daily as needed for constipation., Disp: , Rfl:     senna-docusate (SENOKOT-S/PERICOLACE) 8.6-50 MG tablet, Take 1 tablet by mouth 2 times daily., Disp: 30 tablet, Rfl: 1    vitamin B6 (PYRIDOXINE) 100 MG tablet, Take 1 tablet (100 mg) by mouth daily, Disp: 90 tablet, Rfl: 3    Current Facility-Administered  Medications:     lidocaine (XYLOCAINE) 5 % ointment, , Topical, Daily PRN, Lyssa Geronimo, NP, Given at 24 0841    lidocaine (XYLOCAINE) 5 % ointment, , Topical, Daily PRN, Lyssa Geronimo, NP    silver nitrate (ARZOL) Misc, , Topical, Once, Lyssa Geronimo, NP    History:   Past Medical History:   Diagnosis Date    ALCOH DEP NEC/NOS-UNSPEC 2007    Alcohol dependence in remission (H) 2023    Asthma     exercise induced    Brain aneurysm     CARDIOVASCULAR SCREENING; LDL GOAL LESS THAN 160 10/23/2012     delivery delivered 2016    Chickenpox     Chlamydia     Depression with anxiety 2014    Encounter for triage in pregnant patient 2024    Generalized anxiety disorder 2013    Diagnosis updated by automated process. Provider to review and confirm.      GERD (gastroesophageal reflux disease) 2013    Gestational hypertension 2024    Major depressive disorder, recurrent, unspecified (H) 2023    Major depressive disorder, single episode, moderate (H) 2013    Methamphetamine abuse (H) 2023    Mild intermittent asthma 2008    Mirena IUD 2016    Lot: [id number]  Exp:       Panic disorder with agoraphobia and moderate panic attacks 03/10/2009    Persons encountering health services in other specified circumstances 2015    Formatting of this note might be different from the original. Care Coordinator: Kelly DANIEL, MSW See letters for Health Care home care plan SW covering in [place]  absence FREDY Luz, [phone] 3/24/2015 12:24 PM      Placental abruption 2016    Placental abruption in third trimester 2016    Postpartum depression     also 2016    Preeclampsia     ? in 2011    Pregnancy induced hypertension 2011    Pregnancy with history of  section, antepartum 2024    Right upper quadrant abdominal pain affecting pregnancy 2024    Routine postpartum follow-up  05/31/2016    Single liveborn, born in hospital, delivered 04/19/2016    Tobacco use disorder 02/19/2008    Has decreased from 1 ppd to 1 cigarette per day since pregnancy.      Tooth infection 02/20/2022    Urinary tract infection in mother during second trimester of pregnancy 07/05/2024    Vaginal spotting 07/05/2024       Physical Exam:    /74   Pulse 73   Temp 97.8  F (36.6  C)   Resp 18   SpO2 94%     General:  Patient presents to clinic in no apparent distress.  Head: normocephalic atraumatic  Psychiatric:  Alert and oriented x3.   Respiratory: unlabored breathing; no cough  Integumentary:  Skin is uniformly warm, dry and pink.    Ulcer #1 Location: abdomen left  Size: 0.5L x 1W x 0.1depth.  no sinus tract present, Wound base: red; agranular  no undermining present. Ulcer is full thickness. There is moderate drainage. Periwound: no denudement, erythema, induration, maceration or warmth.      Ulcer #2 Location: abdomen right  Size: 0.5L x 1.5W x 0.1depth.  no sinus tract present, Wound base: red; agranular  no undermining present. Ulcer is full thickness. There is moderate drainage. Periwound: no denudement, erythema, induration, maceration or warmth.      VASC Wound ABD middle (Active)   Pre Size Length 0.2 10/25/24 0800   Pre Size Width 0.2 10/25/24 0800   Pre Size Depth 5 10/02/24 1400   Pre Total Sq cm 0.04 10/25/24 0800   Post Size Length 0 10/25/24 0800   Post Size Width 0 10/25/24 0800   Post Size Depth 0 10/25/24 0800   Post Total Sq cm 0 10/25/24 0800   Undermined 0 10/25/24 0800   Number of days: 90       VASC Wound ABD LEFT (Active)   Pre Size Length 0.5 12/31/24 1025   Pre Size Width 1 12/31/24 1025   Pre Size Depth 0.1 12/31/24 1025   Pre Total Sq cm 0.5 12/31/24 1025   Number of days: 90       VASC Wound surgical abdominal right (Active)   Pre Size Length 0.5 12/31/24 1025   Pre Size Width 1.5 12/31/24 1025   Pre Size Depth 0.2 12/31/24 1025   Pre Total Sq cm 0.75 12/31/24 1025   Number  "of days: 90       Incision/Surgical Site Abdomen (Active)   Number of days:             Circumferential volume measures:           No data to display                Labs:    I personally reviewed the following lab results today and those on care everywhere    CRP Inflammation   Date Value Ref Range Status   10/22/2014 13.5 (H) 0.0 - 8.0 mg/L Final      No results found for: \"SED\"   Last Renal Panel:  Sodium   Date Value Ref Range Status   09/17/2024 134 (L) 135 - 145 mmol/L Final   05/28/2020 137 133 - 144 mmol/L Final     Potassium   Date Value Ref Range Status   09/17/2024 4.1 3.4 - 5.3 mmol/L Final   01/24/2022 3.5 3.4 - 5.3 mmol/L Final   05/28/2020 4.2 3.4 - 5.3 mmol/L Final     Chloride   Date Value Ref Range Status   09/17/2024 104 98 - 107 mmol/L Final   01/24/2022 105 94 - 109 mmol/L Final   05/28/2020 106 94 - 109 mmol/L Final     Carbon Dioxide   Date Value Ref Range Status   05/28/2020 26 20 - 32 mmol/L Final     Carbon Dioxide (CO2)   Date Value Ref Range Status   09/17/2024 22 22 - 29 mmol/L Final   01/24/2022 27 20 - 32 mmol/L Final     Anion Gap   Date Value Ref Range Status   09/17/2024 8 7 - 15 mmol/L Final   01/24/2022 5 3 - 14 mmol/L Final   05/28/2020 5 3 - 14 mmol/L Final     Glucose   Date Value Ref Range Status   09/17/2024 175 (H) 70 - 99 mg/dL Final   01/24/2022 85 70 - 99 mg/dL Final   05/28/2020 85 70 - 99 mg/dL Final     Urea Nitrogen   Date Value Ref Range Status   09/17/2024 10.1 6.0 - 20.0 mg/dL Final   01/24/2022 11 7 - 30 mg/dL Final   05/28/2020 15 7 - 30 mg/dL Final     Creatinine   Date Value Ref Range Status   09/17/2024 0.71 0.51 - 0.95 mg/dL Final   05/28/2020 0.82 0.52 - 1.04 mg/dL Final     GFR Estimate   Date Value Ref Range Status   09/17/2024 >90 >60 mL/min/1.73m2 Final     Comment:     eGFR calculated using 2021 CKD-EPI equation.   05/28/2020 >90 >60 mL/min/[1.73_m2] Final     Comment:     Non  GFR Calc  Starting 12/18/2018, serum creatinine based " "estimated GFR (eGFR) will be   calculated using the Chronic Kidney Disease Epidemiology Collaboration   (CKD-EPI) equation.       Calcium   Date Value Ref Range Status   09/17/2024 9.0 8.8 - 10.4 mg/dL Final     Comment:     Reference intervals for this test were updated on 7/16/2024 to reflect our healthy population more accurately. There may be differences in the flagging of prior results with similar values performed with this method. Those prior results can be interpreted in the context of the updated reference intervals.   05/28/2020 8.7 8.5 - 10.1 mg/dL Final     Albumin   Date Value Ref Range Status   09/17/2024 3.1 (L) 3.5 - 5.2 g/dL Final   01/24/2022 3.8 3.4 - 5.0 g/dL Final   05/28/2020 3.9 3.4 - 5.0 g/dL Final      Lab Results   Component Value Date    WBC 7.2 09/30/2024    WBC 5.6 05/28/2020     Lab Results   Component Value Date    RBC 4.76 09/30/2024    RBC 4.88 05/28/2020     Lab Results   Component Value Date    HGB 12.2 09/30/2024    HGB 14.1 05/28/2020     Lab Results   Component Value Date    HCT 39.8 09/30/2024    HCT 42.4 05/28/2020     No components found for: \"MCT\"  Lab Results   Component Value Date    MCV 84 09/30/2024    MCV 87 05/28/2020     Lab Results   Component Value Date    MCH 25.6 09/30/2024    MCH 28.9 05/28/2020     Lab Results   Component Value Date    MCHC 30.7 09/30/2024    MCHC 33.3 05/28/2020     Lab Results   Component Value Date    RDW 14.2 09/30/2024    RDW 12.9 05/28/2020     Lab Results   Component Value Date     09/30/2024     05/28/2020      Lab Results   Component Value Date    A1C 5.0 03/15/2024      TSH   Date Value Ref Range Status   09/23/2021 2.72 0.40 - 4.00 mU/L Final   09/28/2015 0.85 0.40 - 4.00 mU/L Final      No results found for: \"VITDT\"                Impression:  No diagnosis found.                      Are any of these ulcers new today: No; Location: na    Assessment/Plan:          1. Debridement: Nursing staff removed the old dressing " and cleanse the wound(s) with specified solution. After discussion of risk factors and verbal consent was obtained 2% Lidocaine HCL jelly was applied, under clean conditions, the abdominal ulceration(s) were debrided using currette. Devitalized and nonviable tissue, along with any fibrin and slough, was removed to improve granulation tissue formation, stimulate wound healing, decrease overall bacteria load, disrupt biofilm formation and decrease edge senescence.  Total excisional debridement was 1.25 sq cm from the epidermis/dermis area and into the subcutaneous tissue with a depth of 0.1 cm.   Ulcers were improved afterwards and .  Measures were unchanged after debridement. Additionally treated with silver nitrate.       2.  Ulcer treatment: ulcer treatment will include irrigation and dressings to promote autolytic debridement which will include:will stop the aquacel ag and go to collagen endoform.  If for some reason the patient is not able to get their dressing(s) changed as outlined above (due to illness, lack of supplies, lack of help) please do the following: remove old, soiled dressings; wash the ulcers with saline; pat dry; apply ABD pad or other absorbant pad and secure with rolled gauze; avoid tape directly on your skin; patient instructed to call the clinic as soon as possible to let us know what the current issues are in receiving ulcer care. Stable            3. Edema: na.            4. Nutrition: focus on weight loss           5. Offloading: avoid pants and underwear in the skin fold          6. Wound Etiology: surgical     Patient will follow up with me in 4 weeks for reevaluation. They were instructed to call the clinic sooner with any signs or symptoms of infection or any further questions/concerns. Answered all questions.          Lyssa Geronimo DNP, RN, CNP, CWOCN, CFCN, CLT  [facility]   [phone]        This note was electronically signed by Lyssa Geronimo NP

## 2024-12-31 NOTE — PATIENT INSTRUCTIONS
Your wound was treated with silver nitrate today this will discolor the area silver gray    Wound care supplies were not ordered or needed today.        Wound Care Instructions    3 TIMES PER WEEK and as needed, Cleanse your abdominal  wound(s) with Dilute hibiclens 30cc in 500cc NS.    Pat Dry with non-sterile gauze      Primary Dressing: Apply endoform collagen into/onto the wounds; use only one strip; Please note that endoform will do one of two things; it will either form a hard crust like scab structure or a yellow gelatinous substance. If a crust forms do not try to remove this; just gently cleanse and apply another piece of collagen on top; if a yellow gelatinous substance forms cleanse this away and apply a new piece of collagen.     Secondary dressing: Cover with bordered gauze or long island dressing    It is ok to get your wound wet in the bath or shower; shower right before home care comes to change the dressing      If for some reason you are not able to get your dressing(s) changed as outlined above (due to illness, lack of supplies, lack of help) please do the following: remove old, soiled dressings; wash the wounds with saline; pat dry; apply ABD pad or other absorbant pad and secure with rolled gauze; avoid tape directly on your skin; Call the clinic as soon as possible to let us know what the current issues are in receiving wound care [phone].      SEEK MEDICAL CARE IF:  You have an increase in swelling, pain, or redness around the wound.  You have an increase in the amount of pus coming from the wound.  There is a bad smell coming from the wound.  The wound appears to be worsening/enlarging  You have a fever greater than 101.5 F      It is ok to continue current wound care treatment/products for the next 2-3 days until new wound care supplies are ordered and arrive. If longer than this please contact our office at [phone].    If you have a 2 layer or 4 layer compression wrap on these are  safe to have on for ONLY 7 days. If for some reason you are not able to get the wrap(s) changed (due to illness; lack of supplies, lack of help, lack of transportation) please do the following: unwrap the old 2 or 4 layer compression wrap; avoid using scissors as you could cut your skin and cause wounds; use tubular compression when available. Call to reschedule your home care or clinic visit appointment as soon as possible.    Please NOTE: if you are 15 minutes late to your clinic appointment you will have to be rescheduled. Please call our clinic as soon as possible if you know you will not be able to get to your appointment at [phone].    If you fail to show up to 3 scheduled clinic appointments you will be dismissed from our clinic.              We want to hear from you!  In the next few weeks, you should receive a call or email to complete a survey about your visit at [facility]. Please help us improve your appointment experience by letting us know how we did today. We strive to make your experience good and value any ways in which we could do better.      We value your input and suggestions.    Thank you for choosing the [facility]!           High Protein Foods  Chicken  -Chicken breast, 3.5oz.-30 grams protein  -Chicken thigh-10 grams(average size)  -Drumstick-11 grams  -Wing- 6 grams  -Chicken meat, cooked, 4 oz.  Beef  -Hamburger georgette, 4 oz-28 grams protein  -Steak, 6 oz-42 grams  -Most cuts of beef- 7 grams of protein per ounce  Fish  -Most fish fillets or steaks are about 22 grams of protein for 3 1/2 oz(100 grams) of cooked fish, or 6 grams per ounce  -Tuna, 6 oz can-40 grams of protein  Pork  -Pork chop, average-22 grams protein  -Pork loin or tenderloin, 4 oz.-29 grams  -Ham, 3oz serving- 19 grams  -Ground pork 3oz cooked-22 grams  -Jimenez, 1 slice-3 grams  -Orleans-style jimenez(black jimenez), slice-5-6 grams  Eggs and Dairy  -Egg, large-7 grams  -Milk, 1  cup-8 grams  -Cottage cheese, 1/2 cup-15 grams  -Greek yogurt, 1 cup-usually 8-12 grams, check label  -Soft cheeses (Mozzarella, Brie, Camembert)- 6 grams  -Medium cheeses(cheddar, swiss)- 7 or 8 grams per oz  -Hard cheeses(parmesan)- 10 grams per oz  Beans  -Tofu, 1/2 cup 20 grams  -Tofu, 1 oz., 2.3 grams  -Soy milk, 1 cup-6-10 grams  -Most beans(black, mckeon, lentils, etc.) about 7-10 grams protein per half cup of cooked beans  -soy beans, 1/2 cup cooked-14 grams  -Split peas, 1/2 cup cooked- 8 grams  Nuts and Seeds  -Peanut butter, 2 Tablespoons- 8 grams protein  -Almonds, 1/4 cup- 8 grams  -Peanuts, 1/4 cup-9 grams  -Cashews, 1/4 cup- 5 grams  -Pecans, 1/4 cup- 2.5 grams  -Sunflower seeds, 1/4 cup- 6 grams  -Pumpkin seeds, 1/4 cup-8 grams  -Flax seeds- 1/4 cup- 8 grams  Protein Supplements  -Ensure  -Boost  -Glucerna, if diabetic  When you have an open ulcer, your bodies protein needs are much higher, so it is recommended eat good sources of protein

## 2025-01-14 ENCOUNTER — TELEPHONE (OUTPATIENT)
Dept: NEUROSURGERY | Facility: CLINIC | Age: 40
End: 2025-01-14
Payer: COMMERCIAL

## 2025-01-28 ENCOUNTER — OFFICE VISIT (OUTPATIENT)
Dept: VASCULAR SURGERY | Facility: CLINIC | Age: 40
End: 2025-01-28
Attending: NURSE PRACTITIONER
Payer: COMMERCIAL

## 2025-01-28 VITALS
OXYGEN SATURATION: 95 % | DIASTOLIC BLOOD PRESSURE: 83 MMHG | TEMPERATURE: 97.7 F | RESPIRATION RATE: 18 BRPM | HEART RATE: 76 BPM | SYSTOLIC BLOOD PRESSURE: 138 MMHG

## 2025-01-28 DIAGNOSIS — S31.109D OPEN ABDOMINAL WALL WOUND, SUBSEQUENT ENCOUNTER: ICD-10-CM

## 2025-01-28 DIAGNOSIS — T14.8XXA WOUND INFECTION: ICD-10-CM

## 2025-01-28 DIAGNOSIS — T81.31XD DEHISCENCE OF OPERATIVE WOUND, SUBSEQUENT ENCOUNTER: Primary | ICD-10-CM

## 2025-01-28 DIAGNOSIS — L08.9 WOUND INFECTION: ICD-10-CM

## 2025-01-28 PROCEDURE — G0463 HOSPITAL OUTPT CLINIC VISIT: HCPCS | Performed by: NURSE PRACTITIONER

## 2025-01-28 PROCEDURE — 99213 OFFICE O/P EST LOW 20 MIN: CPT | Performed by: NURSE PRACTITIONER

## 2025-01-28 ASSESSMENT — PAIN SCALES - GENERAL: PAINLEVEL_OUTOF10: NO PAIN (0)

## 2025-01-28 NOTE — PROGRESS NOTES
Follow up Vascular Visit       Date of Service:25      Chief Complaint: dehisced  incision      Pt returns to [facility] with regards to their dehisced  incision.  They arrive today with her infant. They are currently using endoform and tegaderm to the wounds. This is being done by home care twice per week. They are using nothing for compression. They are feeling well today. Denies fevers, chills. No shortness of breath.     Allergies:   Allergies   Allergen Reactions    Amoxicillin Rash       Medications:   Current Outpatient Medications:     acetaminophen (TYLENOL) 325 MG tablet, Take 325-650 mg by mouth every 6 hours as needed for mild pain., Disp: , Rfl:     escitalopram (LEXAPRO) 10 MG tablet, Take 0.5 tablets (5 mg) by mouth daily. Take 5 mg for 7-10 days and if well tolerated increase to 10 mg daily, Disp: 60 tablet, Rfl: 1    ibuprofen (ADVIL/MOTRIN) 800 MG tablet, Take 1 tablet (800 mg) by mouth every 6 hours., Disp: 30 tablet, Rfl: 1    ketoconazole (NIZORAL) 2 % external cream, Apply topically daily, Disp: 30 g, Rfl: 0    lidocaine (XYLOCAINE) 5 % external ointment, Apply topically as needed for moderate pain (for wound care)., Disp: 50 g, Rfl: 3    Misc. Devices (BREAST PUMP) MISC, 1 each See Admin Instructions., Disp: 1 each, Rfl: 0    NIFEdipine ER OSMOTIC (ADALAT CC) 30 MG 24 hr tablet, Take 1 tablet (30 mg) by mouth every 24 hours., Disp: 30 tablet, Rfl: 2    Prenatal Vit-Fe Fumarate-FA (PRENATAL MULTIVITAMIN  PLUS IRON) 27-1 MG TABS, Take 1 tablet by mouth daily., Disp: , Rfl:     senna (SENOKOT) 8.6 MG tablet, Take 1 tablet by mouth daily as needed for constipation., Disp: , Rfl:     senna-docusate (SENOKOT-S/PERICOLACE) 8.6-50 MG tablet, Take 1 tablet by mouth 2 times daily., Disp: 30 tablet, Rfl: 1    vitamin B6 (PYRIDOXINE) 100 MG tablet, Take 1 tablet (100 mg) by mouth daily, Disp: 90 tablet, Rfl: 3    Current Facility-Administered  Medications:     lidocaine (XYLOCAINE) 5 % ointment, , Topical, Daily PRN, Lyssa Geronimo, NP, Given at 24 0841    lidocaine (XYLOCAINE) 5 % ointment, , Topical, Daily PRN, Lyssa Geronimo, NP    silver nitrate (ARZOL) Misc, , Topical, Once, Lyssa Geronimo, NP    History:   Past Medical History:   Diagnosis Date    ALCOH DEP NEC/NOS-UNSPEC 2007    Alcohol dependence in remission (H) 2023    Asthma     exercise induced    Brain aneurysm     CARDIOVASCULAR SCREENING; LDL GOAL LESS THAN 160 10/23/2012     delivery delivered 2016    Chickenpox     Chlamydia     Depression with anxiety 2014    Encounter for triage in pregnant patient 2024    Generalized anxiety disorder 2013    Diagnosis updated by automated process. Provider to review and confirm.      GERD (gastroesophageal reflux disease) 2013    Gestational hypertension 2024    Major depressive disorder, recurrent, unspecified 2023    Major depressive disorder, single episode, moderate (H) 2013    Methamphetamine abuse (H) 2023    Mild intermittent asthma 2008    Mirena IUD 2016    Lot: [id number]  Exp:       Panic disorder with agoraphobia and moderate panic attacks 03/10/2009    Persons encountering health services in other specified circumstances 2015    Formatting of this note might be different from the original. Care Coordinator: Kelly DANIEL, MSW See letters for Health Care home care plan SW covering in [facility] absence FREDY Luz, [phone] 3/24/2015 12:24 PM      Placental abruption 2016    Placental abruption in third trimester 2016    Postpartum depression     also 2016    Preeclampsia     ? in 2011    Pregnancy induced hypertension 2011    Pregnancy with history of  section, antepartum 2024    Right upper quadrant abdominal pain affecting pregnancy 2024    Routine postpartum follow-up  05/31/2016    Single liveborn, born in hospital, delivered 04/19/2016    Tobacco use disorder 02/19/2008    Has decreased from 1 ppd to 1 cigarette per day since pregnancy.      Tooth infection 02/20/2022    Urinary tract infection in mother during second trimester of pregnancy 07/05/2024    Vaginal spotting 07/05/2024       Physical Exam:    There were no vitals taken for this visit.    General:  Patient presents to clinic in no apparent distress.  Head: normocephalic atraumatic  Psychiatric:  Alert and oriented x3.   Respiratory: unlabored breathing; no cough  Integumentary:  Skin is uniformly warm, dry and pink.    Ulcer #1 Location: abdomen right  Size: 0L x 0W x 0depth.  No sinus tract present, Wound base: scar tissue  no undermining present. Ulcer is healed thickness. There is no drainage. Periwound: no denudement, erythema, induration, maceration or warmth.        Ulcer #2  Location: abdomen left   Size: 0L x 0W x 0depth.  No sinus tract present, Wound base: healed  no undermining present. Ulcer is healed thickness. There is no drainage. Periwound: no denudement, erythema, induration, maceration or warmth.        VASC Wound ABD middle (Active)   Pre Size Length 0.2 10/25/24 0800   Pre Size Width 0.2 10/25/24 0800   Pre Size Depth 5 10/02/24 1400   Pre Total Sq cm 0.04 10/25/24 0800   Post Size Length 0 10/25/24 0800   Post Size Width 0 10/25/24 0800   Post Size Depth 0 10/25/24 0800   Post Total Sq cm 0 10/25/24 0800   Undermined 0 10/25/24 0800   Number of days: 118       VASC Wound ABD LEFT (Active)   Pre Size Length 0.5 12/31/24 1025   Pre Size Width 1 12/31/24 1025   Pre Size Depth 0.1 12/31/24 1025   Pre Total Sq cm 0.5 12/31/24 1025   Number of days: 118       VASC Wound surgical abdominal right (Active)   Pre Size Length 0.5 12/31/24 1025   Pre Size Width 1.5 12/31/24 1025   Pre Size Depth 0.1 12/31/24 1025   Pre Total Sq cm 0.75 12/31/24 1025   Number of days: 118       Incision/Surgical Site Abdomen  "(Active)   Number of days:             Circumferential volume measures:           No data to display                Labs:    I personally reviewed the following lab results today and those on care everywhere    CRP Inflammation   Date Value Ref Range Status   10/22/2014 13.5 (H) 0.0 - 8.0 mg/L Final      No results found for: \"SED\"   Last Renal Panel:  Sodium   Date Value Ref Range Status   09/17/2024 134 (L) 135 - 145 mmol/L Final   05/28/2020 137 133 - 144 mmol/L Final     Potassium   Date Value Ref Range Status   09/17/2024 4.1 3.4 - 5.3 mmol/L Final   01/24/2022 3.5 3.4 - 5.3 mmol/L Final   05/28/2020 4.2 3.4 - 5.3 mmol/L Final     Chloride   Date Value Ref Range Status   09/17/2024 104 98 - 107 mmol/L Final   01/24/2022 105 94 - 109 mmol/L Final   05/28/2020 106 94 - 109 mmol/L Final     Carbon Dioxide   Date Value Ref Range Status   05/28/2020 26 20 - 32 mmol/L Final     Carbon Dioxide (CO2)   Date Value Ref Range Status   09/17/2024 22 22 - 29 mmol/L Final   01/24/2022 27 20 - 32 mmol/L Final     Anion Gap   Date Value Ref Range Status   09/17/2024 8 7 - 15 mmol/L Final   01/24/2022 5 3 - 14 mmol/L Final   05/28/2020 5 3 - 14 mmol/L Final     Glucose   Date Value Ref Range Status   09/17/2024 175 (H) 70 - 99 mg/dL Final   01/24/2022 85 70 - 99 mg/dL Final   05/28/2020 85 70 - 99 mg/dL Final     Urea Nitrogen   Date Value Ref Range Status   09/17/2024 10.1 6.0 - 20.0 mg/dL Final   01/24/2022 11 7 - 30 mg/dL Final   05/28/2020 15 7 - 30 mg/dL Final     Creatinine   Date Value Ref Range Status   09/17/2024 0.71 0.51 - 0.95 mg/dL Final   05/28/2020 0.82 0.52 - 1.04 mg/dL Final     GFR Estimate   Date Value Ref Range Status   09/17/2024 >90 >60 mL/min/1.73m2 Final     Comment:     eGFR calculated using 2021 CKD-EPI equation.   05/28/2020 >90 >60 mL/min/[1.73_m2] Final     Comment:     Non  GFR Calc  Starting 12/18/2018, serum creatinine based estimated GFR (eGFR) will be   calculated using the " "Chronic Kidney Disease Epidemiology Collaboration   (CKD-EPI) equation.       Calcium   Date Value Ref Range Status   09/17/2024 9.0 8.8 - 10.4 mg/dL Final     Comment:     Reference intervals for this test were updated on 7/16/2024 to reflect our healthy population more accurately. There may be differences in the flagging of prior results with similar values performed with this method. Those prior results can be interpreted in the context of the updated reference intervals.   05/28/2020 8.7 8.5 - 10.1 mg/dL Final     Albumin   Date Value Ref Range Status   09/17/2024 3.1 (L) 3.5 - 5.2 g/dL Final   01/24/2022 3.8 3.4 - 5.0 g/dL Final   05/28/2020 3.9 3.4 - 5.0 g/dL Final      Lab Results   Component Value Date    WBC 7.2 09/30/2024    WBC 5.6 05/28/2020     Lab Results   Component Value Date    RBC 4.76 09/30/2024    RBC 4.88 05/28/2020     Lab Results   Component Value Date    HGB 12.2 09/30/2024    HGB 14.1 05/28/2020     Lab Results   Component Value Date    HCT 39.8 09/30/2024    HCT 42.4 05/28/2020     No components found for: \"MCT\"  Lab Results   Component Value Date    MCV 84 09/30/2024    MCV 87 05/28/2020     Lab Results   Component Value Date    MCH 25.6 09/30/2024    MCH 28.9 05/28/2020     Lab Results   Component Value Date    MCHC 30.7 09/30/2024    MCHC 33.3 05/28/2020     Lab Results   Component Value Date    RDW 14.2 09/30/2024    RDW 12.9 05/28/2020     Lab Results   Component Value Date     09/30/2024     05/28/2020      Lab Results   Component Value Date    A1C 5.0 03/15/2024      TSH   Date Value Ref Range Status   09/23/2021 2.72 0.40 - 4.00 mU/L Final   09/28/2015 0.85 0.40 - 4.00 mU/L Final      No results found for: \"VITDT\"                Impression:  Encounter Diagnoses   Name Primary?    Dehiscence of operative wound, subsequent encounter Yes    Open abdominal wall wound, subsequent encounter     Wound infection                    Are any of these ulcers new today: No; " Location:     Assessment/Plan:          1. Debridement: na     2.  Ulcer treatment: ulcer treatment will include irrigation and dressings to promote autolytic debridement which will include:healed; no dressings needed If for some reason the patient is not able to get their dressing(s) changed as outlined above (due to illness, lack of supplies, lack of help) please do the following: remove old, soiled dressings; wash the ulcers with saline; pat dry; apply ABD pad or other absorbant pad and secure with rolled gauze; avoid tape directly on your skin; patient instructed to call the clinic as soon as possible to let us know what the current issues are in receiving ulcer care. Stable            3. Edema: na.            4. Nutrition: focus on protein; low albumin level historically           5. Offloading: na          6. Wound Etiology: surgical     Patient will follow up with me in prn weeks for reevaluation. They were instructed to call the clinic sooner with any signs or symptoms of infection or any further questions/concerns. Answered all questions.          Lyssa Geronimo DNP, RN, CNP, CWOCN, CFCN, CLT  [facility]   [phone]        This note was electronically signed by Lyssa Geronimo NP

## 2025-02-21 ENCOUNTER — HOSPITAL ENCOUNTER (OUTPATIENT)
Dept: MRI IMAGING | Facility: CLINIC | Age: 40
Discharge: HOME | End: 2025-02-21
Attending: RADIOLOGY | Admitting: RADIOLOGY
Payer: COMMERCIAL

## 2025-02-21 DIAGNOSIS — I67.1 NONRUPTURED CEREBRAL ANEURYSM: ICD-10-CM

## 2025-02-21 PROCEDURE — 70544 MR ANGIOGRAPHY HEAD W/O DYE: CPT

## 2025-02-25 ENCOUNTER — VIRTUAL VISIT (OUTPATIENT)
Dept: NEUROSURGERY | Facility: CLINIC | Age: 40
End: 2025-02-25
Payer: COMMERCIAL

## 2025-02-25 VITALS — BODY MASS INDEX: 38.92 KG/M2 | WEIGHT: 278 LBS | HEIGHT: 71 IN

## 2025-02-25 DIAGNOSIS — I67.1 CEREBRAL ANEURYSM, NONRUPTURED: ICD-10-CM

## 2025-02-25 PROCEDURE — 98013 SYNCH AUDIO-ONLY EST LOW 20: CPT | Mod: 4UV | Performed by: RADIOLOGY

## 2025-02-25 ASSESSMENT — PAIN SCALES - GENERAL: PAINLEVEL_OUTOF10: MODERATE PAIN (5)

## 2025-02-25 NOTE — PATIENT INSTRUCTIONS
Please follow-up with Dr. Walker in 2-3 years with [facility] prior.     Stroke & Endovascular RN Care Coordinators:    Chelly Stephenson, RN, BSN  Christy Hill, RN, CNRN, SCRN    If you have any questions please contact the RN Care Coordinators at [phone], option 1.    After business hours call the  at [phone] and have the Neuro-Interventional Fellow paged.    Thank you for choosing [facility] for your health care needs.

## 2025-02-25 NOTE — PROGRESS NOTES
"Virtual Visit Details    Type of service:  Video Visit   Video Start Time: {video visit start/end time for provider to select:325854}  Video End Time:{video visit start/end time for provider to select:208883}    Originating Location (pt. Location): {video visit patient location:136565::\"Home\"}  {PROVIDER LOCATION On-site should be selected for visits conducted from your clinic location or adjoining [facility] hospital, academic office, or other nearby [facility] building. Off-site should be selected for all other provider locations, including home:308971}  Distant Location (provider location):  {virtual location provider:960468}  Platform used for Video Visit: {Virtual Visit Platforms:768816::\"[employer]\"}  "

## 2025-02-25 NOTE — PROGRESS NOTES
Neuro-IR clinic visit- telephone call    Ms. 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     On her most recent MR angiogram of the head, there was no significant change in this aneurysm/infundibulum when compared to the prior MR angiogram of the head.  This aneurysm/infundibulum is seen to arise from the left anterior cerebral artery, A2 segment and is linear in configuration.     I assured Ms. Tolliver that this aneurysm/infundibulum poses very little risk of rupture and cerebral hemorrhage at this point.  The shape of the lesion is also unusual for an aneurysm.  Given the stability of this finding, we will follow-up with another [facility] angiogram in 2-3years.  Ms. Tolliver was agreeable to this plan of action.    Rachelle Rosas MD  Fellow, Endovascular Surgical Neuroradiology

## 2025-02-25 NOTE — NURSING NOTE
Anesthesia Post Evaluation    Patient: Hossein Veloz Jr.    Procedure(s) Performed: Procedure(s) (LRB):  TYMPANOPLASTY (Right)    Final Anesthesia Type: general      Patient location during evaluation: [facility]  Patient participation: Yes- Able to Participate  Level of consciousness: awake and alert  Post-procedure vital signs: reviewed and stable  Pain management: adequate  Airway patency: patent    PONV status at discharge: No PONV  Anesthetic complications: no      Cardiovascular status: blood pressure returned to baseline  Respiratory status: unassisted  Hydration status: euvolemic  Follow-up not needed.          Vitals Value Taken Time   BP 86/49 07/27/23 1115   Temp 36.2 °C (97.2 °F) 07/27/23 1100   Pulse 79 07/27/23 1115   Resp 18 07/27/23 1115   SpO2 95 % 07/27/23 1115         No case tracking events are documented in the log.      Pain/Trent Score: Presence of Pain: non-verbal indicators absent (7/27/2023  6:07 AM)  Pain Rating Prior to Med Admin: 0 (7/27/2023  6:37 AM)  Trent Score: 9 (7/27/2023 10:15 AM)         Current patient location: [facility]   [facility] [address]    Is the patient currently in the state of [place]? YES    Visit mode: VIDEO    If the visit is dropped, the patient can be reconnected by:VIDEO VISIT: Text to cell phone:   Telephone Information:   Mobile [phone]       Will anyone else be joining the visit? NO  (If patient encounters technical issues they should call [phone])    Are changes needed to the allergy or medication list? No    Are refills needed on medications prescribed by this physician? NO    Rooming Documentation:  Not applicable    Reason for visit: [facility]

## 2025-02-25 NOTE — LETTER
2/25/2025       RE: Holly Tolliver  [address]  [facility] [address]     Dear Colleague,    Thank you for referring your patient, Holly Tolliver, to the [facility] [place] at [facility]. Please see a copy of my visit note below.    Neuro-IR clinic visit- telephone call    Ms. 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     On her most recent MR angiogram of the head, there was no significant change in this aneurysm/infundibulum when compared to the prior MR angiogram of the head.  This aneurysm/infundibulum is seen to arise from the left anterior cerebral artery, A2 segment and is linear in configuration.     I assured Ms. Tollivre that this aneurysm/infundibulum poses very little risk of rupture and cerebral hemorrhage at this point.  The shape of the lesion is also unusual for an aneurysm.  Given the stability of this finding, we will follow-up with another [facility] angiogram in 2-3years.  Ms. Tolliver was agreeable to this plan of action.    Rachelle Rosas MD  Fellow, Endovascular Surgical Neuroradiology    Attestation signed by Yoel Walker MD at 3/4/2025 12:27 PM:  I agree with the above note by Dr. Rosas         on  2/25/25      Again, thank you for allowing me to participate in the care of your patient.      Sincerely,    Yoel Walker MD

## 2025-04-24 ENCOUNTER — OFFICE VISIT (OUTPATIENT)
Dept: FAMILY MEDICINE | Facility: CLINIC | Age: 40
End: 2025-04-24
Payer: COMMERCIAL

## 2025-04-24 VITALS
HEART RATE: 70 BPM | BODY MASS INDEX: 38.46 KG/M2 | OXYGEN SATURATION: 98 % | SYSTOLIC BLOOD PRESSURE: 134 MMHG | HEIGHT: 71 IN | RESPIRATION RATE: 16 BRPM | WEIGHT: 274.7 LBS | TEMPERATURE: 98 F | DIASTOLIC BLOOD PRESSURE: 84 MMHG

## 2025-04-24 DIAGNOSIS — F41.1 GAD (GENERALIZED ANXIETY DISORDER): ICD-10-CM

## 2025-04-24 DIAGNOSIS — J45.990 EXERCISE-INDUCED ASTHMA: ICD-10-CM

## 2025-04-24 DIAGNOSIS — M25.571 RIGHT ANKLE PAIN, UNSPECIFIED CHRONICITY: Primary | ICD-10-CM

## 2025-04-24 DIAGNOSIS — D17.1 LIPOMA OF LOWER BACK: ICD-10-CM

## 2025-04-24 RX ORDER — ALBUTEROL SULFATE 90 UG/1
2 INHALANT RESPIRATORY (INHALATION) EVERY 6 HOURS PRN
Qty: 18 G | Refills: 1 | Status: SHIPPED | OUTPATIENT
Start: 2025-04-24

## 2025-04-24 RX ORDER — DULOXETIN HYDROCHLORIDE 30 MG/1
30 CAPSULE, DELAYED RELEASE ORAL DAILY
Qty: 60 CAPSULE | Refills: 0 | Status: SHIPPED | OUTPATIENT
Start: 2025-04-24

## 2025-04-24 RX ORDER — OMEGA-3 FATTY ACIDS/FISH OIL 300-1000MG
600 CAPSULE ORAL EVERY 4 HOURS PRN
Qty: 90 CAPSULE | Refills: 0 | Status: SHIPPED | OUTPATIENT
Start: 2025-04-24

## 2025-04-24 ASSESSMENT — ASTHMA QUESTIONNAIRES
QUESTION_3 LAST FOUR WEEKS HOW OFTEN DID YOUR ASTHMA SYMPTOMS (WHEEZING, COUGHING, SHORTNESS OF BREATH, CHEST TIGHTNESS OR PAIN) WAKE YOU UP AT NIGHT OR EARLIER THAN USUAL IN THE MORNING: NOT AT ALL
QUESTION_1 LAST FOUR WEEKS HOW MUCH OF THE TIME DID YOUR ASTHMA KEEP YOU FROM GETTING AS MUCH DONE AT WORK, SCHOOL OR AT HOME: A LITTLE OF THE TIME
QUESTION_4 LAST FOUR WEEKS HOW OFTEN HAVE YOU USED YOUR RESCUE INHALER OR NEBULIZER MEDICATION (SUCH AS ALBUTEROL): NOT AT ALL
ACT_TOTALSCORE: 18
ACT_TOTALSCORE: 18
QUESTION_1 LAST FOUR WEEKS HOW MUCH OF THE TIME DID YOUR ASTHMA KEEP YOU FROM GETTING AS MUCH DONE AT WORK, SCHOOL OR AT HOME: A LITTLE OF THE TIME
QUESTION_2 LAST FOUR WEEKS HOW OFTEN HAVE YOU HAD SHORTNESS OF BREATH: ONCE A DAY
QUESTION_5 LAST FOUR WEEKS HOW WOULD YOU RATE YOUR ASTHMA CONTROL: POORLY CONTROLLED

## 2025-04-24 ASSESSMENT — PAIN SCALES - GENERAL: PAINLEVEL_OUTOF10: MODERATE PAIN (4)

## 2025-04-24 ASSESSMENT — PATIENT HEALTH QUESTIONNAIRE - PHQ9
SUM OF ALL RESPONSES TO PHQ QUESTIONS 1-9: 8
SUM OF ALL RESPONSES TO PHQ QUESTIONS 1-9: 8
10. IF YOU CHECKED OFF ANY PROBLEMS, HOW DIFFICULT HAVE THESE PROBLEMS MADE IT FOR YOU TO DO YOUR WORK, TAKE CARE OF THINGS AT HOME, OR GET ALONG WITH OTHER PEOPLE: VERY DIFFICULT

## 2025-04-24 ASSESSMENT — ENCOUNTER SYMPTOMS: NERVOUS/ANXIOUS: 1

## 2025-04-24 NOTE — PROGRESS NOTES
Assessment & Plan     Right ankle pain, unspecified chronicity  Injury > 1 year ago. Able to bear weight. Does have significant tenderness along the lateral foot, no swelling. Will refer to Podiatry for evaluation and any imaging.   - Orthopedic  Referral; Future  - ibuprofen (ADVIL/MOTRIN) 200 MG capsule; Take 3 capsules (600 mg) by mouth every 4 hours as needed for fever.    RUBENS (generalized anxiety disorder)  Longstanding history of anxiety, has tried numerous medications. Given she is breastfeeding, will try duloxetine as limited amounts transfer to breast milk and does not appear that she has tried SNRI prior. Plan to start 30mg for 1 week, then increase to 60mg. Follow up in 4-6 weeks to reassess anxiety.   - DULoxetine (CYMBALTA) 30 MG capsule; Take 1 capsule (30 mg) by mouth daily.    Lipoma of lower back  Evaluate for lipoma. Will refer to general surgery per patient request after ultrasound.  - US Soft Tissue Abdominal Wall or Lower Back; Future    Exercise-induced asthma  Intermittent. Well controlled. Refill provided on albuterol.   - albuterol (PROAIR HFA/PROVENTIL HFA/VENTOLIN HFA) 108 (90 Base) MCG/ACT inhaler; Inhale 2 puffs into the lungs every 6 hours as needed for shortness of breath, wheezing or cough.          Follow up in 4-6 weeks for anxiety     Subjective   Ana is a 40 year old, presenting for the following health issues:  Anxiety (On going concerns ) and Musculoskeletal Problem (Right foot concerns. Had injury during pregnancy )      4/24/2025    11:39 AM   Additional Questions   Roomed by Mercedes   Accompanied by self     Anxiety    Musculoskeletal Problem    History of Present Illness       Reason for visit:  Anxiety and foot pain  Symptom onset:  More than a month  Symptoms include:  Worry foot pain  Symptom intensity:  Severe  Symptom progression:  Staying the same  Had these symptoms before:  Yes  Has tried/received treatment for these symptoms:  Yes  What makes it  "better:  Resting my foot   She is taking medications regularly.        Right foot/ankle pain  She injured her foot over a year ago while shoveling.   Never saw care  Pain has gotten worse  Pain comes and goes  Can bother her at rest or with movement   Wears crocs/slide on shoes most of the time   History of frequent right ankle sprains    Anxiety  Longstanding history of anxiety  Worse with after having a child  Has been on many different medications, will take for awhile, and then stop  Most recently was on lexapro and felt like she was having a panic attack  Does not want side effect of weight gain      Has lump on her back that she would like removed     Asthma  - bothers her with long walks or up and down the stairs carrying loads of laundry   -no nighttime coughing       Review of Systems  Detailed as above      Objective    /84 (BP Location: Left arm, Patient Position: Sitting, Cuff Size: Adult Large)   Pulse 70   Temp 98  F (36.7  C) (Oral)   Resp 16   Ht 1.803 m (5' 11\")   Wt 124.6 kg (274 lb 11.2 oz)   SpO2 98%   Breastfeeding Yes   BMI 38.31 kg/m    Body mass index is 38.31 kg/m .  Physical Exam   GENERAL: alert and no distress  NECK: no adenopathy, no asymmetry, masses, or scars  RESP: lungs clear to auscultation - no rales, rhonchi or wheezes  CV: regular rate and rhythm, normal S1 S2, no S3 or S4, no murmur, click or rub, no peripheral edema  ABDOMEN: soft, nontender, no hepatosplenomegaly, no masses and bowel sounds normal  MS: significant tenderness along the right lateral foot, no swelling, mild swelling to right lateral ankle. Soft, tender lump to mid back            Signed Electronically by: Rachael Hunt, WOLFGANG CNP    "

## 2025-04-28 ENCOUNTER — PATIENT OUTREACH (OUTPATIENT)
Dept: CARE COORDINATION | Facility: CLINIC | Age: 40
End: 2025-04-28
Payer: COMMERCIAL

## 2025-04-29 ENCOUNTER — HOSPITAL ENCOUNTER (OUTPATIENT)
Dept: ULTRASOUND IMAGING | Facility: CLINIC | Age: 40
Discharge: HOME | End: 2025-04-29
Payer: COMMERCIAL

## 2025-04-29 DIAGNOSIS — D17.1 LIPOMA OF LOWER BACK: ICD-10-CM

## 2025-04-29 PROCEDURE — 76705 ECHO EXAM OF ABDOMEN: CPT

## 2025-05-06 ENCOUNTER — TELEPHONE (OUTPATIENT)
Dept: PODIATRY | Facility: CLINIC | Age: 40
End: 2025-05-06
Payer: COMMERCIAL

## 2025-05-06 NOTE — TELEPHONE ENCOUNTER
Other: Requesting c/b- wondering if it ok if she brings her baby to the appt      Could we send this information to you in [employer] [place] or would you prefer to receive a phone call?:   Patient would prefer a phone call   Okay to leave a detailed message?: No at Cell number on file:    Telephone Information:   Mobile [phone]

## 2025-05-06 NOTE — TELEPHONE ENCOUNTER
Spoke with Ana and discussed the need for x-ray and would be best to have a sitter or someone to come with to the appointment. She would need someone who can watch her 7th month old while in x-ray. She is reaching out to her mom to see when she is available and will call us back if needing to reschedule.

## 2025-05-21 ENCOUNTER — ANCILLARY PROCEDURE (OUTPATIENT)
Dept: GENERAL RADIOLOGY | Facility: CLINIC | Age: 40
End: 2025-05-21
Attending: STUDENT IN AN ORGANIZED HEALTH CARE EDUCATION/TRAINING PROGRAM
Payer: COMMERCIAL

## 2025-05-21 ENCOUNTER — OFFICE VISIT (OUTPATIENT)
Dept: PODIATRY | Facility: CLINIC | Age: 40
End: 2025-05-21
Payer: COMMERCIAL

## 2025-05-21 DIAGNOSIS — M76.71 PERONEAL TENDINITIS, RIGHT: Primary | ICD-10-CM

## 2025-05-21 DIAGNOSIS — M79.671 FOOT PAIN, RIGHT: ICD-10-CM

## 2025-05-21 DIAGNOSIS — M25.571 SINUS TARSITIS, RIGHT: ICD-10-CM

## 2025-05-21 PROCEDURE — 73630 X-RAY EXAM OF FOOT: CPT | Mod: RT | Performed by: STUDENT IN AN ORGANIZED HEALTH CARE EDUCATION/TRAINING PROGRAM

## 2025-05-21 ASSESSMENT — PAIN SCALES - GENERAL: PAINLEVEL_OUTOF10: MILD PAIN (3)

## 2025-05-21 NOTE — LETTER
2025      Holly Tolliver  [address]  [facility] [address]      Dear Colleague,    Thank you for referring your patient, Holly Tolliver, to the [facility]. Please see a copy of my visit note below.    CC: Ankle pain, right     HPI: Holly Tolliver is a 40 year old female who presents to clinic for chief concern of ankle pain to the right foot.  She states that she chronically sprained her ankles and that now she is having pain in her ring around her foot and ankle on the right side.  She states that there is now also a soft tissue mass to the outside of her right ankle that showed up around the same time.  She states that she thought this was just due to pregnancy but as her weight is coming down the soft tissue masses remaining.    Past Surgical History:   Procedure Laterality Date      SECTION Bilateral 2016    Procedure:  SECTION;  Surgeon: Beau Cardoso MD;  Location: [facility] OR     COMBINED  SECTION, SALPINGECTOMY BILATERAL Bilateral 2024    Procedure: repeat  SECTION, WITH BILATERAL SALPINGECTOMY;  Surgeon: Antonette Whitman MD;  Location: [facility] OR     PE TUBES  age 3     Past Medical History:   Diagnosis Date     ALCOH DEP NEC/NOS-UNSPEC 2007     Alcohol dependence in remission (H) 2023     Asthma     exercise induced     Brain aneurysm      CARDIOVASCULAR SCREENING; LDL GOAL LESS THAN 160 10/23/2012      delivery delivered 2016     Chickenpox      Chlamydia      Depression with anxiety 2014     Encounter for triage in pregnant patient 2024     Generalized anxiety disorder 2013    Diagnosis updated by automated process. Provider to review and confirm.       GERD (gastroesophageal reflux disease) 2013     Gestational hypertension 2024     Major depressive disorder, recurrent, unspecified 2023     Major depressive disorder, single episode, moderate (H)  2013     Methamphetamine abuse (H) 2023     Mild intermittent asthma 2008     Mirena IUD 2016    Lot: [id number]  Exp:        Panic disorder with agoraphobia and moderate panic attacks 03/10/2009     Persons encountering health services in other specified circumstances 2015    Formatting of this note might be different from the original. Care Coordinator: Kelly DANIEL, MSW See letters for Health Care home care plan SW covering in [facility] absence Kelly Simon, FREDY, [phone] 3/24/2015 12:24 PM       Placental abruption 2016     Placental abruption in third trimester 2016     Postpartum depression     also 2016     Preeclampsia     ? in      Pregnancy induced hypertension      Pregnancy with history of  section, antepartum 2024     Right upper quadrant abdominal pain affecting pregnancy 2024     Routine postpartum follow-up 2016     Single liveborn, born in hospital, delivered 2016     Tobacco use disorder 2008    Has decreased from 1 ppd to 1 cigarette per day since pregnancy.       Tooth infection 2022     Urinary tract infection in mother during second trimester of pregnancy 2024     Vaginal spotting 2024     Medications:  Current Outpatient Medications   Medication Sig Dispense Refill     acetaminophen (TYLENOL) 325 MG tablet Take 325-650 mg by mouth every 6 hours as needed for mild pain.       albuterol (PROAIR HFA/PROVENTIL HFA/VENTOLIN HFA) 108 (90 Base) MCG/ACT inhaler Inhale 2 puffs into the lungs every 6 hours as needed for shortness of breath, wheezing or cough. 18 g 1     DULoxetine (CYMBALTA) 30 MG capsule Take 1 capsule (30 mg) by mouth daily. 60 capsule 0     ibuprofen (ADVIL/MOTRIN) 200 MG capsule Take 3 capsules (600 mg) by mouth every 4 hours as needed for fever. 90 capsule 0     Misc. Devices (BREAST PUMP) MISC 1 each See Admin Instructions. 1 each 0      Allergies:  Amoxicillin  Social History     Socioeconomic History     Marital status: Single     Spouse name: Not on file     Number of children: 1     Years of education: Not on file     Highest education level: Not on file   Occupational History     Occupation: PCA     Employer: [employer]   Tobacco Use     Smoking status: Former     Current packs/day: 0.00     Types: Cigarettes     Passive exposure: Never     Smokeless tobacco: Never   Vaping Use     Vaping status: Former   Substance and Sexual Activity     Alcohol use: Not Currently     Comment: sober since 5/19/23     Drug use: Not Currently     Types: Methamphetamines, Marijuana     Comment: sober since 5/19/23     Sexual activity: Yes     Partners: Male   Other Topics Concern     Parent/sibling w/ CABG, MI or angioplasty before 65F 55M? No   Social History Narrative     Not on file     Social Drivers of Health     Financial Resource Strain: Low Risk  (9/16/2024)    Financial Resource Strain      Within the past 12 months, have you or your family members you live with been unable to get utilities (heat, electricity) when it was really needed?: No   Food Insecurity: Low Risk  (9/16/2024)    Food Insecurity      Within the past 12 months, did you worry that your food would run out before you got money to buy more?: No      Within the past 12 months, did the food you bought just not last and you didn t have money to get more?: No   Transportation Needs: Low Risk  (9/16/2024)    Transportation Needs      Within the past 12 months, has lack of transportation kept you from medical appointments, getting your medicines, non-medical meetings or appointments, work, or from getting things that you need?: No   Physical Activity: Not on file   Stress: Not on file   Social Connections: Not on file   Interpersonal Safety: Low Risk  (4/24/2025)    Interpersonal Safety      Do you feel physically and emotionally safe where you currently live?: Yes       Within the past 12 months, have you been hit, slapped, kicked or otherwise physically hurt by someone?: No      Within the past 12 months, have you been humiliated or emotionally abused in other ways by your partner or ex-partner?: No   Housing Stability: Low Risk  (2024)    Housing Stability      Do you have housing? : Yes      Are you worried about losing your housing?: No     Family History   Problem Relation Age of Onset     Alcohol/Drug Mother         alcohol- recovered     Hypertension Mother      Depression Mother         also anxiety     Other - See Comments Mother         ankylosing spondylosis     Anxiety Disorder Mother      Irritable Bowel Syndrome Mother      Alcohol/Drug Father         alcohol- recovered     Alcohol/Drug Sister         A&D-recovered     Alcoholism Brother         recovered     Alcohol/Drug Brother         alcohol     Hypertension Brother      Schizophrenia Brother      Cancer Maternal Grandmother         lung- at age 44     Depression Maternal Grandmother         also anxiety       Medical records were reviewed and are summarized above.    Review of Systems    PHYSICAL EXAM:  Vital signs:There were no vitals taken for this visit.     Focused lower extremity physical exam:     Derm: Skin is warm, dry, intact. No open wounds, laceration or abrasions noted. Nails are well trimmed. No ecchymosis or erythema noted.     Vasc: Dorsalis pedis pulses, 2/4 bilateral. Posterior tibial pulses 2/4 bilateral. Cap fill time < 3 seconds to the digits. No edema is present. Hair growth present to the toes.     Neuro: Protective sensation intact via light touch to the feet. Gross sensation intact.     MSK:   - Decreased medial arch height noted with RCSP.  Valgus tilt of the calcaneus with patient's stance and RCSP.  Palpable talar head noted at the TN joint, bilaterally.  - Soft tissue lipoma appreciated to the end of the sinus tarsi, right  - Pain to palpation at the sinus tarsi, right  -  Pain to palpation with endrange passive eversion of the subtalar joint at the posterior facet, right  - No pain to the ATFL, CFL, PTFL, lateral gutter, lateral malleoli, medial gutter, medial malleoli, or anterior aspect of the ankle.  - Muscle strength 5/5 with dorsiflexion, plantarflexion, eversion, inversion of the feet. Compartments soft and compressible.  - Tenderness along the peroneus brevis and into the insertion of the fifth metatarsal head, right    Imaging: 3 views of the right foot were taken and evaluated.  Asymmetric joint space narrowing with osteophytic changes noted to the central anterior aspect of the posterior facet of the subtalar joint.  No acute fracture, subluxation, dislocation noted.    Assessment:   - Peroneus brevis tendinitis, right  - Sinus tarsitis, right  - Subtalar joint arthritis, right    Plan:  - Patient was seen and evaluated in clinic by myself.   - 3 views of the right foot were ordered and evaluated today.    - Sinus tarsitis with subtalar joint arthritis, right:  Discussed the subtalar joint and how this differs from the talonavicular joint and ankle joint.  Discussed the location of the posterior facet.  Discussed how the sinus tarsi becomes the exit of this area and how its associated with the lipomatous mass at the end of the sinus tarsi.  Discussed pain and tenderness with palpation of the sinus tarsi.  Discussed the pain and tenderness with movement especially endrange eversion of the subtalar joint.  Discussed conservative treatment including RICE, NSAIDs, ankle bracing verse ankle sleeve.  Discussed subtalar joint fusion first therapeutic injection.  Patient like to move forth over-the-counter bracing at this time.    - Peroneus brevis tendinitis, right:  Discussed the peroneus brevis and its insertion of the fifth metatarsal base.  Discussed how this can become irritated with inversion injuries.  Discussed that this can also be associated with avulsion fracture of the  fifth metatarsal base.  Discussed the motion and function of the peroneus brevis.  Discussed bracing of the brevis.  Discussed RICE and NSAIDs.  Patient will attempt bracing at this time.  Discussed at home exercise versus physical therapy.  Patient would like to move forward with physical therapy.  Physical therapy was ordered for the patient.    - Patient return to clinic in 4 to 6 weeks sooner as problems arise    45 minutes were spent in pre-charting, patient encounter, lab/imaging review, and documentation on the day of the encounter.    Carlos Fuller DPM     Again, thank you for allowing me to participate in the care of your patient.        Sincerely,        Carlos Fuller DPM    Electronically signed

## 2025-05-21 NOTE — PROGRESS NOTES
CC: Ankle pain, right     HPI: Holly Tolliver is a 40 year old female who presents to clinic for chief concern of ankle pain to the right foot.  She states that she chronically sprained her ankles and that now she is having pain in her ring around her foot and ankle on the right side.  She states that there is now also a soft tissue mass to the outside of her right ankle that showed up around the same time.  She states that she thought this was just due to pregnancy but as her weight is coming down the soft tissue masses remaining.    Past Surgical History:   Procedure Laterality Date     SECTION Bilateral 2016    Procedure:  SECTION;  Surgeon: Beau Cardoso MD;  Location: [facility]    COMBINED  SECTION, SALPINGECTOMY BILATERAL Bilateral 2024    Procedure: repeat  SECTION, WITH BILATERAL SALPINGECTOMY;  Surgeon: Antonette Whitman MD;  Location: [facility] OR    PE TUBES  age 3     Past Medical History:   Diagnosis Date    ALCOH DEP NEC/NOS-UNSPEC 2007    Alcohol dependence in remission (H) 2023    Asthma     exercise induced    Brain aneurysm     CARDIOVASCULAR SCREENING; LDL GOAL LESS THAN 160 10/23/2012     delivery delivered 2016    Chickenpox     Chlamydia     Depression with anxiety 2014    Encounter for triage in pregnant patient 2024    Generalized anxiety disorder 2013    Diagnosis updated by automated process. Provider to review and confirm.      GERD (gastroesophageal reflux disease) 2013    Gestational hypertension 2024    Major depressive disorder, recurrent, unspecified 2023    Major depressive disorder, single episode, moderate (H) 2013    Methamphetamine abuse (H) 2023    Mild intermittent asthma 2008    Mirena IUD 2016    Lot: [id number]  Exp:       Panic disorder with agoraphobia and moderate panic attacks 03/10/2009    Persons encountering health services in other specified  circumstances 2015    Formatting of this note might be different from the original. Care Coordinator: Kelly DANIEL, MSW See letters for Health Care home care plan SW covering in [facility] absence FREDY Luz, [phone] 3/24/2015 12:24 PM      Placental abruption 2016    Placental abruption in third trimester 2016    Postpartum depression     also 2016    Preeclampsia     ? in     Pregnancy induced hypertension     Pregnancy with history of  section, antepartum 2024    Right upper quadrant abdominal pain affecting pregnancy 2024    Routine postpartum follow-up 2016    Single liveborn, born in hospital, delivered 2016    Tobacco use disorder 2008    Has decreased from 1 ppd to 1 cigarette per day since pregnancy.      Tooth infection 2022    Urinary tract infection in mother during second trimester of pregnancy 2024    Vaginal spotting 2024     Medications:  Current Outpatient Medications   Medication Sig Dispense Refill    acetaminophen (TYLENOL) 325 MG tablet Take 325-650 mg by mouth every 6 hours as needed for mild pain.      albuterol (PROAIR HFA/PROVENTIL HFA/VENTOLIN HFA) 108 (90 Base) MCG/ACT inhaler Inhale 2 puffs into the lungs every 6 hours as needed for shortness of breath, wheezing or cough. 18 g 1    DULoxetine (CYMBALTA) 30 MG capsule Take 1 capsule (30 mg) by mouth daily. 60 capsule 0    ibuprofen (ADVIL/MOTRIN) 200 MG capsule Take 3 capsules (600 mg) by mouth every 4 hours as needed for fever. 90 capsule 0    Misc. Devices (BREAST PUMP) MISC 1 each See Admin Instructions. 1 each 0     Allergies:  Amoxicillin  Social History     Socioeconomic History    Marital status: Single     Spouse name: Not on file    Number of children: 1    Years of education: Not on file    Highest education level: Not on file   Occupational History    Occupation: PCA     Employer: [employer]   Tobacco Use    Smoking status: Former     Current packs/day: 0.00     Types: Cigarettes     Passive exposure: Never    Smokeless tobacco: Never   Vaping Use    Vaping status: Former   Substance and Sexual Activity    Alcohol use: Not Currently     Comment: sober since 5/19/23    Drug use: Not Currently     Types: Methamphetamines, Marijuana     Comment: sober since 5/19/23    Sexual activity: Yes     Partners: Male   Other Topics Concern    Parent/sibling w/ CABG, MI or angioplasty before 65F 55M? No   Social History Narrative    Not on file     Social Drivers of Health     Financial Resource Strain: Low Risk  (9/16/2024)    Financial Resource Strain     Within the past 12 months, have you or your family members you live with been unable to get utilities (heat, electricity) when it was really needed?: No   Food Insecurity: Low Risk  (9/16/2024)    Food Insecurity     Within the past 12 months, did you worry that your food would run out before you got money to buy more?: No     Within the past 12 months, did the food you bought just not last and you didn t have money to get more?: No   Transportation Needs: Low Risk  (9/16/2024)    Transportation Needs     Within the past 12 months, has lack of transportation kept you from medical appointments, getting your medicines, non-medical meetings or appointments, work, or from getting things that you need?: No   Physical Activity: Not on file   Stress: Not on file   Social Connections: Not on file   Interpersonal Safety: Low Risk  (4/24/2025)    Interpersonal Safety     Do you feel physically and emotionally safe where you currently live?: Yes     Within the past 12 months, have you been hit, slapped, kicked or otherwise physically hurt by someone?: No     Within the past 12 months, have you been humiliated or emotionally abused in other ways by your partner or ex-partner?: No   Housing Stability: Low Risk  (9/16/2024)    Housing Stability     Do you have housing?  : Yes     Are you worried about losing your housing?: No     Family History   Problem Relation Age of Onset    Alcohol/Drug Mother         alcohol- recovered    Hypertension Mother     Depression Mother         also anxiety    Other - See Comments Mother         ankylosing spondylosis    Anxiety Disorder Mother     Irritable Bowel Syndrome Mother     Alcohol/Drug Father         alcohol- recovered    Alcohol/Drug Sister         A&D-recovered    Alcoholism Brother         recovered    Alcohol/Drug Brother         alcohol    Hypertension Brother     Schizophrenia Brother     Cancer Maternal Grandmother         lung- at age 44    Depression Maternal Grandmother         also anxiety       Medical records were reviewed and are summarized above.    Review of Systems    PHYSICAL EXAM:  Vital signs:There were no vitals taken for this visit.     Focused lower extremity physical exam:     Derm: Skin is warm, dry, intact. No open wounds, laceration or abrasions noted. Nails are well trimmed. No ecchymosis or erythema noted.     Vasc: Dorsalis pedis pulses, 2/4 bilateral. Posterior tibial pulses 2/4 bilateral. Cap fill time < 3 seconds to the digits. No edema is present. Hair growth present to the toes.     Neuro: Protective sensation intact via light touch to the feet. Gross sensation intact.     MSK:   - Decreased medial arch height noted with RCSP.  Valgus tilt of the calcaneus with patient's stance and RCSP.  Palpable talar head noted at the TN joint, bilaterally.  - Soft tissue lipoma appreciated to the end of the sinus tarsi, right  - Pain to palpation at the sinus tarsi, right  - Pain to palpation with endrange passive eversion of the subtalar joint at the posterior facet, right  - No pain to the ATFL, CFL, PTFL, lateral gutter, lateral malleoli, medial gutter, medial malleoli, or anterior aspect of the ankle.  - Muscle strength 5/5 with dorsiflexion, plantarflexion, eversion, inversion of the feet. Compartments soft  and compressible.  - Tenderness along the peroneus brevis and into the insertion of the fifth metatarsal head, right    Imaging: 3 views of the right foot were taken and evaluated.  Asymmetric joint space narrowing with osteophytic changes noted to the central anterior aspect of the posterior facet of the subtalar joint.  No acute fracture, subluxation, dislocation noted.    Assessment:   - Peroneus brevis tendinitis, right  - Sinus tarsitis, right  - Subtalar joint arthritis, right    Plan:  - Patient was seen and evaluated in clinic by myself.   - 3 views of the right foot were ordered and evaluated today.    - Sinus tarsitis with subtalar joint arthritis, right:  Discussed the subtalar joint and how this differs from the talonavicular joint and ankle joint.  Discussed the location of the posterior facet.  Discussed how the sinus tarsi becomes the exit of this area and how its associated with the lipomatous mass at the end of the sinus tarsi.  Discussed pain and tenderness with palpation of the sinus tarsi.  Discussed the pain and tenderness with movement especially endrange eversion of the subtalar joint.  Discussed conservative treatment including RICE, NSAIDs, ankle bracing verse ankle sleeve.  Discussed subtalar joint fusion first therapeutic injection.  Patient like to move forth over-the-counter bracing at this time.    - Peroneus brevis tendinitis, right:  Discussed the peroneus brevis and its insertion of the fifth metatarsal base.  Discussed how this can become irritated with inversion injuries.  Discussed that this can also be associated with avulsion fracture of the fifth metatarsal base.  Discussed the motion and function of the peroneus brevis.  Discussed bracing of the brevis.  Discussed RICE and NSAIDs.  Patient will attempt bracing at this time.  Discussed at home exercise versus physical therapy.  Patient would like to move forward with physical therapy.  Physical therapy was ordered for the  patient.    - Patient return to clinic in 4 to 6 weeks sooner as problems arise    45 minutes were spent in pre-charting, patient encounter, lab/imaging review, and documentation on the day of the encounter.    Carlos Fuller DPM

## 2025-05-22 ENCOUNTER — DOCUMENTATION ONLY (OUTPATIENT)
Dept: OTHER | Facility: CLINIC | Age: 40
End: 2025-05-22
Payer: COMMERCIAL

## 2025-05-23 ENCOUNTER — OFFICE VISIT (OUTPATIENT)
Dept: SURGERY | Facility: CLINIC | Age: 40
End: 2025-05-23
Payer: COMMERCIAL

## 2025-05-23 ENCOUNTER — HOSPITAL ENCOUNTER (OUTPATIENT)
Facility: AMBULATORY SURGERY CENTER | Age: 40
End: 2025-05-23
Attending: SPECIALIST
Payer: COMMERCIAL

## 2025-05-23 VITALS — BODY MASS INDEX: 37.24 KG/M2 | HEIGHT: 71 IN | WEIGHT: 266 LBS

## 2025-05-23 DIAGNOSIS — D17.30 LIPOMA OF SKIN AND SUBCUTANEOUS TISSUE: ICD-10-CM

## 2025-05-23 DIAGNOSIS — D17.30 LIPOMA OF SKIN AND SUBCUTANEOUS TISSUE: Primary | ICD-10-CM

## 2025-05-23 PROCEDURE — 99243 OFF/OP CNSLTJ NEW/EST LOW 30: CPT | Performed by: SPECIALIST

## 2025-05-23 RX ORDER — ACETAMINOPHEN 325 MG/1
975 TABLET ORAL ONCE
OUTPATIENT
Start: 2025-05-23 | End: 2025-05-23

## 2025-05-23 NOTE — LETTER
2025      Holly Tolliver  [address]  [facility] [address]      Dear Colleague,    Thank you for referring your patient, Holly Tolliver, to the [facility] [place]. Please see a copy of my visit note below.        [facility] Surgery Consult    HPI:    40 year old year old female who I have been consulted by Rachael Hunt for evaluation of Consult For (Lipoma of skin and subcutaneous tissue / US WW  / located left side of back / pt aware)  This is located on the left side of her back causing more troubles and issues with clothing movement and activities.  She comes in consultation today.  She is in her normal state of health otherwise.    Allergies:Amoxicillin    Past Medical History:   Diagnosis Date     ALCOH DEP NEC/NOS-UNSPEC 2007     Alcohol dependence in remission (H) 2023     Asthma     exercise induced     Brain aneurysm      CARDIOVASCULAR SCREENING; LDL GOAL LESS THAN 160 10/23/2012      delivery delivered 2016     Chickenpox      Chlamydia      Depression with anxiety 2014     Encounter for triage in pregnant patient 2024     Generalized anxiety disorder 2013    Diagnosis updated by automated process. Provider to review and confirm.       GERD (gastroesophageal reflux disease) 2013     Gestational hypertension 2024     Major depressive disorder, recurrent, unspecified 2023     Major depressive disorder, single episode, moderate (H) 2013     Methamphetamine abuse (H) 2023     Mild intermittent asthma 2008     Mirena IUD 2016    Lot: [id number]  Exp:        Panic disorder with agoraphobia and moderate panic attacks 03/10/2009     Persons encountering health services in other specified circumstances 2015    Formatting of this note might be different from the original. Care Coordinator: Kelly Simon LSW, MSW See letters for  Health Care home care plan SW covering in [place], [place] SW absence Kelly Simon, LSW, [phone] 3/24/2015 12:24 PM       Placental abruption 2016     Placental abruption in third trimester 2016     Postpartum depression     also 2016     Preeclampsia     ? in      Pregnancy induced hypertension      Pregnancy with history of  section, antepartum 2024     Right upper quadrant abdominal pain affecting pregnancy 2024     Routine postpartum follow-up 2016     Single liveborn, born in hospital, delivered 2016     Tobacco use disorder 2008    Has decreased from 1 ppd to 1 cigarette per day since pregnancy.       Tooth infection 2022     Urinary tract infection in mother during second trimester of pregnancy 2024     Vaginal spotting 2024       Past Surgical History:   Procedure Laterality Date      SECTION Bilateral 2016    Procedure:  SECTION;  Surgeon: Beau Cardoso MD;  Location: [facility] OR     COMBINED  SECTION, SALPINGECTOMY BILATERAL Bilateral 2024    Procedure: repeat  SECTION, WITH BILATERAL SALPINGECTOMY;  Surgeon: Antonette Whitman MD;  Location: [facility] OR     PE TUBES  age 3       CURRENT MEDS:  Current Outpatient Medications   Medication Sig Dispense Refill     acetaminophen (TYLENOL) 325 MG tablet Take 325-650 mg by mouth every 6 hours as needed for mild pain.       albuterol (PROAIR HFA/PROVENTIL HFA/VENTOLIN HFA) 108 (90 Base) MCG/ACT inhaler Inhale 2 puffs into the lungs every 6 hours as needed for shortness of breath, wheezing or cough. 18 g 1     DULoxetine (CYMBALTA) 30 MG capsule Take 1 capsule (30 mg) by mouth daily. 60 capsule 0     ibuprofen (ADVIL/MOTRIN) 200 MG capsule Take 3 capsules (600 mg) by mouth every 4 hours as needed for fever. 90 capsule 0     Misc. Devices (BREAST PUMP) MISC 1 each See Admin Instructions. 1 each 0     Current Facility-Administered Medications  "  Medication Dose Route Frequency Provider Last Rate Last Admin     lidocaine (XYLOCAINE) 5 % ointment   Topical Daily PRN Lyssa Geronimo NP   Given at 24 0841     lidocaine (XYLOCAINE) 5 % ointment   Topical Daily PRN Lyssa Geronimo NP         silver nitrate (ARZOL) Misc   Topical Once Lyssa Geronimo NP           Family History   Problem Relation Age of Onset     Alcohol/Drug Mother         alcohol- recovered     Hypertension Mother      Depression Mother         also anxiety     Other - See Comments Mother         ankylosing spondylosis     Anxiety Disorder Mother      Irritable Bowel Syndrome Mother      Alcohol/Drug Father         alcohol- recovered     Alcohol/Drug Sister         A&D-recovered     Alcoholism Brother         recovered     Alcohol/Drug Brother         alcohol     Hypertension Brother      Schizophrenia Brother      Cancer Maternal Grandmother         lung- at age 44     Depression Maternal Grandmother         also anxiety        reports that she has quit smoking. Her smoking use included cigarettes. She has never been exposed to tobacco smoke. She has never used smokeless tobacco. She reports that she does not currently use alcohol. She reports that she does not currently use drugs after having used the following drugs: Methamphetamines and Marijuana.    Review of Systems:  Negative except left sided back mass getting larger over time. Otherwise twelve system of review is negative.      OBJECTIVE:  Vitals:    25 1253   Weight: 120.7 kg (266 lb)   Height: 1.803 m (5' 11\")     Body mass index is 37.1 kg/m .    EXAM:  GENERAL: This is a well-developed 40 year old female who appears her stated age  HEAD: normocephalic  HEENT: Pupils equal and reactive bilaterally  MOUTH: mucus membranes intact  CARDIAC: RRR without murmur  CHEST/LUNG:  Clear to auscultation  BACK: Back mass about 4 x 2 to 3 cm  ABDOMEN: Soft, nontender, nondistended, no masses  EXTREMITIES: Grossly normal, warm, "   NEUROLOGIC: Focally intact, nonfocal  INTEGUMENT: No open lesions or ulcers  VASCULAR: Pulses intact, symmetrical upper and lower extremities.        LABS:  Lab Results   Component Value Date    WBC 7.2 09/30/2024    WBC 5.6 05/28/2020    HGB 12.2 09/30/2024    HGB 14.1 05/28/2020    HCT 39.8 09/30/2024    HCT 42.4 05/28/2020    MCV 84 09/30/2024    MCV 87 05/28/2020     09/30/2024     05/28/2020       Lab Results   Component Value Date    ALT 16 09/17/2024    AST 27 09/17/2024    ALKPHOS 136 09/17/2024        Images:    Reviewed and my interpretation of the US is a mass of back looks to be a fatty tumor     Exam Information    Exam Date Exam Time Exam Date Exam Time Accession # Performing Department Results    4/29/25  2:05 PM 4/29/25  2:05 PM [id number] [facility]      PACS Images     Show images for US Soft Tissue Abdominal Wall or Lower Back     Study Result    Narrative & Impression   EXAM: US SOFT TISSUE ABDOMINAL WALL OR LOWER BACK  LOCATION: [facility]  DATE: 4/29/2025     INDICATION: Palpable lump left lower back.  COMPARISON: None.     TECHNIQUE: Routine.     FINDINGS: Scans at the site of palpable lump demonstrate a 3.9 x 2.0 x 1.2 cm encapsulated subcutaneous lesion which is isoechoic with subcutaneous fat. Color Doppler demonstrates no hyperemia.                                                                      IMPRESSION:  1.  Palpable lump left lower back corresponds to a probable benign lipoma.       Assessment/Plan:   Back Mass  Most likely lipoma    Discussed excision and closure of this.  This is a same-day surgery setting there is anesthesia fraction bleeding recurrence atrial discussed at this time point she wants to proceed we will get her set up in the very near future.    No follow-ups on file.    Papito Caicedo MD  General Surgery [phone]  Vascular Surgery  [phone]        Again, thank you for allowing me to participate in the care of your patient.        Sincerely,        Papito Caicedo MD    Electronically signed

## 2025-05-23 NOTE — PROGRESS NOTES
[facility] Surgery Consult    HPI:    40 year old year old female who I have been consulted by Rachael Hnut for evaluation of Consult For (Lipoma of skin and subcutaneous tissue / US WW  / located left side of back / pt aware)  This is located on the left side of her back causing more troubles and issues with clothing movement and activities.  She comes in consultation today.  She is in her normal state of health otherwise.    Allergies:Amoxicillin    Past Medical History:   Diagnosis Date    ALCOH DEP NEC/NOS-UNSPEC 2007    Alcohol dependence in remission (H) 2023    Asthma     exercise induced    Brain aneurysm     CARDIOVASCULAR SCREENING; LDL GOAL LESS THAN 160 10/23/2012     delivery delivered 2016    Chickenpox     Chlamydia     Depression with anxiety 2014    Encounter for triage in pregnant patient 2024    Generalized anxiety disorder 2013    Diagnosis updated by automated process. Provider to review and confirm.      GERD (gastroesophageal reflux disease) 2013    Gestational hypertension 2024    Major depressive disorder, recurrent, unspecified 2023    Major depressive disorder, single episode, moderate (H) 2013    Methamphetamine abuse (H) 2023    Mild intermittent asthma 2008    Mirena IUD 2016    Lot: [id number]  Exp:       Panic disorder with agoraphobia and moderate panic attacks 03/10/2009    Persons encountering health services in other specified circumstances 2015    Formatting of this note might be different from the original. Care Coordinator: Kelly DANIEL, MSW See letters for Health Care home care plan SW covering in [place], [place]  absence FREDY Luz, [phone] 3/24/2015 12:24 PM      Placental abruption 2016    Placental abruption in third trimester 2016    Postpartum depression     also 2016    Preeclampsia     ? in     Pregnancy  induced hypertension     Pregnancy with history of  section, antepartum 2024    Right upper quadrant abdominal pain affecting pregnancy 2024    Routine postpartum follow-up 2016    Single liveborn, born in hospital, delivered 2016    Tobacco use disorder 2008    Has decreased from 1 ppd to 1 cigarette per day since pregnancy.      Tooth infection 2022    Urinary tract infection in mother during second trimester of pregnancy 2024    Vaginal spotting 2024       Past Surgical History:   Procedure Laterality Date     SECTION Bilateral 2016    Procedure:  SECTION;  Surgeon: Beau Cardoso MD;  Location: [facility] OR    COMBINED  SECTION, SALPINGECTOMY BILATERAL Bilateral 2024    Procedure: repeat  SECTION, WITH BILATERAL SALPINGECTOMY;  Surgeon: Antonette Whitman MD;  Location: [facility] OR    PE TUBES  age 3       CURRENT MEDS:  Current Outpatient Medications   Medication Sig Dispense Refill    acetaminophen (TYLENOL) 325 MG tablet Take 325-650 mg by mouth every 6 hours as needed for mild pain.      albuterol (PROAIR HFA/PROVENTIL HFA/VENTOLIN HFA) 108 (90 Base) MCG/ACT inhaler Inhale 2 puffs into the lungs every 6 hours as needed for shortness of breath, wheezing or cough. 18 g 1    DULoxetine (CYMBALTA) 30 MG capsule Take 1 capsule (30 mg) by mouth daily. 60 capsule 0    ibuprofen (ADVIL/MOTRIN) 200 MG capsule Take 3 capsules (600 mg) by mouth every 4 hours as needed for fever. 90 capsule 0    Misc. Devices (BREAST PUMP) MISC 1 each See Admin Instructions. 1 each 0     Current Facility-Administered Medications   Medication Dose Route Frequency Provider Last Rate Last Admin    lidocaine (XYLOCAINE) 5 % ointment   Topical Daily PRN Lyssa Geronimo NP   Given at 24 0841    lidocaine (XYLOCAINE) 5 % ointment   Topical Daily PRN Lyssa Geronimo NP        silver nitrate (ARZOL) Misc   Topical Once Lyssa Geronimo NP           Family  "History   Problem Relation Age of Onset    Alcohol/Drug Mother         alcohol- recovered    Hypertension Mother     Depression Mother         also anxiety    Other - See Comments Mother         ankylosing spondylosis    Anxiety Disorder Mother     Irritable Bowel Syndrome Mother     Alcohol/Drug Father         alcohol- recovered    Alcohol/Drug Sister         A&D-recovered    Alcoholism Brother         recovered    Alcohol/Drug Brother         alcohol    Hypertension Brother     Schizophrenia Brother     Cancer Maternal Grandmother         lung- at age 44    Depression Maternal Grandmother         also anxiety        reports that she has quit smoking. Her smoking use included cigarettes. She has never been exposed to tobacco smoke. She has never used smokeless tobacco. She reports that she does not currently use alcohol. She reports that she does not currently use drugs after having used the following drugs: Methamphetamines and Marijuana.    Review of Systems:  Negative except left sided back mass getting larger over time. Otherwise twelve system of review is negative.      OBJECTIVE:  Vitals:    25 1253   Weight: 120.7 kg (266 lb)   Height: 1.803 m (5' 11\")     Body mass index is 37.1 kg/m .    EXAM:  GENERAL: This is a well-developed 40 year old female who appears her stated age  HEAD: normocephalic  HEENT: Pupils equal and reactive bilaterally  MOUTH: mucus membranes intact  CARDIAC: RRR without murmur  CHEST/LUNG:  Clear to auscultation  BACK: Back mass about 4 x 2 to 3 cm  ABDOMEN: Soft, nontender, nondistended, no masses  EXTREMITIES: Grossly normal, warm,   NEUROLOGIC: Focally intact, nonfocal  INTEGUMENT: No open lesions or ulcers  VASCULAR: Pulses intact, symmetrical upper and lower extremities.        LABS:  Lab Results   Component Value Date    WBC 7.2 2024    WBC 5.6 2020    HGB 12.2 2024    HGB 14.1 2020    HCT 39.8 2024    HCT 42.4 2020    MCV 84 " 09/30/2024    MCV 87 05/28/2020     09/30/2024     05/28/2020       Lab Results   Component Value Date    ALT 16 09/17/2024    AST 27 09/17/2024    ALKPHOS 136 09/17/2024        Images:    Reviewed and my interpretation of the US is a mass of back looks to be a fatty tumor     Exam Information    Exam Date Exam Time Exam Date Exam Time Accession # Performing Department Results    4/29/25  2:05 PM 4/29/25  2:05 PM [id number] [facility]      PACS Images     Show images for US Soft Tissue Abdominal Wall or Lower Back     Study Result    Narrative & Impression   EXAM: US SOFT TISSUE ABDOMINAL WALL OR LOWER BACK  LOCATION: [facility]  DATE: 4/29/2025     INDICATION: Palpable lump left lower back.  COMPARISON: None.     TECHNIQUE: Routine.     FINDINGS: Scans at the site of palpable lump demonstrate a 3.9 x 2.0 x 1.2 cm encapsulated subcutaneous lesion which is isoechoic with subcutaneous fat. Color Doppler demonstrates no hyperemia.                                                                      IMPRESSION:  1.  Palpable lump left lower back corresponds to a probable benign lipoma.       Assessment/Plan:   Back Mass  Most likely lipoma    Discussed excision and closure of this.  This is a same-day surgery setting there is anesthesia fraction bleeding recurrence atrial discussed at this time point she wants to proceed we will get her set up in the very near future.    No follow-ups on file.    Papito Caicedo MD  General Surgery [phone]  Vascular Surgery [phone]

## 2025-06-02 ENCOUNTER — VIRTUAL VISIT (OUTPATIENT)
Dept: FAMILY MEDICINE | Facility: CLINIC | Age: 40
End: 2025-06-02
Payer: COMMERCIAL

## 2025-06-02 DIAGNOSIS — F41.1 GAD (GENERALIZED ANXIETY DISORDER): Primary | ICD-10-CM

## 2025-06-02 PROCEDURE — 96127 BRIEF EMOTIONAL/BEHAV ASSMT: CPT | Mod: 95

## 2025-06-02 PROCEDURE — 98006 SYNCH AUDIO-VIDEO EST MOD 30: CPT

## 2025-06-02 RX ORDER — DULOXETIN HYDROCHLORIDE 30 MG/1
90 CAPSULE, DELAYED RELEASE ORAL DAILY
Qty: 180 CAPSULE | Refills: 0 | Status: SHIPPED | OUTPATIENT
Start: 2025-06-02

## 2025-06-02 ASSESSMENT — PATIENT HEALTH QUESTIONNAIRE - PHQ9
SUM OF ALL RESPONSES TO PHQ QUESTIONS 1-9: 5
SUM OF ALL RESPONSES TO PHQ QUESTIONS 1-9: 5
10. IF YOU CHECKED OFF ANY PROBLEMS, HOW DIFFICULT HAVE THESE PROBLEMS MADE IT FOR YOU TO DO YOUR WORK, TAKE CARE OF THINGS AT HOME, OR GET ALONG WITH OTHER PEOPLE: SOMEWHAT DIFFICULT
5. POOR APPETITE OR OVEREATING: NEARLY EVERY DAY

## 2025-06-02 ASSESSMENT — ANXIETY QUESTIONNAIRES
5. BEING SO RESTLESS THAT IT IS HARD TO SIT STILL: NEARLY EVERY DAY
2. NOT BEING ABLE TO STOP OR CONTROL WORRYING: MORE THAN HALF THE DAYS
6. BECOMING EASILY ANNOYED OR IRRITABLE: NEARLY EVERY DAY
7. FEELING AFRAID AS IF SOMETHING AWFUL MIGHT HAPPEN: MORE THAN HALF THE DAYS
IF YOU CHECKED OFF ANY PROBLEMS ON THIS QUESTIONNAIRE, HOW DIFFICULT HAVE THESE PROBLEMS MADE IT FOR YOU TO DO YOUR WORK, TAKE CARE OF THINGS AT HOME, OR GET ALONG WITH OTHER PEOPLE: SOMEWHAT DIFFICULT
GAD7 TOTAL SCORE: 18
1. FEELING NERVOUS, ANXIOUS, OR ON EDGE: NEARLY EVERY DAY
3. WORRYING TOO MUCH ABOUT DIFFERENT THINGS: MORE THAN HALF THE DAYS
GAD7 TOTAL SCORE: 18

## 2025-06-02 ASSESSMENT — ASTHMA QUESTIONNAIRES
QUESTION_5 LAST FOUR WEEKS HOW WOULD YOU RATE YOUR ASTHMA CONTROL: WELL CONTROLLED
QUESTION_2 LAST FOUR WEEKS HOW OFTEN HAVE YOU HAD SHORTNESS OF BREATH: THREE TO SIX TIMES A WEEK
QUESTION_1 LAST FOUR WEEKS HOW MUCH OF THE TIME DID YOUR ASTHMA KEEP YOU FROM GETTING AS MUCH DONE AT WORK, SCHOOL OR AT HOME: A LITTLE OF THE TIME
QUESTION_4 LAST FOUR WEEKS HOW OFTEN HAVE YOU USED YOUR RESCUE INHALER OR NEBULIZER MEDICATION (SUCH AS ALBUTEROL): NOT AT ALL
ACT_TOTALSCORE: 21
QUESTION_3 LAST FOUR WEEKS HOW OFTEN DID YOUR ASTHMA SYMPTOMS (WHEEZING, COUGHING, SHORTNESS OF BREATH, CHEST TIGHTNESS OR PAIN) WAKE YOU UP AT NIGHT OR EARLIER THAN USUAL IN THE MORNING: NOT AT ALL

## 2025-06-02 NOTE — PROGRESS NOTES
"Ana is a 40 year old who is being evaluated via a billable video visit.    How would you like to obtain your AVS? MyChart  If the video visit is dropped, the invitation should be resent by: Text to cell phone: [phone]  Will anyone else be joining your video visit? No      Assessment & Plan     RUBENS (generalized anxiety disorder)        1/24/2022     2:45 PM 7/19/2023     4:00 PM 6/2/2025    11:23 AM   RUBENS-7 SCORE   Total Score 19 1 18     Long standing history of anxiety, mostly social anxiety.  Has been on several different medications with reported side effects.  In April, started on duloxetine 30 mg with titration up to 60 mg daily.  Currently breastfeeding.  Has not seen significant change in anxiety and continues to experience moderate-severe diaphoresis with heightened anxiety.  Wellbutrin does have less side effects of hyperhidrosis but caution is warranted with breastfeeding as there is limited data and possible adverse effects.  Usually maintenance dose of duloxetine is 60 mg daily but since patient has not seen benefit, will increase to 90 mg daily.  Advised patient to follow up with PCP in 4 weeks for symptom review and medication management.  With unsuccessful history of multiple psychiatry medications, placed psychiatry referral for medication recommendations.   - DULoxetine (CYMBALTA) 30 MG capsule; Take 3 capsules (90 mg) by mouth daily.  - Adult Mental Health  Referral; Future    BMI  Estimated body mass index is 37.1 kg/m  as calculated from the following:    Height as of 5/23/25: 1.803 m (5' 11\").    Weight as of 5/23/25: 120.7 kg (266 lb).     Subjective   Ana is a 40 year old, presenting for the following health issues:  Recheck Medication      6/2/2025    11:19 AM   Additional Questions   Roomed by Maricruz ROWE CMA     Video Start Time: 1:33 PM    History of Present Illness       Reason for visit:  Med check       Patient is 40-year old female presenting on video visit for follow " up on anxiety.     Long standing history of anxiety since childhood.  Anxiety heightens especially in social situations.   Anxiety symptoms includes: excessive sweating- whole body, body trembling, heart racing.  If she removes herself from the social situation, her anxiety will decrease.  Social anxiety prevents her from engaging in social situations.   Was previously on Paxil which managed her anxiety the best but had weight gain.   Was previously on Lexapro and had side effects.  Tried Wellbutrin many years ago but doesn't remember why she discontinued use.   Was on citalopram many years ago.   Started on duloxetine in April and currently taking 60 mg daily.   Did not see significant decrease in anxiety symptoms.   Continues to have concerns of excessive sweating.   Currently on maternity leave and currently breastfeeding.  If her anxiety is not managed, it will be difficult for her to return back to work.    Review of Systems  Review of systems negative otherwise known HPI.    Past Medical History:   Diagnosis Date    ALCOH DEP NEC/NOS-UNSPEC 2007    Alcohol dependence in remission (H) 2023    Asthma     exercise induced    Brain aneurysm     CARDIOVASCULAR SCREENING; LDL GOAL LESS THAN 160 10/23/2012     delivery delivered 2016    Chickenpox     Chlamydia     Depression with anxiety 2014    Encounter for triage in pregnant patient 2024    Generalized anxiety disorder 2013    Diagnosis updated by automated process. Provider to review and confirm.      GERD (gastroesophageal reflux disease) 2013    Gestational hypertension 2024    Major depressive disorder, recurrent, unspecified 2023    Major depressive disorder, single episode, moderate (H) 2013    Methamphetamine abuse (H) 2023    Mild intermittent asthma 2008    Mirena IUD 2016    Lot: [id number]  Exp:       Panic disorder with agoraphobia and moderate panic attacks  03/10/2009    Persons encountering health services in other specified circumstances 2015    Formatting of this note might be different from the original. Care Coordinator: Kelly DANIEL, MSW See letters for Health Care home care plan SW covering in [place], [place] SW absence Kelly Simon, FREDY, [phone] 3/24/2015 12:24 PM      Placental abruption 2016    Placental abruption in third trimester 2016    Postpartum depression     also 2016    Preeclampsia     ? in     Pregnancy induced hypertension 2011    Pregnancy with history of  section, antepartum 2024    Right upper quadrant abdominal pain affecting pregnancy 2024    Routine postpartum follow-up 2016    Single liveborn, born in hospital, delivered 2016    Tobacco use disorder 2008    Has decreased from 1 ppd to 1 cigarette per day since pregnancy.      Tooth infection 2022    Urinary tract infection in mother during second trimester of pregnancy 2024    Vaginal spotting 2024       Past Surgical History:   Procedure Laterality Date     SECTION Bilateral 2016    Procedure:  SECTION;  Surgeon: Beau Cardoso MD;  Location: [facility] OR    COMBINED  SECTION, SALPINGECTOMY BILATERAL Bilateral 2024    Procedure: repeat  SECTION, WITH BILATERAL SALPINGECTOMY;  Surgeon: Antonette Whitman MD;  Location: [facility] OR    PE TUBES  age 3       Family History   Problem Relation Age of Onset    Alcohol/Drug Mother         alcohol- recovered    Hypertension Mother     Depression Mother         also anxiety    Other - See Comments Mother         ankylosing spondylosis    Anxiety Disorder Mother     Irritable Bowel Syndrome Mother     Alcohol/Drug Father         alcohol- recovered    Alcohol/Drug Sister         A&D-recovered    Alcoholism Brother         recovered    Alcohol/Drug Brother         alcohol    Hypertension Brother     Schizophrenia Brother      Cancer Maternal Grandmother         lung- at age 44    Depression Maternal Grandmother         also anxiety       Social History     Tobacco Use    Smoking status: Former     Current packs/day: 0.00     Types: Cigarettes     Passive exposure: Never    Smokeless tobacco: Never   Substance Use Topics    Alcohol use: Not Currently     Comment: sober since 24     Current Outpatient Medications   Medication Sig Dispense Refill    acetaminophen (TYLENOL) 325 MG tablet Take 325-650 mg by mouth every 6 hours as needed for mild pain.      albuterol (PROAIR HFA/PROVENTIL HFA/VENTOLIN HFA) 108 (90 Base) MCG/ACT inhaler Inhale 2 puffs into the lungs every 6 hours as needed for shortness of breath, wheezing or cough. 18 g 1    DULoxetine (CYMBALTA) 30 MG capsule Take 1 capsule (30 mg) by mouth daily. (Patient taking differently: Take 60 mg by mouth daily.) 60 capsule 0    ibuprofen (ADVIL/MOTRIN) 200 MG capsule Take 3 capsules (600 mg) by mouth every 4 hours as needed for fever. 90 capsule 0    Misc. Devices (BREAST PUMP) MISC 1 each See Admin Instructions. 1 each 0     Current Facility-Administered Medications   Medication Dose Route Frequency Provider Last Rate Last Admin    lidocaine (XYLOCAINE) 5 % ointment   Topical Daily PRN Lyssa Geronimo NP   Given at 24 0841    lidocaine (XYLOCAINE) 5 % ointment   Topical Daily PRN Lyssa Geronimo NP        silver nitrate (ARZOL) Misc   Topical Once Lyssa Geronimo, JAMES           Objective       Vitals:  No vitals were obtained today due to virtual visit.    Physical Exam   GENERAL: alert and no distress  EYES: Eyes grossly normal to inspection.  No discharge or erythema, or obvious scleral/conjunctival abnormalities.  RESP: No audible wheeze, cough, or visible cyanosis.    SKIN: Visible skin clear. No significant rash, abnormal pigmentation or lesions.  NEURO: Cranial nerves grossly intact.  Mentation and speech appropriate for age.  PSYCH: Appropriate affect, tone, and pace  of words      Video-Visit Details    Type of service:  Video Visit   Video End Time: 2:05 PM  Originating Location (pt. Location): Home    Distant Location (provider location):  On-site  Platform used for Video Visit: Luba  Signed Electronically by: WOLFGANG Sim CNP

## 2025-06-03 ENCOUNTER — PATIENT OUTREACH (OUTPATIENT)
Dept: CARE COORDINATION | Facility: CLINIC | Age: 40
End: 2025-06-03
Payer: COMMERCIAL

## 2025-06-17 ENCOUNTER — TELEPHONE (OUTPATIENT)
Dept: SURGERY | Facility: CLINIC | Age: 40
End: 2025-06-17
Payer: COMMERCIAL

## 2025-06-17 NOTE — TELEPHONE ENCOUNTER
Spoke with patient today to schedule surgery as ordered by the provider.    WC/MVA Related?: No    Went over details/instructions as written on the letter.    Pre Op By: H&P by Primary MD  Post Op Scheduled : Not applicable    Medications:  Blood Thinners? No  Weight Loss Meds? No  Diabetes Meds? No    Surgery Letter sent via [employer]      (Please see LETTERS TAB in chart to retrieve a copy of this letter)

## 2025-06-17 NOTE — LETTER
Pre-op Physical: 7/15/2025 arrival 2:40 pm with Dr. Treviño at the [facility]    Surgery Date: 7/25/2025     Location: [place] [address]    Approximate Arrival Time: 7:00 am  (Unless instructed differently by the pre-op call nurse)     Pre-Surgical Tasks:     Schedule a pre-op physical with your primary care doctor. The pre-op physical must be 10-30 days before surgery and since it is required by anesthesia, your surgery will be cancelled if it's not done.      Review all medications with your primary care or prescribing physician; they will advise you which meds to stop and when, and when you can resume taking.  Certain medications like blood thinners and weight loss medications need to be stopped in advance of surgery to proceed safely.      Blood thinners including but not exclusive to drugs like Xarelto, Eliquis, Warfarin and Aspirin, should be stopped five days before surgery, if your prescribing provider agrees. Follow your provider's advice on stopping blood thinners because they know you best.  If you are unsure if your medication is a blood thinner, ask your prescribing provider.    Weight loss medications: There are multiple medications being used for weight management and diabetes today, and the list is growing.  Phentermine, Ozempic, Wegovy, Trulicity, and other similar medications need to be stopped one week before surgery to avoid being cancelled.  Victoza and Saxenda can be continued longer but must be stopped one full day before surgery.  Please ask your prescribing provider for advice.    Diabetic medications: in addition to the medications talked about above that are used for either weight loss or diabetes, some people are on insulin that may require adjustment.  Please discuss managing diabetic medications with your prescribing doctor as these medications may require modification prior to surgery.     Please shower the evening  before and morning of surgery with Hibiclens soap.  This can be found at your local pharmacy.     Fasting instructions will be provided by the pre-op nurse who will call you 1-3 days before surgery.  Typically, we advise normal food up to 8 hours before you arrive for surgery. Clear liquids only from then until 2 hours before you arrive surgery, then nothing at all by mouth.  The nurse will review your specific instructions with you at the call.      Smoking impacts your body's ability to heal properly so we advise patients to quit if possible before surgery.  Plastic Surgery patients are required to be nicotine free for at least 8 weeks before surgery.      You will need an adult to drive you home and stay with you 24 hours after surgery. Public transportation or Medical Van Services are not permitted.    Visitor restrictions are subject to change, please verify with the pre-op nurse when they call how many people are permitted to accompany you.    We always encourage you to notify your insurance any time you have medical tests or procedures scheduled including surgery. The number is usually right on the back of your insurance card. To obtain pricing for surgery, please call  [facility] at [phone] or email [email or web address].        Call our office if you have any questions! Thank you!     Kaylene Munoz MA  [facility]   (General Surgery/ ENT/ Plastics)  Direct Office: [phone]    Electronically signed

## 2025-06-18 ENCOUNTER — THERAPY VISIT (OUTPATIENT)
Dept: PHYSICAL THERAPY | Facility: REHABILITATION | Age: 40
End: 2025-06-18
Attending: STUDENT IN AN ORGANIZED HEALTH CARE EDUCATION/TRAINING PROGRAM
Payer: COMMERCIAL

## 2025-06-18 DIAGNOSIS — M79.671 FOOT PAIN, RIGHT: ICD-10-CM

## 2025-06-18 DIAGNOSIS — M25.571 SINUS TARSITIS, RIGHT: ICD-10-CM

## 2025-06-18 DIAGNOSIS — M76.71 PERONEAL TENDINITIS, RIGHT: ICD-10-CM

## 2025-06-18 PROCEDURE — 97161 PT EVAL LOW COMPLEX 20 MIN: CPT | Mod: GP | Performed by: PHYSICAL THERAPIST

## 2025-06-18 PROCEDURE — 97535 SELF CARE MNGMENT TRAINING: CPT | Mod: GP | Performed by: PHYSICAL THERAPIST

## 2025-06-18 PROCEDURE — 97110 THERAPEUTIC EXERCISES: CPT | Mod: GP | Performed by: PHYSICAL THERAPIST

## 2025-06-18 ASSESSMENT — ACTIVITIES OF DAILY LIVING (ADL)
SQUATTING: A LITTLE BIT OF DIFFICULTY
LEFS_SCORE(%): 0
LEFS_RAW_SCORE: 0
WALKING_2_BLOCKS: MODERATE DIFFICULTY
WALKING_A_MILE: QUITE A BIT OF DIFFICULTY
RUNNING_ON_EVEN_GROUND: MODERATE DIFFICULTY
ANY_OF_YOUR_USUAL_WORK,_HOUSEWORK_OR_SCHOOL_ACTIVITIES: MODERATE DIFFICULTY
SITTING_FOR_1_HOUR: EXTREME DIFFICULTY OR UNABLE TO PERFORM ACTIVITY
PUTTING_ON_YOUR_SHOES_OR_SOCKS: NO DIFFICULTY
RUNNING_ON_UNEVEN_GROUND: QUITE A BIT OF DIFFICULTY
STANDING_FOR_1_HOUR: EXTREME DIFFICULTY OR UNABLE TO PERFORM ACTIVITY
GETTING_INTO_OR_OUT_OF_A_CAR: A LITTLE BIT OF DIFFICULTY
GETTING_INTO_AND_OUT_OF_A_BATH: MODERATE DIFFICULTY
PERFORMING_LIGHT_ACTIVITIES_AROUND_YOUR_HOME: A LITTLE BIT OF DIFFICULTY
SHOPPING: QUITE A BIT OF DIFFICULTY
LIFTING_AN_OBJECT,_LIKE_A_BAG_OF_GROCERIES_FROM_THE_FLOOR: A LITTLE BIT OF DIFFICULTY
WALKING_BETWEEN_ROOMS: A LITTLE BIT OF DIFFICULTY
MAKING_SHARP_TURNS_WHILE_RUNNING_FAST: QUITE A BIT OF DIFFICULTY
PERFORMING_HEAVY_ACTIVITIES_AROUND_YOUR_HOME: A LITTLE BIT OF DIFFICULTY
YOUR_USUAL_HOBBIES,_RECREATIONAL_OR_SPORTING_ACTIVITIES: MODERATE DIFFICULTY
PLEASE_INDICATE_YOR_PRIMARY_REASON_FOR_REFERRAL_TO_THERAPY:: FOOT AND/OR ANKLE
GOING_UP_OR_DOWN_10_STAIRS: EXTREME DIFFICULTY OR UNABLE TO PERFORM ACTIVITY
ROLLING_OVER_IN_BED: NO DIFFICULTY

## 2025-06-18 NOTE — PROGRESS NOTES
PHYSICAL THERAPY EVALUATION  Type of Visit: Evaluation       Fall Risk Screen:  Have you fallen 2 or more times in the past year?: No  Have you fallen and had an injury in the past year?: No    Subjective         Presenting condition or subjective complaint: My right foot hurts  Pt reports when she was pregnant last year she was shoveling and twisted her R foot - previously sprained that ankle 3 times - and has been continuing to hurt. Sometimes it can have multiple days without pain but then it can come back.  Pt reports she also twisted her R foot again about 3 months ago going on the stairs.  Pt has been off of work since having her baby 9 months ago.   Pt   Date of onset:      Relevant medical history: Asthma   Dates & types of surgery:      Prior diagnostic imaging/testing results: X-ray     Prior therapy history for the same diagnosis, illness or injury: No      Prior Level of Function  Transfers: Independent  Ambulation: Independent  ADL: Independent      Living Environment  Social support: With family members   Type of home: Apartment/condo   Stairs to enter the home: No       Ramp: No   Stairs inside the home: No       Help at home: None  Equipment owned:       Employment: No    Hobbies/Interests: Walking/ basketball    Patient goals for therapy: Be able to in less pain    Pain assessment: 0-10/10     Objective   FOOT/ANKLE EVALUATION    POSTURE: WFL  GAIT:   Weightbearing Status: WBAT  Assistive Device(s): None  Gait Deviations: Mild decreased stance time R and decreased push off R  BALANCE/PROPRIOCEPTION: Increased pain when attempting SLS    ROM: R ankle DF 3 degrees with good stretch, PF 40 degrees, ever 10 degrees with good stretch, inver 40 degrees with R lateral ankle pain  L ankle DF 6 degrees, PF 44 degrees, ever 18 degrees, and inver 34 degrees  STRENGTH: B hip flexors, quads and DF 5/5, B ankle grossly 5/5 except R DF with ever and PF with ever 4/5 with good sensation, PF with inver 4/5 with  pain, unable to do B heel raise due to pain    SPECIAL TESTS: Figure eight B ankle 57 cm but with non pitting edema around R lateral malleou    PALPATION: Tender along R lateral malleolus distal end and posterior border and 5th met  JOINT MOBILITY: R TC jt anteriolateral hypermobility     Assessment & Plan   CLINICAL IMPRESSIONS  Medical Diagnosis: Foot pain, right (M79.671)    Sinus tarsitis, right (M25.571)    Peroneal tendinitis, right (M76.71)    Treatment Diagnosis: Foot pain, right (M79.671)    Sinus tarsitis, right (M25.571)    Peroneal tendinitis, right (M76.71)   Impression/Assessment: Patient is a 40 year old female with R foot and ankle pain complaints.  Pt has h/o spraining R ankle 3 times - first time about 20 years ago and last time about 3 years ago. Pt reports twisting her ankle last year shoveling and then again about 3 months ago on the stairs. Pt demo's signs and sx consistent with R TC jt anterolateral laxity with decreased ROM and strength. The following significant findings have been identified: Pain, Decreased ROM/flexibility, Decreased strength, Impaired balance, Impaired gait, Impaired muscle performance, and Decreased activity tolerance. These impairments interfere with their ability to perform self care tasks, work tasks, recreational activities, household chores, household mobility, and community mobility as compared to previous level of function.     Clinical Decision Making (Complexity):  Clinical Presentation: Stable/Uncomplicated  Clinical Presentation Rationale: based on medical and personal factors listed in PT evaluation  Clinical Decision Making (Complexity): Low complexity    PLAN OF CARE  Treatment Interventions:  Interventions: Gait Training, Manual Therapy, Neuromuscular Re-education, Therapeutic Activity, Therapeutic Exercise, Self-Care/Home Management    Long Term Goals     PT Goal 1  Goal Description: Pt will be able to walk without R ankle pain for improved ability to  perform walking in 90 days.  Target Date: 09/15/25  PT Goal 2  Goal Description: Pt will be able to carry her daughter without increase in R foot pain for improved ability to perform dependent care for 9 month old in 90 days.  Target Date: 09/15/25  PT Goal 3  Goal Description: Pt will be able to return to work duties of bending, walking and running errands without increase in R foot pain for improved ability to do work tasks in 90 days.  Target Date: 09/15/25      Frequency of Treatment: 1x/week  Duration of Treatment: 90 days      Risks and benefits of evaluation/treatment have been explained.   Patient/Family/caregiver agrees with Plan of Care.     Evaluation Time:     PT Eval, Low Complexity Minutes (63392): 20       Signing Clinician: Yudy Pedro PT, DPT, CLT        [facility]                                                                                   OUTPATIENT PHYSICAL THERAPY      PLAN OF TREATMENT FOR OUTPATIENT REHABILITATION   Patient's Last Name, First Name, Holly Romano YOB: 1985   Provider's Name   [facility]   Medical Record No.  [id number]     Onset Date:    Start of Care Date: 06/18/25     Medical Diagnosis:  Foot pain, right (M79.671)    Sinus tarsitis, right (M25.571)    Peroneal tendinitis, right (M76.71)      PT Treatment Diagnosis:  Foot pain, right (M79.671)    Sinus tarsitis, right (M25.571)    Peroneal tendinitis, right (M76.71) Plan of Treatment  Frequency/Duration: 1x/week/ 90 days    Certification date from 06/18/25 to 09/15/25         See note for plan of treatment details and functional goals     Yudy Pedro PT, DPT, CLT                         I CERTIFY THE NEED FOR THESE SERVICES FURNISHED UNDER        THIS PLAN OF TREATMENT AND WHILE UNDER MY CARE     (Physician attestation of this document indicates review and certification of the therapy plan).              Referring  Provider:  Carlos Fuller DPM    Initial Assessment  See Epic Evaluation- Start of Care Date: 06/18/25

## 2025-06-21 ENCOUNTER — HEALTH MAINTENANCE LETTER (OUTPATIENT)
Age: 40
End: 2025-06-21

## 2025-06-27 ENCOUNTER — THERAPY VISIT (OUTPATIENT)
Dept: PHYSICAL THERAPY | Facility: REHABILITATION | Age: 40
End: 2025-06-27
Payer: COMMERCIAL

## 2025-06-27 DIAGNOSIS — M79.671 FOOT PAIN, RIGHT: Primary | ICD-10-CM

## 2025-06-27 DIAGNOSIS — M76.71 PERONEAL TENDINITIS, RIGHT: ICD-10-CM

## 2025-06-27 DIAGNOSIS — M25.571 SINUS TARSITIS, RIGHT: ICD-10-CM

## 2025-06-27 PROCEDURE — 97110 THERAPEUTIC EXERCISES: CPT | Mod: GP | Performed by: PHYSICAL THERAPIST

## 2025-06-27 PROCEDURE — 97140 MANUAL THERAPY 1/> REGIONS: CPT | Mod: GP | Performed by: PHYSICAL THERAPIST

## 2025-06-30 ENCOUNTER — VIRTUAL VISIT (OUTPATIENT)
Dept: FAMILY MEDICINE | Facility: CLINIC | Age: 40
End: 2025-06-30
Payer: COMMERCIAL

## 2025-06-30 DIAGNOSIS — F41.1 GAD (GENERALIZED ANXIETY DISORDER): Primary | ICD-10-CM

## 2025-06-30 PROCEDURE — 96127 BRIEF EMOTIONAL/BEHAV ASSMT: CPT | Mod: 95

## 2025-06-30 PROCEDURE — 98006 SYNCH AUDIO-VIDEO EST MOD 30: CPT

## 2025-06-30 RX ORDER — DULOXETIN HYDROCHLORIDE 30 MG/1
30 CAPSULE, DELAYED RELEASE ORAL DAILY
COMMUNITY
Start: 2025-06-30

## 2025-06-30 ASSESSMENT — ANXIETY QUESTIONNAIRES
IF YOU CHECKED OFF ANY PROBLEMS ON THIS QUESTIONNAIRE, HOW DIFFICULT HAVE THESE PROBLEMS MADE IT FOR YOU TO DO YOUR WORK, TAKE CARE OF THINGS AT HOME, OR GET ALONG WITH OTHER PEOPLE: EXTREMELY DIFFICULT
4. TROUBLE RELAXING: NEARLY EVERY DAY
1. FEELING NERVOUS, ANXIOUS, OR ON EDGE: NEARLY EVERY DAY
GAD7 TOTAL SCORE: 17
3. WORRYING TOO MUCH ABOUT DIFFERENT THINGS: NEARLY EVERY DAY
5. BEING SO RESTLESS THAT IT IS HARD TO SIT STILL: NEARLY EVERY DAY
7. FEELING AFRAID AS IF SOMETHING AWFUL MIGHT HAPPEN: SEVERAL DAYS
7. FEELING AFRAID AS IF SOMETHING AWFUL MIGHT HAPPEN: SEVERAL DAYS
2. NOT BEING ABLE TO STOP OR CONTROL WORRYING: NEARLY EVERY DAY
GAD7 TOTAL SCORE: 17
8. IF YOU CHECKED OFF ANY PROBLEMS, HOW DIFFICULT HAVE THESE MADE IT FOR YOU TO DO YOUR WORK, TAKE CARE OF THINGS AT HOME, OR GET ALONG WITH OTHER PEOPLE?: EXTREMELY DIFFICULT
6. BECOMING EASILY ANNOYED OR IRRITABLE: SEVERAL DAYS
GAD7 TOTAL SCORE: 17

## 2025-06-30 ASSESSMENT — PATIENT HEALTH QUESTIONNAIRE - PHQ9
10. IF YOU CHECKED OFF ANY PROBLEMS, HOW DIFFICULT HAVE THESE PROBLEMS MADE IT FOR YOU TO DO YOUR WORK, TAKE CARE OF THINGS AT HOME, OR GET ALONG WITH OTHER PEOPLE: EXTREMELY DIFFICULT
SUM OF ALL RESPONSES TO PHQ QUESTIONS 1-9: 7
SUM OF ALL RESPONSES TO PHQ QUESTIONS 1-9: 7

## 2025-06-30 NOTE — PROGRESS NOTES
"Ana is a 40 year old who is being evaluated via a billable video visit.    How would you like to obtain your AVS? MyChart  If the video visit is dropped, the invitation should be resent by: Text to cell phone: [phone]  Will anyone else be joining your video visit? No      Assessment & Plan     Generalized anxiety disorder      7/19/2023     4:00 PM 6/2/2025    11:23 AM 6/30/2025    12:50 PM   RUBENS-7 SCORE   Total Score   17 (severe anxiety)   Total Score 1 18 17        Patient-reported   Anxiety worsened with duloxetine 90 mg.  She decreased duloxetine to 60 mg every other day but continues to have unmanaged anxiety, hyperhidrosis, abdominal pain with administration.  Will decrease duloxetine to 30 mg daily as she did not report significant side effects with that dosage.  Discussed with patient that lower dosage will not manage anxiety well but will bridge her until psychiatry appointment 7/21/2025.      BMI  Estimated body mass index is 37.1 kg/m  as calculated from the following:    Height as of 5/23/25: 1.803 m (5' 11\").    Weight as of 5/23/25: 120.7 kg (266 lb).     Subjective   Ana is a 40 year old, presenting for the following health issues:  Recheck Medication (Follow up on anxiety medication. Patient states that it is not really helping, and its making her sweat more. )      6/30/2025    12:46 PM   Additional Questions   Roomed by Maricruz ROWE CMA     Video Start Time: 1:30 PM    HPI    Patient is a 40-year old female presenting on video visit for follow up on anxiety.  Medical history includes     Per previous note: Long standing history of anxiety, mostly social anxiety.  Has been on several different medications with reported side effects.  In April, started on duloxetine 30 mg with titration up to 60 mg daily.  Currently breastfeeding.  Has not seen significant change in anxiety and continues to experience moderate-severe diaphoresis with heightened anxiety.  Usually maintenance dose of duloxetine " is 60 mg daily but since patient has not seen benefit, will increase to 90 mg daily.  Advised patient to follow up with PCP in 4 weeks for symptom review and medication management.  With unsuccessful history of multiple psychiatry medications, placed psychiatry referral for medication recommendations.     When taking duloxetine 90 mg daily, developed worsened anxiety.  Attended graduation party and couldn't eat and felt like people were looking at her and wanted to leave.  She had panic attack during social situation.  Continues to have excessive sweating.  She decreased duloxetine to 60 mg, has not been taking medication daily, every other day as she was having stomach ache after administration.  The one thing she does like about duloxetine is that gives her energy to work out.     Per previous note:   Was previously on Paxil which managed her anxiety the best but had weight gain.   Was previously on Lexapro and had side effects.  Tried Wellbutrin many years ago but doesn't remember why she discontinued use.   Was on citalopram many years ago.     Review of Systems  Review of systems negative otherwise known HPI.    Past Medical History:   Diagnosis Date    ALCOH DEP NEC/NOS-UNSPEC 2007    Alcohol dependence in remission (H) 2023    Asthma     exercise induced    Brain aneurysm     CARDIOVASCULAR SCREENING; LDL GOAL LESS THAN 160 10/23/2012     delivery delivered 2016    Chickenpox     Chlamydia     Depression with anxiety 2014    Encounter for triage in pregnant patient 2024    Generalized anxiety disorder 2013    Diagnosis updated by automated process. Provider to review and confirm.      GERD (gastroesophageal reflux disease) 2013    Gestational hypertension 2024    Major depressive disorder, recurrent, unspecified 2023    Major depressive disorder, single episode, moderate (H) 2013    Methamphetamine abuse (H) 2023    Mild intermittent  asthma 2008    Mirena IUD 2016    Lot: [id number]  Exp:       Panic disorder with agoraphobia and moderate panic attacks 03/10/2009    Persons encountering health services in other specified circumstances 2015    Formatting of this note might be different from the original. Care Coordinator: Kelly DANIEL, MSW See letters for Health Care home care plan SW covering in [facility] absence Kelly Simon, FREDY, [phone] 3/24/2015 12:24 PM      Placental abruption 2016    Placental abruption in third trimester 2016    Postpartum depression     also 2016    Preeclampsia     ? in     Pregnancy induced hypertension     Pregnancy with history of  section, antepartum 2024    Right upper quadrant abdominal pain affecting pregnancy 2024    Routine postpartum follow-up 2016    Single liveborn, born in hospital, delivered 2016    Tobacco use disorder 2008    Has decreased from 1 ppd to 1 cigarette per day since pregnancy.      Tooth infection 2022    Urinary tract infection in mother during second trimester of pregnancy 2024    Vaginal spotting 2024       Past Surgical History:   Procedure Laterality Date     SECTION Bilateral 2016    Procedure:  SECTION;  Surgeon: Beau Cardoso MD;  Location: [facility] OR    COMBINED  SECTION, SALPINGECTOMY BILATERAL Bilateral 2024    Procedure: repeat  SECTION, WITH BILATERAL SALPINGECTOMY;  Surgeon: Antonette Whitman MD;  Location: [facility] OR    PE TUBES  age 3       Family History   Problem Relation Age of Onset    Alcohol/Drug Mother         alcohol- recovered    Hypertension Mother     Depression Mother         also anxiety    Other - See Comments Mother         ankylosing spondylosis    Anxiety Disorder Mother     Irritable Bowel Syndrome Mother     Alcohol/Drug Father         alcohol- recovered    Alcohol/Drug Sister         A&D-recovered     Alcoholism Brother         recovered    Alcohol/Drug Brother         alcohol    Hypertension Brother     Schizophrenia Brother     Cancer Maternal Grandmother         lung- at age 44    Depression Maternal Grandmother         also anxiety       Social History     Tobacco Use    Smoking status: Former     Current packs/day: 0.00     Types: Cigarettes     Passive exposure: Never    Smokeless tobacco: Never   Substance Use Topics    Alcohol use: Not Currently     Comment: sober since 24     Current Outpatient Medications   Medication Sig Dispense Refill    acetaminophen (TYLENOL) 325 MG tablet Take 325-650 mg by mouth every 6 hours as needed for mild pain.      albuterol (PROAIR HFA/PROVENTIL HFA/VENTOLIN HFA) 108 (90 Base) MCG/ACT inhaler Inhale 2 puffs into the lungs every 6 hours as needed for shortness of breath, wheezing or cough. 18 g 1    DULoxetine (CYMBALTA) 30 MG capsule Take 3 capsules (90 mg) by mouth daily. (Patient taking differently: Take 60 mg by mouth daily.) 180 capsule 0    ibuprofen (ADVIL/MOTRIN) 200 MG capsule Take 3 capsules (600 mg) by mouth every 4 hours as needed for fever. 90 capsule 0    Misc. Devices (BREAST PUMP) MISC 1 each See Admin Instructions. 1 each 0     Current Facility-Administered Medications   Medication Dose Route Frequency Provider Last Rate Last Admin    lidocaine (XYLOCAINE) 5 % ointment   Topical Daily PRN Lyssa Geronimo NP   Given at 24 0841    lidocaine (XYLOCAINE) 5 % ointment   Topical Daily PRN Lyssa Geronimo NP        silver nitrate (ARZOL) Misc   Topical Once Lyssa Geronimo NP           Objective       Vitals:  No vitals were obtained today due to virtual visit.    Physical Exam   GENERAL: alert and no distress  EYES: Eyes grossly normal to inspection.  No discharge or erythema, or obvious scleral/conjunctival abnormalities.  RESP: No audible wheeze, cough, or visible cyanosis.    SKIN: Visible skin clear. No significant rash, abnormal pigmentation  or lesions.  NEURO: Cranial nerves grossly intact.  Mentation and speech appropriate for age.  PSYCH: Appropriate affect, tone, and pace of words      Video-Visit Details    Type of service:  Video Visit   Video End Time:1:42 PM  Originating Location (pt. Location): Home    Distant Location (provider location):  On-site  Platform used for Video Visit: Luba  Signed Electronically by: WOLFGANG Sim CNP

## 2025-07-15 ENCOUNTER — OFFICE VISIT (OUTPATIENT)
Dept: FAMILY MEDICINE | Facility: CLINIC | Age: 40
End: 2025-07-15
Payer: COMMERCIAL

## 2025-07-15 VITALS
SYSTOLIC BLOOD PRESSURE: 128 MMHG | TEMPERATURE: 98.3 F | OXYGEN SATURATION: 97 % | RESPIRATION RATE: 24 BRPM | DIASTOLIC BLOOD PRESSURE: 74 MMHG | HEIGHT: 72 IN | WEIGHT: 259 LBS | BODY MASS INDEX: 35.08 KG/M2 | HEART RATE: 71 BPM

## 2025-07-15 DIAGNOSIS — F41.1 GAD (GENERALIZED ANXIETY DISORDER): ICD-10-CM

## 2025-07-15 DIAGNOSIS — M79.89 SOFT TISSUE MASS: ICD-10-CM

## 2025-07-15 DIAGNOSIS — Z01.818 PREOP GENERAL PHYSICAL EXAM: Primary | ICD-10-CM

## 2025-07-15 DIAGNOSIS — J45.990 EXERCISE-INDUCED ASTHMA: ICD-10-CM

## 2025-07-15 PROCEDURE — 99213 OFFICE O/P EST LOW 20 MIN: CPT | Performed by: FAMILY MEDICINE

## 2025-07-15 ASSESSMENT — PAIN SCALES - GENERAL: PAINLEVEL_OUTOF10: NO PAIN (0)

## 2025-07-15 NOTE — PROGRESS NOTES
Preoperative Evaluation  [facility]  [address]  [facility] [address]  Phone: [phone]  Fax: [phone]  Primary Provider: WOLFGANG Castañeda CNP  Pre-op Performing Provider: Juancho Treviño MD  Jul 15, 2025             7/15/2025   Surgical Information   [facility]   Facility or Hospital where procedure/surgery will be performed: [facility]   Who is doing the procedure / surgery? Papito   Date of surgery / procedure: 7/25/2025   Time of surgery / procedure: 7 am   Where do you plan to recover after surgery? at home with family     Fax number for surgical facility: [phone]    Assessment & Plan     The proposed surgical procedure is considered INTERMEDIATE risk.    Ana was seen today for recheck medication and pre-op exam.    Diagnoses and all orders for this visit:    Preop general physical exam    Soft tissue mass    RUBENS (generalized anxiety disorder)    Exercise-induced asthma          - No identified additional risk factors other than previously addressed    Antiplatelet or Anticoagulation Medication Instructions   - We reviewed the medication list and the patient is not on an antiplatelet or anticoagulation medications.    Additional Medication Instructions  Take all scheduled medications on the day of surgery    Recommendation  Approval given to proceed with proposed procedure, without further diagnostic evaluation.        Subjective   Ana is a 40 year old, presenting for the following:  Recheck Medication and Pre-Op Exam    She has a soft tissue mass in the left side low back.  It is causing symptoms and she is scheduled for surgical excision.  She reports overall she is doing well there are no signs or symptoms of an acute illness.  She has exercise-induced asthma that is under control.  She has anxiety that is under control.    She does report having some nausea associated with spinal anesthesia use  for a .  Also reports it was difficult to place the spinal anesthesia at the time of that procedure but is otherwise not had any significant problems associated with anesthesia or procedures.      7/15/2025     2:47 PM   Additional Questions   Roomed by am jonathan   Accompanied by daughter     HPI:              7/15/2025   Pre-Op Questionnaire   Have you ever had a heart attack or stroke? No   Have you ever had surgery on your heart or blood vessels, such as a stent placement, a coronary artery bypass, or surgery on an artery in your head, neck, heart, or legs? No   Do you have chest pain with activity? No   Do you have a history of heart failure? No   Do you currently have a cold, bronchitis or symptoms of other infection? No   Do you have a cough, shortness of breath, or wheezing? No   Do you or anyone in your family have previous history of blood clots? No   Do you or does anyone in your family have a serious bleeding problem such as prolonged bleeding following surgeries or cuts? No   Have you ever had problems with anemia or been told to take iron pills? No   Have you had any abnormal blood loss such as black, tarry or bloody stools, or abnormal vaginal bleeding? (!) UNKNOWN    Have you ever had a blood transfusion? No   Are you willing to have a blood transfusion if it is medically needed before, during, or after your surgery? Yes   Have you or any of your relatives ever had problems with anesthesia? No   Do you have sleep apnea, excessive snoring or daytime drowsiness? No   Do you have any artifical heart valves or other implanted medical devices like a pacemaker, defibrillator, or continuous glucose monitor? No   Do you have artificial joints? No   Are you allergic to latex? No     Advance Care Planning      Preoperative Review of    reviewed - no record of controlled substances prescribed.      Patient Active Problem List    Diagnosis Date Noted    Foot pain, right 2025     Priority: Medium     Sinus tarsitis, right 2025     Priority: Medium    Peroneal tendinitis, right 2025     Priority: Medium    Lipoma of skin and subcutaneous tissue 2025     Priority: Medium    Encounter for triage in pregnant patient 2024     Priority: Medium    Gestational hypertension 2024     Priority: Medium    Pregnancy with history of  section, antepartum 2024     Priority: Medium    Right upper quadrant abdominal pain affecting pregnancy 2024     Priority: Medium    Urinary tract infection in mother during second trimester of pregnancy 2024     Priority: Medium    Vaginal spotting 2024     Priority: Medium    Major depressive disorder, recurrent, unspecified 2023     Priority: Medium    Alcohol dependence in remission (H) 2023     Priority: Medium    Methamphetamine abuse (H) 2023     Priority: Medium    Tooth infection 2022     Priority: Medium    Routine postpartum follow-up 2016     Priority: Medium    Mirena IUD 2016     Priority: Medium     Lot: [id number]  Exp:       Single liveborn, born in hospital, delivered 2016     Priority: Medium    Placental abruption in third trimester 2016     Priority: Medium    Placental abruption 2016     Priority: Medium     delivery delivered 2016     Priority: Medium    Prenatal care, subsequent pregnancy 2016     Priority: Medium     FOB- Lam Robledo      Prenatal care, subsequent pregnancy in third trimester 2016     Priority: Medium    Persons encountering health services in other specified circumstances 2015     Priority: Medium     Formatting of this note might be different from the original. Care Coordinator: Kelly DANIEL, MSW See letters for Health Care home care plan SW covering in [place] [facility] absence RFEDY Luz, [phone] 3/24/2015 12:24 PM      Depression with anxiety 2014     Priority: Medium     Generalized anxiety disorder 2013     Priority: Medium     Diagnosis updated by automated process. Provider to review and confirm.      GERD (gastroesophageal reflux disease) 2013     Priority: Medium    Major depressive disorder, single episode, moderate (H) 2013     Priority: Medium    CARDIOVASCULAR SCREENING; LDL GOAL LESS THAN 160 10/23/2012     Priority: Medium    CTS (carpal tunnel syndrome) 10/14/2011     Priority: Medium    Panic disorder with agoraphobia and moderate panic attacks 03/10/2009     Priority: Medium    Tobacco use disorder 2008     Priority: Medium     Has decreased from 1 ppd to 1 cigarette per day since pregnancy.      Mild intermittent asthma 2008     Priority: Medium      Past Medical History:   Diagnosis Date    ALCOH DEP NEC/NOS-UNSPEC 2007    Alcohol dependence in remission (H) 2023    Asthma     exercise induced    Brain aneurysm     CARDIOVASCULAR SCREENING; LDL GOAL LESS THAN 160 10/23/2012     delivery delivered 2016    Chickenpox     Chlamydia     Depression with anxiety 2014    Encounter for triage in pregnant patient 2024    Generalized anxiety disorder 2013    Diagnosis updated by automated process. Provider to review and confirm.      GERD (gastroesophageal reflux disease) 2013    Gestational hypertension 2024    Major depressive disorder, recurrent, unspecified 2023    Major depressive disorder, single episode, moderate (H) 2013    Methamphetamine abuse (H) 2023    Mild intermittent asthma 2008    Mirena IUD 2016    Lot: [id number]  Exp:       Panic disorder with agoraphobia and moderate panic attacks 03/10/2009    Persons encountering health services in other specified circumstances 2015    Formatting of this note might be different from the original. Care Coordinator: Kelly Simon LSW, MSW See letters for Health Care home care plan SW covering  in [place], [place] SW absence Kelly Simon, JAIRONW, [phone] 3/24/2015 12:24 PM      Placental abruption 2016    Placental abruption in third trimester 2016    Postpartum depression     also 2016    Preeclampsia     ? in     Pregnancy induced hypertension     Pregnancy with history of  section, antepartum 2024    Right upper quadrant abdominal pain affecting pregnancy 2024    Routine postpartum follow-up 2016    Single liveborn, born in hospital, delivered 2016    Tobacco use disorder 2008    Has decreased from 1 ppd to 1 cigarette per day since pregnancy.      Tooth infection 2022    Urinary tract infection in mother during second trimester of pregnancy 2024    Vaginal spotting 2024     Past Surgical History:   Procedure Laterality Date     SECTION Bilateral 2016    Procedure:  SECTION;  Surgeon: Beau Cardoso MD;  Location: [facility] OR    COMBINED  SECTION, SALPINGECTOMY BILATERAL Bilateral 2024    Procedure: repeat  SECTION, WITH BILATERAL SALPINGECTOMY;  Surgeon: Antonette Whitman MD;  Location: [facility] OR    PE TUBES  age 3     Current Outpatient Medications   Medication Sig Dispense Refill    acetaminophen (TYLENOL) 325 MG tablet Take 325-650 mg by mouth every 6 hours as needed for mild pain.      albuterol (PROAIR HFA/PROVENTIL HFA/VENTOLIN HFA) 108 (90 Base) MCG/ACT inhaler Inhale 2 puffs into the lungs every 6 hours as needed for shortness of breath, wheezing or cough. 18 g 1    DULoxetine (CYMBALTA) 30 MG capsule Take 1 capsule (30 mg) by mouth daily.      ibuprofen (ADVIL/MOTRIN) 200 MG capsule Take 3 capsules (600 mg) by mouth every 4 hours as needed for fever. 90 capsule 0    Misc. Devices (BREAST PUMP) MISC 1 each See Admin Instructions. 1 each 0       Allergies   Allergen Reactions    Amoxicillin Rash        Social History     Tobacco Use    Smoking status: Former     Current packs/day:  "0.00     Types: Cigarettes     Passive exposure: Never    Smokeless tobacco: Never   Substance Use Topics    Alcohol use: Not Currently     Comment: sober since 5/19/23       History   Drug Use Unknown     Comment: sober since 5/19/23           Complete review of systems is obtained.  Other than the specific considerations noted above complete review of systems is negative.      Objective    /74 (BP Location: Right arm, Patient Position: Sitting, Cuff Size: Adult Large)   Pulse 71   Temp 98.3  F (36.8  C) (Oral)   Resp 24   Ht 1.822 m (5' 11.75\")   Wt 117.5 kg (259 lb)   LMP 12/01/2023 (Approximate)   SpO2 97%   Breastfeeding Yes   BMI 35.37 kg/m     Estimated body mass index is 35.37 kg/m  as calculated from the following:    Height as of this encounter: 1.822 m (5' 11.75\").    Weight as of this encounter: 117.5 kg (259 lb).  Physical Exam    General Appearance:    Alert, cooperative, no distress   Eyes:   No scleral icterus or conjunctival irritation       Ears:    Normal TM's and external ear canals, both ears   Throat:   Lips, mucosa, and tongue normal; teeth and gums normal   Neck:   Supple, symmetrical, trachea midline, no adenopathy;        thyroid:  No enlargement/tenderness/nodules   Lungs:     Clear to auscultation bilaterally, respirations unlabored, no wheezes or crackles   Heart:    Regular rate and rhythm,  No murmur   Abdomen:    Soft, no distention, no tenderness on palpation, no masses, no organomegaly     Extremities:  No edema, no joint swelling or redness, no evidence of any injuries   Skin: Small soft tissue mass   Neurologic:  On gross examination there is no motor or sensory deficit.  Patient walks with a normal gait         Recent Labs   Lab Test 09/30/24  1427 09/17/24  0607 09/16/24  1051   HGB 12.2 10.6* 13.0    195 238   INR  --   --  0.98   NA  --  134* 136   POTASSIUM  --  4.1 4.0   CR  --  0.71 0.65        Diagnostics  No labs were ordered during this visit.   No " EKG required for low risk surgery (cataract, skin procedure, breast biopsy, etc).    Revised Cardiac Risk Index (RCRI)  The patient has the following serious cardiovascular risks for perioperative complications:   - No serious cardiac risks = 0 points     RCRI Interpretation: 0 points: Class I (very low risk - 0.4% complication rate)         Signed Electronically by: Juancho Treviño MD  A copy of this evaluation report is provided to the requesting physician.

## 2025-07-21 ENCOUNTER — VIRTUAL VISIT (OUTPATIENT)
Dept: BEHAVIORAL HEALTH | Facility: CLINIC | Age: 40
End: 2025-07-21
Payer: COMMERCIAL

## 2025-07-21 ENCOUNTER — VIRTUAL VISIT (OUTPATIENT)
Dept: PSYCHIATRY | Facility: CLINIC | Age: 40
End: 2025-07-21
Payer: COMMERCIAL

## 2025-07-21 DIAGNOSIS — F43.9 TRAUMA AND STRESSOR-RELATED DISORDER: ICD-10-CM

## 2025-07-21 DIAGNOSIS — F33.1 MODERATE EPISODE OF RECURRENT MAJOR DEPRESSIVE DISORDER (H): ICD-10-CM

## 2025-07-21 DIAGNOSIS — Z86.59 HX OF BORDERLINE PERSONALITY DISORDER: ICD-10-CM

## 2025-07-21 DIAGNOSIS — Z79.899 ENCOUNTER FOR LONG-TERM (CURRENT) USE OF MEDICATIONS: Primary | ICD-10-CM

## 2025-07-21 DIAGNOSIS — F90.9 ATTENTION DEFICIT HYPERACTIVITY DISORDER (ADHD), UNSPECIFIED ADHD TYPE: ICD-10-CM

## 2025-07-21 DIAGNOSIS — F41.1 GAD (GENERALIZED ANXIETY DISORDER): ICD-10-CM

## 2025-07-21 DIAGNOSIS — F41.1 GAD (GENERALIZED ANXIETY DISORDER): Primary | ICD-10-CM

## 2025-07-21 DIAGNOSIS — F10.21 ALCOHOL USE DISORDER, SEVERE, IN SUSTAINED REMISSION (H): ICD-10-CM

## 2025-07-21 DIAGNOSIS — F15.21 AMPHETAMINE USE DISORDER, SEVERE, IN SUSTAINED REMISSION (H): ICD-10-CM

## 2025-07-21 PROCEDURE — 98002 SYNCH AUDIO-VIDEO NEW MOD 45: CPT | Performed by: PSYCHIATRY & NEUROLOGY

## 2025-07-21 PROCEDURE — 90791 PSYCH DIAGNOSTIC EVALUATION: CPT | Mod: 95 | Performed by: COUNSELOR

## 2025-07-21 ASSESSMENT — PATIENT HEALTH QUESTIONNAIRE - PHQ9
10. IF YOU CHECKED OFF ANY PROBLEMS, HOW DIFFICULT HAVE THESE PROBLEMS MADE IT FOR YOU TO DO YOUR WORK, TAKE CARE OF THINGS AT HOME, OR GET ALONG WITH OTHER PEOPLE: EXTREMELY DIFFICULT
5. POOR APPETITE OR OVEREATING: SEVERAL DAYS
SUM OF ALL RESPONSES TO PHQ QUESTIONS 1-9: 4
SUM OF ALL RESPONSES TO PHQ QUESTIONS 1-9: 4
10. IF YOU CHECKED OFF ANY PROBLEMS, HOW DIFFICULT HAVE THESE PROBLEMS MADE IT FOR YOU TO DO YOUR WORK, TAKE CARE OF THINGS AT HOME, OR GET ALONG WITH OTHER PEOPLE: EXTREMELY DIFFICULT
SUM OF ALL RESPONSES TO PHQ QUESTIONS 1-9: 4
SUM OF ALL RESPONSES TO PHQ QUESTIONS 1-9: 4

## 2025-07-21 ASSESSMENT — ANXIETY QUESTIONNAIRES
2. NOT BEING ABLE TO STOP OR CONTROL WORRYING: SEVERAL DAYS
7. FEELING AFRAID AS IF SOMETHING AWFUL MIGHT HAPPEN: MORE THAN HALF THE DAYS
6. BECOMING EASILY ANNOYED OR IRRITABLE: SEVERAL DAYS
5. BEING SO RESTLESS THAT IT IS HARD TO SIT STILL: MORE THAN HALF THE DAYS
1. FEELING NERVOUS, ANXIOUS, OR ON EDGE: NEARLY EVERY DAY
IF YOU CHECKED OFF ANY PROBLEMS ON THIS QUESTIONNAIRE, HOW DIFFICULT HAVE THESE PROBLEMS MADE IT FOR YOU TO DO YOUR WORK, TAKE CARE OF THINGS AT HOME, OR GET ALONG WITH OTHER PEOPLE: SOMEWHAT DIFFICULT
GAD7 TOTAL SCORE: 12
GAD7 TOTAL SCORE: 12
3. WORRYING TOO MUCH ABOUT DIFFERENT THINGS: MORE THAN HALF THE DAYS

## 2025-07-21 ASSESSMENT — PAIN SCALES - GENERAL: PAINLEVEL_OUTOF10: MODERATE PAIN (4)

## 2025-07-21 NOTE — NURSING NOTE
Current patient location: [facility]   [facility] [address]    Is the patient currently in the state of [place]? YES    Visit mode: VIDEO    If the visit is dropped, the patient can be reconnected by:VIDEO VISIT: Text to cell phone:   Telephone Information:   Mobile [phone]       Will anyone else be joining the visit? NO  (If patient encounters technical issues they should call [phone]    Are changes needed to the allergy or medication list? No    Are refills needed on medications prescribed by this physician? Discuss with provider    Rooming Documentation:  Unable to complete questionnaire(s) due to time    Reason for visit: Consult    Oj BLANCO
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
## 2025-07-21 NOTE — PROGRESS NOTES
"Virtual Visit Details    Type of service:  Video Visit   Video Start Time: {video visit start/end time for provider to select:533446}  Video End Time:{video visit start/end time for provider to select:426517}    Originating Location (pt. Location): {video visit patient location:697939::\"Home\"}  {PROVIDER LOCATION On-site should be selected for visits conducted from your clinic location or adjoining [facility] hospital, academic office, or other nearby [facility] building. Off-site should be selected for all other provider locations, including home:174[address]}  Distant Location (provider location):  {virtual location provider:221601}  Platform used for Video Visit: {Virtual Visit Platforms:919811::\"[employer]\"}  "

## 2025-07-21 NOTE — Clinical Note
Hi there! Could someone please mail a hard copy (to  ept's listed home address) of the letter/communication sent to the patient's MyChart on 7/21/25?  She wanted a copy mailed to her in case she cannot print it from her MyChart.  Thank you so much!

## 2025-07-21 NOTE — PATIENT INSTRUCTIONS
Treatment Plan:  Discontinue duloxetine/Cymbalta  Discussed it was okay to alternate 30 mg every other day with no duloxetine for 1-2 weeks then discontinue the medication.  Have lab (urine drug screen) completed at any [facility] lab. Schedule on [employer] or call your clinic ahead of time to schedule an appointment for lab.  Review and sign stimulant contract sent to you in your [employer].  Copy also mailed to your home.  Upload to [employer] when completed.  Once urine drug screen obtained along with signed controlled substance agreement:  Start Vyvanse/lisdexamfetamine 30 mg daily for ADHD.  Recommend individual psychotherapy for additional support and ongoing development of nonpharmacologic coping skills and strategies.  Referral placed 7/21/2025 by [facility].  Continue all other cares per primary care provider.   Continue all other medications as reviewed per electronic medical record today.   Safety plan reviewed. To the Emergency Department as needed or call after hours crisis line at [phone] or [phone]. [place] Crisis Text Line: Text [place] to 396158  or  Suicide LifeLine Chat: suicidepreventionlifeline.org/chat  Schedule an appointment with me in 4-6 weeks or sooner as needed.  Call [facility] at [phone] to schedule.  Follow up with primary care provider as planned or sooner if needed for acute medical concerns.  Call the psychiatric nurse line with medication questions or concerns at [phone].  [employer] may be used to communicate with your provider, but this is not intended to be used for emergencies.    Patient Education:  See resources sent via [employer]    Discussed risks of stimulant medication including, but not limited to, decreased appetite, risk of tics (and that they may be lasting), trouble sleeping, cardiac risks such as increased heart rate and blood pressure, and rare risk of sudden cardiac death.  Also risk of addiction/tolerance/dependence.    Good ADHD  resources:  Books-Mastering Adult ADHD, Driven to Distraction, Take Charge of Adult ADHD  Website-www.DDN    ADHD medication expectations:   https://www.[employer]/slideshows/lqy-ztah-aojp-medication-work/    What to Expect and What NOT to Expect from Stimulant Medication  by Giselle Benitez, PhD    Many clients taking stimulant medication aren t sure what to expect from it. They may have unrealistic expectations of their medication and decide it s not working. Or they may have become used to the benefits of stimulant medication and think it s no longer working.    Here s a list of things to expect when taking stimulant medications. Of course, everyone has a different reaction and a different level of sensitivity to medication, so some seem to benefit much more clearly than others.    A good response to stimulant medication typically results in:    - Improved attention span - being able to read longer while staying focused; being able to listen longer while staying focused.    - Reduced distractability - being able to remain focused when some distractions occur around you.    - Greater ease in getting back on task after an interruption.    - Better working memory - i.e., being able to remember the three things you went downstairs to get.    - Easier to start tasks and complete them.    - Reduced feeling of stress and overwhelm.    - Decreased irritability and over-reactivity    - Reduced feelings of restlessness or hyperactivity    - Reduced impulsiveness - less likely to interrupt, to make decisions with little consideration for costs or consequences    However, as Ed Tripathi MD, said so memorably,  Pills don t build skills.  You shouldn t expect stimulant medication to help you organize your files, improve your study skills, write your paper, prioritize your tasks, reduce your clutter, or problem-solve better. But it will put your brain in a state where these skills can be more easily  learned.    Often, the best pairing is stimulant medication in combination with a therapist, organizer or  that is helping you build the skills you need to succeed now that you re able to focus and concentrate.     Care team has reviewed attendance agreement with patient. Patient advised that two failed appointments within 6 months may lead to termination of current episode of care.     Community Resources:    National Suicide Prevention Lifeline: 874.127.2109 (TTY: 213.456.5911). Call anytime for help.  (www.suicidepreventionlifeline.org)  National [place] [employer] Mental Illness (www.billie.org): 234.900.8218 or 077-952-7961.   Mental Health Association (www.mentalhealth.org): 322.720.5260 or 510-527-0404.  [place] Crisis Text Line: Text [place] to 296878  Suicide LifeLine Chat: suicide[place].org/chat    Patient Education   Collaborative Care Psychiatry Service  What to Expect  Here's what to expect from your Collaborative Care Psychiatry Service (CCPS).   About CCPS  CCPS means 2 people work together to help you get better. You'll meet with a behavioral health clinician and a psychiatric doctor. A behavioral health clinician helps people with mental health problems by talking with them. A psychiatric doctor helps people by giving them medicine.  How it works  At every visit, you'll see the behavioral health clinician (BHC) first. They'll talk with you about how you're doing and teach you how to feel better.   Then you'll see the psychiatric doctor. This doctor can help you deal with troubling thoughts and feelings by giving you medicine. They'll make sure you know the plan for your care.   CCPS usually takes 3 to 6 visits. If you need more visits, we may have you start seeing a different psychiatric doctor for ongoing care.  If you have any questions or concerns, we'll be glad to talk with you.  About visits  Be open  At your visits, please talk openly about your problems. It may feel hard, but it's the  "best way for us to help you.  Cancelling visits  If you can't come to your visit, please call us right away at [phone]. If you don't cancel at least 24 hours (1 full day) before your visit, that's \"late cancellation.\"  Being late to visits  Being very late is the same as not showing up. You will be a \"no show\" if:  Your appointment starts with a [facility], and you're more than 15 minutes late for a 30-minute (half hour) visit. This will also cancel your appointment with the psychiatric doctor.  Your appointment is with a psychiatric doctor only, and you're more than 15 minutes late for a 30-minute (half hour) visit.  Your appointment is with a psychiatric doctor only, and you're more than 30 minutes late for a 60-minute (full hour) visit.  If you cancel late or don't show up 2 times within 6 months, we may end your care.   Getting help between visits  If you need help between visits, you can call us Monday to Friday from 8 a.m. to 4:30 p.m. at [phone].  Emergency care  Call 911 or go to the nearest emergency department if your life or someone else's life is in danger.  Call 538 anytime to reach the national Suicide and Crisis hotline.  Medicine refills  To refill your medicine, call your pharmacy. You can also call [facility] at [phone], Monday to Friday, 8 a.m. to 4:30 p.m. It can take 1 to 3 business days to get a refill.   Forms, letters, and tests  You may have papers to fill out, like FMLA, short-term disability, and workability. We can help you with these forms at your visits, but you must have an appointment. You may need more than 1 visit for this, to be in an intensive therapy program, or both.  Before we can give you medicine for ADHD, we may refer you to get tested for it or confirm it another way.  We may not be able to give you an emotional support animal letter.  We don't do mental health checks ordered by the court.   We don't do mental health testing, but we " can refer you to get tested.   Thank you for choosing us for your care.  For informational purposes only. Not to replace the advice of your health care provider. Copyright   2022 [employer]. All rights reserved. [employer] 849084 - Rev 11/24.
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
## 2025-07-21 NOTE — LETTER
July 21, 2025      Re: Holly Tolliver  [address]   [place] [address]      Ana,       Alfredo to see you today.  If agreeable, please review the stimulant contract below and initial by each section and sign at the bottom.  Please then upload to your MyChart when completed.      Regards,      Ne Forbes, DO on 7/21/2025 at 5:24 PM   Electronically signed                                                      Behavioral Health Stimulant Patient Agreement    I understand that stimulant medication has been prescribed for me.  This agreement is designed to help ensure safe and effective use.  I voluntarily agree to the following:    ___ I understand that this medication has the potential for abuse and dependence.   ___ I will keep my prescribed medications safe, secure, and out of the reach of children.    ___ I will not allow others to use or purchase my medication.    ___ Lost or stolen stimulant(s) will not be replaced or refilled early.    ___ I will take my medication(s) only as prescribed.  Any dosing changes need to be discussed with the provider who is prescribing my stimulant(s).    ___ Except for emergencies, I will not start any other controlled substances (e.g., benzodiazepines, stimulants, opioids) without first checking with the treatment team.   ___ I will not use alcohol or other mood-altering substances (e.g., marijuana, synthetics, heroin, cocaine, etc.).  I understand if I do, my treatment, including any prescribed controlled substances (e.g., Adderall) may be stopped.  If I am uncertain if it qualifies as a mood-altering substance, I will check with my provider first.    ___ I understand that I may be asked to complete random drug testing and that the expectation is that this is completed within 48 hours unless other arrangements are made with the prescribing provider.  Any unexpected positive drug screening may result in the discontinuation of my stimulant(s).   ___ If asked, I  will provide a pill count of my medication.    ___ I will keep regular appointments with the provider prescribing my stimulant medication.  Refills for controlled substances may not be filled outside of scheduled appointments. Changes of stimulant medication need to be discussed during an appointment (unless other arrangements were discussed with provider).   ___ I understand that failure to follow the above guidelines will lead to a break in this agreement.  As a result, I may no longer be prescribed stimulants and could potentially be dismissed from [facility].       _____________________________________  ______________  Patient Name (printed and signed)    Date

## 2025-07-22 ENCOUNTER — PATIENT OUTREACH (OUTPATIENT)
Dept: CARE COORDINATION | Facility: CLINIC | Age: 40
End: 2025-07-22
Payer: COMMERCIAL

## 2025-07-24 ENCOUNTER — PATIENT OUTREACH (OUTPATIENT)
Dept: CARE COORDINATION | Facility: CLINIC | Age: 40
End: 2025-07-24
Payer: COMMERCIAL

## 2025-07-25 PROCEDURE — 21931 EXC BACK LES SC 3 CM/>: CPT | Performed by: SPECIALIST

## 2025-07-25 RX ORDER — HYDROCODONE BITARTRATE AND ACETAMINOPHEN 5; 325 MG/1; MG/1
1 TABLET ORAL EVERY 6 HOURS PRN
Qty: 10 TABLET | Refills: 0 | Status: SHIPPED | OUTPATIENT
Start: 2025-07-25 | End: 2025-07-28

## 2025-07-25 NOTE — OP NOTE
Operative Note    Name:  Holly Tolliver  PCP:  Rachael Hunt  Procedure Date:  2025      Procedure(s):  EXCISION, LESION, TORSO left back    Pre-Procedure Diagnosis:  Lipoma of skin and subcutaneous tissue [D17.30]    Post-Procedure Diagnosis:    same    OR staff:  Surgeon(s):  Papito Caicedo MD      Anesthesia Type:  MAC with Local    Past Medical History:   Diagnosis Date    ALCOH DEP NEC/NOS-UNSPEC 2007    Alcohol dependence in remission (H) 2023    Asthma     exercise induced    Brain aneurysm     CARDIOVASCULAR SCREENING; LDL GOAL LESS THAN 160 10/23/2012     delivery delivered 2016    Chickenpox     Chlamydia     Depression with anxiety 2014    Encounter for triage in pregnant patient 2024    Generalized anxiety disorder 2013    Diagnosis updated by automated process. Provider to review and confirm.      GERD (gastroesophageal reflux disease) 2013    Gestational hypertension 2024    Major depressive disorder, recurrent, unspecified 2023    Major depressive disorder, single episode, moderate (H) 2013    Methamphetamine abuse (H) 2023    Mild intermittent asthma 2008    Mirena IUD 2016    Lot: [id number]  Exp:       Panic disorder with agoraphobia and moderate panic attacks 03/10/2009    Persons encountering health services in other specified circumstances 2015    Formatting of this note might be different from the original. Care Coordinator: Kelly DANIEL, MSW See letters for Health Care home care plan SW covering in [place]  absence FREDY Luz, [phone] 3/24/2015 12:24 PM      Placental abruption 2016    Placental abruption in third trimester 2016    Postpartum depression     also 2016    Preeclampsia     ? in     Pregnancy induced hypertension 2011    Pregnancy with history of  section, antepartum 2024    Right upper quadrant  abdominal pain affecting pregnancy 2024    Routine postpartum follow-up 2016    Single liveborn, born in hospital, delivered 2016    Tobacco use disorder 2008    Has decreased from 1 ppd to 1 cigarette per day since pregnancy.      Tooth infection 2022    Urinary tract infection in mother during second trimester of pregnancy 2024    Vaginal spotting 2024       Patient Active Problem List    Diagnosis Date Noted    Foot pain, right 2025     Priority: Medium    Sinus tarsitis, right 2025     Priority: Medium    Peroneal tendinitis, right 2025     Priority: Medium    Lipoma of skin and subcutaneous tissue 2025     Priority: Medium    Encounter for triage in pregnant patient 2024     Priority: Medium    Gestational hypertension 2024     Priority: Medium    Pregnancy with history of  section, antepartum 2024     Priority: Medium    Right upper quadrant abdominal pain affecting pregnancy 2024     Priority: Medium    Urinary tract infection in mother during second trimester of pregnancy 2024     Priority: Medium    Vaginal spotting 2024     Priority: Medium    Major depressive disorder, recurrent, unspecified 2023     Priority: Medium    Alcohol dependence in remission (H) 2023     Priority: Medium    Methamphetamine abuse (H) 2023     Priority: Medium    Tooth infection 2022     Priority: Medium    Routine postpartum follow-up 2016     Priority: Medium    Mirena IUD 2016     Priority: Medium     Lot: [id number]  Exp:       Single liveborn, born in hospital, delivered 2016     Priority: Medium    Placental abruption in third trimester 2016     Priority: Medium    Placental abruption 2016     Priority: Medium     delivery delivered 2016     Priority: Medium    Prenatal care, subsequent pregnancy 2016     Priority: Medium     FOB- Lam  Venkat      Prenatal care, subsequent pregnancy in third trimester 03/29/2016     Priority: Medium    Persons encountering health services in other specified circumstances 03/24/2015     Priority: Medium     Formatting of this note might be different from the original. Care Coordinator: Kelly DANIEL, MSW See letters for Health Care home care plan SW covering in [place], [place] SW absence Kelly Simon, FREDY, [phone] 3/24/2015 12:24 PM      Depression with anxiety 08/25/2014     Priority: Medium    Generalized anxiety disorder 09/13/2013     Priority: Medium     Diagnosis updated by automated process. Provider to review and confirm.      GERD (gastroesophageal reflux disease) 09/13/2013     Priority: Medium    Major depressive disorder, single episode, moderate (H) 01/23/2013     Priority: Medium    CARDIOVASCULAR SCREENING; LDL GOAL LESS THAN 160 10/23/2012     Priority: Medium    CTS (carpal tunnel syndrome) 10/14/2011     Priority: Medium    Panic disorder with agoraphobia and moderate panic attacks 03/10/2009     Priority: Medium    Tobacco use disorder 02/19/2008     Priority: Medium     Has decreased from 1 ppd to 1 cigarette per day since pregnancy.      Mild intermittent asthma 02/19/2008     Priority: Medium       Findings:  4x3x2 cm fatty tumor    Operative Report:    Consent was obtained.  The patient was taken to the operating room and placed in a supine position.  MAC anesthesia was administered by anesthesia staff.  Patient was then put in the lateral position the back was prepped and draped sterile manner.  The lesion was marked preoperatively of note.  A briefing timeout was performed by anesthesia and our staff.  I began by infiltrated local anesthetic inferior around the area of the lesion for a field block and incisional and block also.  Incision was made I dissected out the mass itself its entirety.  Upon completion of this and made sure good hemostasis electrocautery closed deep  layers with 3-0 Vicryl suture and the skin with a 4 Monocryl subcuticular stitch.  Steri-Strips sterile dressing applied transferred to PACU in stable condition all sponge needle counts are correct.      Estimated Blood Loss: 5 ml    Specimens:    none       Drains:   none     Complications:    None    Papito Caicedo MD     Date: 7/25/2025  Time: 10:01 AM

## 2025-07-28 ENCOUNTER — MYC MEDICAL ADVICE (OUTPATIENT)
Dept: PSYCHIATRY | Facility: CLINIC | Age: 40
End: 2025-07-28

## 2025-07-28 ENCOUNTER — LAB (OUTPATIENT)
Dept: LAB | Facility: CLINIC | Age: 40
End: 2025-07-28
Payer: COMMERCIAL

## 2025-07-28 DIAGNOSIS — F90.2 ATTENTION DEFICIT HYPERACTIVITY DISORDER (ADHD), COMBINED TYPE: Primary | ICD-10-CM

## 2025-07-28 DIAGNOSIS — Z79.899 ENCOUNTER FOR LONG-TERM (CURRENT) USE OF MEDICATIONS: ICD-10-CM

## 2025-07-28 LAB
AMPHETAMINES UR QL SCN: NORMAL
BARBITURATES UR QL SCN: NORMAL
BENZODIAZ UR QL SCN: NORMAL
BZE UR QL SCN: NORMAL
CANNABINOIDS UR QL SCN: NORMAL
FENTANYL UR QL: NORMAL
OPIATES UR QL SCN: NORMAL
PCP QUAL URINE (ROCHE): NORMAL

## 2025-07-28 PROCEDURE — 80307 DRUG TEST PRSMV CHEM ANLYZR: CPT

## 2025-07-28 NOTE — TELEPHONE ENCOUNTER
RN reviewed [employer] message from patient regarding prescription for hydrocodone/ Acetaminophen 5-32. Patient uploaded behavioral stimulant patient agreement form. Routing to provider for review.    Tyra Self RN on 7/28/2025 at 12:40 PM

## 2025-07-29 RX ORDER — LISDEXAMFETAMINE DIMESYLATE 30 MG/1
30 CAPSULE ORAL EVERY MORNING
Qty: 15 CAPSULE | Refills: 0 | Status: SHIPPED | OUTPATIENT
Start: 2025-07-29

## 2025-08-11 ENCOUNTER — MYC MEDICAL ADVICE (OUTPATIENT)
Dept: PSYCHIATRY | Facility: CLINIC | Age: 40
End: 2025-08-11
Payer: COMMERCIAL

## 2025-08-11 DIAGNOSIS — F90.2 ATTENTION DEFICIT HYPERACTIVITY DISORDER (ADHD), COMBINED TYPE: ICD-10-CM

## 2025-08-11 RX ORDER — LISDEXAMFETAMINE DIMESYLATE 30 MG/1
30 CAPSULE ORAL EVERY MORNING
Qty: 30 CAPSULE | Refills: 0 | Status: SHIPPED | OUTPATIENT
Start: 2025-08-11

## 2025-09-04 ENCOUNTER — VIRTUAL VISIT (OUTPATIENT)
Dept: BEHAVIORAL HEALTH | Facility: CLINIC | Age: 40
End: 2025-09-04
Payer: COMMERCIAL

## 2025-09-04 DIAGNOSIS — F41.1 GAD (GENERALIZED ANXIETY DISORDER): ICD-10-CM

## 2025-09-04 DIAGNOSIS — F33.1 MODERATE EPISODE OF RECURRENT MAJOR DEPRESSIVE DISORDER (H): ICD-10-CM

## 2025-09-04 DIAGNOSIS — F90.9 ATTENTION DEFICIT HYPERACTIVITY DISORDER (ADHD), UNSPECIFIED ADHD TYPE: Primary | ICD-10-CM

## 2025-09-04 DIAGNOSIS — Z86.59 HX OF BORDERLINE PERSONALITY DISORDER: ICD-10-CM

## 2025-09-04 DIAGNOSIS — F43.9 TRAUMA AND STRESSOR-RELATED DISORDER: ICD-10-CM

## 2025-09-04 DIAGNOSIS — F15.21 AMPHETAMINE USE DISORDER, SEVERE, IN SUSTAINED REMISSION (H): ICD-10-CM

## 2025-09-04 DIAGNOSIS — F10.21 ALCOHOL USE DISORDER, SEVERE, IN SUSTAINED REMISSION (H): ICD-10-CM

## 2025-09-04 ASSESSMENT — ANXIETY QUESTIONNAIRES
5. BEING SO RESTLESS THAT IT IS HARD TO SIT STILL: SEVERAL DAYS
3. WORRYING TOO MUCH ABOUT DIFFERENT THINGS: NEARLY EVERY DAY
7. FEELING AFRAID AS IF SOMETHING AWFUL MIGHT HAPPEN: NOT AT ALL
8. IF YOU CHECKED OFF ANY PROBLEMS, HOW DIFFICULT HAVE THESE MADE IT FOR YOU TO DO YOUR WORK, TAKE CARE OF THINGS AT HOME, OR GET ALONG WITH OTHER PEOPLE?: EXTREMELY DIFFICULT
1. FEELING NERVOUS, ANXIOUS, OR ON EDGE: NEARLY EVERY DAY
GAD7 TOTAL SCORE: 12
IF YOU CHECKED OFF ANY PROBLEMS ON THIS QUESTIONNAIRE, HOW DIFFICULT HAVE THESE PROBLEMS MADE IT FOR YOU TO DO YOUR WORK, TAKE CARE OF THINGS AT HOME, OR GET ALONG WITH OTHER PEOPLE: EXTREMELY DIFFICULT
4. TROUBLE RELAXING: SEVERAL DAYS
7. FEELING AFRAID AS IF SOMETHING AWFUL MIGHT HAPPEN: NOT AT ALL
GAD7 TOTAL SCORE: 12
6. BECOMING EASILY ANNOYED OR IRRITABLE: NEARLY EVERY DAY
2. NOT BEING ABLE TO STOP OR CONTROL WORRYING: SEVERAL DAYS
GAD7 TOTAL SCORE: 12

## 2025-09-04 ASSESSMENT — PATIENT HEALTH QUESTIONNAIRE - PHQ9
SUM OF ALL RESPONSES TO PHQ QUESTIONS 1-9: 4
SUM OF ALL RESPONSES TO PHQ QUESTIONS 1-9: 4
10. IF YOU CHECKED OFF ANY PROBLEMS, HOW DIFFICULT HAVE THESE PROBLEMS MADE IT FOR YOU TO DO YOUR WORK, TAKE CARE OF THINGS AT HOME, OR GET ALONG WITH OTHER PEOPLE: SOMEWHAT DIFFICULT

## (undated) DEVICE — LABEL MEDICATION SYSTEM  3304

## (undated) DEVICE — PREP CHLORAPREP 26ML TINTED ORANGE  260815

## (undated) DEVICE — SOL NACL 0.9% IRRIG 1000ML BOTTLE 07138-09

## (undated) DEVICE — GLOVE BIOGEL PI MICRO SZ 5.5 48555

## (undated) DEVICE — SU DERMABOND ADVANCED .7ML DNX12

## (undated) DEVICE — PACK C-SECTION LF PL15OTA83B

## (undated) DEVICE — SU MONOCRYL 0 CT-1 27" Y340H

## (undated) DEVICE — SUCTION MANIFOLD NEPTUNE 2 SYS 1 PORT 702-025-000

## (undated) DEVICE — SOL WATER IRRIG 1000ML BOTTLE 07139-09

## (undated) DEVICE — BLADE CLIPPER 4406

## (undated) DEVICE — ESU LIGASURE OPEN SEALER/DIVIDER SM JAW 16.5MM LF1212A

## (undated) DEVICE — SU VICRYL 0 CT-1 36" J946H

## (undated) DEVICE — Device

## (undated) DEVICE — STOCKING SLEEVE COMPRESSION CALF MED

## (undated) DEVICE — SU MONOCRYL 4-0 PS-2 18" UND Y496G

## (undated) DEVICE — GLOVE BIOGEL PI MICRO INDICATOR UNDERGLOVE SZ 6.0 48960

## (undated) DEVICE — SU PDS II 0 TP-1 60" Z991G

## (undated) DEVICE — CATH TRAY FOLEY SURESTEP 16FR W/URINE MTR STATLK LF A303416A

## (undated) DEVICE — DRAPE SHEET REV FOLD 3/4 9349

## (undated) RX ORDER — DEXAMETHASONE SODIUM PHOSPHATE 4 MG/ML
INJECTION, SOLUTION INTRA-ARTICULAR; INTRALESIONAL; INTRAMUSCULAR; INTRAVENOUS; SOFT TISSUE
Status: DISPENSED
Start: 2024-09-16

## (undated) RX ORDER — ONDANSETRON 2 MG/ML
INJECTION INTRAMUSCULAR; INTRAVENOUS
Status: DISPENSED
Start: 2024-09-16

## (undated) RX ORDER — FENTANYL CITRATE-0.9 % NACL/PF 10 MCG/ML
PLASTIC BAG, INJECTION (ML) INTRAVENOUS
Status: DISPENSED
Start: 2024-09-16

## (undated) RX ORDER — OXYTOCIN/0.9 % SODIUM CHLORIDE 30/500 ML
PLASTIC BAG, INJECTION (ML) INTRAVENOUS
Status: DISPENSED
Start: 2024-09-16

## (undated) RX ORDER — MORPHINE SULFATE 1 MG/ML
INJECTION, SOLUTION EPIDURAL; INTRATHECAL; INTRAVENOUS
Status: DISPENSED
Start: 2024-09-16

## (undated) RX ORDER — KETOROLAC TROMETHAMINE 30 MG/ML
INJECTION, SOLUTION INTRAMUSCULAR; INTRAVENOUS
Status: DISPENSED
Start: 2024-09-16

## (undated) RX ORDER — TRANEXAMIC ACID 10 MG/ML
INJECTION, SOLUTION INTRAVENOUS
Status: DISPENSED
Start: 2024-09-16

## (undated) RX ORDER — FENTANYL CITRATE 50 UG/ML
INJECTION, SOLUTION INTRAMUSCULAR; INTRAVENOUS
Status: DISPENSED
Start: 2024-09-16

## (undated) RX ORDER — PROPOFOL 10 MG/ML
INJECTION, EMULSION INTRAVENOUS
Status: DISPENSED
Start: 2024-09-16